# Patient Record
Sex: MALE | Race: BLACK OR AFRICAN AMERICAN | NOT HISPANIC OR LATINO | Employment: FULL TIME | URBAN - METROPOLITAN AREA
[De-identification: names, ages, dates, MRNs, and addresses within clinical notes are randomized per-mention and may not be internally consistent; named-entity substitution may affect disease eponyms.]

---

## 2018-01-10 DIAGNOSIS — M79.671 BILATERAL FOOT PAIN: Primary | ICD-10-CM

## 2018-01-10 DIAGNOSIS — M79.672 BILATERAL FOOT PAIN: Primary | ICD-10-CM

## 2018-02-07 ENCOUNTER — HOSPITAL ENCOUNTER (OUTPATIENT)
Dept: RADIOLOGY | Facility: HOSPITAL | Age: 43
Discharge: HOME | End: 2018-02-07
Attending: PODIATRIST
Payer: COMMERCIAL

## 2018-02-07 ENCOUNTER — OFFICE VISIT (OUTPATIENT)
Dept: PODIATRY | Facility: CLINIC | Age: 43
End: 2018-02-07
Payer: COMMERCIAL

## 2018-02-07 VITALS
SYSTOLIC BLOOD PRESSURE: 130 MMHG | DIASTOLIC BLOOD PRESSURE: 89 MMHG | HEART RATE: 78 BPM | WEIGHT: 239.5 LBS | RESPIRATION RATE: 16 BRPM | HEIGHT: 72 IN | BODY MASS INDEX: 32.44 KG/M2

## 2018-02-07 DIAGNOSIS — M79.671 BILATERAL FOOT PAIN: ICD-10-CM

## 2018-02-07 DIAGNOSIS — M21.6X9 MIDFOOT COLLAPSE, UNSPECIFIED LATERALITY: ICD-10-CM

## 2018-02-07 DIAGNOSIS — M79.672 BILATERAL FOOT PAIN: ICD-10-CM

## 2018-02-07 DIAGNOSIS — B35.3 TINEA PEDIS OF BOTH FEET: ICD-10-CM

## 2018-02-07 DIAGNOSIS — B35.1 ONYCHOMYCOSIS: ICD-10-CM

## 2018-02-07 DIAGNOSIS — M76.821 TIBIALIS TENDINITIS OF BOTH LOWER EXTREMITIES: ICD-10-CM

## 2018-02-07 DIAGNOSIS — M62.469 GASTROCNEMIUS EQUINUS, UNSPECIFIED LATERALITY: ICD-10-CM

## 2018-02-07 DIAGNOSIS — M76.822 TIBIALIS TENDINITIS OF BOTH LOWER EXTREMITIES: ICD-10-CM

## 2018-02-07 DIAGNOSIS — M72.2 PLANTAR FASCIITIS: Primary | ICD-10-CM

## 2018-02-07 PROCEDURE — 73610 X-RAY EXAM OF ANKLE: CPT | Mod: 26,50,, | Performed by: RADIOLOGY

## 2018-02-07 PROCEDURE — 73630 X-RAY EXAM OF FOOT: CPT | Mod: 26,50,, | Performed by: RADIOLOGY

## 2018-02-07 PROCEDURE — 73610 X-RAY EXAM OF ANKLE: CPT | Mod: 50,TC

## 2018-02-07 PROCEDURE — 73630 X-RAY EXAM OF FOOT: CPT | Mod: 50,TC

## 2018-02-07 PROCEDURE — 99213 OFFICE O/P EST LOW 20 MIN: CPT | Mod: 25 | Performed by: PODIATRIST

## 2018-02-07 PROCEDURE — 3008F BODY MASS INDEX DOCD: CPT | Mod: S$GLB,,, | Performed by: PODIATRIST

## 2018-02-07 PROCEDURE — 99999 PR PBB SHADOW E&M-EST. PATIENT-LVL III: CPT | Mod: PBBFAC,,, | Performed by: PODIATRIST

## 2018-02-07 PROCEDURE — 99203 OFFICE O/P NEW LOW 30 MIN: CPT | Mod: 25,S$GLB,, | Performed by: PODIATRIST

## 2018-02-07 RX ORDER — NABUMETONE 500 MG/1
750 TABLET, FILM COATED ORAL 3 TIMES DAILY
COMMUNITY
End: 2018-03-22 | Stop reason: ALTCHOICE

## 2018-02-07 RX ORDER — NAFTIFINE HYDROCHLORIDE 20 MG/G
1 GEL TOPICAL DAILY
Qty: 45 G | Refills: 3 | Status: SHIPPED | OUTPATIENT
Start: 2018-02-07 | End: 2020-01-15

## 2018-02-07 RX ORDER — METOPROLOL SUCCINATE 100 MG/1
100 TABLET, EXTENDED RELEASE ORAL DAILY
COMMUNITY
End: 2018-05-03

## 2018-02-07 RX ORDER — AMOXICILLIN 500 MG
1 CAPSULE ORAL DAILY
COMMUNITY
End: 2018-05-03

## 2018-02-07 RX ORDER — ASPIRIN 81 MG/1
81 TABLET ORAL DAILY
Status: ON HOLD | COMMUNITY
End: 2019-04-16 | Stop reason: HOSPADM

## 2018-02-07 RX ORDER — GABAPENTIN 600 MG/1
600 TABLET ORAL 2 TIMES DAILY
COMMUNITY
End: 2020-01-15

## 2018-02-07 RX ORDER — LISINOPRIL 20 MG/1
20 TABLET ORAL DAILY
COMMUNITY
End: 2018-08-15 | Stop reason: SDUPTHER

## 2018-02-07 NOTE — PROGRESS NOTES
Office Visit 2/7/2018  Last encounter in this department: Visit date not found    Subjective:      Patient ID: Monica Johnson is a 42 y.o. male.    Chief Complaint: Flat Foot and Foot Pain (Bilateral foot pain with possible Planter Fasciitis, Pt rates pain 10/10 when first waking and ambulating, and a 7/10 for the rest of the day, Pt states that he has had this problem since childhood, Pt also states that he is Hypoglycemic, Lst visit with PCP at [facility] on 1/15/18)    Monica Johnson is a 42 y.o. year-old male presenting to podiatry clinic with complaint of bilateral medial and plantar  rearfoot and midfoot pain. Patient describes pain as 6/10 shooting sharp, aching, throbbing, burning, stabbing and numb tingling type pain that has been ongoing for the past 1 years and has worsened. The pain is worse with pressure, increased ambulation, prolonged standing and some shoe gear. The patient also relates he notices the pain on his heel worse on the first steps in the morning. Monica Johnson had not sought any medical attention until now. The only thing that gives true relief is staying non-weightbearing with no pressure on the foot. He does not recall any injuries or inciting events leading to this problem. He does note that he has always had flatfeet all his life and was told that he had chronic sprains on the left ankle as a child growing up.  He also suffers from osteoarthritis in multiple joints and does take Relafen on a regular basis.  He also notices some itching and burning on his feet in between his toe from time to time and has known some scaling peeling to the bottom of his feet which has been ongoing for several years.          Review of Systems   Constitution: Negative for chills and fever.   Cardiovascular: Negative for chest pain.   Respiratory: Negative for shortness of breath.    Gastrointestinal: Negative for nausea and vomiting.           Objective:      Physical Exam    Constitutional: He is oriented to person, place, and time. He appears well-developed and well-nourished. No distress.   Cardiovascular:   Pulses:       Dorsalis pedis pulses are 2+ on the right side, and 2+ on the left side.        Posterior tibial pulses are 2+ on the right side, and 2+ on the left side.   Capillary fill time 3 seconds to digits bilateral feet.   Musculoskeletal:   Lower extremities:    Deformities: Supple pes planovalgus posture bilaterally.     4/5 plantarflexion inversion bilaterally.   5/5 muscle strength and tone in remaining quadrants bilaterally.     Some pulling discomfort on maximal ankle joint dorsiflexion with some limitation bilateral feet.    No pain on side to side compression of the calcaneal body bilateral feet.    Primary area of pain to palpation is the plantar medial calcaneal tubercle bilateral feet and mild pain on palpation along the plantar rim of the calcaneus.     Pain on palpation: medial navicular bilaterally.   Neurological: He is alert and oriented to person, place, and time. He displays no Babinski's sign on the right side. He displays no Babinski's sign on the left side.   Plantar protective threshold sensation by touch via 5.07 SWMF present bilaterally.    Skin:   Lower extremities:    Normal turgor, texture, temperature bilaterally. Digital hair present bilaterally. Warm equally bilaterally.   Mild fullness at the plantarmedial heel bilateral feet.     No bilateral varicosities, pigmentary changes.    No wounds, drainage were noted.    Malodor: None  Erythema: None  Interdigital maceration: webspaces 3,4 bilaterally.     Scaling in moccasin distribution bilaterally. Xerosis bilaterally.    Nails are thick dystrophic and discolored bilaterally x10.     Psychiatric: He has a normal mood and affect.   Nursing note and vitals reviewed.            X-rays: no fracture or dislocation noted, low arch height.    Assessment:       Encounter Diagnoses   Name Primary?     Plantar fasciitis Yes    Tibialis tendinitis of both lower extremities     Gastrocnemius equinus, unspecified laterality     Midfoot collapse, unspecified laterality     Tinea pedis of both feet     Onychomycosis          Plan:       Monica was seen today for flat foot and foot pain.    Diagnoses and all orders for this visit:    Plantar fasciitis    Tibialis tendinitis of both lower extremities    Gastrocnemius equinus, unspecified laterality    Midfoot collapse, unspecified laterality    Tinea pedis of both feet    Onychomycosis    Other orders  -     naftifine 2 % Gel; Apply 1 application topically once daily.  -     efinaconazole (JUBLIA) 10 % Fredis; Apply 1 application topically once daily.      I counseled the patient on his conditions, their implications and medical management.    Patient was given written instructions on maintenance care for athlete's foot and mycotic nails. The need for proper skin care in order to prevent infection and colonization in the nails was explained to the patient. Discuss treatment options for nail fungus.  I explained that fungus lives in a warm dark moist environment and therefore patient should make every attempt to keep feet clean and dry.  We discussed drying feet thoroughly after shower particularly between the toes and then applying powder between the toes and in the shoes.  For the nails, patient was prescribed  Jublia to be applied daily with filing in between applications. We discussed oral Lamisil but I did not recommend it as a first line of treatment since it is an internal medicine that may potentially have side effects, including liver problems. Patient elects for topical treatment.     For the athletes foot, patient was prescribed Naftin gel to apply between the toes and to the bottoms of feet daily. In addition, recommendations were made to spray and disinfect shoes with Lysol and to alternate shoes. Also, for excessive sweating patient was instructed to use  Arid Extra Dry spray on the bottom of the feet with Tinactin powder in the digital interspaces.     Patient was educated and counseled regarding tibial tendinitis. At this time, the patient agreed to proceed with conservative treatment for relative support and immobilization. Until symptoms improve, the patient was instructed to limit ambulation or activities on uneven surfaces that may overwork and stress the affected tendon. Patient was also given recommendations for motion controlling inserts with medial heel wedge or ankle brace and supportive shoes for long term maintenance. If pain persists we may also consider MRI imaging to evaluate the quality of the tendon. We also discussed more aggressive bracing with high top shoe and possible surgical tendon decompression and debridement.    Patient was educated regarding current condition and x-rays were reviewed. Maintenance care consisting of the use of supportive shoe gear at all times, orthotic inserts, stretching, and icing was emphasized to the patient. Patient wished to defer injection today.     Recommendations were given to the patient on powerstep over-the-counter orthotic inserts and motion controlling supportive shoe gear along with written instructions on stretching exercises. The patient was instructed to avoid going barefoot and to return with recommended shoes and insoles to be adjusted and modified as needed.  The patient was also guided on the use of anti-inflammatories for pain and inflammation to be discontinued if any stomach irritation occurs. He may continue his relafen.        .

## 2018-02-07 NOTE — PATIENT INSTRUCTIONS
Wear recommended shoes and insoles and perform stretches and exercises as directed. Discontinue medication if any stomach irritation occurs. Return with shoes and insoles on follow up for adjustments as needed.        ATHLETES FOOT (Tinea Pedis) or FUNGAL (Mycotic) NAILS    Athlete's foot is a fungal infection that develops in the moist areas between your toes and sometimes on other parts of your foot. Athlete's foot usually causes itching, stinging, burning and may also present with scaling and maceration.  When the feet or other areas of the body stay moist, warm, and irritated, this fungus can thrive and infect the upper layer of the skin.  The fungus sometimes also infects the nails as well and usually presents as a discolored, thickened or distorted toenail which the patient has noticed for a period of weeks, months, or years, and may involve one or all of the nails. Because the fungus lives under the nail, it is very difficult to eradicate germ completely.  Fungus toenails are very recalcitrant to treatment and, at best, the patients can hope to control the problem from spreading or diminish the effect of the germ on the nail. Because the germ is present in the environment and something patients are exposed to every day, the chances of completely eradication the fungus nail problem are not great.    Both oral and topical treatments of the fungus are available.  The oral treatments may have negative effects on the liver, so blood work is needed to begin oral therapy.  Even once the fungus has been eradicated, maintenance is required to prevent re-infection.  Topical treatment is also available, but requires persistence and diligence to see effective results.      My recommendations for severe athletes foot are as follows:    - Twice daily application of topical antifungal cream or gel after thorough cleansing and drying.  - If odor and excessive sweating of the feet are a problem an anti-perspirant spray  should be applied to the bottoms of the feet followed by an antifungal powder or lambswool between the toes every day in the morning.  - Also, shoes should be sprayed with a disinfectant after use and allowed to dry for at least 24 hours, therefore shoes should be alternated and not worn on consecutive days.    - In addition, if fungal nails are present, a topical nail lacquer may be applied twice daily with filing of the nail prior to each new coat of medication for better penetration.    Not all medications are effective on every type of fungus, so if problems persist you may need alterations or adjustments to your medication or maintenance regiment.               PAMELA [email or web address].      Apply nail medication daily as directed with filing of nail prior to application.

## 2018-03-22 ENCOUNTER — OFFICE VISIT (OUTPATIENT)
Dept: PODIATRY | Facility: CLINIC | Age: 43
End: 2018-03-22
Payer: COMMERCIAL

## 2018-03-22 VITALS
BODY MASS INDEX: 31.75 KG/M2 | SYSTOLIC BLOOD PRESSURE: 124 MMHG | HEIGHT: 72 IN | WEIGHT: 234.44 LBS | DIASTOLIC BLOOD PRESSURE: 75 MMHG | HEART RATE: 93 BPM

## 2018-03-22 DIAGNOSIS — M76.821 TIBIALIS TENDINITIS OF BOTH LOWER EXTREMITIES: ICD-10-CM

## 2018-03-22 DIAGNOSIS — B35.3 TINEA PEDIS OF BOTH FEET: ICD-10-CM

## 2018-03-22 DIAGNOSIS — B35.1 ONYCHOMYCOSIS: ICD-10-CM

## 2018-03-22 DIAGNOSIS — M21.6X9 MIDFOOT COLLAPSE, UNSPECIFIED LATERALITY: ICD-10-CM

## 2018-03-22 DIAGNOSIS — F17.200 SMOKER: Primary | ICD-10-CM

## 2018-03-22 DIAGNOSIS — M62.469 GASTROCNEMIUS EQUINUS, UNSPECIFIED LATERALITY: ICD-10-CM

## 2018-03-22 DIAGNOSIS — M76.822 TIBIALIS TENDINITIS OF BOTH LOWER EXTREMITIES: ICD-10-CM

## 2018-03-22 DIAGNOSIS — M72.2 PLANTAR FASCIITIS: ICD-10-CM

## 2018-03-22 PROCEDURE — 99999 PR PBB SHADOW E&M-EST. PATIENT-LVL III: CPT | Mod: PBBFAC,,, | Performed by: PODIATRIST

## 2018-03-22 PROCEDURE — 20550 NJX 1 TENDON SHEATH/LIGAMENT: CPT | Mod: 50,S$GLB,, | Performed by: PODIATRIST

## 2018-03-22 PROCEDURE — 99214 OFFICE O/P EST MOD 30 MIN: CPT | Mod: 25,S$GLB,, | Performed by: PODIATRIST

## 2018-03-22 RX ORDER — ESCITALOPRAM OXALATE 20 MG/1
TABLET ORAL
COMMUNITY
Start: 2016-12-27 | End: 2018-05-03

## 2018-03-22 RX ORDER — NAPROXEN 500 MG/1
500 TABLET ORAL 2 TIMES DAILY WITH MEALS
Qty: 60 TABLET | Refills: 1 | Status: SHIPPED | OUTPATIENT
Start: 2018-03-22 | End: 2018-04-21

## 2018-03-22 RX ORDER — DEXAMETHASONE SODIUM PHOSPHATE 4 MG/ML
1 INJECTION, SOLUTION INTRA-ARTICULAR; INTRALESIONAL; INTRAMUSCULAR; INTRAVENOUS; SOFT TISSUE ONCE
Status: COMPLETED | OUTPATIENT
Start: 2018-03-22 | End: 2018-03-22

## 2018-03-22 RX ORDER — TRIAMCINOLONE ACETONIDE 40 MG/ML
20 INJECTION, SUSPENSION INTRA-ARTICULAR; INTRAMUSCULAR ONCE
Status: COMPLETED | OUTPATIENT
Start: 2018-03-22 | End: 2018-03-22

## 2018-03-22 RX ADMIN — DEXAMETHASONE SODIUM PHOSPHATE 1 MG: 4 INJECTION, SOLUTION INTRA-ARTICULAR; INTRALESIONAL; INTRAMUSCULAR; INTRAVENOUS; SOFT TISSUE at 07:03

## 2018-03-22 RX ADMIN — TRIAMCINOLONE ACETONIDE 20 MG: 40 INJECTION, SUSPENSION INTRA-ARTICULAR; INTRAMUSCULAR at 07:03

## 2018-03-22 NOTE — PROGRESS NOTES
Office Visit 3/22/2018  Last encounter in this department: 2/7/2018    Subjective:      Patient ID: Monica Johnson is a 42 y.o. male.    Chief Complaint: Follow-up (PCP N/A, 1 month F/U possible injection Pt states that the pain has worsened and is now in both feet and now has pain that radiates from the feet up to his knees. Rates pain 8/10 and describes it as a sharp, shooting stabbing pain and states that it is constant. He states that he is not a diabetic and is wearing slippers.)    Monica Johnson is a 42 y.o. year-old male following up for bilateral heel and medial midfoot pain. Patient now rates pain as 8/10 shooting  sharp and stabbing that has worsened. The pain is worse with pressure, increased ambulation, prolonged standing and some shoe gear. The patient also relates he did purchase the recommended insoles and shoes but the shoes are on order and have not come in yet.  He has also been continuing with maintenance care and following directions for his athlete's foot and nail fungus.  He states he is in need of refills for his nail fungus medication.  Because of persistent pain he inquires about injection for his heel pain today.  He has been continuing stretching and has been using his pressure relief insoles but continues to have pain and discomfort.  He has also been taking his Relafen prescribed to him by his nurse practitioner for lower osteoarthritis which he has had for years now.  He does not feel that it is helping him very much and is open to trying an alternative.        Review of Systems   Constitution: Negative for chills and fever.   Cardiovascular: Negative for chest pain.   Respiratory: Negative for shortness of breath.    Gastrointestinal: Negative for nausea and vomiting.           Objective:      Physical Exam   Constitutional: He is oriented to person, place, and time. He appears well-developed and well-nourished. No distress.   Cardiovascular:   Pulses:       Dorsalis pedis pulses are  2+ on the right side, and 2+ on the left side.        Posterior tibial pulses are 2+ on the right side, and 2+ on the left side.   Capillary fill time 3 seconds to digits bilateral feet.   Musculoskeletal:   Lower extremities:    Deformities: Supple pes planovalgus posture bilaterally.     4/5 plantarflexion inversion bilaterally.   5/5 muscle strength and tone in remaining quadrants bilaterally.     Some pulling discomfort on maximal ankle joint dorsiflexion with some limitation bilateral feet.    No pain on side to side compression of the calcaneal body bilateral feet.    Primary area of pain to palpation is the plantar medial calcaneal tubercle bilateral feet and mild pain on palpation along the plantar rim of the calcaneus.     Pain on palpation: medial navicular bilaterally.   Neurological: He is alert and oriented to person, place, and time. He displays no Babinski's sign on the right side. He displays no Babinski's sign on the left side.   Plantar protective threshold sensation by touch via 5.07 SWMF present bilaterally.    Skin:   Lower extremities:    Normal turgor, texture, temperature bilaterally. Digital hair present bilaterally. Warm equally bilaterally.   Mild fullness at the plantarmedial heel bilateral feet.     No bilateral varicosities, pigmentary changes.    No wounds, drainage were noted.    Malodor: None  Erythema: None  Interdigital maceration: webspaces 3,4 bilaterally.     Scaling in moccasin distribution bilaterally. Xerosis bilaterally.    Nails are thick dystrophic and discolored bilaterally x10.     Psychiatric: He has a normal mood and affect.   Nursing note and vitals reviewed.                Assessment:       Encounter Diagnoses   Name Primary?    Smoker Yes    Plantar fasciitis     Tibialis tendinitis of both lower extremities     Gastrocnemius equinus, unspecified laterality     Midfoot collapse, unspecified laterality     Tinea pedis of both feet     Onychomycosis           Plan:       Moinca was seen today for follow-up.    Diagnoses and all orders for this visit:    Smoker  -     Ambulatory referral to Smoking Cessation Program    Plantar fasciitis  -     Ambulatory Referral to Physical/Occupational Therapy    Tibialis tendinitis of both lower extremities  -     Ambulatory Referral to Physical/Occupational Therapy    Gastrocnemius equinus, unspecified laterality  -     Ambulatory Referral to Physical/Occupational Therapy    Midfoot collapse, unspecified laterality  -     Ambulatory Referral to Physical/Occupational Therapy    Tinea pedis of both feet    Onychomycosis    Other orders  -     naproxen (NAPROSYN) 500 MG tablet; Take 1 tablet (500 mg total) by mouth 2 (two) times daily with meals.  -     dexamethasone injection 1 mg; Inject 0.25 mLs (1 mg total) into the muscle once.  -     triamcinolone acetonide injection 20 mg; Inject 0.5 mLs (20 mg total) into the muscle once.      I counseled the patient on his conditions, their implications and medical management.    For the athletes foot, patient will continue to apply naftin gel between the toes and to the bottoms of feet daily and keep webspace dry. In addition, recommendations were made to spray and disinfect shoes with Lysol and to alternate shoes. Also, for excessive sweating patient was instructed to use Arid Extra Dry spray on the bottom of the feet with Tinactin powder in the digital interspaces    For mycotic nails patient will continue, Jublia nail lacquer to be applied daily with filing in between applications. We also discussed drying feet thoroughly after shower particularly between the toes and then applying powder between the toes and in the shoes.      Plantar fasciitis with bursitis both heels. Once again emphasized the importance of the use of supportive shoe gear at all times, powersteps orthotic inserts, stretching, and icing.     Because of the persistent pain, the patient did wish to proceed with injection  today.     Procedure: 1st injection was administered to the both heels utilizing a mixture of one mL 1% lidocaine plain, quarter mL of dexamethasone phosphate, and half mL of Kenalog 40.    The patient was also guided on the use of anti-inflammatories for pain and inflammation to be discontinued if any stomach irritation occurs. He will discontinue relafen and try naproxen prescribed today.    Because of continued pain and limitation in range of motion patient was referred to physical therapy.

## 2018-03-22 NOTE — PATIENT INSTRUCTIONS
Mild compression wrap to aspiration/injection site and limit activities over the next 3 days.    Physical therapy as prescribed.

## 2018-04-11 ENCOUNTER — CLINICAL SUPPORT (OUTPATIENT)
Dept: SMOKING CESSATION | Facility: CLINIC | Age: 43
End: 2018-04-11
Payer: COMMERCIAL

## 2018-04-11 VITALS — SYSTOLIC BLOOD PRESSURE: 143 MMHG | DIASTOLIC BLOOD PRESSURE: 90 MMHG

## 2018-04-11 DIAGNOSIS — F17.210 NICOTINE DEPENDENCE, CIGARETTES, UNCOMPLICATED: Primary | ICD-10-CM

## 2018-04-11 PROCEDURE — 99404 PREV MED CNSL INDIV APPRX 60: CPT | Mod: S$GLB,,, | Performed by: INTERNAL MEDICINE

## 2018-04-11 PROCEDURE — 99999 PR PBB SHADOW E&M-EST. PATIENT-LVL II: CPT | Mod: PBBFAC,,,

## 2018-04-11 RX ORDER — IBUPROFEN 200 MG
1 TABLET ORAL DAILY
Qty: 28 PATCH | Refills: 0 | Status: SHIPPED | OUTPATIENT
Start: 2018-04-11 | End: 2019-04-22

## 2018-04-13 DIAGNOSIS — M25.561 PAIN IN BOTH KNEES, UNSPECIFIED CHRONICITY: Primary | ICD-10-CM

## 2018-04-13 DIAGNOSIS — M25.562 PAIN IN BOTH KNEES, UNSPECIFIED CHRONICITY: Primary | ICD-10-CM

## 2018-04-24 ENCOUNTER — TELEPHONE (OUTPATIENT)
Dept: SMOKING CESSATION | Facility: CLINIC | Age: 43
End: 2018-04-24

## 2018-04-24 NOTE — TELEPHONE ENCOUNTER
Attempted to contact patient regarding missed sessions, Left message with name Lew Saxena and return phone number [phone].

## 2018-05-03 ENCOUNTER — OFFICE VISIT (OUTPATIENT)
Dept: PODIATRY | Facility: CLINIC | Age: 43
End: 2018-05-03
Payer: COMMERCIAL

## 2018-05-03 ENCOUNTER — OFFICE VISIT (OUTPATIENT)
Dept: INTERNAL MEDICINE | Facility: CLINIC | Age: 43
End: 2018-05-03
Payer: COMMERCIAL

## 2018-05-03 VITALS
SYSTOLIC BLOOD PRESSURE: 137 MMHG | WEIGHT: 236.44 LBS | BODY MASS INDEX: 32.03 KG/M2 | HEART RATE: 91 BPM | HEIGHT: 72 IN | DIASTOLIC BLOOD PRESSURE: 86 MMHG

## 2018-05-03 VITALS
OXYGEN SATURATION: 97 % | SYSTOLIC BLOOD PRESSURE: 136 MMHG | HEIGHT: 72 IN | BODY MASS INDEX: 32.01 KG/M2 | WEIGHT: 236.31 LBS | HEART RATE: 80 BPM | DIASTOLIC BLOOD PRESSURE: 80 MMHG | TEMPERATURE: 97 F

## 2018-05-03 DIAGNOSIS — M72.2 PLANTAR FASCIITIS: ICD-10-CM

## 2018-05-03 DIAGNOSIS — M76.822 TIBIALIS TENDINITIS OF BOTH LOWER EXTREMITIES: ICD-10-CM

## 2018-05-03 DIAGNOSIS — M19.90 OSTEOARTHRITIS, UNSPECIFIED OSTEOARTHRITIS TYPE, UNSPECIFIED SITE: ICD-10-CM

## 2018-05-03 DIAGNOSIS — M62.469 GASTROCNEMIUS EQUINUS, UNSPECIFIED LATERALITY: ICD-10-CM

## 2018-05-03 DIAGNOSIS — M76.821 TIBIALIS TENDINITIS OF BOTH LOWER EXTREMITIES: ICD-10-CM

## 2018-05-03 DIAGNOSIS — Z00.00 ANNUAL PHYSICAL EXAM: Primary | ICD-10-CM

## 2018-05-03 DIAGNOSIS — M72.2 PLANTAR FASCIITIS: Primary | ICD-10-CM

## 2018-05-03 DIAGNOSIS — F43.0 STRESS REACTION: ICD-10-CM

## 2018-05-03 DIAGNOSIS — I10 ESSENTIAL HYPERTENSION: ICD-10-CM

## 2018-05-03 PROBLEM — M54.32 SCIATICA OF LEFT SIDE: Status: ACTIVE | Noted: 2017-01-31

## 2018-05-03 PROCEDURE — 99386 PREV VISIT NEW AGE 40-64: CPT | Mod: S$GLB,,, | Performed by: FAMILY MEDICINE

## 2018-05-03 PROCEDURE — 3079F DIAST BP 80-89 MM HG: CPT | Mod: CPTII,S$GLB,, | Performed by: PODIATRIST

## 2018-05-03 PROCEDURE — 3079F DIAST BP 80-89 MM HG: CPT | Mod: CPTII,S$GLB,, | Performed by: FAMILY MEDICINE

## 2018-05-03 PROCEDURE — 99213 OFFICE O/P EST LOW 20 MIN: CPT | Mod: 25,S$GLB,, | Performed by: PODIATRIST

## 2018-05-03 PROCEDURE — 3075F SYST BP GE 130 - 139MM HG: CPT | Mod: CPTII,S$GLB,, | Performed by: PODIATRIST

## 2018-05-03 PROCEDURE — 20550 NJX 1 TENDON SHEATH/LIGAMENT: CPT | Mod: LT,S$GLB,, | Performed by: PODIATRIST

## 2018-05-03 PROCEDURE — 3008F BODY MASS INDEX DOCD: CPT | Mod: CPTII,S$GLB,, | Performed by: PODIATRIST

## 2018-05-03 PROCEDURE — 3075F SYST BP GE 130 - 139MM HG: CPT | Mod: CPTII,S$GLB,, | Performed by: FAMILY MEDICINE

## 2018-05-03 PROCEDURE — 3008F BODY MASS INDEX DOCD: CPT | Mod: CPTII,S$GLB,, | Performed by: FAMILY MEDICINE

## 2018-05-03 PROCEDURE — 99999 PR PBB SHADOW E&M-EST. PATIENT-LVL III: CPT | Mod: PBBFAC,,, | Performed by: FAMILY MEDICINE

## 2018-05-03 PROCEDURE — 99999 PR PBB SHADOW E&M-EST. PATIENT-LVL III: CPT | Mod: PBBFAC,,, | Performed by: PODIATRIST

## 2018-05-03 RX ORDER — DILTIAZEM HYDROCHLORIDE 120 MG/1
120 TABLET, FILM COATED ORAL 4 TIMES DAILY
COMMUNITY
End: 2018-08-15 | Stop reason: SDUPTHER

## 2018-05-03 RX ORDER — NAPROXEN 500 MG/1
500 TABLET ORAL 2 TIMES DAILY WITH MEALS
Qty: 60 TABLET | Refills: 1 | Status: SHIPPED | OUTPATIENT
Start: 2018-05-03 | End: 2018-05-03

## 2018-05-03 RX ORDER — TRIAMCINOLONE ACETONIDE 40 MG/ML
20 INJECTION, SUSPENSION INTRA-ARTICULAR; INTRAMUSCULAR ONCE
Status: COMPLETED | OUTPATIENT
Start: 2018-05-03 | End: 2018-05-03

## 2018-05-03 RX ORDER — DEXAMETHASONE SODIUM PHOSPHATE 4 MG/ML
1 INJECTION, SOLUTION INTRA-ARTICULAR; INTRALESIONAL; INTRAMUSCULAR; INTRAVENOUS; SOFT TISSUE ONCE
Status: COMPLETED | OUTPATIENT
Start: 2018-05-03 | End: 2018-05-03

## 2018-05-03 RX ADMIN — DEXAMETHASONE SODIUM PHOSPHATE 1 MG: 4 INJECTION, SOLUTION INTRA-ARTICULAR; INTRALESIONAL; INTRAMUSCULAR; INTRAVENOUS; SOFT TISSUE at 09:05

## 2018-05-03 RX ADMIN — TRIAMCINOLONE ACETONIDE 20 MG: 40 INJECTION, SUSPENSION INTRA-ARTICULAR; INTRAMUSCULAR at 09:05
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
## 2018-05-05 NOTE — PROGRESS NOTES
Office Visit Last encounter in this department: 3/22/2018    Subjective:      Patient ID: Monica Johnson is a 42 y.o. male.    Chief Complaint: follow up (plantar fasciitis (bilateral), tendonitis (left foot),patient rated curent pain at a 4, patient is accomapanied by his wife )    Monica Johnson returns to clinic today for followup of pain, both heels.  Patient reports continued 4/10 sharp pain on first steps in the morning and late in the day despite physical therapy, injection, motion control shoes, supportive insoles, stretching and oral anti inflammatories as directed.  He relates that the injection did work remarkably well for quite some time but about 2 weeks ago he had flareup of pain in both his heels.  Patient returns today with new recommended shoes and insoles.       Review of Systems   Constitution: Negative for chills and fever.   Cardiovascular: Negative for chest pain.   Respiratory: Negative for shortness of breath.    Gastrointestinal: Negative for nausea and vomiting.           Objective:      Physical Exam   Constitutional: He is oriented to person, place, and time. He appears well-developed and well-nourished. No distress.   Cardiovascular:   Pulses:       Dorsalis pedis pulses are 2+ on the right side, and 2+ on the left side.        Posterior tibial pulses are 2+ on the right side, and 2+ on the left side.   Capillary fill time 3 seconds to digits bilateral feet.   Musculoskeletal:   Lower extremities:    Deformities: Supple pes planovalgus posture bilaterally.     4/5 plantarflexion inversion bilaterally.   5/5 muscle strength and tone in remaining quadrants bilaterally.     Some pulling discomfort on maximal ankle joint dorsiflexion with some limitation bilateral feet.    pain on side to side compression of the calcaneal body right.    Primary area of pain to palpation is the plantar medial calcaneal tubercle bilateral feet and mild pain on palpation along the plantar rim of the calcaneus.      Pain on palpation: medial navicular bilaterally.   Neurological: He is alert and oriented to person, place, and time. He displays no Babinski's sign on the right side. He displays no Babinski's sign on the left side.   Plantar protective threshold sensation by touch via 5.07 SWMF present bilaterally.    Skin:   Lower extremities:    Normal turgor, texture, temperature bilaterally. Digital hair present bilaterally. Warm equally bilaterally.   Mild fullness at the plantarmedial heel bilateral feet.     No bilateral varicosities, pigmentary changes.    No wounds, drainage were noted.    Malodor: None  Erythema: None  Interdigital maceration: webspaces 3,4 bilaterally.     Scaling in moccasin distribution bilaterally. Xerosis bilaterally.    Nails are thick dystrophic and discolored bilaterally x10.     Psychiatric: He has a normal mood and affect.   Nursing note and vitals reviewed.                Assessment:       Encounter Diagnoses   Name Primary?    Plantar fasciitis Yes    Tibialis tendinitis of both lower extremities     Gastrocnemius equinus, unspecified laterality     Stress reaction          Plan:       Monica was seen today for follow up.    Diagnoses and all orders for this visit:    Plantar fasciitis    Tibialis tendinitis of both lower extremities    Gastrocnemius equinus, unspecified laterality    Stress reaction    Other orders  -     Discontinue: naproxen (NAPROSYN) 500 MG tablet; Take 1 tablet (500 mg total) by mouth 2 (two) times daily with meals.  -     dexamethasone injection 1 mg; Inject 0.25 mLs (1 mg total) into the muscle once.  -     triamcinolone acetonide injection 20 mg; Inject 0.5 mLs (20 mg total) into the muscle once.      I counseled the patient on his conditions, their implications and medical management.    Findings were reviewed and explained condition to the patient with visual reference to the foot and x-rays. I explained to the patient that there were signs indicating early  stages of developing a stress fracture. I emphasized the importance of remaining nonweightbearing to prevent a through and through fracture. Camwalker was dispensed to offload pressure over the area of the stress reaction right foot.     Plantar fasciitis with acquired ankle joint equinus left foot. Once again emphasized the importance of the use of supportive shoe gear at all times, NB orthotic inserts, stretching, and icing.     Because of the persistent pain, the patient did wish to proceed with injection today.     Procedure: 2nd injection was administered to the left foot utilizing a mixture of one mL 1% lidocaine plain, quarter mL of dexamethasone phosphate, and half mL of Kenalog 40.    The patient was also guided on the use of anti-inflammatories for pain and inflammation to be discontinued if any stomach irritation occurs.    .

## 2018-05-07 ENCOUNTER — LAB VISIT (OUTPATIENT)
Dept: LAB | Facility: HOSPITAL | Age: 43
End: 2018-05-07
Attending: FAMILY MEDICINE
Payer: COMMERCIAL

## 2018-05-07 DIAGNOSIS — Z00.00 ANNUAL PHYSICAL EXAM: ICD-10-CM

## 2018-05-07 LAB
ALBUMIN SERPL BCP-MCNC: 4 G/DL
ALP SERPL-CCNC: 55 U/L
ALT SERPL W/O P-5'-P-CCNC: 58 U/L
ANION GAP SERPL CALC-SCNC: 9 MMOL/L
AST SERPL-CCNC: 45 U/L
BASOPHILS # BLD AUTO: 0.13 K/UL
BASOPHILS NFR BLD: 1.2 %
BILIRUB SERPL-MCNC: 0.3 MG/DL
BUN SERPL-MCNC: 16 MG/DL
CALCIUM SERPL-MCNC: 10 MG/DL
CHLORIDE SERPL-SCNC: 104 MMOL/L
CHOLEST SERPL-MCNC: 177 MG/DL
CHOLEST/HDLC SERPL: 4.7 {RATIO}
CO2 SERPL-SCNC: 28 MMOL/L
CREAT SERPL-MCNC: 1.2 MG/DL
DIFFERENTIAL METHOD: ABNORMAL
EOSINOPHIL # BLD AUTO: 0.4 K/UL
EOSINOPHIL NFR BLD: 4 %
ERYTHROCYTE [DISTWIDTH] IN BLOOD BY AUTOMATED COUNT: 14.8 %
EST. GFR  (AFRICAN AMERICAN): >60 ML/MIN/1.73 M^2
EST. GFR  (NON AFRICAN AMERICAN): >60 ML/MIN/1.73 M^2
GLUCOSE SERPL-MCNC: 84 MG/DL
HCT VFR BLD AUTO: 43.8 %
HDLC SERPL-MCNC: 38 MG/DL
HDLC SERPL: 21.5 %
HGB BLD-MCNC: 13.9 G/DL
IMM GRANULOCYTES # BLD AUTO: 0.05 K/UL
IMM GRANULOCYTES NFR BLD AUTO: 0.5 %
LDLC SERPL CALC-MCNC: 109.2 MG/DL
LYMPHOCYTES # BLD AUTO: 5 K/UL
LYMPHOCYTES NFR BLD: 47.7 %
MCH RBC QN AUTO: 28.1 PG
MCHC RBC AUTO-ENTMCNC: 31.7 G/DL
MCV RBC AUTO: 89 FL
MONOCYTES # BLD AUTO: 0.9 K/UL
MONOCYTES NFR BLD: 8.1 %
NEUTROPHILS # BLD AUTO: 4.1 K/UL
NEUTROPHILS NFR BLD: 38.5 %
NONHDLC SERPL-MCNC: 139 MG/DL
NRBC BLD-RTO: 0 /100 WBC
PLATELET # BLD AUTO: 307 K/UL
PMV BLD AUTO: 10.5 FL
POTASSIUM SERPL-SCNC: 3.9 MMOL/L
PROT SERPL-MCNC: 7.2 G/DL
RBC # BLD AUTO: 4.94 M/UL
SODIUM SERPL-SCNC: 141 MMOL/L
TRIGL SERPL-MCNC: 149 MG/DL
WBC # BLD AUTO: 10.54 K/UL

## 2018-05-07 PROCEDURE — 36415 COLL VENOUS BLD VENIPUNCTURE: CPT

## 2018-05-07 PROCEDURE — 80053 COMPREHEN METABOLIC PANEL: CPT

## 2018-05-07 PROCEDURE — 80061 LIPID PANEL: CPT

## 2018-05-07 PROCEDURE — 85025 COMPLETE CBC W/AUTO DIFF WBC: CPT

## 2018-05-08 ENCOUNTER — TELEPHONE (OUTPATIENT)
Dept: INTERNAL MEDICINE | Facility: CLINIC | Age: 43
End: 2018-05-08

## 2018-05-08 DIAGNOSIS — R79.89 ELEVATED LIVER FUNCTION TESTS: Primary | ICD-10-CM

## 2018-05-08 NOTE — PROGRESS NOTES
Call patient with labs  Normal glucose  Liver function is elevated.  He will need to have follow up Hepatitis Panel and liver US.   Cholesterol is fine  CBC is unremarkable.     I will order the follow up tests.  Ask if he drank ETOH prior to the testing.     FOllow up labs ordered, schedule him for this and the liver US>

## 2018-05-08 NOTE — TELEPHONE ENCOUNTER
----- Message from Kenya Escoto MD sent at 5/8/2018  8:04 AM CDT -----  Call patient with labs  Normal glucose  Liver function is elevated.  He will need to have follow up Hepatitis Panel and liver US.   Cholesterol is fine  CBC is unremarkable.     I will order the follow up tests.  Ask if he drank ETOH prior to the testing.     FOllow up labs ordered, schedule him for this and the liver US>

## 2018-05-10 ENCOUNTER — TELEPHONE (OUTPATIENT)
Dept: RADIOLOGY | Facility: HOSPITAL | Age: 43
End: 2018-05-10

## 2018-05-10 ENCOUNTER — LAB VISIT (OUTPATIENT)
Dept: LAB | Facility: HOSPITAL | Age: 43
End: 2018-05-10
Payer: COMMERCIAL

## 2018-05-10 DIAGNOSIS — R79.89 ELEVATED LIVER FUNCTION TESTS: ICD-10-CM

## 2018-05-10 PROCEDURE — 80074 ACUTE HEPATITIS PANEL: CPT

## 2018-05-10 PROCEDURE — 36415 COLL VENOUS BLD VENIPUNCTURE: CPT

## 2018-05-11 ENCOUNTER — HOSPITAL ENCOUNTER (OUTPATIENT)
Dept: RADIOLOGY | Facility: HOSPITAL | Age: 43
Discharge: HOME | End: 2018-05-11
Attending: FAMILY MEDICINE
Payer: COMMERCIAL

## 2018-05-11 DIAGNOSIS — R79.89 ELEVATED LIVER FUNCTION TESTS: ICD-10-CM

## 2018-05-11 LAB
HAV IGM SERPL QL IA: NEGATIVE
HBV CORE IGM SERPL QL IA: NEGATIVE
HBV SURFACE AG SERPL QL IA: NEGATIVE
HCV AB SERPL QL IA: NEGATIVE

## 2018-05-11 PROCEDURE — 76705 ECHO EXAM OF ABDOMEN: CPT | Mod: TC

## 2018-05-11 PROCEDURE — 76705 ECHO EXAM OF ABDOMEN: CPT | Mod: 26,,, | Performed by: RADIOLOGY

## 2018-05-15 PROBLEM — R79.89 ELEVATED LIVER FUNCTION TESTS: Status: ACTIVE | Noted: 2018-05-15
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
## 2018-05-16 ENCOUNTER — OFFICE VISIT (OUTPATIENT)
Dept: INTERNAL MEDICINE | Facility: CLINIC | Age: 43
End: 2018-05-16
Payer: MEDICAID

## 2018-05-16 VITALS
HEIGHT: 72 IN | BODY MASS INDEX: 31.95 KG/M2 | DIASTOLIC BLOOD PRESSURE: 88 MMHG | HEART RATE: 91 BPM | SYSTOLIC BLOOD PRESSURE: 122 MMHG | OXYGEN SATURATION: 97 % | WEIGHT: 235.88 LBS | TEMPERATURE: 97 F

## 2018-05-16 DIAGNOSIS — I10 ESSENTIAL HYPERTENSION: Primary | ICD-10-CM

## 2018-05-16 DIAGNOSIS — R79.89 ELEVATED LIVER FUNCTION TESTS: ICD-10-CM

## 2018-05-16 DIAGNOSIS — M19.90 OSTEOARTHRITIS, UNSPECIFIED OSTEOARTHRITIS TYPE, UNSPECIFIED SITE: ICD-10-CM

## 2018-05-16 PROCEDURE — 99999 PR PBB SHADOW E&M-EST. PATIENT-LVL III: CPT | Mod: PBBFAC,,, | Performed by: FAMILY MEDICINE

## 2018-05-16 PROCEDURE — 99213 OFFICE O/P EST LOW 20 MIN: CPT | Mod: PBBFAC | Performed by: FAMILY MEDICINE

## 2018-05-16 PROCEDURE — 99214 OFFICE O/P EST MOD 30 MIN: CPT | Mod: S$PBB,,, | Performed by: FAMILY MEDICINE

## 2018-05-16 NOTE — PATIENT INSTRUCTIONS
Treating Arthritis in the Foot  If your symptoms are mild, medications may be enough to reduce pain and swelling. For more severe arthritis, surgery may be needed to improve the condition of the joint.    Medicine  Your doctor may prescribe medicine--pills or injections--to limit pain and swelling. Ice, aspirin, acetaminophen, or ibuprofen may help relieve mild symptoms that occur after activity.  Surgery and bone trimming  To ease movement and reduce pain, your doctor may trim damaged bone. If arthritis is severe, the joint may be fused or removed. If the bone is not damaged too badly, your doctor may simply shave away bone spurs. Any excess bone growth related to a bunion may also be trimmed.  Fusing joints  If damage is more severe, your doctor may fuse the joint to prevent the bones from rubbing. Afterward, staples, plates, or screws may hold the bones in place so they heal properly. In some cases, the joint may be removed and replaced with an implant.  After surgery  During the early stages of recovery, your foot is likely to be bandaged and immobilized for a while. For best results, follow up with your doctor as scheduled. These visits help ensure that your foot heals properly.  As you heal  After surgery, youll be told how to care for your incision and how soon to begin walking on the foot. Until the foot can bear weight, you may need to walk with crutches or a cane.  For surgery on the big toe, your foot may be splinted to limit movement for several weeks. Despite this, you should be able to walk soon after surgery.  For surgery on rear or midfoot joints, you may need to wear a cast or surgical shoe. These joints are fairly large, so full recovery may take a few months. Once the bone has healed, any staples, plates, or screws may be removed.  Date Last Reviewed: 7/1/2016  © 6904-3091 [email or web address]. [address] [place] [address], [place] [address]. All rights reserved. This information is not intended  as a substitute for professional medical care. Always follow your healthcare professional's instructions.

## 2018-05-22 ENCOUNTER — HOSPITAL ENCOUNTER (OUTPATIENT)
Dept: RADIOLOGY | Facility: HOSPITAL | Age: 43
Discharge: HOME | End: 2018-05-22
Attending: ORTHOPAEDIC SURGERY
Payer: MEDICAID

## 2018-05-22 ENCOUNTER — OFFICE VISIT (OUTPATIENT)
Dept: ORTHOPEDICS | Facility: CLINIC | Age: 43
End: 2018-05-22
Payer: MEDICAID

## 2018-05-22 VITALS
WEIGHT: 235.88 LBS | HEART RATE: 91 BPM | SYSTOLIC BLOOD PRESSURE: 131 MMHG | HEIGHT: 72 IN | DIASTOLIC BLOOD PRESSURE: 82 MMHG | BODY MASS INDEX: 31.95 KG/M2

## 2018-05-22 DIAGNOSIS — S83.241A ACUTE MEDIAL MENISCUS TEAR OF RIGHT KNEE, INITIAL ENCOUNTER: Primary | ICD-10-CM

## 2018-05-22 DIAGNOSIS — M25.561 PAIN IN BOTH KNEES, UNSPECIFIED CHRONICITY: ICD-10-CM

## 2018-05-22 DIAGNOSIS — R93.89 ABNORMAL X-RAY: ICD-10-CM

## 2018-05-22 DIAGNOSIS — M25.562 PAIN IN BOTH KNEES, UNSPECIFIED CHRONICITY: ICD-10-CM

## 2018-05-22 DIAGNOSIS — M94.261 CHONDROMALACIA OF RIGHT KNEE: ICD-10-CM

## 2018-05-22 PROCEDURE — 73562 X-RAY EXAM OF KNEE 3: CPT | Mod: TC,50,FY,PO

## 2018-05-22 PROCEDURE — 99999 PR PBB SHADOW E&M-EST. PATIENT-LVL III: CPT | Mod: PBBFAC,,, | Performed by: ORTHOPAEDIC SURGERY

## 2018-05-22 PROCEDURE — 99203 OFFICE O/P NEW LOW 30 MIN: CPT | Mod: S$PBB,,, | Performed by: ORTHOPAEDIC SURGERY

## 2018-05-22 PROCEDURE — 99213 OFFICE O/P EST LOW 20 MIN: CPT | Mod: PBBFAC,25,PO | Performed by: ORTHOPAEDIC SURGERY

## 2018-05-22 PROCEDURE — 73562 X-RAY EXAM OF KNEE 3: CPT | Mod: 26,50,, | Performed by: RADIOLOGY

## 2018-05-22 NOTE — PROGRESS NOTES
This consultation has been requested my Dr. Kenya Escoto .  Please make certain that  she gets a copy of this consultation report.        CC: This is a 42 year old male that complains of right knee pain.  Chief Complaint   Patient presents with    Right Knee - Pain    Left Knee - Pain       HPI: The patient was at home when he fell onto the right knee, Sunday.  He has been having pain in the right knee pain for 10-20 years.  The patient states that he goes to pain management for chronic pain.  He states the he is aware that pain medication is addictive.     PMH:    Past Medical History:   Diagnosis Date    ADHD (attention deficit hyperactivity disorder)     Allergy     Anxiety 1/25/2016    Arthritis 2014    Osteoarthritis    Depression 1/25/2016    Hypertension     Sciatica of left side 1/31/2017       PSH:  History reviewed. No pertinent surgical history.    Family Hx:  History reviewed. No pertinent family history.    Allergy:  Review of patient's allergies indicates:  No Known Allergies    Medication:    Current Outpatient Prescriptions:     aspirin (ECOTRIN) 81 MG EC tablet, Take 81 mg by mouth once daily., Disp: , Rfl:     diltiaZEM (CARDIZEM) 120 MG tablet, Take 120 mg by mouth 4 (four) times daily., Disp: , Rfl:     efinaconazole (JUBLIA) 10 % Fredis, Apply 1 application topically once daily., Disp: 8 mL, Rfl: 11    gabapentin (NEURONTIN) 600 MG tablet, Take 600 mg by mouth 2 (two) times daily., Disp: , Rfl:     lisinopril (PRINIVIL,ZESTRIL) 20 MG tablet, Take 20 mg by mouth once daily., Disp: , Rfl:     naftifine 2 % Gel, Apply 1 application topically once daily., Disp: 45 g, Rfl: 3    nicotine (NICODERM CQ) 21 mg/24 hr, Place 1 patch onto the skin once daily., Disp: 28 patch, Rfl: 0    Social History:    Social History     Social History    Marital status:      Spouse name: N/A    Number of children: N/A    Years of education: N/A     Occupational History    Not on file.     Social  History Main Topics    Smoking status: Current Every Day Smoker     Packs/day: 0.50     Types: Cigarettes    Smokeless tobacco: Never Used    Alcohol use Yes      Comment: Socially    Drug use: Yes     Types: Marijuana    Sexual activity: Yes     Other Topics Concern    Not on file     Social History Narrative    No narrative on file       Vitals:   /82   Pulse 91   Ht 6' (1.829 m)   Wt 107 kg (235 lb 14.3 oz)   BMI 31.99 kg/m²      ROS:  GENERAL: No fever, chills, fatigability or weight loss.  SKIN: No rashes, itching or changes in color or texture of skin.  HEAD: No headaches or recent head trauma.  EYES: Visual acuity fine. No photophobia, ocular pain or diplopia.  EARS: Denies ear pain, discharge or vertigo.  NOSE: No loss of smell, no epistaxis or postnasal drip.  MOUTH & THROAT: No hoarseness or change in voice. No excessive gum bleeding.  NODES: Denies swollen glands.  CHEST: Denies LOOMIS, cyanosis, wheezing, cough and sputum production.  CARDIOVASCULAR: Denies chest pain, PND, orthopnea or reduced exercise tolerance.  ABDOMEN: Appetite fine. No weight loss. Denies diarrhea, abdominal pain, hematemesis or blood in stool.  URINARY: No flank pain, dysuria or hematuria.  PERIPHERAL VASCULAR: No claudication or cyanosis.  NEUROLOGIC: No history of seizures, paralysis, alteration of gait or coordination.  MUSCULOSKELETAL: See HPI    PE:  APPEARANCE: Well nourished, well developed, in no acute distress.   HEAD: Normocephalic, atraumatic.  EYES: PERRL. EOMI.   EARS: TM's intact. Light reflex normal. No retraction or perforation.   NOSE: Mucosa pink. Airway clear.  MOUTH & THROAT: No tonsillar enlargement. No pharyngeal erythema or exudate. No stridor.  NECK: Supple.   NODES: No cervical, axillary or inguinal lymph node enlargement.  CHEST: Lungs clear to auscultation.  CARDIOVASCULAR: Normal S1, S2. No rubs, murmurs or gallops.  ABDOMEN: Bowel sounds normal. Not distended. Soft. No tenderness or  masses.  NEUROLOGIC: Cranial Nerves: II-XII grossly intact, also see MUSCULOSKELETAL  MUSCULOSKELETAL:      Right  Knee Exam-abnormal    Gait-abnormal  Muscle Appearance:abnormal  Grooming:normal  Spine Alignment-normal  Muscle Atrophy-Positive  Deformities-Negative  Tenderness-Positive  Paresthesias-Negative  Range of Motion         Ext-normal, 0 degrees         Flex-abnormal  Muscle Strength-abnormal  Sensation-normal  Reflexes-normal  Crepitus-Positive                                Swelling-Negative  Effusion- Positive                                Edema-Negative  Lachman-Negative                                Erythema-Negative  Bob's-Positive                              Apley Grind-Positive  Patellar Comp-Positive                         Alignment-normal/symmetric  Patellar Apprehension-Negative              Synovial fullness-Positive  Passive Patellar Tilt-normal  Patellar Tracking-normal   Patellar Glide-normal  Q-Angle at 90 degrees-normal  Patellar Grind-abnormal  [id number]  Fatigue-Negative                                     HS Tightness-Negative  Tests on Exam, No ligamentous laxity  Neurovascular Status-normal+2 DP and PT artery pulses  Skin-normal    Assessment:  X-ray Knee Ortho Bilateral  Narrative: EXAMINATION:  XR KNEE ORTHO BILAT    CLINICAL HISTORY:  Pain in right knee    TECHNIQUE:  AP standing, lateral and Merchant views of both knees were performed.    COMPARISON:  None    FINDINGS:  Mild bilateral patellar tilt is seen.  No significant joint effusion on either side.  The joint spaces are maintained.  No marginal spurring.  No acute osseous abnormality.  Impression: As above    Electronically signed by: Kike Suresh MD  Date:    05/22/2018  Time:    08:34             Diagnosis:              1. Right patellar chondromalacia               2. Right knee medial meniscus tear    Diagnostic Studies  MRI-Yes, right knee  X-Ray-No  EMG/NCV-No  Arthrogram-No  Bone Scan-No  CT  Scan-No  Doppler-No  ESR-No  CRP-No  CBC with Diff-No   Rheumatoid/Arthritis Panel-No      Plan:                                                 1. PT-yes                                                 2.OT-no                                          3.NSAID-yes                                        4. Narcotics-no                                     5. Wound care-No                                 6. Rest-yes                                           7. Surgery-no                                         8. ILEANA Hose-no                                    9. Anticoagulation therapy-no               10. Elevation-no                                     11. Crutches-no                                    12. Walker-no             13. Cane no                        14. Referral-no                                     15.Injection-no                            16. Splint   /    Cast   /   Cast Shoe-No              17. RICE-none            18. Follow up-  3 weeks

## 2018-05-22 NOTE — LETTER
May 23, 2018      Yuki Funez, DPM  [address] [place] [address]           [facility]  [address]  [place] [address]  Phone: [phone]  Fax: [phone]          Patient: Monica Johnson   MR Number: [id number]   YOB: 1975   Date of Visit: 5/22/2018       Dear Dr. Yuki Funez:    Thank you for referring Monica Johnson to me for evaluation. Attached you will find relevant portions of my assessment and plan of care.    If you have questions, please do not hesitate to call me. I look forward to following Monica Johnson along with you.    Sincerely,    Juan Manuel Walker Sr., MD    Enclosure  CC:  No Recipients    If you would like to receive this communication electronically, please contact [email or web address] or [phone] to request more information on [facility] access.    For providers and/or their staff who would like to refer a patient to [facility], please contact us through our one-stop-shop provider referral line, [facility], at [phone].    If you feel you have received this communication in error or would no longer like to receive these types of communications, please e-mail [email or web address]

## 2018-05-23 ENCOUNTER — OFFICE VISIT (OUTPATIENT)
Dept: PODIATRY | Facility: CLINIC | Age: 43
End: 2018-05-23
Payer: MEDICAID

## 2018-05-23 VITALS
DIASTOLIC BLOOD PRESSURE: 84 MMHG | BODY MASS INDEX: 31.99 KG/M2 | HEIGHT: 72 IN | SYSTOLIC BLOOD PRESSURE: 142 MMHG | RESPIRATION RATE: 16 BRPM | HEART RATE: 92 BPM

## 2018-05-23 DIAGNOSIS — M76.821 TIBIALIS TENDINITIS OF BOTH LOWER EXTREMITIES: ICD-10-CM

## 2018-05-23 DIAGNOSIS — M76.822 TIBIALIS TENDINITIS OF BOTH LOWER EXTREMITIES: ICD-10-CM

## 2018-05-23 DIAGNOSIS — M72.2 PLANTAR FASCIITIS: ICD-10-CM

## 2018-05-23 DIAGNOSIS — M62.469 GASTROCNEMIUS EQUINUS, UNSPECIFIED LATERALITY: ICD-10-CM

## 2018-05-23 DIAGNOSIS — F43.0 STRESS REACTION: Primary | ICD-10-CM

## 2018-05-23 PROBLEM — S83.241A ACUTE MEDIAL MENISCUS TEAR OF RIGHT KNEE: Status: ACTIVE | Noted: 2018-05-23

## 2018-05-23 PROBLEM — M94.261 CHONDROMALACIA OF RIGHT KNEE: Status: ACTIVE | Noted: 2018-05-23

## 2018-05-23 PROCEDURE — 99999 PR PBB SHADOW E&M-EST. PATIENT-LVL III: CPT | Mod: PBBFAC,,, | Performed by: PODIATRIST

## 2018-05-23 PROCEDURE — 99213 OFFICE O/P EST LOW 20 MIN: CPT | Mod: PBBFAC | Performed by: PODIATRIST

## 2018-05-23 PROCEDURE — 99213 OFFICE O/P EST LOW 20 MIN: CPT | Mod: 25,S$PBB,, | Performed by: PODIATRIST

## 2018-05-23 NOTE — PATIENT INSTRUCTIONS
How Your Knee Works  A healthy knee bends easily and rotates slightly. The joint absorbs stress and moves smoothly. This allows you to walk, squat, and turn without pain.    A healthy knee  The knee is a hinge joint, formed where the thighbone (femur) and the shinbone (tibia) meet. It is the largest joint in the body. The joint is covered with smooth tissue and powered by large muscles. When all the parts listed below are healthy, a knee should move easily:  · Cartilage is a layer of smooth tissue. It covers the ends of the thighbone and shinbone. It also lines the back side of the kneecap. Healthy cartilage absorbs stress and allows the knee to bend easily.  · Muscles power the knee and leg for movement.  · Tendons attach the muscles to the bones.  · Ligaments are bands of tissue that connect bones and brace the joint.  · Bones that make up your knee joint include your thighbone (femur), shinbone (tibia), and kneecap (patella).  · Menisci are 2 wedge shaped pieces of cartilage that absorb shock between the thighbone and shinbone.  Date Last Reviewed: 9/20/2015  © 2690-1677 [employer]. [address] [place] [address], [place] [address]. All rights reserved. This information is not intended as a substitute for professional medical care. Always follow your healthcare professional's instructions.

## 2018-05-23 NOTE — PROGRESS NOTES
Office Visit 5/23/2018  Last encounter in this department: 5/3/2018    Subjective:      Patient ID: Monica Johnson is a 42 y.o. male.    Chief Complaint: Foot Pain (Right foot, rates pain 5/10, wears walking boot, states that he stands for 8-9 hours a day at work, non-diabetic, last visit with Dr. Escoto on 5/16/18)    Monica Johnson is a 42 y.o. year-old male following up for bilateral heel pain with recent injection on the left. Patient now rates pain as 2/10 dull on the left but he has been remaining in the short Cam Walker on the right with continued 5 out of 10 deep aching pain that has slightly worsened.  He explains that he just started a new job only about 90 days ago and must be able to walk and stand.  He describes the pain as being more extensive from the posterior ankle to the inferior heel.  He has continued physical therapy range of motion and massage type treatment on the right and discontinued on the left because it was doing so well.        Review of Systems   Constitution: Negative for chills and fever.   Cardiovascular: Negative for chest pain.   Respiratory: Negative for shortness of breath.    Gastrointestinal: Negative for nausea and vomiting.           Objective:      Physical Exam   Constitutional: He is oriented to person, place, and time. He appears well-developed and well-nourished. No distress.   Cardiovascular:   Pulses:       Dorsalis pedis pulses are 2+ on the right side, and 2+ on the left side.        Posterior tibial pulses are 2+ on the right side, and 2+ on the left side.   Capillary fill time 3 seconds to digits bilateral feet.   Musculoskeletal:   Lower extremities:    Deformities: Supple pes planovalgus posture bilaterally.     4/5 plantarflexion inversion bilaterally.   5/5 muscle strength and tone in remaining quadrants bilaterally.     Some pulling discomfort on maximal ankle joint dorsiflexion with some limitation bilateral feet.    Continued pain on side to side compression  of the calcaneal body right.    Primary area of pain to palpation is the plantar medial and central calcaneal tubercle right foot and along the plantar rim of the calcaneus to kager's triangle.     Pain on palpation: medial navicular bilaterally.   Neurological: He is alert and oriented to person, place, and time. He displays no Babinski's sign on the right side. He displays no Babinski's sign on the left side.   Plantar protective threshold sensation by touch via 5.07 SWMF present bilaterally.    Skin:   Lower extremities:    Normal turgor, texture, temperature bilaterally. Digital hair present bilaterally. Warm equally bilaterally.   Mild fullness at the plantarmedial heel bilateral feet.     No bilateral varicosities, pigmentary changes.    No wounds, drainage were noted.    Malodor: None  Erythema: None  Interdigital maceration: webspaces 3,4 bilaterally.     Scaling in moccasin distribution bilaterally. Xerosis bilaterally.    Nails are thick dystrophic and discolored bilaterally x10.     Psychiatric: He has a normal mood and affect.   Nursing note and vitals reviewed.              Assessment:       Encounter Diagnoses   Name Primary?    Stress reaction - Left Foot Yes    Plantar fasciitis     Gastrocnemius equinus, unspecified laterality     Tibialis tendinitis of both lower extremities          Plan:       Monica was seen today for foot pain.    Diagnoses and all orders for this visit:    Stress reaction - Left Foot    Plantar fasciitis    Gastrocnemius equinus, unspecified laterality    Tibialis tendinitis of both lower extremities      I counseled the patient on his conditions, their implications and medical management.    At this time his plantar fasciitis is improving but recommended to continue with physical therapy on the left.  Regarding his right foot we discussed discontinuing physical therapy and placement into a nonweightbearing cast.  At this time he states that because of his personal situation  with work he cannot afford to be nonweightbearing.  Explained to him that if he does develop a through and through fracture on the heel it would need more time off his feet.  With the cast I am recommending 3 weeks off his foot but if he does develop a through and through fracture he may need to be off of work much longer until complete healing.  Patient states that he understands but wished to try a walking boot and if he does have any worsening of pain he will report back to clinic immediately for casting.  He will follow-up in 3 weeks to monitor progress or call if any problems arise.

## 2018-05-24 ENCOUNTER — TELEPHONE (OUTPATIENT)
Dept: RADIOLOGY | Facility: HOSPITAL | Age: 43
End: 2018-05-24

## 2018-05-25 ENCOUNTER — HOSPITAL ENCOUNTER (OUTPATIENT)
Dept: RADIOLOGY | Facility: HOSPITAL | Age: 43
Discharge: HOME | End: 2018-05-25
Attending: ORTHOPAEDIC SURGERY
Payer: MEDICAID

## 2018-05-25 DIAGNOSIS — R93.89 ABNORMAL X-RAY: ICD-10-CM

## 2018-05-25 PROCEDURE — 73721 MRI JNT OF LWR EXTRE W/O DYE: CPT | Mod: 26,RT,, | Performed by: RADIOLOGY

## 2018-05-25 PROCEDURE — 73721 MRI JNT OF LWR EXTRE W/O DYE: CPT | Mod: TC,PO,RT

## 2018-05-31 ENCOUNTER — OFFICE VISIT (OUTPATIENT)
Dept: ORTHOPEDICS | Facility: CLINIC | Age: 43
End: 2018-05-31
Payer: MEDICAID

## 2018-05-31 ENCOUNTER — OFFICE VISIT (OUTPATIENT)
Dept: PODIATRY | Facility: CLINIC | Age: 43
End: 2018-05-31
Payer: MEDICAID

## 2018-05-31 VITALS
WEIGHT: 235.88 LBS | HEART RATE: 93 BPM | DIASTOLIC BLOOD PRESSURE: 85 MMHG | HEIGHT: 72 IN | SYSTOLIC BLOOD PRESSURE: 137 MMHG | BODY MASS INDEX: 31.95 KG/M2

## 2018-05-31 VITALS
WEIGHT: 235.88 LBS | BODY MASS INDEX: 31.95 KG/M2 | DIASTOLIC BLOOD PRESSURE: 90 MMHG | HEART RATE: 98 BPM | SYSTOLIC BLOOD PRESSURE: 133 MMHG | HEIGHT: 72 IN

## 2018-05-31 DIAGNOSIS — M62.469 GASTROCNEMIUS EQUINUS, UNSPECIFIED LATERALITY: ICD-10-CM

## 2018-05-31 DIAGNOSIS — M21.6X9 MIDFOOT COLLAPSE, UNSPECIFIED LATERALITY: ICD-10-CM

## 2018-05-31 DIAGNOSIS — M72.2 PLANTAR FASCIITIS: ICD-10-CM

## 2018-05-31 DIAGNOSIS — M76.822 TIBIALIS TENDINITIS OF BOTH LOWER EXTREMITIES: ICD-10-CM

## 2018-05-31 DIAGNOSIS — M76.821 TIBIALIS TENDINITIS OF BOTH LOWER EXTREMITIES: ICD-10-CM

## 2018-05-31 DIAGNOSIS — F43.0 STRESS REACTION: Primary | ICD-10-CM

## 2018-05-31 DIAGNOSIS — M94.261 CHONDROMALACIA OF RIGHT KNEE: Primary | ICD-10-CM

## 2018-05-31 DIAGNOSIS — F17.200 SMOKER: ICD-10-CM

## 2018-05-31 PROCEDURE — 99999 PR PBB SHADOW E&M-EST. PATIENT-LVL III: CPT | Mod: PBBFAC,,, | Performed by: ORTHOPAEDIC SURGERY

## 2018-05-31 PROCEDURE — 99213 OFFICE O/P EST LOW 20 MIN: CPT | Mod: PBBFAC,27,PO | Performed by: ORTHOPAEDIC SURGERY

## 2018-05-31 PROCEDURE — 99999 PR PBB SHADOW E&M-EST. PATIENT-LVL III: CPT | Mod: PBBFAC,,, | Performed by: PODIATRIST

## 2018-05-31 PROCEDURE — 99499 UNLISTED E&M SERVICE: CPT | Mod: S$PBB,,, | Performed by: PODIATRIST

## 2018-05-31 PROCEDURE — 99214 OFFICE O/P EST MOD 30 MIN: CPT | Mod: S$PBB,,, | Performed by: ORTHOPAEDIC SURGERY

## 2018-05-31 PROCEDURE — 99213 OFFICE O/P EST LOW 20 MIN: CPT | Mod: PBBFAC | Performed by: PODIATRIST

## 2018-05-31 NOTE — PROGRESS NOTES
CC: This is a 42 year old male that complains of right knee pain.  Chief Complaint   Patient presents with    Right Knee - Pain, Follow-up       HPI: The patient was at home when he fell onto the right knee, Sunday.  He has been having pain in the right knee pain for 10-20 years.  The patient states that he goes to pain management for chronic pain.  He states the he is aware that pain medication is addictive.     PMH:    Past Medical History:   Diagnosis Date    ADHD (attention deficit hyperactivity disorder)     Allergy     Anxiety 1/25/2016    Arthritis 2014    Osteoarthritis    Depression 1/25/2016    Hypertension     Sciatica of left side 1/31/2017       PSH:  History reviewed. No pertinent surgical history.    Family Hx:  History reviewed. No pertinent family history.    Allergy:  Review of patient's allergies indicates:  No Known Allergies    Medication:    Current Outpatient Prescriptions:     aspirin (ECOTRIN) 81 MG EC tablet, Take 81 mg by mouth once daily., Disp: , Rfl:     diltiaZEM (CARDIZEM) 120 MG tablet, Take 120 mg by mouth 4 (four) times daily., Disp: , Rfl:     efinaconazole (JUBLIA) 10 % Fredis, Apply 1 application topically once daily., Disp: 8 mL, Rfl: 11    gabapentin (NEURONTIN) 600 MG tablet, Take 600 mg by mouth 2 (two) times daily., Disp: , Rfl:     lisinopril (PRINIVIL,ZESTRIL) 20 MG tablet, Take 20 mg by mouth once daily., Disp: , Rfl:     naftifine 2 % Gel, Apply 1 application topically once daily., Disp: 45 g, Rfl: 3    nicotine (NICODERM CQ) 21 mg/24 hr, Place 1 patch onto the skin once daily., Disp: 28 patch, Rfl: 0    Social History:    Social History     Social History    Marital status:      Spouse name: N/A    Number of children: N/A    Years of education: N/A     Occupational History    Not on file.     Social History Main Topics    Smoking status: Current Some Day Smoker     Packs/day: 0.25     Types: Cigarettes, Cigars    Smokeless tobacco:  Never Used    Alcohol use Yes      Comment: Socially    Drug use: Yes     Types: Marijuana    Sexual activity: Yes     Other Topics Concern    Not on file     Social History Narrative    No narrative on file       Vitals:   /85   Pulse 93   Ht 6' (1.829 m)   Wt 107 kg (235 lb 14.3 oz)   BMI 31.99 kg/m²      ROS:  GENERAL: No fever, chills, fatigability or weight loss.  SKIN: No rashes, itching or changes in color or texture of skin.  HEAD: No headaches or recent head trauma.  EYES: Visual acuity fine. No photophobia, ocular pain or diplopia.  EARS: Denies ear pain, discharge or vertigo.  NOSE: No loss of smell, no epistaxis or postnasal drip.  MOUTH & THROAT: No hoarseness or change in voice. No excessive gum bleeding.  NODES: Denies swollen glands.  CHEST: Denies LOOMIS, cyanosis, wheezing, cough and sputum production.  CARDIOVASCULAR: Denies chest pain, PND, orthopnea or reduced exercise tolerance.  ABDOMEN: Appetite fine. No weight loss. Denies diarrhea, abdominal pain, hematemesis or blood in stool.  URINARY: No flank pain, dysuria or hematuria.  PERIPHERAL VASCULAR: No claudication or cyanosis.  NEUROLOGIC: No history of seizures, paralysis, alteration of gait or coordination.  MUSCULOSKELETAL: See HPI    PE:  APPEARANCE: Well nourished, well developed, in no acute distress.   HEAD: Normocephalic, atraumatic.  EYES: PERRL. EOMI.   EARS: TM's intact. Light reflex normal. No retraction or perforation.   NOSE: Mucosa pink. Airway clear.  MOUTH & THROAT: No tonsillar enlargement. No pharyngeal erythema or exudate. No stridor.  NECK: Supple.   NODES: No cervical, axillary or inguinal lymph node enlargement.  CHEST: Lungs clear to auscultation.  CARDIOVASCULAR: Normal S1, S2. No rubs, murmurs or gallops.  ABDOMEN: Bowel sounds normal. Not distended. Soft. No tenderness or masses.  NEUROLOGIC: Cranial Nerves: II-XII grossly intact, also see MUSCULOSKELETAL  MUSCULOSKELETAL:      Right  Knee  Exam-abnormal    Gait-abnormal  Muscle Appearance:abnormal  Grooming:normal  Spine Alignment-normal  Muscle Atrophy-Positive  Deformities-Negative  Tenderness-Positive  Paresthesias-Negative  Range of Motion         Ext-normal, 0 degrees         Flex-abnormal  Muscle Strength-abnormal  Sensation-normal  Reflexes-normal  Crepitus-Positive                                Swelling-Negative  Effusion- Positive                                Edema-Negative  Lachman-Negative                                Erythema-Negative  Bob's-Positive                              Apley Grind-Positive  Patellar Comp-Positive                         Alignment-normal/symmetric  Patellar Apprehension-Negative              Synovial fullness-Positive  Passive Patellar Tilt-normal  Patellar Tracking-normal   Patellar Glide-normal  Q-Angle at 90 degrees-normal  Patellar Grind-abnormal  E-Thqx-Mensqils  Fatigue-Negative                                     HS Tightness-Negative  Tests on Exam, No ligamentous laxity  Neurovascular Status-normal+2 DP and PT artery pulses  Skin-normal    Assessment:  MRI Knee Without Contrast Right  Narrative: EXAMINATION:  MRI KNEE WITHOUT CONTRAST RIGHT    CLINICAL HISTORY:  Abnormal xray, knee;Abnormal findings on diagnostic imaging of other specified body structures    TECHNIQUE:  Multiplanar, multisequence images were preformed of the right knee.    COMPARISON:  Radiographs dated 05/22/2018    FINDINGS:  Menisci:  There is no tear of the medial or lateral meniscus.    Ligaments:  ACL, PCL, MCL, and LCL complex are intact.    Tendons:  Extensor mechanism is maintained.    Cartilage:    Patellofemoral: Full-thickness chondral fissuring noted in the central trochlea with associated subcortical cystic change.    Medial tibiofemoral: Articular cartilage is maintained.    Lateral tibiofemoral: Articular cartilage is maintained.    Bone: No fracture or marrow replacing process.    Miscellaneous: There is no  joint effusion.  Impression: Patellofemoral cartilage loss as above.  Otherwise no evidence of internal derangement of the right knee.    Electronically signed by: Mark Moser MD  Date:    05/25/2018  Time:    09:38   I reviewed the patient's MRI findings with him.           Diagnosis:              1. Right patellar chondromalacia               2. Right knee medial meniscus tear    Diagnostic Studies  MRI-no  X-Ray-No  EMG/NCV-No  Arthrogram-No  Bone Scan-No  CT Scan-No  Doppler-No  ESR-No  CRP-No  CBC with Diff-No   Rheumatoid/Arthritis Panel-No      Plan:                                                 1. PT-yes                                                 2.OT-no                                          3.NSAID-yes                                        4. Narcotics-no                                     5. Wound care-No                                 6. Rest-yes                                           7. Surgery-yes, right knee arthroscoped                                        8. ILEANA Hose-no                                    9. Anticoagulation therapy-no               10. Elevation-no                                     11. Crutches-no                                    12. Walker-no             13. Cane no                        14. Referral-no                                     15.Injection-no                            16. Splint   /    Cast   /   Cast Shoe-No              17. RICE-none            18. Follow up-  3 weeks

## 2018-06-01 NOTE — PATIENT INSTRUCTIONS
How Your Knee Works  A healthy knee bends easily and rotates slightly. The joint absorbs stress and moves smoothly. This allows you to walk, squat, and turn without pain.    A healthy knee  The knee is a hinge joint, formed where the thighbone (femur) and the shinbone (tibia) meet. It is the largest joint in the body. The joint is covered with smooth tissue and powered by large muscles. When all the parts listed below are healthy, a knee should move easily:  · Cartilage is a layer of smooth tissue. It covers the ends of the thighbone and shinbone. It also lines the back side of the kneecap. Healthy cartilage absorbs stress and allows the knee to bend easily.  · Muscles power the knee and leg for movement.  · Tendons attach the muscles to the bones.  · Ligaments are bands of tissue that connect bones and brace the joint.  · Bones that make up your knee joint include your thighbone (femur), shinbone (tibia), and kneecap (patella).  · Menisci are 2 wedge shaped pieces of cartilage that absorb shock between the thighbone and shinbone.  Date Last Reviewed: 9/20/2015  © 5395-0731 [employer]. [address] [place] [address], [place] [address]. All rights reserved. This information is not intended as a substitute for professional medical care. Always follow your healthcare professional's instructions.

## 2018-06-02 NOTE — PROGRESS NOTES
Last encounter in this department: 5/23/2018    Subjective:      Patient ID: Monica Johnson is a 43 y.o. male.    Chief Complaint: Follow-up (left foot stress reaction)    Monica Johnson is a 43 y.o. year-old male following up for right heel pain.  Patient has now been in a tall cam walker for a week now but continues to complain of deep aching heel pain that continues to slightly worsened.  Because of his new job he has not been able to slow down but has remained in the Cam Walker boot.  For his left plantar fasciitis he does continue to receive physical therapy.  He is concerned that the effects of the injection are wearing off on the left side.  He does admit that he has been putting more pressure on the left side because of the problem on the right.        Review of Systems   Constitution: Negative for chills and fever.   Cardiovascular: Negative for chest pain.   Respiratory: Negative for shortness of breath.    Gastrointestinal: Negative for nausea and vomiting.           Objective:      Physical Exam   Constitutional: He is oriented to person, place, and time. He appears well-developed and well-nourished. No distress.   Cardiovascular:   Pulses:       Dorsalis pedis pulses are 2+ on the right side, and 2+ on the left side.        Posterior tibial pulses are 2+ on the right side, and 2+ on the left side.   Capillary fill time 3 seconds to digits bilateral feet.   Musculoskeletal:   Lower extremities:    Deformities: Supple pes planovalgus posture bilaterally.     4/5 plantarflexion inversion bilaterally.   5/5 muscle strength and tone in remaining quadrants bilaterally.     Some pulling discomfort on maximal ankle joint dorsiflexion with some limitation bilateral feet.    Continued pain on side to side compression of the calcaneal body right.    Primary area of pain to palpation is the plantar medial and central calcaneal tubercle right foot and along the plantar rim of the calcaneus to kager's triangle.      Pain on palpation: medial navicular bilaterally.   Neurological: He is alert and oriented to person, place, and time. He displays no Babinski's sign on the right side. He displays no Babinski's sign on the left side.   Plantar protective threshold sensation by touch via 5.07 SWMF present bilaterally.    Skin:   Lower extremities:    Normal turgor, texture, temperature bilaterally. Digital hair present bilaterally. Warm equally bilaterally.   Mild fullness at the plantarmedial heel bilateral feet.     No bilateral varicosities, pigmentary changes.    No wounds, drainage were noted.    Malodor: None  Erythema: None  Interdigital maceration: webspaces 3,4 bilaterally.     Scaling in moccasin distribution bilaterally. Xerosis bilaterally.    Nails are thick dystrophic and discolored bilaterally x10.     Psychiatric: He has a normal mood and affect.   Nursing note and vitals reviewed.                Assessment:       Encounter Diagnoses   Name Primary?    Stress reaction - Left Foot Yes    Plantar fasciitis     Gastrocnemius equinus, unspecified laterality     Tibialis tendinitis of both lower extremities     Smoker     Midfoot collapse, unspecified laterality          Plan:       Monica was seen today for follow-up.    Diagnoses and all orders for this visit:    Stress reaction - Left Foot    Plantar fasciitis    Gastrocnemius equinus, unspecified laterality    Tibialis tendinitis of both lower extremities    Smoker    Midfoot collapse, unspecified laterality      I counseled the patient on his conditions, their implications and medical management.    Because of persistent right heel calcaneal body pain that has been worsening despite use of the Cam Walker he agreed to transition into a cast today.  A short leg fiberglass cast was applied on the right side with a cast shoe applied so that he can continue to ambulate.  Once again explained to him this is not the most optimal situation but given his current  situation with the new job he insists that this is his only alternative for the time being.  He was cautioned on through and through fracture on the right heel.  He agrees that if the pain does worsen he will return to clinic immediately.  Because of my departure from [facility] he will continue follow-up with Dr. Dorsey who he will see in 3 weeks for reevaluation and/or cast change with possible repeat x-rays.

## 2018-06-02 NOTE — PATIENT INSTRUCTIONS
1. YOU ARE IN A CAST. PLEASE REST AND TAKE IT EASY.     2. USE A WALKER OR CRUTCHES IF YOU ARE UNSTEADY.    3. ELEVATE YOUR FOOT WHENEVER YOU ARE RESTING.    4. PLEASE CALL THE CLINIC IMMEDIATELY IF THE CAST GETS WET OR IF IT BREAKS IN ANY WAY. [phone]    5. PLEASE DO NOT ATTEMPT TO REMOVE THE CAST ON YOUR OWN.    6. CALL [facility] IF THERE IS A PROBLEM AFTER HOURS TO HAVE DR. KILGORE PAGED. [phone].

## 2018-06-14 ENCOUNTER — CLINICAL SUPPORT (OUTPATIENT)
Dept: SMOKING CESSATION | Facility: CLINIC | Age: 43
End: 2018-06-14
Payer: COMMERCIAL

## 2018-06-14 DIAGNOSIS — F17.200 NICOTINE DEPENDENCE: Primary | ICD-10-CM

## 2018-06-14 PROCEDURE — 99407 BEHAV CHNG SMOKING > 10 MIN: CPT | Mod: S$GLB,,,

## 2018-06-14 NOTE — PROGRESS NOTES
Successful contact with patient regarding tobacco cessation quit #1. Pt states, he currently smoke 2 cigarettes/day; down from 10 cigarettes/day and he's unable to return to the program at this time. Pt commended for the accomplishment, thus far. Pt provided with his current and next available benefit status and contact information to schedule an appointment when he's ready. Will follow up with him in 3-4 months.

## 2018-06-21 ENCOUNTER — OFFICE VISIT (OUTPATIENT)
Dept: PODIATRY | Facility: CLINIC | Age: 43
End: 2018-06-21
Payer: COMMERCIAL

## 2018-06-21 ENCOUNTER — HOSPITAL ENCOUNTER (OUTPATIENT)
Dept: RADIOLOGY | Facility: HOSPITAL | Age: 43
Discharge: HOME | End: 2018-06-21
Attending: PODIATRIST
Payer: COMMERCIAL

## 2018-06-21 VITALS
SYSTOLIC BLOOD PRESSURE: 124 MMHG | HEIGHT: 72 IN | WEIGHT: 235.88 LBS | BODY MASS INDEX: 31.95 KG/M2 | HEART RATE: 81 BPM | DIASTOLIC BLOOD PRESSURE: 84 MMHG

## 2018-06-21 DIAGNOSIS — S99.921A RIGHT FOOT INJURY, INITIAL ENCOUNTER: Primary | ICD-10-CM

## 2018-06-21 DIAGNOSIS — M62.469 GASTROCNEMIUS EQUINUS, UNSPECIFIED LATERALITY: ICD-10-CM

## 2018-06-21 DIAGNOSIS — M72.2 PLANTAR FASCIITIS: Primary | ICD-10-CM

## 2018-06-21 DIAGNOSIS — S99.921A RIGHT FOOT INJURY, INITIAL ENCOUNTER: ICD-10-CM

## 2018-06-21 PROCEDURE — 73630 X-RAY EXAM OF FOOT: CPT | Mod: 26,RT,, | Performed by: RADIOLOGY

## 2018-06-21 PROCEDURE — 99214 OFFICE O/P EST MOD 30 MIN: CPT | Mod: S$GLB,,, | Performed by: PODIATRIST

## 2018-06-21 PROCEDURE — 73630 X-RAY EXAM OF FOOT: CPT | Mod: TC,FY,PO,RT

## 2018-06-21 PROCEDURE — 99999 PR PBB SHADOW E&M-EST. PATIENT-LVL IV: CPT | Mod: PBBFAC,,, | Performed by: PODIATRIST

## 2018-06-21 PROCEDURE — 99214 OFFICE O/P EST MOD 30 MIN: CPT | Mod: PBBFAC,25,PO | Performed by: PODIATRIST

## 2018-06-21 RX ORDER — MELOXICAM 15 MG/1
TABLET ORAL
Qty: 30 TABLET | Refills: 0 | Status: SHIPPED | OUTPATIENT
Start: 2018-06-21 | End: 2019-03-18

## 2018-06-21 NOTE — PROGRESS NOTES
[facility] -   PODIATRIC MEDICINE AND SURGERY  PROGRESS NOTE  6/21/2018    PODIATRY NOTE  PCP: Dr. Kenya Escoto MD    CHIEF COMPLAINT   Chief Complaint   Patient presents with    Follow-up     F/U right foot stress fracture. NWB in cast for 3 weeks. Current pain 4/10. NWB x-rays prior       HPI  Monica Johnson is a 43 y.o. male who has a past medical history of ADHD (attention deficit hyperactivity disorder); Allergy; Anxiety (1/25/2016); Arthritis (2014); Depression (1/25/2016); Hypertension; and Sciatica of left side (1/31/2017).     Monica presents to clinic today complaining of right foot pain. Pt was a patient of Dr. Funez. He relates he has had right heel pain for the past two months. Of note, per chart review, clinical concern for calcaneus stress fracture. He was placed in boot, but did not improve and therefore He recently was placed in a cast due to severity of pain. He has bene in cast for past three weeks and is here today for follow up. Last xray was 1/10/18.  Pt has diagnosed plantar fasciitis on left and has initiated physical therapy which has helped with symptoms.     Pt states he works as  at [employer]. He relates he was not able to comply with NWB instructions due to work demand therefore he has been ambulating in cast. He states pain has improved on right.     Patient denies other pedal complaints at this time.     PMH  Past Medical History:   Diagnosis Date    ADHD (attention deficit hyperactivity disorder)     Allergy     Anxiety 1/25/2016    Arthritis 2014    Osteoarthritis    Depression 1/25/2016    Hypertension     Sciatica of left side 1/31/2017       PROBLEM LIST  Patient Active Problem List    Diagnosis Date Noted    Acute medial meniscus tear of right knee 05/23/2018    Chondromalacia of right knee 05/23/2018    Elevated liver function tests 05/15/2018    Plantar fasciitis 05/03/2018    Osteoarthritis 05/03/2018    Essential hypertension  05/03/2018    Sciatica of left side 01/31/2017    Anxiety 01/25/2016    Depression 01/25/2016    Intermittent explosive disorder 01/19/2016       MEDS  Current Outpatient Prescriptions on File Prior to Visit   Medication Sig Dispense Refill    aspirin (ECOTRIN) 81 MG EC tablet Take 81 mg by mouth once daily.      diltiaZEM (CARDIZEM) 120 MG tablet Take 120 mg by mouth 4 (four) times daily.      efinaconazole (JUBLIA) 10 % Fredis Apply 1 application topically once daily. 8 mL 11    gabapentin (NEURONTIN) 600 MG tablet Take 600 mg by mouth 2 (two) times daily.      lisinopril (PRINIVIL,ZESTRIL) 20 MG tablet Take 20 mg by mouth once daily.      naftifine 2 % Gel Apply 1 application topically once daily. 45 g 3    nicotine (NICODERM CQ) 21 mg/24 hr Place 1 patch onto the skin once daily. 28 patch 0     No current facility-administered medications on file prior to visit.        Medication List with Changes/Refills   New Medications    MELOXICAM (MOBIC) 15 MG TABLET    Take 1 tablet daily as needed for pain   Current Medications    ASPIRIN (ECOTRIN) 81 MG EC TABLET    Take 81 mg by mouth once daily.    DILTIAZEM (CARDIZEM) 120 MG TABLET    Take 120 mg by mouth 4 (four) times daily.    EFINACONAZOLE (JUBLIA) 10 % FREDIS    Apply 1 application topically once daily.    GABAPENTIN (NEURONTIN) 600 MG TABLET    Take 600 mg by mouth 2 (two) times daily.    LISINOPRIL (PRINIVIL,ZESTRIL) 20 MG TABLET    Take 20 mg by mouth once daily.    NAFTIFINE 2 % GEL    Apply 1 application topically once daily.    NICOTINE (NICODERM CQ) 21 MG/24 HR    Place 1 patch onto the skin once daily.       PSH   No past surgical history on file.     ALL  Review of patient's allergies indicates:  No Known Allergies    SOC     Social History   Substance Use Topics    Smoking status: Current Some Day Smoker     Packs/day: 0.25     Types: Cigarettes, Cigars    Smokeless tobacco: Never Used    Alcohol use Yes      Comment: Socially         FAMILY  HX  No family history on file.         REVIEW OF SYSTEMS   General: This patient is well-developed, well-nourished and appears stated age, well-oriented to person, place and time, and cooperative and pleasant on today's visit  Constitutional: Negative for chills and fever.   Respiratory: Negative for shortness of breath.    Cardiovascular: Negative for chest pain, palpitations, orthopnea  Gastrointestinal: Negative for diarrhea, nausea and vomiting.   Musculoskeletal: Positive for above noted in HPI  Skin: positive for skin changes  Neurological: negative  for tingling and sensory changes  Peripheral Vascular: no claudication or cyanosis  Psychiatric/Behavioral: Negative for altered mental status     PHYSICAL EXAM  Vitals:    06/21/18 1029   BP: 124/84   Pulse: 81   Weight: 107 kg (235 lb 14.3 oz)   Height: 6' (1.829 m)   PainSc:   4   PainLoc: Foot       General: This patient is well-developed, well-nourished and appears stated age, well-oriented to person, place and time, and cooperative and pleasant on today's visit    LOWER EXTREMITY  Vascular exam:   · Dorsalis pedis and posterior tibial pulses palpable 2/4 bilaterally.   · Capillary refill time immediate to the toes.   · Feet are warm to the touch. Skin temperature warm to warm from proximally to distally   · There are no varicosities, telangiectasias noted to bilateral foot and ankle regions.   · There are no ecchymoses noted to bilateral foot and ankle regions.   · There is no gross lower extremity edema.    Dermatologic exam:   · Skin moist with healthy texture and turgor.  · There are no open ulcerations, lacerations, or fissures to bilateral foot and ankle regions. There are no signs of infection as there is no erythema, no proximal-extending lymphangiitis, no fluctuance, or crepitus noted on palpation to bilateral foot and ankle regions.   · There is no interdigital maceration.   · There are hyperkeratotic lesions noted to feet. Nails are  well-trimmed.    Neurologic exam:  · Epicritic sensation is intact as the patient is able to sense light touch to bilateral foot and ankle regions.   · Achilles and patellar deep tendon reflexes intact  · Babinski reflex absent    Musculoskeletal/Orthopedic exam:   · Mild TTP along medial aspect of calcaneus tubercle RIGHT  · Decreased ankle joint DF with knee extended and flexed, bilateral   · Muscle strength AT/EHL/EDL/PT: 5/5; Achilles/Gastroc/Soleus: 5/5; PB/PL: 5/5 Muscle tone is normal.  · STJ ROM supple inv/ev, non crepitus   · MTPJ b/l supple DF/PF, non crepitus  · Foot type: Pronated     IMAGING   Reviewed by me and I agree with radiologist findings, 3 views of foot/ankle, reveal:  Results for orders placed during the hospital encounter of 02/07/18   X-Ray Foot Complete Bilateral    Narrative Technique: AP, lateral, and oblique views were obtained of the bilateral feet    Comparison: 5/3/2015    Findings: No acute fractures or dislocations visualized. There are degenerative changes at the bottom right toe MTP joints with slight hallux valgus deformity on the left.  There is left worse on right pes planus deformity.  No erosive osseous changes demonstrated.    Impression As above.             Electronically signed by: LEIDA CORTEZ MD  Date:     02/07/18  Time:    08:22            Results for orders placed during the hospital encounter of 02/07/18   X-Ray Foot Complete Bilateral    Narrative Technique: AP, lateral, and oblique views were obtained of the bilateral feet    Comparison: 5/3/2015    Findings: No acute fractures or dislocations visualized. There are degenerative changes at the bottom right toe MTP joints with slight hallux valgus deformity on the left.  There is left worse on right pes planus deformity.  No erosive osseous changes demonstrated.    Impression As above.             Electronically signed by: LEIDA CORTEZ MD  Date:     02/07/18  Time:    08:22                      ASSESSMENT  Encounter Diagnoses   Name Primary?    Plantar fasciitis - Right Foot Yes    Gastrocnemius equinus, unspecified laterality - Right Foot          PLAN  1. Patient was educated about clinical and imaging findings, and verbalizes understanding of above.     Diagnoses and all orders for this visit:  Plantar fasciitis - Right Foot  -     Ambulatory Referral to Physical/Occupational Therapy    Gastrocnemius equinus, unspecified laterality - Right Foot  -     Ambulatory Referral to Physical/Occupational Therapy    Other orders  -     meloxicam (MOBIC) 15 MG tablet; Take 1 tablet daily as needed for pain  Dispense: 30 tablet; Refill: 0      2. Treatment plan: -I Reviewed films with patient. No evidence of stress fracture. Discussed different treatment options for heel pain.   - transition back into CAM boot and then into tennis shoes and inserts x 1 week  -Night splint, Physical therapy referral placed  -Mobic Rx     -I gave written and verbal instructions on heel cord stretching and this was demonstrated for the patient. A theraband was also provided to the patient for stretching exercises. Recommendations were given for plantar fasciitis treatment including icing, stretching, arch supports, avoidance of barefoot walking, appropriate shoe wear, and strict compliance. Discussed importance of patient to perform stretching exercises.     Information was given regarding the patient's condition and prognosis. All the patient's questions were answered.     If symptoms still persists at follow up will proceed with steroid injection .    3. RTC  for follow up/evaluation as scheduled       Future Appointments  Date Time Provider Department Center   7/26/2018 10:40 AM Vilma Dorsey DPM [facility] POD [facility]   8/7/2018 8:20 AM Ivan Davey MD [facility] INT [facility]   8/16/2018 8:00 AM Kenya Escoto MD [facility]       Report Electronically Signed By:  Vilma Dorsey DPM   Podiatric Medicine &  Surgery  [facility]  6/21/2018

## 2018-06-21 NOTE — PATIENT INSTRUCTIONS
"You can purchase a night splint on the internet or at a medical supply store. These are not as effective as a "dynamic" night splint, however they have shown to help greatly in the treatment of plantar fasciitis.    Go to:  http://[employer]/   Lilliana Plantar Fasciitis Night splint-$23.99.  · Alleviates pain in the arch and heel area associated with plantar fasciitis  · Lightweight, low profile shell is sturdy and breathable  · Provides gentle stretching of the plantar fascia and achilles tendon  · User-friendly center-release nichole simplify application      You can also search online at different websites for NIGHT SPLINT. http://www.plantarfasciitisresource.com/best-plantar-fasciitis-night-splint/     BEST PLANTAR FASCIITIS NIGHT SPLINT  (click image to enlarge)  Image Brand / Model Price Rating    [employer] ~$20 5/5    Alpha Medical ~$20 5/5    Futuro Sleep Support(Dorsal) and Futuro Adjustable ~$25 4/5    Active Ankle DNS (Dorsal) ~$25 4/5    Swede-O Deluxe (Dorsal) $50+ 3.5/5    Thermoskin Night Time Relief FXT ~$30 3.5/5           "

## 2018-07-26 ENCOUNTER — OFFICE VISIT (OUTPATIENT)
Dept: PODIATRY | Facility: CLINIC | Age: 43
End: 2018-07-26
Payer: MEDICAID

## 2018-07-26 VITALS
WEIGHT: 231.25 LBS | BODY MASS INDEX: 31.32 KG/M2 | HEART RATE: 75 BPM | DIASTOLIC BLOOD PRESSURE: 88 MMHG | SYSTOLIC BLOOD PRESSURE: 145 MMHG | HEIGHT: 72 IN

## 2018-07-26 DIAGNOSIS — M72.2 PLANTAR FASCIITIS: Primary | ICD-10-CM

## 2018-07-26 DIAGNOSIS — M79.671 CHRONIC HEEL PAIN, RIGHT: ICD-10-CM

## 2018-07-26 DIAGNOSIS — G89.29 CHRONIC HEEL PAIN, RIGHT: ICD-10-CM

## 2018-07-26 PROCEDURE — 3079F DIAST BP 80-89 MM HG: CPT | Mod: CPTII,S$GLB,, | Performed by: PODIATRIST

## 2018-07-26 PROCEDURE — 99999 PR PBB SHADOW E&M-EST. PATIENT-LVL III: CPT | Mod: PBBFAC,,, | Performed by: PODIATRIST

## 2018-07-26 PROCEDURE — 99214 OFFICE O/P EST MOD 30 MIN: CPT | Mod: S$GLB,,, | Performed by: PODIATRIST

## 2018-07-26 PROCEDURE — 3077F SYST BP >= 140 MM HG: CPT | Mod: CPTII,S$GLB,, | Performed by: PODIATRIST

## 2018-07-26 PROCEDURE — 3008F BODY MASS INDEX DOCD: CPT | Mod: CPTII,S$GLB,, | Performed by: PODIATRIST

## 2018-07-26 NOTE — PROGRESS NOTES
[facility]  PODIATRIC MEDICINE AND SURGERY  PROGRESS NOTE  7/26/2018    PODIATRY NOTE  PCP: Dr. Kenya Escoto MD    CHIEF COMPLAINT   Chief Complaint   Patient presents with    Follow-up     PCP Dr. Escoto 05/16/18, F/U on b/l P.F. and stress fracture right, possible D.O.I 5/23/18. Pt states that he is still in pain mainly the right foot in the arch and the heel. He rates the pain 6/10       HPI  Monica Johnson is a 43 y.o. male who has a past medical history of ADHD (attention deficit hyperactivity disorder); Allergy; Anxiety (1/25/2016); Arthritis (2014); Depression (1/25/2016); Hypertension; and Sciatica of left side (1/31/2017).     Monica presents to clinic today complaining of right foot pain. Pt was a patient of Dr. Funez. He relates he has had right heel pain for the past two months. Of note, per chart review, clinical concern for calcaneus stress fracture. He was placed in boot, but did not improve and therefore He recently was placed in a cast due to severity of pain. He has been in cast for past three weeks and is here today for follow up. Last xray was 1/10/18.  Pt has diagnosed plantar fasciitis on left and has initiated physical therapy which has helped with symptoms. Pt states he works as  at [employer]. He relates he was not able to comply with NWB instructions due to work demand therefore he has been ambulating in cast.  He has had PT on both feet. RIGHT became progressively worse than LEFT      7/26/2018  Patient presents to clinic today for 4 week follow-up of right heel pain.  Patient states he were CAM boot for an additional week after his last visit with me and he has been in tennis shoes with the past 2 weeks.  Patient states the symptoms are still present and he has been experiencing the these pains for the past 3-4 months.  Symptoms are aggravated primarily with 1st steps in the morning and at the end of the day.  Patient is a  and stands on his  feet for long duration of time.  He has underwent physical therapist therapy in the past which helped his symptoms but he is unable to afford physical therapy at this time.  He also has knee pain which is being managed by Orthopedics.  In describing his pain patient points to plantar aspect of left heel medially and states the pain radiates to the lateral plantar aspect he also admits to pain distally at medial band of plantar fascia.  He denies any injury however there was concern of a stress fracture on his right foot recent x-rays negative for stress fracture which were taken 4 weeks ago. Otherwise there are no other new changes he is presently wearing new balance tennis shoes with orthotic inserts.  He has no further pedal complaints at this time.     [facility]  Past Medical History:   Diagnosis Date    ADHD (attention deficit hyperactivity disorder)     Allergy     Anxiety 1/25/2016    Arthritis 2014    Osteoarthritis    Depression 1/25/2016    Hypertension     Sciatica of left side 1/31/2017       PROBLEM LIST  Patient Active Problem List    Diagnosis Date Noted    Acute medial meniscus tear of right knee 05/23/2018    Chondromalacia of right knee 05/23/2018    Elevated liver function tests 05/15/2018    Plantar fasciitis 05/03/2018    Osteoarthritis 05/03/2018    Essential hypertension 05/03/2018    Sciatica of left side 01/31/2017    Anxiety 01/25/2016    Depression 01/25/2016    Intermittent explosive disorder 01/19/2016     REVIEW OF SYSTEMS   General: This patient is well-developed, well-nourished and appears stated age, well-oriented to person, place and time, and cooperative and pleasant on today's visit  Constitutional: Negative for chills and fever.   Respiratory: Negative for shortness of breath.    Cardiovascular: Negative for chest pain, palpitations, orthopnea  Gastrointestinal: Negative for diarrhea, nausea and vomiting.   Musculoskeletal: Positive for above noted in HPI  Skin:  positive for skin changes  Neurological: negative  for tingling and sensory changes  Peripheral Vascular: no claudication or cyanosis  Psychiatric/Behavioral: Negative for altered mental status     PHYSICAL EXAM  Vitals:    07/26/18 1059   BP: (!) 145/88   Pulse: 75   Weight: 104.9 kg (231 lb 4.2 oz)   Height: 6' (1.829 m)       General: This patient is well-developed, well-nourished and appears stated age, well-oriented to person, place and time, and cooperative and pleasant on today's visit    LOWER EXTREMITY  Vascular exam:   · Dorsalis pedis and posterior tibial pulses palpable 2/4 bilaterally.   · Capillary refill time immediate to the toes.   · Feet are warm to the touch. Skin temperature warm to warm from proximally to distally   · There are no varicosities, telangiectasias noted to bilateral foot and ankle regions.   · There are no ecchymoses noted to bilateral foot and ankle regions.   · There is no gross lower extremity edema.    Dermatologic exam:   · Skin moist with healthy texture and turgor.  · There are no open ulcerations, lacerations, or fissures to bilateral foot and ankle regions. There are no signs of infection as there is no erythema, no proximal-extending lymphangiitis, no fluctuance, or crepitus noted on palpation to bilateral foot and ankle regions.   · There is no interdigital maceration.   · There are hyperkeratotic lesions noted to feet. Nails are well-trimmed.    Neurologic exam:  · Epicritic sensation is intact as the patient is able to sense light touch to bilateral foot and ankle regions.   · Achilles and patellar deep tendon reflexes intact  · Babinski reflex absent    Musculoskeletal/Orthopedic exam:   · Mild TTP along medial aspect of calcaneus tubercle RIGHT  · There is pain with medial to lateral compression of right calcaneus  · There is mild TTP along medial band of plantar fascia, right   · Decreased ankle joint DF with knee extended and flexed, bilateral   · Muscle strength  AT/EHL/EDL/PT: 5/5; Achilles/Gastroc/Soleus: 5/5; PB/PL: 5/5 Muscle tone is normal.  · STJ ROM supple inv/ev, non crepitus   · MTPJ b/l supple DF/PF, non crepitus  · Foot type: Pronated     IMAGING   Reviewed by me and I agree with radiologist findings, 3 views of foot/ankle, reveal:  Results for orders placed during the hospital encounter of 02/07/18   X-Ray Foot Complete Bilateral    Narrative Technique: AP, lateral, and oblique views were obtained of the bilateral feet    Comparison: 5/3/2015    Findings: No acute fractures or dislocations visualized. There are degenerative changes at the bottom right toe MTP joints with slight hallux valgus deformity on the left.  There is left worse on right pes planus deformity.  No erosive osseous changes demonstrated.    Impression As above.             Electronically signed by: LEIDA CORTEZ MD  Date:     02/07/18  Time:    08:22            Results for orders placed during the hospital encounter of 02/07/18   X-Ray Foot Complete Bilateral    Narrative Technique: AP, lateral, and oblique views were obtained of the bilateral feet    Comparison: 5/3/2015    Findings: No acute fractures or dislocations visualized. There are degenerative changes at the bottom right toe MTP joints with slight hallux valgus deformity on the left.  There is left worse on right pes planus deformity.  No erosive osseous changes demonstrated.    Impression As above.             Electronically signed by: LEIDA CORTEZ MD  Date:     02/07/18  Time:    08:22                     ASSESSMENT  Encounter Diagnoses   Name Primary?    Plantar fasciitis - Right Foot Yes    Chronic heel pain, right          PLAN  1. Patient was educated about clinical and imaging findings, and verbalizes understanding of above.     Diagnoses and all orders for this visit:  Plantar fasciitis - Right Foot  -     MRI Foot ([facility]) Right Without Contrast; Future; Expected date: 07/26/2018    Chronic heel pain, right  -      MRI Foot ([place]) Right Without Contrast; Future; Expected date: 07/26/2018         2. Treatment plan:  At this point this is been a longstanding chronic issue of right heel pain and patient warrants further diagnostic workup with an MRI to evaluate for stress fracture of right calcaneus and/or plantar fasciitis and/or plan rupture.  Patient is ambulating in tennis shoes and what appears to be minimal to moderate pain however his clinical expression of the pain is present at 8 out a 6/10 with daily activities.  I did recommend wearing Cam boot to offload the painful area until MRI results or in.  I will call patient with results once this is received.  Will delay any further invasive treatment such as a corticosteroid injection.  Patient does inquire about pain medication and was informed to continue with anti-inflammatories as needed for pain icing and performing stretching exercises in at home rehab as provided written and verbal format.       Future Appointments  Date Time Provider Department Center   7/31/2018 8:00 AM [facility] MRI2 [facility] MRI [facility]   8/7/2018 8:20 AM Ivan Davey MD [facility] INT [facility]   [facility]16/2018 8:00 [facility]       Report Electronically Signed By:  Vilma Dorsey DPM   Podiatric Medicine & Surgery  [facility]  7/26/2018

## 2018-07-30 ENCOUNTER — TELEPHONE (OUTPATIENT)
Dept: RADIOLOGY | Facility: HOSPITAL | Age: 43
End: 2018-07-30

## 2018-07-31 ENCOUNTER — TELEPHONE (OUTPATIENT)
Dept: PODIATRY | Facility: CLINIC | Age: 43
End: 2018-07-31

## 2018-07-31 ENCOUNTER — HOSPITAL ENCOUNTER (OUTPATIENT)
Dept: RADIOLOGY | Facility: HOSPITAL | Age: 43
Discharge: HOME | End: 2018-07-31
Attending: PODIATRIST
Payer: COMMERCIAL

## 2018-07-31 DIAGNOSIS — M79.671 CHRONIC HEEL PAIN, RIGHT: ICD-10-CM

## 2018-07-31 DIAGNOSIS — G89.29 CHRONIC HEEL PAIN, RIGHT: ICD-10-CM

## 2018-07-31 DIAGNOSIS — M72.2 PLANTAR FASCIITIS: ICD-10-CM

## 2018-07-31 PROCEDURE — 73718 MRI LOWER EXTREMITY W/O DYE: CPT | Mod: TC,PO,RT

## 2018-07-31 PROCEDURE — 73718 MRI LOWER EXTREMITY W/O DYE: CPT | Mod: 26,RT,, | Performed by: RADIOLOGY

## 2018-07-31 NOTE — TELEPHONE ENCOUNTER
Calling to provide MRI results. There is no stress fracture or plantar fasciia rupture on MRI. Can receive steroid injection in heel. Schedule at his convenience.

## 2018-07-31 NOTE — TELEPHONE ENCOUNTER
Contacted pt and educated him on MRI results. Verbalized understanding. Appt scheduled 8/15/18 for steroid injection and confirmed by pt. Reminder letter to be mailed.

## 2018-08-07 ENCOUNTER — HOSPITAL ENCOUNTER (OUTPATIENT)
Dept: RADIOLOGY | Facility: HOSPITAL | Age: 43
Discharge: HOME | End: 2018-08-07
Attending: ANESTHESIOLOGY
Payer: COMMERCIAL

## 2018-08-07 ENCOUNTER — OFFICE VISIT (OUTPATIENT)
Dept: PAIN MEDICINE | Facility: CLINIC | Age: 43
End: 2018-08-07
Payer: COMMERCIAL

## 2018-08-07 VITALS
WEIGHT: 231.25 LBS | SYSTOLIC BLOOD PRESSURE: 136 MMHG | TEMPERATURE: 98 F | OXYGEN SATURATION: 98 % | BODY MASS INDEX: 31.32 KG/M2 | DIASTOLIC BLOOD PRESSURE: 93 MMHG | HEIGHT: 72 IN | HEART RATE: 80 BPM

## 2018-08-07 DIAGNOSIS — M47.816 LUMBAR SPONDYLOSIS: Primary | ICD-10-CM

## 2018-08-07 DIAGNOSIS — M47.816 LUMBAR SPONDYLOSIS: ICD-10-CM

## 2018-08-07 DIAGNOSIS — M47.816 LUMBAR FACET ARTHROPATHY: ICD-10-CM

## 2018-08-07 DIAGNOSIS — M79.18 MYOFASCIAL MUSCLE PAIN: ICD-10-CM

## 2018-08-07 DIAGNOSIS — S83.241A ACUTE MEDIAL MENISCUS TEAR OF RIGHT KNEE, INITIAL ENCOUNTER: ICD-10-CM

## 2018-08-07 PROCEDURE — 3075F SYST BP GE 130 - 139MM HG: CPT | Mod: CPTII,S$GLB,, | Performed by: ANESTHESIOLOGY

## 2018-08-07 PROCEDURE — 3080F DIAST BP >= 90 MM HG: CPT | Mod: CPTII,S$GLB,, | Performed by: ANESTHESIOLOGY

## 2018-08-07 PROCEDURE — 99204 OFFICE O/P NEW MOD 45 MIN: CPT | Mod: S$GLB,,, | Performed by: ANESTHESIOLOGY

## 2018-08-07 PROCEDURE — 3008F BODY MASS INDEX DOCD: CPT | Mod: CPTII,S$GLB,, | Performed by: ANESTHESIOLOGY

## 2018-08-07 PROCEDURE — 72114 X-RAY EXAM L-S SPINE BENDING: CPT | Mod: 26,,, | Performed by: RADIOLOGY

## 2018-08-07 PROCEDURE — 72114 X-RAY EXAM L-S SPINE BENDING: CPT | Mod: TC,FY,PO

## 2018-08-07 PROCEDURE — 99999 PR PBB SHADOW E&M-EST. PATIENT-LVL III: CPT | Mod: PBBFAC,,, | Performed by: ANESTHESIOLOGY

## 2018-08-07 RX ORDER — DICLOFENAC SODIUM 10 MG/G
4 GEL TOPICAL 3 TIMES DAILY PRN
Qty: 2 TUBE | Refills: 4 | Status: SHIPPED | OUTPATIENT
Start: 2018-08-07 | End: 2019-04-22

## 2018-08-07 RX ORDER — CYCLOBENZAPRINE HCL 10 MG
10 TABLET ORAL 3 TIMES DAILY PRN
Qty: 90 TABLET | Refills: 0 | Status: SHIPPED | OUTPATIENT
Start: 2018-08-07 | End: 2018-09-06

## 2018-08-07 NOTE — PROGRESS NOTES
Chief Pain Complaint:  Lumbar Back Pain  Right Knee Pain      History of Present Illness:   Monica Johnson is a 43 y.o. male  who is presenting with a chief complaint of Lumbar Back Pain. The patient began experiencing this problem insidiously, and the pain has been gradually worsening over the past 3 year(s). The pain is described as throbbing, cramping, aching and heavy and is located in the bilateral lumbar spine. Pain is intermittent and lasts hours. The  pain is nonradiating. The patient rates his pain a 7 out of ten and interferes with activities of daily living a 5 out of ten. Pain is exacerbated by extension of the lumbar spine, and is improved by rest. Patient reports no prior trauma, no prior spinal surgery     - pertinent negatives: No fever, No chills, No weight loss, No bladder dysfunction, No bowel dysfunction, No saddle anesthesia  - pertinent positives: none    - medications, other therapies tried (physical therapy, injections):     >> NSAIDs, Tylenol, gabapentin and flexeril    >> Has previously undergone Physical Therapy    >> Has NOT previously undergone spinal injection/s      Imaging / Labs / Studies (reviewed on 8/7/2018):    Review of Systems:  CONSTITUTIONAL: patient denies any fever, chills, or weight loss  SKIN: patient denies any rash or itching  RESPIRATORY: patient denies having any shortness of breath  GASTROINTESTINAL: patient denies having any diarrhea, constipation, or bowel incontinence  GENITOURINARY: patient denies having any abnormal bladder function    MUSCULOSKELETAL:  - patient complains of the above noted pain/s (see chief pain complaint)    NEUROLOGICAL:   - pain as above  - strength in Lower extremities is intact, BILATERALLY  - sensation in Lower extremities is intact, BILATERALLY  - patient denies any loss of bowel or bladder control      PSYCHIATRIC: patient denies any change in mood    Other:  All other systems reviewed and are negative      Physical Exam:  BP (!)  136/93 (BP Location: Left arm, Patient Position: Sitting)   Pulse 80   Temp 98 °F (36.7 °C)   Ht 6' (1.829 m)   Wt 104.9 kg (231 lb 4.2 oz)   SpO2 98%   BMI 31.36 kg/m²  (reviewed on 8/7/2018)  General: Alert and oriented, in no apparent distress.  Gait: normal gait.  Skin: No rashes, No discoloration, No obvious lesions  HEENT: Normocephalic, atraumatic. Pupils equal and round.  Cardiovascular: Regular rate and rhythm , no significant peripheral edema present  Respiratory: Without audible wheezing, without use of accessory muscles of respiration.    Musculoskeletal:    Cervical Spine    - Pain on flexion of cervical spine Absent  - Spurling's Test:  Absent    - Pain on extension of cervical spine Absent  - TTP over the cervical facet joints Absent  - Cervical facet loading Absent  -TTP over bilateral Rhomboids    Lumbar Spine    - Pain on flexion of lumbar spine Absent  - Straight Leg Raise:  Absent    - Pain on extension of lumbar spine Present  - TTP over the lumbar facet joints Present Bilateral L5-S1  - Lumbar facet loading Present    -Pain on palpation over the SI joint  Absent  - VENICE: Absent      Neuro:    Strength:  LE R/L: HF: 5/5, HE: 5/5, KF: 5/5; KE: 5/5; FE: 5/5; FF: 5/5    Extremity Reflexes: Brisk and symmetric throughout.      Extremity Sensory: Sensation to pinprick and temperature symmetric. Proprioception intact.      Psych:  Mood and affect is appropriate      Assessment:    Monica Johnson is a 43 y.o. year old male who is presenting with   Encounter Diagnoses   Name Primary?    Lumbar spondylosis Yes    Lumbar facet arthropathy     Acute medial meniscus tear of right knee, initial encounter     Myofascial muscle pain        Plan:    1. Interventional: Consider bilateral L3, L4, L5 lumbar MBB  and Right Genicular Nerve Block, patients wants to hold off for now.     2. Pharmacologic: Tylenol, NSAID's PRN.     3. Rehabilitative: Encouraged PT but patient not interested at this  time.    4. Diagnostic: Lumbar Xray.    5. Follow up: Follow-up if symptoms worsen or fail to improve.      20 minutes were spent in this encounter with more than 50% of the time used for counseling and review of the plan.  Imaging / studies reviewed, detailed above.  I discussed in detail the risks, benefits, and alternatives to any and all potential treatment options.  All questions and concerns were fully addressed today in clinic. Medical decision making moderate.    Thank you for the opportunity to assist in the care of this patient.    Best wishes,    Signed:    Ivan Davey MD          Disclaimer:  This note may have been prepared using voice recognition software, it may have not been extensively proofed, as such there could be errors within the text such as sound alike errors.

## 2018-08-07 NOTE — LETTER
August 7, 2018      Kenya Escoto MD  [id number]  [place] [address]           [facility]  [address] [place] [address]  Phone: [phone]  Fax: [phone]          Patient: Monica Johnson   MR Number: [id number]   YOB: 1975   Date of Visit: 8/7/2018       Dear Dr. Kenya Escoto:    Thank you for referring Monica Johnson to me for evaluation. Attached you will find relevant portions of my assessment and plan of care.    If you have questions, please do not hesitate to call me. I look forward to following Monica Johnson along with you.    Sincerely,    Ivan Davey MD    Enclosure  CC:  No Recipients    If you would like to receive this communication electronically, please contact [email or web address] or [phone] to request more information on [employer] Link access.    For providers and/or their staff who would like to refer a patient to [facility], please contact us through our one-stop-shop provider referral line, [facility] , at [phone].    If you feel you have received this communication in error or would no longer like to receive these types of communications, please e-mail [email or web address]

## 2018-08-15 ENCOUNTER — LAB VISIT (OUTPATIENT)
Dept: LAB | Facility: HOSPITAL | Age: 43
End: 2018-08-15
Payer: COMMERCIAL

## 2018-08-15 ENCOUNTER — OFFICE VISIT (OUTPATIENT)
Dept: INTERNAL MEDICINE | Facility: CLINIC | Age: 43
End: 2018-08-15
Payer: COMMERCIAL

## 2018-08-15 VITALS
TEMPERATURE: 97 F | DIASTOLIC BLOOD PRESSURE: 88 MMHG | SYSTOLIC BLOOD PRESSURE: 126 MMHG | HEART RATE: 84 BPM | BODY MASS INDEX: 31.36 KG/M2 | HEIGHT: 72 IN | OXYGEN SATURATION: 98 % | WEIGHT: 231.5 LBS

## 2018-08-15 DIAGNOSIS — I10 ESSENTIAL HYPERTENSION: ICD-10-CM

## 2018-08-15 DIAGNOSIS — R79.89 ELEVATED LIVER FUNCTION TESTS: Primary | ICD-10-CM

## 2018-08-15 DIAGNOSIS — R79.89 ELEVATED LIVER FUNCTION TESTS: ICD-10-CM

## 2018-08-15 LAB
ALBUMIN SERPL BCP-MCNC: 4 G/DL
ALP SERPL-CCNC: 69 U/L
ALT SERPL W/O P-5'-P-CCNC: 83 U/L
ANION GAP SERPL CALC-SCNC: 9 MMOL/L
AST SERPL-CCNC: 48 U/L
BILIRUB SERPL-MCNC: 0.4 MG/DL
BUN SERPL-MCNC: 8 MG/DL
CALCIUM SERPL-MCNC: 9.9 MG/DL
CHLORIDE SERPL-SCNC: 104 MMOL/L
CO2 SERPL-SCNC: 26 MMOL/L
CREAT SERPL-MCNC: 1.2 MG/DL
EST. GFR  (AFRICAN AMERICAN): >60 ML/MIN/1.73 M^2
EST. GFR  (NON AFRICAN AMERICAN): >60 ML/MIN/1.73 M^2
GLUCOSE SERPL-MCNC: 102 MG/DL
POTASSIUM SERPL-SCNC: 3.9 MMOL/L
PROT SERPL-MCNC: 7.3 G/DL
SODIUM SERPL-SCNC: 139 MMOL/L

## 2018-08-15 PROCEDURE — 99999 PR PBB SHADOW E&M-EST. PATIENT-LVL III: CPT | Mod: PBBFAC,,, | Performed by: FAMILY MEDICINE

## 2018-08-15 PROCEDURE — 99214 OFFICE O/P EST MOD 30 MIN: CPT | Mod: S$GLB,,, | Performed by: FAMILY MEDICINE

## 2018-08-15 PROCEDURE — 3074F SYST BP LT 130 MM HG: CPT | Mod: CPTII,S$GLB,, | Performed by: FAMILY MEDICINE

## 2018-08-15 PROCEDURE — 3079F DIAST BP 80-89 MM HG: CPT | Mod: CPTII,S$GLB,, | Performed by: FAMILY MEDICINE

## 2018-08-15 PROCEDURE — 36415 COLL VENOUS BLD VENIPUNCTURE: CPT

## 2018-08-15 PROCEDURE — 80053 COMPREHEN METABOLIC PANEL: CPT

## 2018-08-15 PROCEDURE — 3008F BODY MASS INDEX DOCD: CPT | Mod: CPTII,S$GLB,, | Performed by: FAMILY MEDICINE

## 2018-08-15 RX ORDER — LISINOPRIL 20 MG/1
20 TABLET ORAL DAILY
Qty: 30 TABLET | Refills: 6 | Status: SHIPPED | OUTPATIENT
Start: 2018-08-15 | End: 2019-03-11

## 2018-08-15 RX ORDER — DILTIAZEM HYDROCHLORIDE 120 MG/1
120 TABLET, FILM COATED ORAL DAILY
Qty: 30 TABLET | Refills: 6 | Status: SHIPPED | OUTPATIENT
Start: 2018-08-15 | End: 2020-01-09 | Stop reason: SDUPTHER

## 2018-08-15 NOTE — PROGRESS NOTES
Monica Johnson  08/15/2018  [id number]    Kenya Escoto MD  Patient Care Team:  Kenya Escoto MD as PCP - General (Family Medicine)  Has the patient seen any provider outside of the [facility] network since the last visit? (yes). If yes, HIPPA forms completed and records requested.        Visit Type:a scheduled routine follow-up visit    Chief Complaint:  Chief Complaint   Patient presents with    Follow-up     3 month follow up    Low-back Pain    Leg Pain       History of Present Illness:  He had check up that showed elevated LFTs.  Hepatitis panel negative  Liver US shows hepatomegaly.     Chart review and record review shows that this is not new.     [facility].   Cardiology did an angiogram. He had a stress test in 2003, and he reports was abnl. He reports he did an angiogram to see if there CAD, and he was told he had small vessel disease. He has slow flow through the vessels, but no intervention needed. He is medically managed.    He has seen Ortho for his knee pain, and he has seen pain management for his chronic pain.  He was offered injections, PT and NSAID. He plans on seeing them back to discuss often.  He declines PT, as he reports it makes his pain worse.      He has also been seeing Podiatry.  I will not Rx narcotics.  He will be getting steroid inection with Podiatry.     Patient is back today for recheck on BP and to recheck his labs.           History:  Past Medical History:   Diagnosis Date    ADHD (attention deficit hyperactivity disorder)     Allergy     Anxiety 1/25/2016    Arthritis 2014    Osteoarthritis    Depression 1/25/2016    Hypertension     Sciatica of left side 1/31/2017     History reviewed. No pertinent surgical history.  Family History   Problem Relation Age of Onset    Cancer Mother     Hypertension Mother     Hypertension Maternal Grandmother      Social History     Socioeconomic History    Marital status:       Spouse name: Not on file    Number of children: Not on file    Years of education: Not on file    Highest education level: Not on file   Social Needs    Financial resource strain: Not on file    Food insecurity - worry: Not on file    Food insecurity - inability: Not on file    Transportation needs - medical: Not on file    Transportation needs - non-medical: Not on file   Occupational History    Not on file   Tobacco Use    Smoking status: Current Some Day Smoker     Packs/day: 0.25     Types: Cigarettes, Cigars    Smokeless tobacco: Never Used   Substance and Sexual Activity    Alcohol use: Yes     Comment: Socially    Drug use: Yes     Types: Marijuana    Sexual activity: Yes   Other Topics Concern    Not on file   Social History Narrative    Not on file     Patient Active Problem List   Diagnosis    Anxiety    Depression    Intermittent explosive disorder    Sciatica of left side    Plantar fasciitis    Osteoarthritis    Essential hypertension    Elevated liver function tests    Acute medial meniscus tear of right knee    Chondromalacia of right knee     Review of patient's allergies indicates:  No Known Allergies    The following were reviewed at this visit: active problem list, medication list, allergies, family history, social history, and health maintenance.    Medications:  Current Outpatient Medications on File Prior to Visit   Medication Sig Dispense Refill    aspirin (ECOTRIN) 81 MG EC tablet Take 81 mg by mouth once daily.      cyclobenzaprine (FLEXERIL) 10 MG tablet Take 1 tablet (10 mg total) by mouth 3 (three) times daily as needed for Muscle spasms. 90 tablet 0    diclofenac sodium 1 % Gel Apply 4 g topically 3 (three) times daily as needed. 2 Tube 4    diltiaZEM (CARDIZEM) 120 MG tablet Take 120 mg by mouth 4 (four) times daily.      efinaconazole (JUBLIA) 10 % Fredis Apply 1 application topically once daily. 8 mL 11    gabapentin (NEURONTIN) 600 MG tablet Take 600 mg  by mouth 2 (two) times daily.      lisinopril (PRINIVIL,ZESTRIL) 20 MG tablet Take 20 mg by mouth once daily.      meloxicam (MOBIC) 15 MG tablet Take 1 tablet daily as needed for pain 30 tablet 0    naftifine 2 % Gel Apply 1 application topically once daily. 45 g 3    nicotine (NICODERM CQ) 21 mg/24 hr Place 1 patch onto the skin once daily. 28 patch 0     No current facility-administered medications on file prior to visit.        Medications have been reviewed and reconciled with patient at this visit.  Barriers to medications present (no)    Adverse reactions to current medications (no)    Over the counter medications reviewed (Yes ), and if needed added to active Medication list at this visit.     Exam:  Wt Readings from Last 3 Encounters:   08/15/18 105 kg (231 lb 7.7 oz)   08/07/18 104.9 kg (231 lb 4.2 oz)   07/26/18 104.9 kg (231 lb 4.2 oz)     Temp Readings from Last 3 Encounters:   08/15/18 96.5 °F (35.8 °C) (Tympanic)   08/07/18 98 °F (36.7 °C)   05/16/18 97.2 °F (36.2 °C) (Tympanic)     BP Readings from Last 3 Encounters:   08/15/18 126/88   08/07/18 (!) 136/93   07/26/18 (!) 145/88     Pulse Readings from Last 3 Encounters:   08/15/18 84   08/07/18 80   07/26/18 75     Body mass index is 31.39 kg/m².    Review of Systems   Constitutional: Negative.  Negative for chills and fever.   HENT: Negative.  Negative for congestion, sinus pain and sore throat.    Eyes: Negative for blurred vision and double vision.   Respiratory: Negative for cough, sputum production, shortness of breath and wheezing.    Cardiovascular: Negative for chest pain, palpitations and leg swelling.   Gastrointestinal: Negative for abdominal pain, constipation, diarrhea, heartburn, nausea and vomiting.   Genitourinary: Negative.    Musculoskeletal: Positive for back pain and joint pain.   Skin: Negative.  Negative for rash.   Neurological: Negative.    Endo/Heme/Allergies: Negative.  Negative for polydipsia. Does not bruise/bleed  easily.   Psychiatric/Behavioral: Negative for depression and substance abuse.         Physical Exam   Constitutional: He is oriented to person, place, and time. He appears well-developed and well-nourished. No distress.   HENT:   Head: Normocephalic and atraumatic.   Right Ear: External ear normal.   Left Ear: External ear normal.   Nose: Nose normal.   Mouth/Throat: Oropharynx is clear and moist. No oropharyngeal exudate.   Eyes: Conjunctivae and EOM are normal. Pupils are equal, round, and reactive to light. Right eye exhibits no discharge. Left eye exhibits no discharge.   Neck: Normal range of motion. Neck supple. No thyromegaly present.   Cardiovascular: Normal rate, regular rhythm, normal heart sounds and intact distal pulses.   No murmur heard.  Pulmonary/Chest: Effort normal and breath sounds normal. No respiratory distress. He has no wheezes.   Abdominal: Soft. Bowel sounds are normal. He exhibits no distension and no mass. There is no tenderness.   Musculoskeletal: Normal range of motion. He exhibits no edema.   Lymphadenopathy:     He has no cervical adenopathy.   Neurological: He is alert and oriented to person, place, and time. No cranial nerve deficit.   Skin: Capillary refill takes less than 2 seconds. He is not diaphoretic.   Psychiatric: He has a normal mood and affect. His behavior is normal. Judgment and thought content normal.   Nursing note and vitals reviewed.      Laboratory Reviewed ({Yes)  Lab Results   Component Value Date    WBC 10.54 05/07/2018    HGB 13.9 (L) 05/07/2018    HCT 43.8 05/07/2018     05/07/2018    CHOL 177 05/07/2018    TRIG 149 05/07/2018    HDL 38 (L) 05/07/2018    ALT 58 (H) 05/07/2018    AST 45 (H) 05/07/2018     05/07/2018    K 3.9 05/07/2018     05/07/2018    CREATININE 1.2 05/07/2018    BUN 16 05/07/2018    CO2 28 05/07/2018    TSH 0.41 05/03/2005       Assessment:  The primary encounter diagnosis was Elevated liver function tests. A diagnosis of  Essential hypertension was also pertinent to this visit.     Plan     Elevated liver function tests  -     Comprehensive metabolic panel; Future; Expected date: 08/15/2018   Recheck Liver   Discussed medication that would affect   No ETOH    Essential hypertension  -     Comprehensive metabolic panel; Future; Expected date: 08/15/2018     Bp at goal range   Continue Cardizem, Zestril   Monitor Bun Cre while on Mobic      Care Plan/Goals: Reviewed  (N/A)  Goals     None          Follow up: Follow-up in about 3 months (around 11/15/2018).    After visit summary was printed and given to patient upon discharge today.  Patient goals and care plan are included in After Visit Summary.

## 2018-08-16 ENCOUNTER — PATIENT MESSAGE (OUTPATIENT)
Dept: INTERNAL MEDICINE | Facility: CLINIC | Age: 43
End: 2018-08-16

## 2018-08-16 DIAGNOSIS — R79.89 ELEVATED LIVER FUNCTION TESTS: Primary | ICD-10-CM

## 2018-08-16 NOTE — PROGRESS NOTES
Liver function still elevated  I would like him to consult with GI.  I will place referral. Please schedule

## 2018-08-28 ENCOUNTER — INITIAL CONSULT (OUTPATIENT)
Dept: GASTROENTEROLOGY | Facility: CLINIC | Age: 43
End: 2018-08-28
Payer: COMMERCIAL

## 2018-08-28 ENCOUNTER — LAB VISIT (OUTPATIENT)
Dept: LAB | Facility: HOSPITAL | Age: 43
End: 2018-08-28
Attending: PHYSICIAN ASSISTANT
Payer: COMMERCIAL

## 2018-08-28 ENCOUNTER — OFFICE VISIT (OUTPATIENT)
Dept: PODIATRY | Facility: CLINIC | Age: 43
End: 2018-08-28
Payer: COMMERCIAL

## 2018-08-28 VITALS
BODY MASS INDEX: 30.34 KG/M2 | HEIGHT: 72 IN | SYSTOLIC BLOOD PRESSURE: 121 MMHG | WEIGHT: 224 LBS | DIASTOLIC BLOOD PRESSURE: 82 MMHG | HEART RATE: 76 BPM

## 2018-08-28 VITALS
HEIGHT: 72 IN | HEART RATE: 79 BPM | WEIGHT: 227.5 LBS | SYSTOLIC BLOOD PRESSURE: 124 MMHG | DIASTOLIC BLOOD PRESSURE: 74 MMHG | BODY MASS INDEX: 30.81 KG/M2

## 2018-08-28 DIAGNOSIS — R79.89 ELEVATED LFTS: Primary | ICD-10-CM

## 2018-08-28 DIAGNOSIS — M62.469 GASTROCNEMIUS EQUINUS, UNSPECIFIED LATERALITY: ICD-10-CM

## 2018-08-28 DIAGNOSIS — R79.89 ELEVATED LFTS: ICD-10-CM

## 2018-08-28 DIAGNOSIS — M72.2 PLANTAR FASCIITIS: Primary | ICD-10-CM

## 2018-08-28 LAB
ALBUMIN SERPL BCP-MCNC: 4.3 G/DL
ALP SERPL-CCNC: 71 U/L
ALT SERPL W/O P-5'-P-CCNC: 51 U/L
AST SERPL-CCNC: 49 U/L
BILIRUB DIRECT SERPL-MCNC: 0.2 MG/DL
BILIRUB SERPL-MCNC: 0.4 MG/DL
CERULOPLASMIN SERPL-MCNC: 25 MG/DL
FERRITIN SERPL-MCNC: 103 NG/ML
IGA SERPL-MCNC: 183 MG/DL
IGG SERPL-MCNC: 1304 MG/DL
IGM SERPL-MCNC: 153 MG/DL
INR PPP: 0.9
IRON SERPL-MCNC: 63 UG/DL
PROT SERPL-MCNC: 7.8 G/DL
PROTHROMBIN TIME: 10.1 SEC
SATURATED IRON: 17 %
TOTAL IRON BINDING CAPACITY: 369 UG/DL
TRANSFERRIN SERPL-MCNC: 249 MG/DL
TSH SERPL DL<=0.005 MIU/L-ACNC: 0.56 UIU/ML

## 2018-08-28 PROCEDURE — 99203 OFFICE O/P NEW LOW 30 MIN: CPT | Mod: S$GLB,,, | Performed by: PHYSICIAN ASSISTANT

## 2018-08-28 PROCEDURE — 86706 HEP B SURFACE ANTIBODY: CPT

## 2018-08-28 PROCEDURE — 82728 ASSAY OF FERRITIN: CPT

## 2018-08-28 PROCEDURE — 83540 ASSAY OF IRON: CPT

## 2018-08-28 PROCEDURE — 3078F DIAST BP <80 MM HG: CPT | Mod: CPTII,S$GLB,, | Performed by: PHYSICIAN ASSISTANT

## 2018-08-28 PROCEDURE — 82784 ASSAY IGA/IGD/IGG/IGM EACH: CPT | Mod: 59

## 2018-08-28 PROCEDURE — 99999 PR PBB SHADOW E&M-EST. PATIENT-LVL III: CPT | Mod: PBBFAC,,, | Performed by: PODIATRIST

## 2018-08-28 PROCEDURE — 3008F BODY MASS INDEX DOCD: CPT | Mod: CPTII,S$GLB,, | Performed by: PODIATRIST

## 2018-08-28 PROCEDURE — 3008F BODY MASS INDEX DOCD: CPT | Mod: CPTII,S$GLB,, | Performed by: PHYSICIAN ASSISTANT

## 2018-08-28 PROCEDURE — 86704 HEP B CORE ANTIBODY TOTAL: CPT

## 2018-08-28 PROCEDURE — 3079F DIAST BP 80-89 MM HG: CPT | Mod: CPTII,S$GLB,, | Performed by: PODIATRIST

## 2018-08-28 PROCEDURE — 99214 OFFICE O/P EST MOD 30 MIN: CPT | Mod: 25,S$GLB,, | Performed by: PODIATRIST

## 2018-08-28 PROCEDURE — 84443 ASSAY THYROID STIM HORMONE: CPT

## 2018-08-28 PROCEDURE — 3074F SYST BP LT 130 MM HG: CPT | Mod: CPTII,S$GLB,, | Performed by: PHYSICIAN ASSISTANT

## 2018-08-28 PROCEDURE — 82390 ASSAY OF CERULOPLASMIN: CPT

## 2018-08-28 PROCEDURE — 20550 NJX 1 TENDON SHEATH/LIGAMENT: CPT | Mod: RT,S$GLB,, | Performed by: PODIATRIST

## 2018-08-28 PROCEDURE — 86235 NUCLEAR ANTIGEN ANTIBODY: CPT

## 2018-08-28 PROCEDURE — 80076 HEPATIC FUNCTION PANEL: CPT

## 2018-08-28 PROCEDURE — 86038 ANTINUCLEAR ANTIBODIES: CPT

## 2018-08-28 PROCEDURE — 85610 PROTHROMBIN TIME: CPT

## 2018-08-28 PROCEDURE — 99999 PR PBB SHADOW E&M-EST. PATIENT-LVL IV: CPT | Mod: PBBFAC,,, | Performed by: PHYSICIAN ASSISTANT

## 2018-08-28 PROCEDURE — 3074F SYST BP LT 130 MM HG: CPT | Mod: CPTII,S$GLB,, | Performed by: PODIATRIST

## 2018-08-28 PROCEDURE — 83516 IMMUNOASSAY NONANTIBODY: CPT

## 2018-08-28 PROCEDURE — 86256 FLUORESCENT ANTIBODY TITER: CPT | Mod: 91

## 2018-08-28 RX ORDER — TRIAMCINOLONE ACETONIDE 40 MG/ML
40 INJECTION, SUSPENSION INTRA-ARTICULAR; INTRAMUSCULAR ONCE
Status: COMPLETED | OUTPATIENT
Start: 2018-08-28 | End: 2018-08-28

## 2018-08-28 RX ADMIN — TRIAMCINOLONE ACETONIDE 40 MG: 40 INJECTION, SUSPENSION INTRA-ARTICULAR; INTRAMUSCULAR at 12:08

## 2018-08-28 NOTE — LETTER
August 28, 2018      Kenya Escoto MD  [address] [place] [id number]           [facility]  [address] [place] [address]  Phone: [phone]  Fax: [phone]          Patient: Monica Johnson   MR Number: [id number]   YOB: 1975   Date of Visit: 8/28/2018       Dear Dr. Kenya Escoto:    Thank you for referring Monica Johnson to me for evaluation. Attached you will find relevant portions of my assessment and plan of care.    If you have questions, please do not hesitate to call me. I look forward to following Monica Johnson along with you.    Sincerely,    Sixto Ac PA-C    Enclosure  CC:  No Recipients    If you would like to receive this communication electronically, please contact [email or web address] or [phone] to request more information on [employer] Link access.    For providers and/or their staff who would like to refer a patient to [facility], please contact us through our one-stop-shop provider referral line, [facility], at [phone].    If you feel you have received this communication in error or would no longer like to receive these types of communications, please e-mail [email or web address]

## 2018-08-28 NOTE — PROGRESS NOTES
[facility] -   PODIATRIC MEDICINE AND SURGERY  PROGRESS NOTE  8/28/2018    PODIATRY NOTE  PCP: Dr. Kenya Escoto MD    CHIEF COMPLAINT   Chief Complaint   Patient presents with    Foot Pain     right foot pain rated at a 4, Patients states pain begain about 5 months ago PCP: Jevon last seen on 8/15/18       HPI  Monica Johnson is a 43 y.o. male who has a past medical history of ADHD (attention deficit hyperactivity disorder), Allergy, Anxiety (1/25/2016), Arthritis (2014), Depression (1/25/2016), Hypertension, and Sciatica of left side (1/31/2017).     Monica presents to clinic today complaining of right foot pain. Pt was a patient of Dr. Funez. He relates he has had right heel pain for the past two months. Of note, per chart review, clinical concern for calcaneus stress fracture. He was placed in boot, but did not improve and therefore He recently was placed in a cast due to severity of pain. He has been in cast for past three weeks and is here today for follow up. Last xray was 1/10/18.  Pt has diagnosed plantar fasciitis on left and has initiated physical therapy which has helped with symptoms. Pt states he works as  at [employer]. He relates he was not able to comply with [facility] instructions due to work demand therefore he has been ambulating in cast.  He has had PT on both feet. RIGHT became progressively worse than LEFT    7/26/18  Patient presents to clinic today for 4 week follow-up of right heel pain.  Patient states he were CAM boot for an additional week after his last visit with me and he has been in tennis shoes with the past 2 weeks.  Patient states the symptoms are still present and he has been experiencing the these pains for the past 3-4 months.  Symptoms are aggravated primarily with 1st steps in the morning and at the end of the day.  Patient is a  and stands on his feet for long duration of time.  He has underwent physical therapist therapy in the past  which helped his symptoms but he is unable to afford physical therapy at this time.  He also has knee pain which is being managed by Orthopedics.  In describing his pain patient points to plantar aspect of left heel medially and states the pain radiates to the lateral plantar aspect he also admits to pain distally at medial band of plantar fascia.  He denies any injury however there was concern of a stress fracture on his right foot recent x-rays negative for stress fracture which were taken 4 weeks ago. Otherwise there are no other new changes he is presently wearing new balance tennis shoes with orthotic inserts.  He has no further pedal complaints at this time.       8/28/2018  Pt is here today follow up right foot pain. MRI performed negative for stress fracture on right heel. He would like to have steroid injection. Symptoms are intermittent to right heel, rates pain 4/10. No further pedal complaints.    PMH  Past Medical History:   Diagnosis Date    ADHD (attention deficit hyperactivity disorder)     Allergy     Anxiety 1/25/2016    Arthritis 2014    Osteoarthritis    Depression 1/25/2016    Hypertension     Sciatica of left side 1/31/2017       PROBLEM LIST  Patient Active Problem List    Diagnosis Date Noted    Acute medial meniscus tear of right knee 05/23/2018    Chondromalacia of right knee 05/23/2018    Elevated liver function tests 05/15/2018    Plantar fasciitis 05/03/2018    Osteoarthritis 05/03/2018    Essential hypertension 05/03/2018    Sciatica of left side 01/31/2017    Anxiety 01/25/2016    Depression 01/25/2016    Intermittent explosive disorder 01/19/2016     REVIEW OF SYSTEMS   General: This patient is well-developed, well-nourished and appears stated age, well-oriented to person, place and time, and cooperative and pleasant on today's visit  Constitutional: Negative for chills and fever.   Respiratory: Negative for shortness of breath.    Cardiovascular: Negative for chest  pain, palpitations, orthopnea  Gastrointestinal: Negative for diarrhea, nausea and vomiting.   Musculoskeletal: Positive for above noted in HPI  Skin: positive for skin changes  Neurological: negative  for tingling and sensory changes  Peripheral Vascular: no claudication or cyanosis  Psychiatric/Behavioral: Negative for altered mental status     PHYSICAL EXAM  Vitals:    08/28/18 1128   BP: 121/82   Pulse: 76   Weight: 101.6 kg (223 lb 15.8 oz)   Height: 6' (1.829 m)   PainSc:   4   PainLoc: Foot       General: This patient is well-developed, well-nourished and appears stated age, well-oriented to person, place and time, and cooperative and pleasant on today's visit    LOWER EXTREMITY  Vascular exam:   · Dorsalis pedis and posterior tibial pulses palpable 2/4 bilaterally.   · Capillary refill time immediate to the toes.   · Feet are warm to the touch. Skin temperature warm to warm from proximally to distally   · There are no varicosities, telangiectasias noted to bilateral foot and ankle regions.   · There are no ecchymoses noted to bilateral foot and ankle regions.   · There is no gross lower extremity edema.    Dermatologic exam:   · Skin moist with healthy texture and turgor.  · There are no open ulcerations, lacerations, or fissures to bilateral foot and ankle regions. There are no signs of infection as there is no erythema, no proximal-extending lymphangiitis, no fluctuance, or crepitus noted on palpation to bilateral foot and ankle regions.   · There is no interdigital maceration.   · There are hyperkeratotic lesions noted to feet. Nails are well-trimmed.    Neurologic exam:  · Epicritic sensation is intact as the patient is able to sense light touch to bilateral foot and ankle regions.   · Achilles and patellar deep tendon reflexes intact  · Babinski reflex absent    Musculoskeletal/Orthopedic exam:   · Mild TTP along medial aspect of calcaneus tubercle RIGHT  · There is mild TTP along medial band of plantar  fascia, right   · Decreased ankle joint DF with knee extended and flexed, bilateral   · Muscle strength AT/EHL/EDL/PT: 5/5; Achilles/Gastroc/Soleus: 5/5; PB/PL: 5/5 Muscle tone is normal.  · STJ ROM supple inv/ev, non crepitus   · MTPJ b/l supple DF/PF, non crepitus  · Foot type: Pronated     IMAGING   Reviewed by me and I agree with radiologist findings, 3 views of foot/ankle, reveal:  Results for orders placed during the hospital encounter of 02/07/18   X-Ray Foot Complete Bilateral    Narrative Technique: AP, lateral, and oblique views were obtained of the bilateral feet    Comparison: 5/3/2015    Findings: No acute fractures or dislocations visualized. There are degenerative changes at the bottom right toe MTP joints with slight hallux valgus deformity on the left.  There is left worse on right pes planus deformity.  No erosive osseous changes demonstrated.    Impression As above.             Electronically signed by: LEIDA CORTEZ MD  Date:     02/07/18  Time:    08:22            Results for orders placed during the hospital encounter of 02/07/18   X-Ray Foot Complete Bilateral    Narrative Technique: AP, lateral, and oblique views were obtained of the bilateral feet    Comparison: 5/3/2015    Findings: No acute fractures or dislocations visualized. There are degenerative changes at the bottom right toe MTP joints with slight hallux valgus deformity on the left.  There is left worse on right pes planus deformity.  No erosive osseous changes demonstrated.    Impression As above.             Electronically signed by: LEIDA CORTEZ MD  Date:     02/07/18  Time:    08:22           MRI:    FINDINGS:  Mild degenerative subcortical cystic change no involve other than the 1st metatarsal head at the great toe MTP joint.  Marrow signal throughout the remaining visualized osseous structures is within normal limits with no evidence of fracture or marrow replacement process.    Plantar fascia, sinus tarsi, and tarsal  tunnel structures appear within normal limits.    Visualized ligamentous and tendinous structures appear grossly intact noting that examination is not tailored for evaluation of internal derangement of the hindfoot.    No large tibiotalar or subtalar chondral defects appreciated.    Mild nonspecific edema noted in the superior aspect of Kager's fat pad.      Impression       1. Mild degenerative changes at the great toe MTP joint.  2. Mild nonspecific edema noted in the superior aspect of Kager's fat pad, of uncertain                ASSESSMENT  Encounter Diagnoses   Name Primary?    Plantar fasciitis - Right Foot Yes    Gastrocnemius equinus, unspecified laterality - Right Foot          PLAN  1. Patient was educated about clinical and imaging findings, and verbalizes understanding of above.     Diagnoses and all orders for this visit:  Plantar fasciitis - Right Foot    Gastrocnemius equinus, unspecified laterality - Right Foot    Other orders  -     triamcinolone acetonide injection 40 mg; 1 mL (40 mg total) by Tendon Sheath Injection route once.         2. Treatment plan:      Pt would like to proceed with steroid injection today.  After verbal consent, the RIGHT heel was injected from a medial approach with 1 mL of 0.5% marcaine plain and 1 mL of Kenalog 40. Topical ethyl chloride was used for the patient's comfort. The patient tolerated well. A band-aid was applied over the injection site. Discussed steroid flare reaction. Pt advised to ice afterwards.          Future Appointments   Date Time Provider Department Center   9/25/2018  9:00 AM Sixto Ac PA-C [facility]   10/3/2018 10:40 AM Vilma Dorsey DPM [facility] POD [place]       Report Electronically Signed By:  Vilma Dorsey DPM   Podiatric Medicine & Surgery  [facility]  8/28/2018

## 2018-08-28 NOTE — PROGRESS NOTES
GI OUTPATIENT NOTE    PCP: Kenya Escoto [id number] / [facility] [id number]    Chief Complaint   Patient presents with    Fatty Liver       HISTORY OF PRESENT ILLNESS:  43 y.o. male presents to the GI clinic today for initial evaluation. The patient has been referred for elevated liver enzymes. This was first noted in May when he established care here. His PCP repeated them and they remained elevated. The patient denies any known family history of liver disease. Hepatitis serologies were negative for acute A, B or C. He has professionally done tattoos. He denies other risk factors. He doesn't have diabetes or elevated triglycerides. An Ultrasound in May showed mild hepatomegaly. He drinks 3-4 beers a week with an occasional mixed drink. He was taking Tylenol on a regular basis up until 3-4 months ago. At one time he was taking four 500 mg Tylenol a day. He denies hematochezia, melena, change in bowel habits, change in appetite, abdominal pain, nausea or vomiting.      Past Medical History:   Diagnosis Date    ADHD (attention deficit hyperactivity disorder)     Allergy     Anxiety 1/25/2016    Arthritis 2014    Osteoarthritis    Depression 1/25/2016    Hypertension     Sciatica of left side 1/31/2017       History reviewed. No pertinent surgical history.    Social History     Socioeconomic History    Marital status:      Spouse name: Not on file    Number of children: Not on file    Years of education: Not on file    Highest education level: Not on file   Social Needs    Financial resource strain: Not on file    Food insecurity - worry: Not on file    Food insecurity - inability: Not on file    Transportation needs - medical: Not on file    Transportation needs - non-medical: Not on file   Occupational History    Not on file   Tobacco Use    Smoking status: Current Some Day Smoker     Packs/day: 0.25     Types: Cigarettes, Cigars    Smokeless tobacco: Never Used   Substance  and Sexual Activity    Alcohol use: Yes     Comment: Socially    Drug use: Yes     Types: Marijuana    Sexual activity: Yes   Other Topics Concern    Not on file   Social History Narrative    Not on file       Family History   Problem Relation Age of Onset    Cancer Mother     Hypertension Mother     Hypertension Maternal Grandmother        MEDS/ALLERGY:  The patient's medications and allergies were reviewed and updated in the EPIC chart.     Review of Systems   Constitutional: Negative for fatigue and fever.   HENT: Negative for hearing loss.    Eyes: Negative for visual disturbance.   Respiratory: Negative for cough and shortness of breath.    Cardiovascular: Negative for chest pain and palpitations.   Gastrointestinal:        As per HPI.   Genitourinary: Negative for difficulty urinating, dysuria, frequency and hematuria.   Musculoskeletal: Positive for arthralgias and back pain.   Skin: Negative for color change and rash.   Neurological: Negative for dizziness, seizures, syncope, weakness, numbness and headaches.   Hematological: Does not bruise/bleed easily.   Psychiatric/Behavioral: The patient is not nervous/anxious.        Physical Exam   Constitutional: He is oriented to person, place, and time. He appears well-developed and well-nourished.   HENT:   Head: Normocephalic and atraumatic.   Eyes: EOM are normal.   Neck: Normal range of motion. Neck supple. No thyromegaly present.   Cardiovascular: Normal rate, regular rhythm and normal heart sounds.   No murmur heard.  Pulmonary/Chest: Effort normal and breath sounds normal. No respiratory distress. He has no wheezes.   Abdominal: Soft. Bowel sounds are normal. He exhibits no distension and no mass. There is no hepatomegaly. There is no tenderness.   Musculoskeletal: He exhibits no edema.   Neurological: He is alert and oriented to person, place, and time. No cranial nerve deficit. Gait normal.   Skin: Skin is warm and dry. No rash noted.    Psychiatric: He has a normal mood and affect. Thought content normal.       LABS/IMAGING:   Pertinent results were reviewed.     ASSESSMENT:  1. Elevated LFTs        PLAN:  Nothing in his HPI sets off any red flags. Will get labs to rule out possible underlying conditions. Further recommendations after results available.     ORDER SUMMARY:  Orders Placed This Encounter   Procedures    Hepatitis B core antibody, total    Hepatitis B surface antibody    Anti-smooth muscle antibody    Antimitochondrial antibody    SUBHASH    Protime-INR    Iron and TIBC    Immunoglobulins (IgG, IgA, IgM) Quantitative    Tissue transglutaminase, IgA    Hepatic function panel    Ferritin    Ceruloplasmin    TSH        Follow-up in about 4 weeks (around 9/25/2018).     Thank you for the opportunity to participate in the care of this patient. This consult was designated to me by my supervising physician. He fully participated in the development of the assessment and recommendations.    Sixto Ac PA-C.

## 2018-08-29 LAB
ANA SER QL IF: NORMAL
HBV CORE AB SERPL QL IA: POSITIVE
HBV SURFACE AB SER-ACNC: POSITIVE M[IU]/ML
MITOCHONDRIA AB TITR SER IF: NORMAL {TITER}

## 2018-08-30 LAB
SMOOTH MUSCLE AB TITR SER IF: NORMAL {TITER}
TTG IGA SER IA-ACNC: 5 UNITS

## 2018-09-25 ENCOUNTER — OFFICE VISIT (OUTPATIENT)
Dept: GASTROENTEROLOGY | Facility: CLINIC | Age: 43
End: 2018-09-25
Payer: COMMERCIAL

## 2018-09-25 VITALS
HEART RATE: 78 BPM | BODY MASS INDEX: 30.73 KG/M2 | DIASTOLIC BLOOD PRESSURE: 72 MMHG | SYSTOLIC BLOOD PRESSURE: 124 MMHG | WEIGHT: 226.88 LBS | HEIGHT: 72 IN

## 2018-09-25 DIAGNOSIS — Z78.9 HEPATITIS B IMMUNE: ICD-10-CM

## 2018-09-25 DIAGNOSIS — R79.89 ELEVATED LFTS: Primary | ICD-10-CM

## 2018-09-25 PROCEDURE — 3078F DIAST BP <80 MM HG: CPT | Mod: CPTII,S$GLB,, | Performed by: PHYSICIAN ASSISTANT

## 2018-09-25 PROCEDURE — 3008F BODY MASS INDEX DOCD: CPT | Mod: CPTII,S$GLB,, | Performed by: PHYSICIAN ASSISTANT

## 2018-09-25 PROCEDURE — 99999 PR PBB SHADOW E&M-EST. PATIENT-LVL III: CPT | Mod: PBBFAC,,, | Performed by: PHYSICIAN ASSISTANT

## 2018-09-25 PROCEDURE — 3074F SYST BP LT 130 MM HG: CPT | Mod: CPTII,S$GLB,, | Performed by: PHYSICIAN ASSISTANT

## 2018-09-25 PROCEDURE — 99213 OFFICE O/P EST LOW 20 MIN: CPT | Mod: S$GLB,,, | Performed by: PHYSICIAN ASSISTANT

## 2018-09-25 NOTE — PROGRESS NOTES
Subjective:      Patient ID: Monica Johnson is a 43 y.o. male.    Chief Complaint: Fatty Liver    HPI  The patient has a history of fatty liver enzymes. He is here for a follow up visit. He has no complaints. Results were discussed.     Review of Systems  As per HPI.     Objective:     Physical Exam   Constitutional: He is oriented to person, place, and time. He appears well-developed and well-nourished. No distress.   Pulmonary/Chest: Effort normal. No respiratory distress.   Neurological: He is alert and oriented to person, place, and time.   Psychiatric: His behavior is normal.       Assessment:     1. Elevated LFTs    2. Hepatitis B immune        Plan:     Results were reviewed. Suspect fatty liver. We discussed low fat diet, exercise and weight loss. I want to repeat his labs in three months. His questions were answered and he reported understanding.     Orders Placed This Encounter   Procedures    Hepatic function panel       Follow-up if symptoms worsen or fail to improve.    Thank you for the opportunity to participate in the care of this patient.   Sixto Ac PA-C.

## 2018-10-03 ENCOUNTER — OFFICE VISIT (OUTPATIENT)
Dept: PODIATRY | Facility: CLINIC | Age: 43
End: 2018-10-03
Payer: COMMERCIAL

## 2018-10-03 VITALS
DIASTOLIC BLOOD PRESSURE: 90 MMHG | BODY MASS INDEX: 30.61 KG/M2 | SYSTOLIC BLOOD PRESSURE: 133 MMHG | HEART RATE: 84 BPM | HEIGHT: 72 IN | WEIGHT: 226 LBS

## 2018-10-03 DIAGNOSIS — M62.469 GASTROCNEMIUS EQUINUS, UNSPECIFIED LATERALITY: ICD-10-CM

## 2018-10-03 DIAGNOSIS — M72.2 PLANTAR FASCIITIS: Primary | ICD-10-CM

## 2018-10-03 PROCEDURE — 99213 OFFICE O/P EST LOW 20 MIN: CPT | Mod: S$GLB,,, | Performed by: PODIATRIST

## 2018-10-03 PROCEDURE — 3075F SYST BP GE 130 - 139MM HG: CPT | Mod: CPTII,S$GLB,, | Performed by: PODIATRIST

## 2018-10-03 PROCEDURE — 99999 PR PBB SHADOW E&M-EST. PATIENT-LVL III: CPT | Mod: PBBFAC,,, | Performed by: PODIATRIST

## 2018-10-03 PROCEDURE — 3008F BODY MASS INDEX DOCD: CPT | Mod: CPTII,S$GLB,, | Performed by: PODIATRIST

## 2018-10-03 PROCEDURE — 3080F DIAST BP >= 90 MM HG: CPT | Mod: CPTII,S$GLB,, | Performed by: PODIATRIST

## 2018-10-03 RX ORDER — ACETAMINOPHEN AND CODEINE PHOSPHATE 300; 30 MG/1; MG/1
1 TABLET ORAL
COMMUNITY
End: 2019-03-11

## 2018-10-03 NOTE — PROGRESS NOTES
[facility] -   PODIATRIC MEDICINE AND SURGERY  PROGRESS NOTE  10/3/2018    PODIATRY NOTE  PCP: Dr. Kenya Escoto MD    CHIEF COMPLAINT   Chief Complaint   Patient presents with    Follow-up     steroid injection       HPI  Monica Johnson is a 43 y.o. male who has a past medical history of ADHD (attention deficit hyperactivity disorder), Allergy, Anxiety (1/25/2016), Arthritis (2014), Depression (1/25/2016), Hypertension, and Sciatica of left side (1/31/2017).     Monica presents to clinic today complaining of right foot pain. Pt was a patient of Dr. Funez. He relates he has had right heel pain for the past two months. Of note, per chart review, clinical concern for calcaneus stress fracture. He was placed in boot, but did not improve and therefore He recently was placed in a cast due to severity of pain. He has been in cast for past three weeks and is here today for follow up. Last xray was 1/10/18.  Pt has diagnosed plantar fasciitis on left and has initiated physical therapy which has helped with symptoms. Pt states he works as  at [employer]. He relates he was not able to comply with NWB instructions due to work demand therefore he has been ambulating in cast.  He has had PT on both feet. RIGHT became progressively worse than LEFT    7/26/18  Patient presents to clinic today for 4 week follow-up of right heel pain.  Patient states he were CAM boot for an additional week after his last visit with me and he has been in tennis shoes with the past 2 weeks.  Patient states the symptoms are still present and he has been experiencing the these pains for the past 3-4 months.  Symptoms are aggravated primarily with 1st steps in the morning and at the end of the day.  Patient is a  and stands on his feet for long duration of time.  He has underwent physical therapist therapy in the past which helped his symptoms but he is unable to afford physical therapy at this time.  He also  has knee pain which is being managed by Orthopedics.  In describing his pain patient points to plantar aspect of left heel medially and states the pain radiates to the lateral plantar aspect he also admits to pain distally at medial band of plantar fascia.  He denies any injury however there was concern of a stress fracture on his right foot recent x-rays negative for stress fracture which were taken 4 weeks ago. Otherwise there are no other new changes he is presently wearing new balance tennis shoes with orthotic inserts.  He has no further pedal complaints at this time.       10/3/2018  Pt is here today follow up plantar fasciitis right foot injection. He relates great improvement in symptoms after receiving sterod injection. He he complains of ball of upper extremity and knee pain on right.  He is seen orthopedist for lower extremity symptoms.  He relates that knee pain has significantly limited his ability to work full time.  He does have follow-up arranged with his orthopedist.  He has no further pedal complaints.    REVIEW OF SYSTEMS   General: This patient is well-developed, well-nourished and appears stated age, well-oriented to person, place and time, and cooperative and pleasant on today's visit  Constitutional: Negative for chills and fever.   Respiratory: Negative for shortness of breath.    Cardiovascular: Negative for chest pain, palpitations, orthopnea  Gastrointestinal: Negative for diarrhea, nausea and vomiting.   Musculoskeletal: Positive for above noted in HPI  Skin: positive for skin changes  Neurological: negative  for tingling and sensory changes  Peripheral Vascular: no claudication or cyanosis  Psychiatric/Behavioral: Negative for altered mental status     PHYSICAL EXAM  Vitals:    10/03/18 1102   BP: (!) 133/90   Pulse: 84   Weight: 102.5 kg (226 lb)   Height: 6' (1.829 m)   PainSc:   4   PainLoc: Foot       General: This patient is well-developed, well-nourished and appears stated age,  well-oriented to person, place and time, and cooperative and pleasant on today's visit    LOWER EXTREMITY  Vascular exam:   · Dorsalis pedis and posterior tibial pulses palpable 2/4 bilaterally.   · Capillary refill time immediate to the toes.   · Feet are warm to the touch. Skin temperature warm to warm from proximally to distally   · There are no varicosities, telangiectasias noted to bilateral foot and ankle regions.   · There are no ecchymoses noted to bilateral foot and ankle regions.   · There is no gross lower extremity edema.    Dermatologic exam:   · Skin moist with healthy texture and turgor.  · There are no open ulcerations, lacerations, or fissures to bilateral foot and ankle regions. There are no signs of infection as there is no erythema, no proximal-extending lymphangiitis, no fluctuance, or crepitus noted on palpation to bilateral foot and ankle regions.   · There is no interdigital maceration.   · There are hyperkeratotic lesions noted to feet. Nails are well-trimmed.    Neurologic exam:  · Epicritic sensation is intact as the patient is able to sense light touch to bilateral foot and ankle regions.   · Achilles and patellar deep tendon reflexes intact  · Babinski reflex absent    Musculoskeletal/Orthopedic exam:   · NEG TTP along medial aspect of calcaneus tubercle RIGHT  · There is NEG TTP along medial band of plantar fascia, right   · Decreased ankle joint DF with knee extended and flexed, bilateral   · Ligamentous laxity noted  · Muscle strength AT/EHL/EDL/PT: 5/5; Achilles/Gastroc/Soleus: 5/5; PB/PL: 5/5 Muscle tone is normal.  · STJ ROM supple inv/ev, non crepitus   · MTPJ b/l supple DF/PF, non crepitus  · Foot type: Pronated     IMAGING   Reviewed by me and I agree with radiologist findings, 3 views of foot/ankle, reveal:  Results for orders placed during the hospital encounter of 02/07/18   X-Ray Foot Complete Bilateral    Narrative Technique: AP, lateral, and oblique views were obtained of  the bilateral feet    Comparison: 5/3/2015    Findings: No acute fractures or dislocations visualized. There are degenerative changes at the bottom right toe MTP joints with slight hallux valgus deformity on the left.  There is left worse on right pes planus deformity.  No erosive osseous changes demonstrated.    Impression As above.             Electronically signed by: LEIDA CORTEZ MD  Date:     02/07/18  Time:    08:22            Results for orders placed during the hospital encounter of 02/07/18   X-Ray Foot Complete Bilateral    Narrative Technique: AP, lateral, and oblique views were obtained of the bilateral feet    Comparison: 5/3/2015    Findings: No acute fractures or dislocations visualized. There are degenerative changes at the bottom right toe MTP joints with slight hallux valgus deformity on the left.  There is left worse on right pes planus deformity.  No erosive osseous changes demonstrated.    Impression As above.             Electronically signed by: LEIDA CORTEZ MD  Date:     02/07/18  Time:    08:22           MRI:    FINDINGS:  Mild degenerative subcortical cystic change no involve other than the 1st metatarsal head at the great toe MTP joint.  Marrow signal throughout the remaining visualized osseous structures is within normal limits with no evidence of fracture or marrow replacement process.    Plantar fascia, sinus tarsi, and tarsal tunnel structures appear within normal limits.    Visualized ligamentous and tendinous structures appear grossly intact noting that examination is not tailored for evaluation of internal derangement of the hindfoot.    No large tibiotalar or subtalar chondral defects appreciated.    Mild nonspecific edema noted in the superior aspect of Kager's fat pad.      Impression       1. Mild degenerative changes at the great toe MTP joint.  2. Mild nonspecific edema noted in the superior aspect of Kager's fat pad, of uncertain                ASSESSMENT  Encounter  Diagnoses   Name Primary?    Plantar fasciitis - Right Foot Yes    Gastrocnemius equinus, unspecified laterality - Right Foot          PLAN  1. Patient was educated about clinical and imaging findings, and verbalizes understanding of above.     Diagnoses and all orders for this visit:  Plantar fasciitis - Right Foot    Gastrocnemius equinus, unspecified laterality - Right Foot         2. Treatment plan:  Continue with at home rehab and corrective orthoses. RTC If symptoms return        Future Appointments   Date Time Provider Department Center   12/26/2018  9:30 AM LABORATORY, [facility] [place]       Report Electronically Signed By:  Vilma Dorsey DPM   Podiatric Medicine & Surgery  [facility]  10/3/2018

## 2018-10-22 ENCOUNTER — CLINICAL SUPPORT (OUTPATIENT)
Dept: SMOKING CESSATION | Facility: CLINIC | Age: 43
End: 2018-10-22
Payer: COMMERCIAL

## 2018-10-22 DIAGNOSIS — F17.200 NICOTINE DEPENDENCE: Primary | ICD-10-CM

## 2018-10-22 PROCEDURE — 99407 BEHAV CHNG SMOKING > 10 MIN: CPT | Mod: S$GLB,,,

## 2018-10-22 NOTE — PROGRESS NOTES
Successful contact with patient regarding tobacco cessation quit #1. Pt states, he continue to smoke 4-5 cigarettes per day and he is not ready to return to the program at this time. Pt informed of his benefit status, future telephone follow ups, and contact information to schedule an appointment when he is ready. Will update the tobacco cessation smart form for 6 months on quit #1.

## 2019-01-02 RX ORDER — CYCLOBENZAPRINE HCL 10 MG
TABLET ORAL
Qty: 90 TABLET | Refills: 0 | Status: SHIPPED | OUTPATIENT
Start: 2019-01-02 | End: 2020-01-15

## 2019-02-06 ENCOUNTER — OFFICE VISIT (OUTPATIENT)
Dept: INTERNAL MEDICINE | Facility: CLINIC | Age: 44
End: 2019-02-06
Payer: COMMERCIAL

## 2019-02-06 VITALS
DIASTOLIC BLOOD PRESSURE: 82 MMHG | TEMPERATURE: 98 F | SYSTOLIC BLOOD PRESSURE: 134 MMHG | HEART RATE: 99 BPM | WEIGHT: 231.5 LBS | OXYGEN SATURATION: 97 % | HEIGHT: 72 IN | BODY MASS INDEX: 31.36 KG/M2

## 2019-02-06 DIAGNOSIS — L73.9 FOLLICULITIS: Primary | ICD-10-CM

## 2019-02-06 PROCEDURE — 99213 OFFICE O/P EST LOW 20 MIN: CPT | Mod: S$GLB,,, | Performed by: FAMILY MEDICINE

## 2019-02-06 PROCEDURE — 99213 PR OFFICE/OUTPT VISIT, EST, LEVL III, 20-29 MIN: ICD-10-PCS | Mod: S$GLB,,, | Performed by: FAMILY MEDICINE

## 2019-02-06 PROCEDURE — 3079F PR MOST RECENT DIASTOLIC BLOOD PRESSURE 80-89 MM HG: ICD-10-PCS | Mod: CPTII,S$GLB,, | Performed by: FAMILY MEDICINE

## 2019-02-06 PROCEDURE — 3075F SYST BP GE 130 - 139MM HG: CPT | Mod: CPTII,S$GLB,, | Performed by: FAMILY MEDICINE

## 2019-02-06 PROCEDURE — 3008F PR BODY MASS INDEX (BMI) DOCUMENTED: ICD-10-PCS | Mod: CPTII,S$GLB,, | Performed by: FAMILY MEDICINE

## 2019-02-06 PROCEDURE — 3079F DIAST BP 80-89 MM HG: CPT | Mod: CPTII,S$GLB,, | Performed by: FAMILY MEDICINE

## 2019-02-06 PROCEDURE — 3075F PR MOST RECENT SYSTOLIC BLOOD PRESS GE 130-139MM HG: ICD-10-PCS | Mod: CPTII,S$GLB,, | Performed by: FAMILY MEDICINE

## 2019-02-06 PROCEDURE — 99999 PR PBB SHADOW E&M-EST. PATIENT-LVL IV: CPT | Mod: PBBFAC,,, | Performed by: FAMILY MEDICINE

## 2019-02-06 PROCEDURE — 99999 PR PBB SHADOW E&M-EST. PATIENT-LVL IV: ICD-10-PCS | Mod: PBBFAC,,, | Performed by: FAMILY MEDICINE

## 2019-02-06 PROCEDURE — 3008F BODY MASS INDEX DOCD: CPT | Mod: CPTII,S$GLB,, | Performed by: FAMILY MEDICINE

## 2019-02-06 RX ORDER — CHLORZOXAZONE 250 MG/1
TABLET ORAL
COMMUNITY
Start: 2018-08-30 | End: 2019-04-22

## 2019-02-06 RX ORDER — CETIRIZINE HYDROCHLORIDE 10 MG/1
TABLET ORAL
COMMUNITY
Start: 2015-06-03 | End: 2020-01-15

## 2019-02-06 RX ORDER — MUPIROCIN 20 MG/G
OINTMENT TOPICAL 3 TIMES DAILY
Qty: 30 G | Refills: 2 | Status: SHIPPED | OUTPATIENT
Start: 2019-02-06 | End: 2019-04-08

## 2019-02-06 RX ORDER — SULFAMETHOXAZOLE AND TRIMETHOPRIM 800; 160 MG/1; MG/1
1 TABLET ORAL 2 TIMES DAILY
Qty: 20 TABLET | Refills: 0 | Status: SHIPPED | OUTPATIENT
Start: 2019-02-06 | End: 2019-03-11

## 2019-02-06 RX ORDER — NAPROXEN 500 MG/1
TABLET ORAL
COMMUNITY
Start: 2018-11-01 | End: 2019-03-11

## 2019-02-06 NOTE — PROGRESS NOTES
Monica Johnson Alex  02/06/2019  [id number]    Kenya Escoto MD  Patient Care Team:  Kenya Escoto MD as PCP - General (Family Medicine)  Barbara Moore LPN as Care Coordinator (Internal Medicine)  Has the patient seen any provider outside of the [facility] network since the last visit? (no). If yes, HIPPA forms completed and records requested.        Visit Type:a scheduled routine follow-up visit    Chief Complaint:  Chief Complaint   Patient presents with    Cyst     on right side of chin. he said it has been about the same size he just wanted to get it checked       History of Present Illness:    Skin bump, noted after shaving months ago.   Didn't go away  Squeezing, but didn't drain. Can be painful.       History:  Past Medical History:   Diagnosis Date    ADHD (attention deficit hyperactivity disorder)     Allergy     Anxiety 1/25/2016    Arthritis 2014    Osteoarthritis    Depression 1/25/2016    Hypertension     Sciatica of left side 1/31/2017     History reviewed. No pertinent surgical history.  Family History   Problem Relation Age of Onset    Cancer Mother     Hypertension Mother     Hypertension Maternal Grandmother      Social History     Socioeconomic History    Marital status:      Spouse name: Not on file    Number of children: Not on file    Years of education: Not on file    Highest education level: Not on file   Social Needs    Financial resource strain: Not on file    Food insecurity - worry: Not on file    Food insecurity - inability: Not on file    Transportation needs - medical: Not on file    Transportation needs - non-medical: Not on file   Occupational History    Not on file   Tobacco Use    Smoking status: Current Some Day Smoker     Packs/day: 0.25     Types: Cigarettes, Cigars    Smokeless tobacco: Never Used   Substance and Sexual Activity    Alcohol use: Yes     Comment: Socially    Drug use: Yes     Types: Marijuana    Sexual activity: Yes   Other  Topics Concern    Not on file   Social History Narrative    Not on file     Patient Active Problem List   Diagnosis    Anxiety    Depression    Intermittent explosive disorder    Sciatica of left side    Plantar fasciitis    Osteoarthritis    Essential hypertension    Elevated liver function tests    Acute medial meniscus tear of right knee    Chondromalacia of right knee     Review of patient's allergies indicates:  No Known Allergies    The following were reviewed at this visit: active problem list, medication list, allergies, family history, social history, and health maintenance.    Medications:  Current Outpatient Medications on File Prior to Visit   Medication Sig Dispense Refill    aspirin (ECOTRIN) 81 MG EC tablet Take 81 mg by mouth once daily.      cetirizine (ZYRTEC) 10 MG tablet       chlorzoxazone (PARAFON FORTE) 250 MG tablet       cyclobenzaprine (FLEXERIL) 10 MG tablet TAKE 1 TABLET 3 TIMES A DAY AS NEEDED FOR MUSCLE SPASMS 90 tablet 0    diltiaZEM (CARDIZEM) 120 MG tablet Take 1 tablet (120 mg total) by mouth once daily. 30 tablet 6    efinaconazole (JUBLIA) 10 % Fredis Apply 1 application topically once daily. 8 mL 11    gabapentin (NEURONTIN) 600 MG tablet Take 600 mg by mouth 2 (two) times daily.      meloxicam (MOBIC) 15 MG tablet Take 1 tablet daily as needed for pain 30 tablet 0    naftifine 2 % Gel Apply 1 application topically once daily. 45 g 3    naproxen (NAPROSYN) 500 MG tablet       acetaminophen-codeine 300-30mg (TYLENOL #3) 300-30 mg Tab Take 1 tablet by mouth.      diclofenac sodium 1 % Gel Apply 4 g topically 3 (three) times daily as needed. 2 Tube 4    lisinopril (PRINIVIL,ZESTRIL) 20 MG tablet Take 1 tablet (20 mg total) by mouth once daily. 30 tablet 6    nicotine (NICODERM CQ) 21 mg/24 hr Place 1 patch onto the skin once daily. 28 patch 0     No current facility-administered medications on file prior to visit.        Medications have been reviewed and  reconciled with patient at this visit.  Barriers to medications present (no)    Adverse reactions to current medications (no)    Over the counter medications reviewed (Yes ), and if needed added to active Medication list at this visit.     Exam:  Wt Readings from Last 3 Encounters:   02/06/19 105 kg (231 lb 7.7 oz)   10/03/18 102.5 kg (226 lb)   09/25/18 102.9 kg (226 lb 13.7 oz)     Temp Readings from Last 3 Encounters:   02/06/19 97.6 °F (36.4 °C) (Tympanic)   08/15/18 96.5 °F (35.8 °C) (Tympanic)   08/07/18 98 °F (36.7 °C)     BP Readings from Last 3 Encounters:   02/06/19 134/82   10/03/18 (!) 133/90   09/25/18 124/72     Pulse Readings from Last 3 Encounters:   02/06/19 99   10/03/18 84   09/25/18 78     Body mass index is 31.39 kg/m².      Review of Systems   HENT: Negative for hearing loss.    Eyes: Negative for discharge.   Respiratory: Negative for wheezing.    Cardiovascular: Negative for chest pain and palpitations.   Gastrointestinal: Negative for blood in stool, constipation, diarrhea and vomiting.   Genitourinary: Negative for hematuria and urgency.   Musculoskeletal: Negative for neck pain.   Skin: Positive for rash.   Neurological: Positive for headaches. Negative for weakness.   Endo/Heme/Allergies: Negative for polydipsia.     Physical Exam   Skin: There is erythema.        Folliculitis, right sub maxillary region.  Beard       Laboratory Reviewed ({Yes)  Lab Results   Component Value Date    WBC 10.54 05/07/2018    HGB 13.9 (L) 05/07/2018    HCT 43.8 05/07/2018     05/07/2018    CHOL 177 05/07/2018    TRIG 149 05/07/2018    HDL 38 (L) 05/07/2018    ALT 51 (H) 08/28/2018    AST 49 (H) 08/28/2018     08/15/2018    K 3.9 08/15/2018     08/15/2018    CREATININE 1.2 08/15/2018    BUN 8 08/15/2018    CO2 26 08/15/2018    TSH 0.563 08/28/2018    INR 0.9 08/28/2018       Tyyuliana was seen today for cyst.    Diagnoses and all orders for this visit:    Folliculitis  -      sulfamethoxazole-trimethoprim 800-160mg (BACTRIM DS) 800-160 mg Tab; Take 1 tablet by mouth 2 (two) times daily.  -     mupirocin (BACTROBAN) 2 % ointment; Apply topically 3 (three) times daily.                Care Plan/Goals: Reviewed  (N/A)  Goals     None          Follow up: No Follow-up on file.    After visit summary was printed and given to patient upon discharge today.  Patient goals and care plan are included in After Visit Summary.    Answers for HPI/ROS submitted by the patient on 2/1/2019   activity change: No  unexpected weight change: No  rhinorrhea: Yes  trouble swallowing: No  chest tightness: No  polyuria: No  difficulty urinating: No  joint swelling: Yes  arthralgias: Yes  confusion: No  dysphoric mood: No

## 2019-03-11 ENCOUNTER — OFFICE VISIT (OUTPATIENT)
Dept: INTERNAL MEDICINE | Facility: CLINIC | Age: 44
End: 2019-03-11
Payer: COMMERCIAL

## 2019-03-11 VITALS
RESPIRATION RATE: 18 BRPM | DIASTOLIC BLOOD PRESSURE: 84 MMHG | HEIGHT: 72 IN | OXYGEN SATURATION: 98 % | HEART RATE: 97 BPM | BODY MASS INDEX: 30.91 KG/M2 | SYSTOLIC BLOOD PRESSURE: 118 MMHG | WEIGHT: 228.19 LBS | TEMPERATURE: 97 F

## 2019-03-11 DIAGNOSIS — L02.91 ABSCESS: Primary | ICD-10-CM

## 2019-03-11 DIAGNOSIS — L73.9 FOLLICULITIS: ICD-10-CM

## 2019-03-11 PROCEDURE — 3079F DIAST BP 80-89 MM HG: CPT | Mod: CPTII,S$GLB,, | Performed by: FAMILY MEDICINE

## 2019-03-11 PROCEDURE — 3008F BODY MASS INDEX DOCD: CPT | Mod: CPTII,S$GLB,, | Performed by: FAMILY MEDICINE

## 2019-03-11 PROCEDURE — 96372 PR INJECTION,THERAP/PROPH/DIAG2ST, IM OR SUBCUT: ICD-10-PCS | Mod: S$GLB,,, | Performed by: FAMILY MEDICINE

## 2019-03-11 PROCEDURE — 96372 THER/PROPH/DIAG INJ SC/IM: CPT | Mod: S$GLB,,, | Performed by: FAMILY MEDICINE

## 2019-03-11 PROCEDURE — 99213 OFFICE O/P EST LOW 20 MIN: CPT | Mod: 25,S$GLB,, | Performed by: FAMILY MEDICINE

## 2019-03-11 PROCEDURE — 3074F PR MOST RECENT SYSTOLIC BLOOD PRESSURE < 130 MM HG: ICD-10-PCS | Mod: CPTII,S$GLB,, | Performed by: FAMILY MEDICINE

## 2019-03-11 PROCEDURE — 3008F PR BODY MASS INDEX (BMI) DOCUMENTED: ICD-10-PCS | Mod: CPTII,S$GLB,, | Performed by: FAMILY MEDICINE

## 2019-03-11 PROCEDURE — 99999 PR PBB SHADOW E&M-EST. PATIENT-LVL IV: CPT | Mod: PBBFAC,,, | Performed by: FAMILY MEDICINE

## 2019-03-11 PROCEDURE — 3074F SYST BP LT 130 MM HG: CPT | Mod: CPTII,S$GLB,, | Performed by: FAMILY MEDICINE

## 2019-03-11 PROCEDURE — 87081 CULTURE SCREEN ONLY: CPT

## 2019-03-11 PROCEDURE — 3079F PR MOST RECENT DIASTOLIC BLOOD PRESSURE 80-89 MM HG: ICD-10-PCS | Mod: CPTII,S$GLB,, | Performed by: FAMILY MEDICINE

## 2019-03-11 PROCEDURE — 99999 PR PBB SHADOW E&M-EST. PATIENT-LVL IV: ICD-10-PCS | Mod: PBBFAC,,, | Performed by: FAMILY MEDICINE

## 2019-03-11 PROCEDURE — 99213 PR OFFICE/OUTPT VISIT, EST, LEVL III, 20-29 MIN: ICD-10-PCS | Mod: 25,S$GLB,, | Performed by: FAMILY MEDICINE

## 2019-03-11 RX ORDER — CEFTRIAXONE 1 G/1
1 INJECTION, POWDER, FOR SOLUTION INTRAMUSCULAR; INTRAVENOUS
Status: COMPLETED | OUTPATIENT
Start: 2019-03-11 | End: 2019-03-11

## 2019-03-11 RX ORDER — SULFAMETHOXAZOLE AND TRIMETHOPRIM 800; 160 MG/1; MG/1
2 TABLET ORAL 2 TIMES DAILY
Qty: 28 TABLET | Refills: 0 | Status: SHIPPED | OUTPATIENT
Start: 2019-03-11 | End: 2019-04-08

## 2019-03-11 RX ADMIN — CEFTRIAXONE 1 G: 1 INJECTION, POWDER, FOR SOLUTION INTRAMUSCULAR; INTRAVENOUS at 05:03

## 2019-03-12 NOTE — PROGRESS NOTES
Monica Johnson Alex  03/12/2019  [id number]    Kenya Escoto MD  Patient Care Team:  Kenya Escoto MD as PCP - General (Family Medicine)  Barbara Moore LPN as Care Coordinator (Internal Medicine)        Chief Complaint:  Chief Complaint   Patient presents with    Abscess       History of Present Illness:  This patient apparently arrived right at 5:00 p.m. today and had a history of being seen at the urgent care of few days ago for an abscess in the lower abdominal/groin region which was painful and draining purulent material.  Patient and his wife state that a small incision was made at urgent care and he had some packing placed and was told to follow up with his primary care and so I am filling in for his primary care today to recheck his wound infection.  He has no known fever in last 24 hr and no vomiting but states that the lower abdominal area is still very sensitive and hurts alot especially since he has to be on his feet working during the day.  He does not know how he may have contracted the infection.        History:  Past Medical History:   Diagnosis Date    ADHD (attention deficit hyperactivity disorder)     Allergy     Anxiety 1/25/2016    Arthritis 2014    Osteoarthritis    Depression 1/25/2016    Hypertension     Sciatica of left side 1/31/2017     No past surgical history on file.  Family History   Problem Relation Age of Onset    Cancer Mother     Hypertension Mother     Hypertension Maternal Grandmother      Social History     Socioeconomic History    Marital status:      Spouse name: Not on file    Number of children: Not on file    Years of education: Not on file    Highest education level: Not on file   Social Needs    Financial resource strain: Not on file    Food insecurity - worry: Not on file    Food insecurity - inability: Not on file    Transportation needs - medical: Not on file    Transportation needs - non-medical: Not on file   Occupational History     Not on file   Tobacco Use    Smoking status: Current Some Day Smoker     Packs/day: 0.25     Types: Cigarettes, Cigars    Smokeless tobacco: Never Used   Substance and Sexual Activity    Alcohol use: Yes     Comment: Socially    Drug use: Yes     Types: Marijuana    Sexual activity: Yes   Other Topics Concern    Not on file   Social History Narrative    Not on file     Patient Active Problem List   Diagnosis    Anxiety    Depression    Intermittent explosive disorder    Sciatica of left side    Plantar fasciitis    Osteoarthritis    Essential hypertension    Elevated liver function tests    Acute medial meniscus tear of right knee    Chondromalacia of right knee     Review of patient's allergies indicates:  No Known Allergies    The following were reviewed at this visit: active problem list, medication list, allergies, family history, social history, and health maintenance.    Medications:  Current Outpatient Medications on File Prior to Visit   Medication Sig Dispense Refill    aspirin (ECOTRIN) 81 MG EC tablet Take 81 mg by mouth once daily.      cetirizine (ZYRTEC) 10 MG tablet       chlorzoxazone (PARAFON FORTE) 250 MG tablet       cyclobenzaprine (FLEXERIL) 10 MG tablet TAKE 1 TABLET 3 TIMES A DAY AS NEEDED FOR MUSCLE SPASMS 90 tablet 0    diltiaZEM (CARDIZEM) 120 MG tablet Take 1 tablet (120 mg total) by mouth once daily. 30 tablet 6    efinaconazole (JUBLIA) 10 % Fredis Apply 1 application topically once daily. 8 mL 11    gabapentin (NEURONTIN) 600 MG tablet Take 600 mg by mouth 2 (two) times daily.      meloxicam (MOBIC) 15 MG tablet Take 1 tablet daily as needed for pain 30 tablet 0    mupirocin (BACTROBAN) 2 % ointment Apply topically 3 (three) times daily. 30 g 2    naftifine 2 % Gel Apply 1 application topically once daily. 45 g 3    nicotine (NICODERM CQ) 21 mg/24 hr Place 1 patch onto the skin once daily. 28 patch 0    diclofenac sodium 1 % Gel Apply 4 g topically 3  (three) times daily as needed. 2 Tube 4     No current facility-administered medications on file prior to visit.        Medications have been reviewed and reconciled with patient at this visit.      Exam:  Wt Readings from Last 3 Encounters:   03/11/19 103.5 kg (228 lb 2.8 oz)   02/06/19 105 kg (231 lb 7.7 oz)   10/03/18 102.5 kg (226 lb)     Temp Readings from Last 3 Encounters:   03/11/19 97.4 °F (36.3 °C) (Tympanic)   02/06/19 97.6 °F (36.4 °C) (Tympanic)   08/15/18 96.5 °F (35.8 °C) (Tympanic)     BP Readings from Last 3 Encounters:   03/11/19 118/84   02/06/19 134/82   10/03/18 (!) 133/90     Pulse Readings from Last 3 Encounters:   03/11/19 97   02/06/19 99   10/03/18 84     Body mass index is 30.95 kg/m².      Review of Systems   Constitutional: Negative for chills, fever and weight loss.   Eyes: Negative for blurred vision.   Respiratory: Negative for cough and shortness of breath.    Cardiovascular: Negative for chest pain.   Musculoskeletal: Negative for back pain and neck pain.   Neurological: Negative for dizziness.     Physical Exam alert and oriented, ambulatory in no acute distress, a well-nourished well-developed  Heart regular rate and rhythm without murmur  Lungs clear  Abdomen-large dressing is removed from the lower abdominal region a 1 cm incision is just to the left of midline and is draining a small amount of yellow purulent material and has packing in place.  Packing is removed and the wound appears to be healing in a normal manner.  Mild tenderness to palpation of the wound and surrounding area no expanding cellulitis is noted however.    Procedure-packing is removed from the wound the wound is cleaned with peroxide and 3 in of iodoform gauze are use to repack the wound and the and mupirocin ointment is placed on around the wound and then repeat and is to.    Recommended the patient his wife they continue cleaning the wound 2 to 3 times a day that they wash their hands very frequently.  I  recommended he double the bACTRIM DOSE for now until he rechecks with his regular physician a few days.  This is commonly known treatment regimen for MRSA which I presume this is.  Sometimes im injection of Rocephin found empirically to be helpful so we did that for the patient today as well.  He is also to apply warm compresses to the WOUND 3 times a day to help with resolution.        Monica was seen today for abscess.    Diagnoses and all orders for this visit:    Abscess  -     Culture, MRSA    Folliculitis  -     sulfamethoxazole-trimethoprim 800-160mg (BACTRIM DS) 800-160 mg Tab; Take 2 tablets by mouth 2 (two) times daily.    Other orders  -     cefTRIAXone injection 1 g                  Follow up: Follow-up in about 3 years (around 3/11/2022) for Jevon.    After visit summary was printed and given to patient upon discharge today.  Patient goals and care plan are included in After Visit Summary.

## 2019-03-14 LAB — MRSA SPEC QL CULT: NORMAL

## 2019-03-18 ENCOUNTER — OFFICE VISIT (OUTPATIENT)
Dept: PODIATRY | Facility: CLINIC | Age: 44
End: 2019-03-18
Payer: COMMERCIAL

## 2019-03-18 ENCOUNTER — OFFICE VISIT (OUTPATIENT)
Dept: INTERNAL MEDICINE | Facility: CLINIC | Age: 44
End: 2019-03-18
Payer: COMMERCIAL

## 2019-03-18 VITALS
HEART RATE: 113 BPM | RESPIRATION RATE: 17 BRPM | SYSTOLIC BLOOD PRESSURE: 125 MMHG | DIASTOLIC BLOOD PRESSURE: 74 MMHG | HEIGHT: 72 IN | WEIGHT: 223.69 LBS | BODY MASS INDEX: 30.3 KG/M2

## 2019-03-18 VITALS
HEART RATE: 115 BPM | SYSTOLIC BLOOD PRESSURE: 122 MMHG | TEMPERATURE: 99 F | OXYGEN SATURATION: 97 % | BODY MASS INDEX: 30.31 KG/M2 | HEIGHT: 72 IN | WEIGHT: 223.75 LBS | DIASTOLIC BLOOD PRESSURE: 80 MMHG

## 2019-03-18 DIAGNOSIS — M25.571 PAIN IN RIGHT ANKLE AND JOINTS OF RIGHT FOOT: ICD-10-CM

## 2019-03-18 DIAGNOSIS — M25.572 PAIN IN LEFT ANKLE AND JOINTS OF LEFT FOOT: ICD-10-CM

## 2019-03-18 DIAGNOSIS — M21.41 BILATERAL PES PLANUS: ICD-10-CM

## 2019-03-18 DIAGNOSIS — M21.42 BILATERAL PES PLANUS: ICD-10-CM

## 2019-03-18 DIAGNOSIS — M76.821 POSTERIOR TIBIAL TENDON DYSFUNCTION (PTTD) OF RIGHT LOWER EXTREMITY: ICD-10-CM

## 2019-03-18 DIAGNOSIS — K76.0 FATTY LIVER: ICD-10-CM

## 2019-03-18 DIAGNOSIS — M72.2 PLANTAR FASCIITIS: ICD-10-CM

## 2019-03-18 DIAGNOSIS — L02.91 ABSCESS: Primary | ICD-10-CM

## 2019-03-18 DIAGNOSIS — M76.822 POSTERIOR TIBIAL TENDON DYSFUNCTION (PTTD) OF LEFT LOWER EXTREMITY: Primary | ICD-10-CM

## 2019-03-18 PROCEDURE — 3008F PR BODY MASS INDEX (BMI) DOCUMENTED: ICD-10-PCS | Mod: CPTII,S$GLB,, | Performed by: FAMILY MEDICINE

## 2019-03-18 PROCEDURE — 3078F PR MOST RECENT DIASTOLIC BLOOD PRESSURE < 80 MM HG: ICD-10-PCS | Mod: CPTII,S$GLB,, | Performed by: PODIATRIST

## 2019-03-18 PROCEDURE — 3074F PR MOST RECENT SYSTOLIC BLOOD PRESSURE < 130 MM HG: ICD-10-PCS | Mod: CPTII,S$GLB,, | Performed by: FAMILY MEDICINE

## 2019-03-18 PROCEDURE — 3008F BODY MASS INDEX DOCD: CPT | Mod: CPTII,S$GLB,, | Performed by: PODIATRIST

## 2019-03-18 PROCEDURE — 99999 PR PBB SHADOW E&M-EST. PATIENT-LVL III: ICD-10-PCS | Mod: PBBFAC,,, | Performed by: PODIATRIST

## 2019-03-18 PROCEDURE — 3008F PR BODY MASS INDEX (BMI) DOCUMENTED: ICD-10-PCS | Mod: CPTII,S$GLB,, | Performed by: PODIATRIST

## 2019-03-18 PROCEDURE — 3074F SYST BP LT 130 MM HG: CPT | Mod: CPTII,S$GLB,, | Performed by: PODIATRIST

## 2019-03-18 PROCEDURE — 99214 PR OFFICE/OUTPT VISIT, EST, LEVL IV, 30-39 MIN: ICD-10-PCS | Mod: S$GLB,,, | Performed by: PODIATRIST

## 2019-03-18 PROCEDURE — 99999 PR PBB SHADOW E&M-EST. PATIENT-LVL III: CPT | Mod: PBBFAC,,, | Performed by: PODIATRIST

## 2019-03-18 PROCEDURE — 99999 PR PBB SHADOW E&M-EST. PATIENT-LVL III: CPT | Mod: PBBFAC,,, | Performed by: FAMILY MEDICINE

## 2019-03-18 PROCEDURE — 3079F DIAST BP 80-89 MM HG: CPT | Mod: CPTII,S$GLB,, | Performed by: FAMILY MEDICINE

## 2019-03-18 PROCEDURE — 99213 OFFICE O/P EST LOW 20 MIN: CPT | Mod: S$GLB,,, | Performed by: FAMILY MEDICINE

## 2019-03-18 PROCEDURE — 3008F BODY MASS INDEX DOCD: CPT | Mod: CPTII,S$GLB,, | Performed by: FAMILY MEDICINE

## 2019-03-18 PROCEDURE — 3078F DIAST BP <80 MM HG: CPT | Mod: CPTII,S$GLB,, | Performed by: PODIATRIST

## 2019-03-18 PROCEDURE — 3074F PR MOST RECENT SYSTOLIC BLOOD PRESSURE < 130 MM HG: ICD-10-PCS | Mod: CPTII,S$GLB,, | Performed by: PODIATRIST

## 2019-03-18 PROCEDURE — 99999 PR PBB SHADOW E&M-EST. PATIENT-LVL III: ICD-10-PCS | Mod: PBBFAC,,, | Performed by: FAMILY MEDICINE

## 2019-03-18 PROCEDURE — 99214 OFFICE O/P EST MOD 30 MIN: CPT | Mod: S$GLB,,, | Performed by: PODIATRIST

## 2019-03-18 PROCEDURE — 3074F SYST BP LT 130 MM HG: CPT | Mod: CPTII,S$GLB,, | Performed by: FAMILY MEDICINE

## 2019-03-18 PROCEDURE — 3079F PR MOST RECENT DIASTOLIC BLOOD PRESSURE 80-89 MM HG: ICD-10-PCS | Mod: CPTII,S$GLB,, | Performed by: FAMILY MEDICINE

## 2019-03-18 PROCEDURE — 99213 PR OFFICE/OUTPT VISIT, EST, LEVL III, 20-29 MIN: ICD-10-PCS | Mod: S$GLB,,, | Performed by: FAMILY MEDICINE

## 2019-03-18 RX ORDER — IBUPROFEN 800 MG/1
800 TABLET ORAL 2 TIMES DAILY
Qty: 60 TABLET | Refills: 1 | Status: ON HOLD | OUTPATIENT
Start: 2019-03-18 | End: 2019-04-16 | Stop reason: HOSPADM

## 2019-03-18 NOTE — PROGRESS NOTES
Monica Johnson Answers for HPI/ROS submitted by the patient on 3/18/2019   activity change: No  unexpected weight change: No  rhinorrhea: No  trouble swallowing: No  visual disturbance: No  chest tightness: No  polyuria: No  difficulty urinating: No  joint swelling: No  arthralgias: No  confusion: No  dysphoric mood: No    03/18/2019  [id number]    Kenya Escoto MD  Patient Care Team:  Kenya Escoto MD as PCP - General (Family Medicine)  Barbara Moore LPN as Care Coordinator (Internal Medicine)  Has the patient seen any provider outside of the [facility] network since the last visit? (no). If yes, HIPPA forms completed and records requested.        Visit Type:a scheduled routine follow-up visit    Chief Complaint:  Chief Complaint   Patient presents with    Follow-up     abcess f/u       History of Present Illness:  43 year old here for recheck on abscess.  Seen orginally on 3.7 at urgent care. Had I & D of abscess and packing lacked, lower abdominal region.  On follow up visit, Dr. Galeano repacked. Rocephin IM given in office. Bactrim dose doubled.    He is here for recheck.  Culture Negative MRSA.      History:  Past Medical History:   Diagnosis Date    ADHD (attention deficit hyperactivity disorder)     Allergy     Anxiety 1/25/2016    Arthritis 2014    Osteoarthritis    Depression 1/25/2016    Hypertension     Sciatica of left side 1/31/2017     No past surgical history on file.  Family History   Problem Relation Age of Onset    Cancer Mother     Hypertension Mother     Hypertension Maternal Grandmother      Social History     Socioeconomic History    Marital status:      Spouse name: Not on file    Number of children: Not on file    Years of education: Not on file    Highest education level: Not on file   Social Needs    Financial resource strain: Not on file    Food insecurity - worry: Not on file    Food insecurity - inability: Not on file    Transportation needs -  medical: Not on file    Transportation needs - non-medical: Not on file   Occupational History    Not on file   Tobacco Use    Smoking status: Current Some Day Smoker     Packs/day: 0.25     Types: Cigarettes, Cigars    Smokeless tobacco: Never Used   Substance and Sexual Activity    Alcohol use: Yes     Comment: Socially    Drug use: Yes     Types: Marijuana    Sexual activity: Yes   Other Topics Concern    Not on file   Social History Narrative    Not on file     Patient Active Problem List   Diagnosis    Anxiety    Depression    Intermittent explosive disorder    Sciatica of left side    Plantar fasciitis    Osteoarthritis    Essential hypertension    Elevated liver function tests    Acute medial meniscus tear of right knee    Chondromalacia of right knee    Fatty liver     Review of patient's allergies indicates:  No Known Allergies    The following were reviewed at this visit: active problem list, medication list, allergies, family history, social history, and health maintenance.    Medications:  Current Outpatient Medications on File Prior to Visit   Medication Sig Dispense Refill    aspirin (ECOTRIN) 81 MG EC tablet Take 81 mg by mouth once daily.      cetirizine (ZYRTEC) 10 MG tablet       chlorzoxazone (PARAFON FORTE) 250 MG tablet       cyclobenzaprine (FLEXERIL) 10 MG tablet TAKE 1 TABLET 3 TIMES A DAY AS NEEDED FOR MUSCLE SPASMS 90 tablet 0    diclofenac sodium 1 % Gel Apply 4 g topically 3 (three) times daily as needed. 2 Tube 4    diltiaZEM (CARDIZEM) 120 MG tablet Take 1 tablet (120 mg total) by mouth once daily. 30 tablet 6    efinaconazole (JUBLIA) 10 % Fredis Apply 1 application topically once daily. 8 mL 11    gabapentin (NEURONTIN) 600 MG tablet Take 600 mg by mouth 2 (two) times daily.      mupirocin (BACTROBAN) 2 % ointment Apply topically 3 (three) times daily. 30 g 2    naftifine 2 % Gel Apply 1 application topically once daily. 45 g 3    nicotine (NICODERM CQ) 21  mg/24 hr Place 1 patch onto the skin once daily. 28 patch 0    sulfamethoxazole-trimethoprim 800-160mg (BACTRIM DS) 800-160 mg Tab Take 2 tablets by mouth 2 (two) times daily. 28 tablet 0    [DISCONTINUED] meloxicam (MOBIC) 15 MG tablet Take 1 tablet daily as needed for pain 30 tablet 0     No current facility-administered medications on file prior to visit.        Medications have been reviewed and reconciled with patient at this visit.  Barriers to medications present (no)    Adverse reactions to current medications (no)    Over the counter medications reviewed (Yes ), and if needed added to active Medication list at this visit.     Exam:  Wt Readings from Last 3 Encounters:   03/18/19 101.5 kg (223 lb 10.5 oz)   03/11/19 103.5 kg (228 lb 2.8 oz)   02/06/19 105 kg (231 lb 7.7 oz)     Temp Readings from Last 3 Encounters:   03/11/19 97.4 °F (36.3 °C) (Tympanic)   02/06/19 97.6 °F (36.4 °C) (Tympanic)   08/15/18 96.5 °F (35.8 °C) (Tympanic)     BP Readings from Last 3 Encounters:   03/18/19 125/74   03/11/19 118/84   02/06/19 134/82     Pulse Readings from Last 3 Encounters:   03/18/19 (!) 113   03/11/19 97   02/06/19 99     There is no height or weight on file to calculate BMI.      Review of Systems   Constitutional: Negative.  Negative for chills and fever.   HENT: Negative.  Negative for congestion, sinus pain and sore throat.    Eyes: Negative for blurred vision and double vision.   Respiratory: Negative for cough, sputum production, shortness of breath and wheezing.    Cardiovascular: Negative for chest pain, palpitations and leg swelling.   Gastrointestinal: Negative for abdominal pain, constipation, diarrhea, heartburn, nausea and vomiting.   Genitourinary: Negative.    Musculoskeletal: Negative.    Skin: Negative.  Negative for rash.        abscess   Neurological: Negative.    Endo/Heme/Allergies: Negative.  Negative for polydipsia. Does not bruise/bleed easily.   Psychiatric/Behavioral: Negative for  depression and substance abuse.     Physical Exam   Constitutional: He is oriented to person, place, and time. He appears well-developed and well-nourished. No distress.   HENT:   Head: Normocephalic and atraumatic.   Right Ear: External ear normal.   Left Ear: External ear normal.   Nose: Nose normal.   Mouth/Throat: Oropharynx is clear and moist. No oropharyngeal exudate.   Eyes: Conjunctivae and EOM are normal. Pupils are equal, round, and reactive to light. Right eye exhibits no discharge. Left eye exhibits no discharge.   Neck: Normal range of motion. Neck supple. No thyromegaly present.   Cardiovascular: Normal rate, regular rhythm, normal heart sounds and intact distal pulses.   No murmur heard.  Pulmonary/Chest: Effort normal and breath sounds normal. No respiratory distress. He has no wheezes.   Abdominal: Soft. Bowel sounds are normal. He exhibits no distension and no mass. There is no tenderness.   Musculoskeletal: Normal range of motion. He exhibits no edema.   Lymphadenopathy:     He has no cervical adenopathy.   Neurological: He is alert and oriented to person, place, and time. No cranial nerve deficit.   Skin: Capillary refill takes less than 2 seconds. He is not diaphoretic.        Packing out Healing abscess  Skin well healing.   Psychiatric: He has a normal mood and affect. His behavior is normal. Judgment and thought content normal.   Nursing note and vitals reviewed.      Laboratory Reviewed ({Yes)  Lab Results   Component Value Date    WBC 10.54 05/07/2018    HGB 13.9 (L) 05/07/2018    HCT 43.8 05/07/2018     05/07/2018    CHOL 177 05/07/2018    TRIG 149 05/07/2018    HDL 38 (L) 05/07/2018    ALT 51 (H) 08/28/2018    AST 49 (H) 08/28/2018     08/15/2018    K 3.9 08/15/2018     08/15/2018    CREATININE 1.2 08/15/2018    BUN 8 08/15/2018    CO2 26 08/15/2018    TSH 0.563 08/28/2018    INR 0.9 08/28/2018       Monica was seen today for follow-up.    Diagnoses and all orders for  this visit:    Abscess    Fatty liver      Healing well  No further tx needed  Finish the antibiotics            Care Plan/Goals: Reviewed  (N/A)  Goals     None          Follow up: No Follow-up on file.    After visit summary was printed and given to patient upon discharge today.  Patient goals and care plan are included in After Visit Summary.    Answers for HPI/ROS submitted by the patient on 3/18/2019   activity change: No  unexpected weight change: No  rhinorrhea: No  trouble swallowing: No  visual disturbance: No  chest tightness: No  polyuria: No  difficulty urinating: No  joint swelling: No  arthralgias: No  confusion: No  dysphoric mood: No

## 2019-03-18 NOTE — PROGRESS NOTES
Subjective:       Patient ID: Monica Johnson is a 43 y.o. male.    Chief Complaint: Foot Pain (bliat foot, pain 8/10, footwear tennis,amublation with out shereen, non-diabetic,PCP Dr. Escoto)      HPI: Monica Johnson presents to the office with the chief complaint of pains to the left foot and ankle at the medial aspect. The pains are rated as 10/10 and are described as sharp in nature. The pains have been on going now for the past several weeks to a month or so, and are worsening. The patient denies any identifiable or trauma. The patient states in frequent NSAIDs for management.  Patient has been evaluated prior my colleague Dr. Vilma Dorsey for plantar fasciitis of the bilateral lower extremity. The patient states that prolonged walking and standing exacerbates and causes the symptoms. The patient does state mild associated swelling as well. Patient's Primary Care Provider is Kenya Escoto MD.     Review of patient's allergies indicates:  No Known Allergies    Past Medical History:   Diagnosis Date    ADHD (attention deficit hyperactivity disorder)     Allergy     Anxiety 1/25/2016    Arthritis 2014    Osteoarthritis    Depression 1/25/2016    Hypertension     Sciatica of left side 1/31/2017       Family History   Problem Relation Age of Onset    Cancer Mother     Hypertension Mother     Hypertension Maternal Grandmother        Social History     Socioeconomic History    Marital status:      Spouse name: Not on file    Number of children: Not on file    Years of education: Not on file    Highest education level: Not on file   Social Needs    Financial resource strain: Not on file    Food insecurity - worry: Not on file    Food insecurity - inability: Not on file    Transportation needs - medical: Not on file    Transportation needs - non-medical: Not on file   Occupational History    Not on file   Tobacco Use    Smoking status: Current Some Day Smoker     Packs/day: 0.25      Types: Cigarettes, Cigars    Smokeless tobacco: Never Used   Substance and Sexual Activity    Alcohol use: Yes     Comment: Socially    Drug use: Yes     Types: Marijuana    Sexual activity: Yes   Other Topics Concern    Not on file   Social History Narrative    Not on file       History reviewed. No pertinent surgical history.    Review of Systems   Constitutional: Negative for chills, fatigue and fever.   HENT: Negative for hearing loss.    Eyes: Negative for photophobia and visual disturbance.   Respiratory: Negative for cough, chest tightness, shortness of breath and wheezing.    Cardiovascular: Negative for chest pain and palpitations.   Gastrointestinal: Negative for constipation, diarrhea, nausea and vomiting.   Endocrine: Negative for cold intolerance and heat intolerance.   Genitourinary: Negative for flank pain.   Musculoskeletal: Positive for gait problem. Negative for neck pain and neck stiffness.   Skin: Negative for wound.   Neurological: Negative for light-headedness, numbness and headaches.   Psychiatric/Behavioral: Negative for sleep disturbance.         Objective:   /74 (BP Location: Right arm, Patient Position: Sitting, BP Method: Medium (Automatic))   Pulse (!) 113   Resp 17   Ht 6' (1.829 m)   Wt 101.5 kg (223 lb 10.5 oz)   BMI 30.33 kg/m²       LOWER EXTREMITY PHYSICAL EXAMINATION    VASCULAR: Pulses are palpable to the B/L lower extremity. The right dorsalis pedis pulse is 2/4 and the posterior tibial pulse is 2/4. The left dorsalis pedis pulse is 2/4 and the posterior tibial pulse is 2/4. Hair growth is noted on the dorsal foot and digits. Proximal to distal, warm to warm. Capillary refill time is WNL at less than 3s.    DERMATOLOGY: Skin is supple, dry and intact. No ecchymosis is noted. No hypertrophic skin formation. No erythema or cellulitis is noted.     ORTHOPEDIC: There is severe collapsing pes planovalgus on the left foot. There is severe tenderness to palpation of  the navicular tuberosity on the left foot. There is mild edema noted along the course of the PT tendon on the left foot. There is mild retro-malleolar edema (medial malleolus). There is mild pain to palpation at the spring ligament and the deltoid ligaments. There is no apparent pains to palpation of the medial malleolus.  Equinus contracture is noted.  No pain with palpation and/or range of motion of the ankle joint.  There is no crepitus noted with range of motion of the ankle joint. The ankle is not in valgus.  There is mild limitation to ROM of the STJ and the MTJ. The hindfoot is in valgus. Upon standing, there is severe marion-talar subluxation noted on the left foot. There is severe forefoot/midfoot abduction on the hindfoot.  RCSP is valgus. The deformity is supple. The patient is able to double heel rise, but has difficulties with single heel rise on the left foot. Gait pattern is antalgic at this time.     NEUROLOGY: Protective sensation is intact via 5.07 [place] monofilament. Proprioception is intact. Sensation to light touch is intact. Upon palpation of the interspaces, there are no neurological sensations stated that radiate proximal or distal. Upon compression of the metatarsal heads from medial to lateral, no neurological sensations or symptoms are stated.    Assessment:     1. Posterior tibial tendon dysfunction (PTTD) of left lower extremity    2. Posterior tibial tendon dysfunction (PTTD) of right lower extremity    3. Plantar fasciitis    4. Bilateral pes planus    5. Pain in right ankle and joints of right foot    6. Pain in left ankle and joints of left foot        Plan:     Posterior tibial tendon dysfunction (PTTD) of left lower extremity  Posterior tibial tendon dysfunction (PTTD) of right lower extremity  Plantar fasciitis  Bilateral pes planus  Pain in right ankle and joints of right foot  Pain in left ankle and joints of left foot  -     HME - OTHER  -     ibuprofen (ADVIL,MOTRIN)  800 MG tablet; Take 1 tablet (800 mg total) by mouth 2 (two) times daily.  Dispense: 60 tablet; Refill: 1    Thorough discussion is had with the patient today, concerning the diagnosis, its etiology, and the treatment algorithm at present.  XRAYS are reviewed in detail with the patient. All questions and concerns regarding findings and its/their implications are outlined and discussed.  Most recent labs reviewed in detail with the patient. All questions and concerns regarding findings and its/their implications are outlined and discussed.  Patient's past medical history is thoroughly reviewed today, in light of the fact that surgical intervention is discussed/planned/initiated.     Patient needs prolonged immobilization with walking boot to the LLE.  CAM Walker (Walking Boot), short/tall is dispensed to the patient. The CAM Walker is appropriately fitted and customized to the patient's lower extremity physique by the LPN/MA. Patient to ambulate with the CAM Walker at all times. The patient should not sleep with the device or shower with the device, or drive with the device (if dispensed for right ankle/foot pathology).     Prescriptions written for Motrin 800mg BID as needed.        Future Appointments   Date Time Provider Department Center   4/2/2019  3:30 PM Rober Colon DPM [facility] POD  Medical C   4/8/2019  8:00 AM Jamie Pacheco IV, MD [facility] UROLOGY  Medical C

## 2019-04-02 ENCOUNTER — OFFICE VISIT (OUTPATIENT)
Dept: PODIATRY | Facility: CLINIC | Age: 44
End: 2019-04-02
Attending: PODIATRIST
Payer: COMMERCIAL

## 2019-04-02 VITALS
BODY MASS INDEX: 30.35 KG/M2 | DIASTOLIC BLOOD PRESSURE: 84 MMHG | SYSTOLIC BLOOD PRESSURE: 142 MMHG | HEART RATE: 78 BPM | HEIGHT: 72 IN | RESPIRATION RATE: 16 BRPM

## 2019-04-02 DIAGNOSIS — M76.822 POSTERIOR TIBIAL TENDON DYSFUNCTION (PTTD) OF LEFT LOWER EXTREMITY: Primary | ICD-10-CM

## 2019-04-02 DIAGNOSIS — M25.572 PAIN IN LEFT ANKLE AND JOINTS OF LEFT FOOT: ICD-10-CM

## 2019-04-02 DIAGNOSIS — M19.072 OSTEOARTHRITIS OF LEFT ANKLE OR FOOT: ICD-10-CM

## 2019-04-02 DIAGNOSIS — M21.42 ACQUIRED PES PLANUS, LEFT: ICD-10-CM

## 2019-04-02 PROCEDURE — 99999 PR PBB SHADOW E&M-EST. PATIENT-LVL III: CPT | Mod: PBBFAC,,, | Performed by: PODIATRIST

## 2019-04-02 PROCEDURE — 99214 PR OFFICE/OUTPT VISIT, EST, LEVL IV, 30-39 MIN: ICD-10-PCS | Mod: S$GLB,,, | Performed by: PODIATRIST

## 2019-04-02 PROCEDURE — 3008F BODY MASS INDEX DOCD: CPT | Mod: CPTII,S$GLB,, | Performed by: PODIATRIST

## 2019-04-02 PROCEDURE — 99214 OFFICE O/P EST MOD 30 MIN: CPT | Mod: S$GLB,,, | Performed by: PODIATRIST

## 2019-04-02 PROCEDURE — 3077F PR MOST RECENT SYSTOLIC BLOOD PRESSURE >= 140 MM HG: ICD-10-PCS | Mod: CPTII,S$GLB,, | Performed by: PODIATRIST

## 2019-04-02 PROCEDURE — 3079F PR MOST RECENT DIASTOLIC BLOOD PRESSURE 80-89 MM HG: ICD-10-PCS | Mod: CPTII,S$GLB,, | Performed by: PODIATRIST

## 2019-04-02 PROCEDURE — 3079F DIAST BP 80-89 MM HG: CPT | Mod: CPTII,S$GLB,, | Performed by: PODIATRIST

## 2019-04-02 PROCEDURE — 3077F SYST BP >= 140 MM HG: CPT | Mod: CPTII,S$GLB,, | Performed by: PODIATRIST

## 2019-04-02 PROCEDURE — 99999 PR PBB SHADOW E&M-EST. PATIENT-LVL III: ICD-10-PCS | Mod: PBBFAC,,, | Performed by: PODIATRIST

## 2019-04-02 PROCEDURE — 3008F PR BODY MASS INDEX (BMI) DOCUMENTED: ICD-10-PCS | Mod: CPTII,S$GLB,, | Performed by: PODIATRIST

## 2019-04-02 NOTE — PROGRESS NOTES
Subjective:       Patient ID: Monica Johnson is a 43 y.o. male.    Chief Complaint: Foot Pain (Left foot, rates pain 9/10, wears walking boot, ambulates without assistance, Non-Diabetic Pt, PCP Dr. Escoto)    HPI: Monica Johnson presents to the office today, for follow-up concerning left posterior tibial tendon dysfunction.  Patient states recalcitrant pains approximately 9/10.  He has been immobilized with a walking boot since his last evaluation, approximately 2 weeks ago. Patient states his pains are recalcitrant to the immobilization therapy and NSAIDs. Kenya Ecsoto MD is his primary care provider.  Patient does state swelling as well. States pains are worse with prolonged walking standing. The patient states the pains are aching and sharp.  Cessation from standing and walking does alleviate his symptoms.  Patient presents this afternoon for surgical consultation.    Review of patient's allergies indicates:  No Known Allergies    Past Medical History:   Diagnosis Date    ADHD (attention deficit hyperactivity disorder)     Allergy     Anxiety 1/25/2016    Arthritis 2014    Osteoarthritis    Depression 1/25/2016    Hypertension     Sciatica of left side 1/31/2017       Family History   Problem Relation Age of Onset    Cancer Mother     Hypertension Mother     Hypertension Maternal Grandmother        Social History     Socioeconomic History    Marital status:      Spouse name: Not on file    Number of children: Not on file    Years of education: Not on file    Highest education level: Not on file   Occupational History    Not on file   Social Needs    Financial resource strain: Not on file    Food insecurity:     Worry: Not on file     Inability: Not on file    Transportation needs:     Medical: Not on file     Non-medical: Not on file   Tobacco Use    Smoking status: Current Some Day Smoker     Packs/day: 0.25     Types: Cigarettes, Cigars    Smokeless tobacco: Never Used    Substance and Sexual Activity    Alcohol use: Yes     Comment: Socially    Drug use: Yes     Types: Marijuana    Sexual activity: Yes   Lifestyle    Physical activity:     Days per week: Not on file     Minutes per session: Not on file    Stress: Not on file   Relationships    Social connections:     Talks on phone: Not on file     Gets together: Not on file     Attends Congregation service: Not on file     Active member of club or organization: Not on file     Attends meetings of clubs or organizations: Not on file     Relationship status: Not on file   Other Topics Concern    Not on file   Social History Narrative    Not on file       History reviewed. No pertinent surgical history.    Review of Systems   Constitutional: Negative for chills, fatigue and fever.   HENT: Negative for hearing loss.    Eyes: Negative for photophobia and visual disturbance.   Respiratory: Negative for cough, chest tightness, shortness of breath and wheezing.    Cardiovascular: Negative for chest pain and palpitations.   Gastrointestinal: Negative for constipation, diarrhea, nausea and vomiting.   Endocrine: Negative for cold intolerance and heat intolerance.   Genitourinary: Negative for flank pain.   Musculoskeletal: Positive for gait problem. Negative for neck pain and neck stiffness.   Skin: Negative for wound.   Neurological: Negative for light-headedness and headaches.   Psychiatric/Behavioral: Negative for sleep disturbance.          Objective:   BP (!) 142/84 (BP Location: Left arm, Patient Position: Sitting, BP Method: Medium (Automatic))   Pulse 78   Resp 16   Ht 6' (1.829 m)   BMI 30.35 kg/m²       LOWER EXTREMITY PHYSICAL EXAMINATION  VASCULAR: On the left foot, the dorsalis pedis pulse is 2/4 and the posterior tibial pulse is 2/4. Capillary refill time is less than 3 seconds. Hair growth is present on the dorsum of the foot and at the digits. No rubor is present. Proximal to distal temperature is warm to  warm.    DERMATOLOGY: Skin is supple, dry and intact. No ecchymosis is noted. No hypertrophic skin formation. No erythema or cellulitis is noted.      ORTHOPEDIC: There is severe collapsing pes planovalgus on the left foot. There is severe tenderness to palpation of the navicular tuberosity on the left foot. There is mild edema noted along the course of the PT tendon on the left foot. There is mild retro-malleolar edema (medial malleolus). There is mild pain to palpation at the spring ligament and the deltoid ligaments. There is no apparent pains to palpation of the medial malleolus.  Equinus contracture is noted.  No pain with palpation and/or range of motion of the ankle joint.  There is no crepitus noted with range of motion of the ankle joint. The ankle is not in valgus.  There is mild limitation to ROM of the STJ and the MTJ. The hindfoot is in valgus. Upon standing, there is severe marion-talar subluxation noted on the left foot. There is severe forefoot/midfoot abduction on the hindfoot.  RCSP is valgus. The deformity is supple. The patient is able to double heel rise, but has difficulties with single heel rise on the left foot. Gait pattern is antalgic at this time.      NEUROLOGY: Protective sensation is intact via 5.07 [place] monofilament. Proprioception is intact. Sensation to light touch is intact. Vibratory sensation is WNL.    Assessment:     1. Posterior tibial tendon dysfunction (PTTD) of left lower extremity    2. Pain in left ankle and joints of left foot    3. Osteoarthritis of left ankle or foot    4. Acquired pes planus, left        Plan:     Posterior tibial tendon dysfunction (PTTD) of left lower extremity  -     CT Foot Without Contrast Left; Future; Expected date: 04/02/2019    Pain in left ankle and joints of left foot  -     CT Foot Without Contrast Left; Future; Expected date: 04/02/2019    Osteoarthritis of left ankle or foot  -     CT Foot Without Contrast Left; Future; Expected  date: 04/02/2019    Acquired pes planus, left      Thorough discussion is had with the patient today, concerning the diagnosis, its etiology, and the treatment algorithm at present.  Patient's pains are nonresponsive to immobilization therapy with CAM Walker boot or NSAIDs.  As such, patient is interested in surgical intervention.  CT scan evaluation of the left foot to evaluate for DJD at the subtalar joint due to substantial sinus tarsi discomfort, as well as rigidity.  Also rule out coalition.  For now, continue CAM Walker and NSAIDs.  Follow-up for surgical consultation after CT scan.          Future Appointments   Date Time Provider Department Center   4/3/2019  4:00 PM [facility] CT1 LIMIT 500 LBS [facility] CT SCAN [place]   4/8/2019  8:00 AM Jamie Pacheco IV, MD [facility] UROLOGY [facility]   4/10/2019  2:45 PM Rober Colon DPM ON POD [facility]

## 2019-04-03 ENCOUNTER — HOSPITAL ENCOUNTER (OUTPATIENT)
Dept: RADIOLOGY | Facility: HOSPITAL | Age: 44
Discharge: HOME | End: 2019-04-03
Attending: PODIATRIST
Payer: COMMERCIAL

## 2019-04-03 DIAGNOSIS — M76.822 POSTERIOR TIBIAL TENDON DYSFUNCTION (PTTD) OF LEFT LOWER EXTREMITY: ICD-10-CM

## 2019-04-03 DIAGNOSIS — M19.072 OSTEOARTHRITIS OF LEFT ANKLE OR FOOT: ICD-10-CM

## 2019-04-03 DIAGNOSIS — M25.572 PAIN IN LEFT ANKLE AND JOINTS OF LEFT FOOT: ICD-10-CM

## 2019-04-03 PROCEDURE — 73700 CT LOWER EXTREMITY W/O DYE: CPT | Mod: TC,LT

## 2019-04-08 ENCOUNTER — OFFICE VISIT (OUTPATIENT)
Dept: PODIATRY | Facility: CLINIC | Age: 44
End: 2019-04-08
Payer: COMMERCIAL

## 2019-04-08 ENCOUNTER — OFFICE VISIT (OUTPATIENT)
Dept: UROLOGY | Facility: CLINIC | Age: 44
End: 2019-04-08
Payer: COMMERCIAL

## 2019-04-08 ENCOUNTER — CLINICAL SUPPORT (OUTPATIENT)
Dept: CARDIOLOGY | Facility: CLINIC | Age: 44
End: 2019-04-08
Payer: COMMERCIAL

## 2019-04-08 ENCOUNTER — HOSPITAL ENCOUNTER (OUTPATIENT)
Dept: RADIOLOGY | Facility: HOSPITAL | Age: 44
Discharge: HOME | End: 2019-04-08
Attending: PODIATRIST
Payer: COMMERCIAL

## 2019-04-08 ENCOUNTER — TELEPHONE (OUTPATIENT)
Dept: UROLOGY | Facility: CLINIC | Age: 44
End: 2019-04-08

## 2019-04-08 VITALS
BODY MASS INDEX: 31.46 KG/M2 | SYSTOLIC BLOOD PRESSURE: 149 MMHG | HEART RATE: 83 BPM | RESPIRATION RATE: 16 BRPM | DIASTOLIC BLOOD PRESSURE: 100 MMHG | HEIGHT: 72 IN

## 2019-04-08 VITALS — WEIGHT: 232 LBS | BODY MASS INDEX: 31.46 KG/M2

## 2019-04-08 DIAGNOSIS — M76.822 POSTERIOR TIBIAL TENDON DYSFUNCTION (PTTD) OF LEFT LOWER EXTREMITY: Primary | ICD-10-CM

## 2019-04-08 DIAGNOSIS — M76.822 POSTERIOR TIBIAL TENDON DYSFUNCTION (PTTD) OF LEFT LOWER EXTREMITY: ICD-10-CM

## 2019-04-08 DIAGNOSIS — M21.42 ACQUIRED PES PLANUS, LEFT: ICD-10-CM

## 2019-04-08 DIAGNOSIS — Z12.5 PROSTATE CANCER SCREENING: Primary | ICD-10-CM

## 2019-04-08 DIAGNOSIS — M25.572 PAIN IN LEFT ANKLE AND JOINTS OF LEFT FOOT: ICD-10-CM

## 2019-04-08 DIAGNOSIS — M19.072 OSTEOARTHRITIS OF LEFT ANKLE OR FOOT: ICD-10-CM

## 2019-04-08 DIAGNOSIS — M24.572 CONTRACTURE, LEFT ANKLE: ICD-10-CM

## 2019-04-08 DIAGNOSIS — M25.375 FOOT JOINT INSTABILITY, LEFT: ICD-10-CM

## 2019-04-08 LAB
BILIRUB SERPL-MCNC: NORMAL MG/DL
BLOOD URINE, POC: NORMAL
COLOR, POC UA: YELLOW
GLUCOSE UR QL STRIP: NORMAL
KETONES UR QL STRIP: NORMAL
LEUKOCYTE ESTERASE URINE, POC: NORMAL
NITRITE, POC UA: NORMAL
PH, POC UA: 7
PROTEIN, POC: NORMAL
SPECIFIC GRAVITY, POC UA: 1.03
UROBILINOGEN, POC UA: NORMAL

## 2019-04-08 PROCEDURE — 3008F BODY MASS INDEX DOCD: CPT | Mod: CPTII,S$GLB,, | Performed by: PODIATRIST

## 2019-04-08 PROCEDURE — 99204 PR OFFICE/OUTPT VISIT, NEW, LEVL IV, 45-59 MIN: ICD-10-PCS | Mod: 25,S$GLB,, | Performed by: UROLOGY

## 2019-04-08 PROCEDURE — 3077F SYST BP >= 140 MM HG: CPT | Mod: CPTII,S$GLB,, | Performed by: PODIATRIST

## 2019-04-08 PROCEDURE — 93000 EKG 12-LEAD: ICD-10-PCS | Mod: S$GLB,,, | Performed by: INTERNAL MEDICINE

## 2019-04-08 PROCEDURE — 99999 PR PBB SHADOW E&M-EST. PATIENT-LVL II: ICD-10-PCS | Mod: PBBFAC,,, | Performed by: UROLOGY

## 2019-04-08 PROCEDURE — 99999 PR PBB SHADOW E&M-EST. PATIENT-LVL II: CPT | Mod: PBBFAC,,, | Performed by: UROLOGY

## 2019-04-08 PROCEDURE — 3008F BODY MASS INDEX DOCD: CPT | Mod: CPTII,S$GLB,, | Performed by: UROLOGY

## 2019-04-08 PROCEDURE — 99999 PR PBB SHADOW E&M-EST. PATIENT-LVL IV: ICD-10-PCS | Mod: PBBFAC,,, | Performed by: PODIATRIST

## 2019-04-08 PROCEDURE — 81002 URINALYSIS NONAUTO W/O SCOPE: CPT | Mod: S$GLB,,, | Performed by: UROLOGY

## 2019-04-08 PROCEDURE — 71046 X-RAY EXAM CHEST 2 VIEWS: CPT | Mod: 26,,, | Performed by: RADIOLOGY

## 2019-04-08 PROCEDURE — 99214 OFFICE O/P EST MOD 30 MIN: CPT | Mod: 57,S$GLB,, | Performed by: PODIATRIST

## 2019-04-08 PROCEDURE — 3080F PR MOST RECENT DIASTOLIC BLOOD PRESSURE >= 90 MM HG: ICD-10-PCS | Mod: CPTII,S$GLB,, | Performed by: PODIATRIST

## 2019-04-08 PROCEDURE — 3080F DIAST BP >= 90 MM HG: CPT | Mod: CPTII,S$GLB,, | Performed by: PODIATRIST

## 2019-04-08 PROCEDURE — 99214 PR OFFICE/OUTPT VISIT, EST, LEVL IV, 30-39 MIN: ICD-10-PCS | Mod: 57,S$GLB,, | Performed by: PODIATRIST

## 2019-04-08 PROCEDURE — 93000 ELECTROCARDIOGRAM COMPLETE: CPT | Mod: S$GLB,,, | Performed by: INTERNAL MEDICINE

## 2019-04-08 PROCEDURE — 71046 XR CHEST PA AND LATERAL PRE-OP: ICD-10-PCS | Mod: 26,,, | Performed by: RADIOLOGY

## 2019-04-08 PROCEDURE — 71046 X-RAY EXAM CHEST 2 VIEWS: CPT | Mod: TC

## 2019-04-08 PROCEDURE — 3077F PR MOST RECENT SYSTOLIC BLOOD PRESSURE >= 140 MM HG: ICD-10-PCS | Mod: CPTII,S$GLB,, | Performed by: PODIATRIST

## 2019-04-08 PROCEDURE — 3008F PR BODY MASS INDEX (BMI) DOCUMENTED: ICD-10-PCS | Mod: CPTII,S$GLB,, | Performed by: PODIATRIST

## 2019-04-08 PROCEDURE — 99204 OFFICE O/P NEW MOD 45 MIN: CPT | Mod: 25,S$GLB,, | Performed by: UROLOGY

## 2019-04-08 PROCEDURE — 81002 POCT URINE DIPSTICK WITHOUT MICROSCOPE: ICD-10-PCS | Mod: S$GLB,,, | Performed by: UROLOGY

## 2019-04-08 PROCEDURE — 3008F PR BODY MASS INDEX (BMI) DOCUMENTED: ICD-10-PCS | Mod: CPTII,S$GLB,, | Performed by: UROLOGY

## 2019-04-08 PROCEDURE — 99999 PR PBB SHADOW E&M-EST. PATIENT-LVL IV: CPT | Mod: PBBFAC,,, | Performed by: PODIATRIST

## 2019-04-08 NOTE — H&P (VIEW-ONLY)
Subjective:       Patient ID: Monica Johnson is a 43 y.o. male.    Chief Complaint: Follow-up (CT Review, left foot, rates pain 9/10, wears walking boot, ambulates wtihout assistance, Diabetic Pt, PCP Dr. Escoto)    HPI: Monica Johnson presents to the office today, for follow-up concerning pes planus foot type, left lower extremity.  Patient does have persistent and recalcitrant severe discomfort to the left foot due to pes planus and PTTD.  The patient presents this afternoon with his significant other.  Patient states on average, symptoms/pains approximately 10/10 with prolonged walking standing. Patient does states slight edema as well. Does state his pains are not responsive to NSAIDs and/or Tylenol.  Patient presents this afternoon for surgical consultation.  At his last evaluation on last week, in anticipation of surgical intervention, we did start Vitamin D, [id number] QWeekly for 8 weeks.  Patient states some tobacco use.    Review of patient's allergies indicates:  No Known Allergies    Past Medical History:   Diagnosis Date    ADHD (attention deficit hyperactivity disorder)     Allergy     Anxiety 1/25/2016    Arthritis 2014    Osteoarthritis    Depression 1/25/2016    Hypertension     Sciatica of left side 1/31/2017       Family History   Problem Relation Age of Onset    Cancer Mother     Hypertension Mother     Hypertension Maternal Grandmother        Social History     Socioeconomic History    Marital status:      Spouse name: Not on file    Number of children: Not on file    Years of education: Not on file    Highest education level: Not on file   Occupational History    Not on file   Social Needs    Financial resource strain: Not on file    Food insecurity:     Worry: Not on file     Inability: Not on file    Transportation needs:     Medical: Not on file     Non-medical: Not on file   Tobacco Use    Smoking status: Current Some Day Smoker     Packs/day: 0.25      Types: Cigarettes, Cigars    Smokeless tobacco: Never Used   Substance and Sexual Activity    Alcohol use: Yes     Comment: Socially    Drug use: Yes     Types: Marijuana    Sexual activity: Yes   Lifestyle    Physical activity:     Days per week: Not on file     Minutes per session: Not on file    Stress: Not on file   Relationships    Social connections:     Talks on phone: Not on file     Gets together: Not on file     Attends Confucianism service: Not on file     Active member of club or organization: Not on file     Attends meetings of clubs or organizations: Not on file     Relationship status: Not on file   Other Topics Concern    Not on file   Social History Narrative    Not on file       Past Surgical History:   Procedure Laterality Date    CIRCUMCISION         Review of Systems   Constitutional: Negative for chills, fatigue and fever.   HENT: Negative for hearing loss.    Eyes: Negative for photophobia and visual disturbance.   Respiratory: Negative for cough, chest tightness, shortness of breath and wheezing.    Cardiovascular: Negative for chest pain and palpitations.   Gastrointestinal: Negative for constipation, diarrhea, nausea and vomiting.   Endocrine: Negative for cold intolerance and heat intolerance.   Genitourinary: Negative for flank pain.   Musculoskeletal: Positive for gait problem. Negative for neck pain and neck stiffness.   Skin: Negative for wound.   Neurological: Negative for light-headedness and headaches.   Psychiatric/Behavioral: Negative for sleep disturbance.          Objective:   BP (!) 149/100 (BP Location: Left arm, Patient Position: Sitting, BP Method: Medium (Automatic))   Pulse 83   Resp 16   Ht 6' (1.829 m)   BMI 31.46 kg/m²     CT Foot Without Contrast Left  Narrative: EXAMINATION:  CT FOOT WITHOUT CONTRAST LEFT    CLINICAL HISTORY:  Posterior tibial tendinitis, left leg, <Reason For Exam>    TECHNIQUE:  Standard CT technique.  All CT scans at this facility are  performed  using dose modulation techniques as appropriate to performed exam including the following:  automated exposure control; adjustment of mA and/or kV according to the patients size (this includes techniques or standardized protocols for targeted exams where dose is matched to indication/reason for exam: i.e. extremities or head);  iterative reconstruction technique.    COMPARISON:  None    FINDINGS:  No evidence of fracture or dislocation.  The Achilles tendon is intact.  The peroneal tendons are intact.  There is a prominent medial process of the navicular bone.  And there appears to be thickening of the posterior tibialis tendon there is insertion on the navicular bone suggesting tendinopathy or tendinitis.  There are degenerative changes once again seen of the 1st metatarsophalangeal joint.  Impression: Prominent medial process of the navicular bone.  There appears to be thickening of the posterior tibialis tendon near its insertion on the navicular bone suggesting tendinopathy or tendinitis.    Electronically signed by: Ottoniel Burns MD  Date:    04/03/2019  Time:    16:30         LOWER EXTREMITY PHYSICAL EXAMINATION  DERMATOLOGY: Skin is supple, dry and intact. No ecchymosis is noted. No hypertrophic skin formation. No erythema or cellulitis is noted.     VASCULAR: On the left foot, the dorsalis pedis pulse is 2/4 and the posterior tibial pulse is 2/4. Capillary refill time is less than 3 seconds. Hair growth is present on the dorsum of the foot and at the digits. No rubor is present. Proximal to distal temperature is warm to warm.      ORTHOPEDIC: There is severe collapsing pes planovalgus on the left foot. There is severe tenderness to palpation of the navicular tuberosity on the left foot. There is mild edema noted along the course of the PT tendon on the left foot. There is mild retro-malleolar edema (medial malleolus). There is mild pain to palpation at the spring ligament and the deltoid  ligaments. There is no apparent pains to palpation of the medial malleolus.  Equinus contracture is noted.  No pain with palpation and/or range of motion of the ankle joint.  There is no crepitus noted with range of motion of the ankle joint. The ankle is not in valgus.  There is mild limitation to ROM of the STJ and the MTJ. The hindfoot is in valgus. Upon standing, there is severe marion-talar subluxation noted on the left foot. There is severe forefoot/midfoot abduction on the hindfoot.  RCSP is valgus. The deformity is supple. The patient is able to double heel rise, but has difficulties with single heel rise on the left foot. Gait pattern is antalgic at this time.      NEUROLOGY: Protective sensation is intact via 5.07 [place] Mayco monofilament. Proprioception is intact. Sensation to light touch is intact. Vibratory sensation is WNL.    Assessment:     1. Posterior tibial tendon dysfunction (PTTD) of left lower extremity    2. Pain in left ankle and joints of left foot    3. Osteoarthritis of left ankle or foot    4. Acquired pes planus, left    5. Contracture, left ankle    6. Foot joint instability, left        Plan:     Posterior tibial tendon dysfunction (PTTD) of left lower extremity  Pain in left ankle and joints of left foot  Osteoarthritis of left ankle or foot  Acquired pes planus, left  Contracture, left ankle  Foot joint instability, left  -     EKG 12-lead  -     X-Ray Chest PA And Lateral Pre-OP; Future; Expected date: 04/08/2019  -     Vitamin D; Future; Expected date: 04/08/2019  -     Comprehensive metabolic panel; Future; Expected date: 04/08/2019  -     CBC auto differential; Future; Expected date: 04/08/2019    Thorough discussion is had with the patient today, concerning the diagnosis, its etiology, and the treatment algorithm at present.  Patient's past medical history is thoroughly reviewed today, in light of the fact that surgical intervention is discussed/planned/initiated.  Most  recent labs reviewed in detail with the patient. All questions and concerns regarding findings and its/their implications are outlined and discussed.  XRAYS are reviewed in detail with the patient. All questions and concerns regarding findings and its/their implications are outlined and discussed.  CT Scan is reviewed in detail with the patient. All questions and concerns regarding findings and its/their implications are outlined and discussed.      The procedure of (left flatfoot reconstruction via DUSTIN + MENDOZA + COTTON + PT Repair + FDL Transfer) was thoroughly explained to the patient. Its necessity was outlined, including its necessity, implications, advantages and/or disadvantages, and possible complications, if any. Possible complications include recurrence of pathology and/or deformity, infection (cellulitis, drainage, purulence, malodor, etc...), pain, numbness, neuritis, edema, burning, loss of function, need for further surgery, possible need for removal of any implanted hardware, soft tissue contracture and/or scarring, etc... No guarantees were given and/or implied. Post-operative expectations and weightbearing protocol is thoroughly explained the patient, who acknowledges understanding. The patient acknowledges understanding of all the aforementioned, and does sign a consent form, that is witnessed. Preoperative labs and/or EKG and/or XRay Chest or other pertinent imaging, as well as medical clearances are to be reviewed and read over by myself.     Surgical Consent Information  Procedure Date:  Tuesday, April 16, 2019.    Treatment/Procedure:  Left lower extremity flatfoot reconstruction.    Sedation: Deep: General.    Patient Condition/Indication for Procedure:  To alleviate pain and deformity.    Surgical Coding  Diagnosis: Posterior tibial tendon dysfunction (PTTD) of left lower extremity  ICD10:   CPT: 66461    Diagnosis: Foot joint instability, left  ICD10:   CPT: 48010    Diagnosis: Contracture,  left ankle  ICD10:  CPT: 65016    Diagnosis: Acquired pes planus, left  ICD10:  CPT: 04079     Diagnosis: Posterior tibial tendon dysfunction (PTTD) of left lower extremity  ICD10:   CPT: 67080          Future Appointments   Date Time Provider Department Center   4/8/2019 11:15 AM E[facility], [facility] E[facility]  [facility]   4/8/2019 11:30 AM [facility]   4/8/2019 11:45 AM [facility] XR1-DR ONL XRAY [facility]   4/9/2019 11:00 AM [facility], [facility]   4/11/2019  2:20 PM Kenya Escoto MD ON IM  [facility]   4/25/2019  9:00 AM Rober Colon DPM ON POD  [facility]   4/8/2020 10:40 AM [facility], [facility]   4/22/2020  9:20 AM Jamie Pacheco IV, MD ON UROLOGY  [facility]
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
## 2019-04-08 NOTE — PROGRESS NOTES
Chief Complaint: Prostate Cancer Screening    HPI:   4/8/19: 42 yo man has a father dx with prostate cancer in 60s and he is here to get checked.  No abd/pelvic pain and no exac/rel factors.  No hematuria.  No urolithiasis.  No urinary bother.  No  history.  Normal sexual function.    Allergies:  Patient has no known allergies.    Medications:  has a current medication list which includes the following prescription(s): aspirin, cetirizine, chlorzoxazone, cyclobenzaprine, diltiazem, efinaconazole, gabapentin, ibuprofen, naftifine, nicotine, and diclofenac sodium.    Review of Systems:  General: No fever, chills, fatigability, or weight loss.  Skin: No rashes, itching, or changes in color or texture of skin.  Chest: Denies LOOMIS, cyanosis, wheezing, cough, and sputum production.  Abdomen: Appetite fine. No weight loss. Denies diarrhea, abdominal pain, hematemesis, or blood in stool.  Musculoskeletal: No joint stiffness or swelling. Denies back pain.  : As above.  All other review of systems negative.    PMH:   has a past medical history of ADHD (attention deficit hyperactivity disorder), Allergy, Anxiety (1/25/2016), Arthritis (2014), Depression (1/25/2016), Hypertension, and Sciatica of left side (1/31/2017).    PSH:   has a past surgical history that includes Circumcision.    FamHx: family history includes Cancer in his mother; Hypertension in his maternal grandmother and mother.    SocHx:  reports that he has been smoking cigarettes and cigars.  He has been smoking about 0.25 packs per day. He has never used smokeless tobacco. He reports that he drinks alcohol. He reports that he has current or past drug history. Drug: Marijuana.      Physical Exam:  There were no vitals filed for this visit.  General: A&Ox3, no apparent distress, no deformities  Neck: No masses, normal thyroid  Lungs: normal inspiration, no use of accessory muscles  Heart: normal pulse, no arrhythmias  Abdomen: Soft, NT, ND, no masses, no  hernias, no hepatosplenomegaly  Lymphatic: Neck and groin nodes negative  Skin: The skin is warm and dry. No jaundice.  Ext: No c/c/e.  : Test desc funmi, no abnormalities of epididymus. Penis normal, with normal penile and scrotal skin. Meatus normal. Normal rectal tone, no hemorrhoids. Prost 40 gm no nodules or masses appreciated. SV not palpable. Perineum and anus normal.    Labs/Studies: Urinalysis performed in clinic, summary: UA normal    Impression/Plan:   1. Low risk of prostate cancer.   PSA today and in a year.

## 2019-04-08 NOTE — PROGRESS NOTES
Subjective:       Patient ID: Monica Johnson is a 43 y.o. male.    Chief Complaint: Follow-up (CT Review, left foot, rates pain 9/10, wears walking boot, ambulates wtihout assistance, Diabetic Pt, PCP Dr. Escoto)    HPI: Monica Johnson presents to the office today, for follow-up concerning pes planus foot type, left lower extremity.  Patient does have persistent and recalcitrant severe discomfort to the left foot due to pes planus and PTTD.  The patient presents this afternoon with his significant other.  Patient states on average, symptoms/pains approximately 10/10 with prolonged walking standing. Patient does states slight edema as well. Does state his pains are not responsive to NSAIDs and/or Tylenol.  Patient presents this afternoon for surgical consultation.  At his last evaluation on last week, in anticipation of surgical intervention, we did start Vitamin D, [id number] QWeekly for 8 weeks.  Patient states some tobacco use.    Review of patient's allergies indicates:  No Known Allergies    Past Medical History:   Diagnosis Date    ADHD (attention deficit hyperactivity disorder)     Allergy     Anxiety 1/25/2016    Arthritis 2014    Osteoarthritis    Depression 1/25/2016    Hypertension     Sciatica of left side 1/31/2017       Family History   Problem Relation Age of Onset    Cancer Mother     Hypertension Mother     Hypertension Maternal Grandmother        Social History     Socioeconomic History    Marital status:      Spouse name: Not on file    Number of children: Not on file    Years of education: Not on file    Highest education level: Not on file   Occupational History    Not on file   Social Needs    Financial resource strain: Not on file    Food insecurity:     Worry: Not on file     Inability: Not on file    Transportation needs:     Medical: Not on file     Non-medical: Not on file   Tobacco Use    Smoking status: Current Some Day Smoker     Packs/day: 0.25      Types: Cigarettes, Cigars    Smokeless tobacco: Never Used   Substance and Sexual Activity    Alcohol use: Yes     Comment: Socially    Drug use: Yes     Types: Marijuana    Sexual activity: Yes   Lifestyle    Physical activity:     Days per week: Not on file     Minutes per session: Not on file    Stress: Not on file   Relationships    Social connections:     Talks on phone: Not on file     Gets together: Not on file     Attends Jew service: Not on file     Active member of club or organization: Not on file     Attends meetings of clubs or organizations: Not on file     Relationship status: Not on file   Other Topics Concern    Not on file   Social History Narrative    Not on file       Past Surgical History:   Procedure Laterality Date    CIRCUMCISION         Review of Systems   Constitutional: Negative for chills, fatigue and fever.   HENT: Negative for hearing loss.    Eyes: Negative for photophobia and visual disturbance.   Respiratory: Negative for cough, chest tightness, shortness of breath and wheezing.    Cardiovascular: Negative for chest pain and palpitations.   Gastrointestinal: Negative for constipation, diarrhea, nausea and vomiting.   Endocrine: Negative for cold intolerance and heat intolerance.   Genitourinary: Negative for flank pain.   Musculoskeletal: Positive for gait problem. Negative for neck pain and neck stiffness.   Skin: Negative for wound.   Neurological: Negative for light-headedness and headaches.   Psychiatric/Behavioral: Negative for sleep disturbance.          Objective:   BP (!) 149/100 (BP Location: Left arm, Patient Position: Sitting, BP Method: Medium (Automatic))   Pulse 83   Resp 16   Ht 6' (1.829 m)   BMI 31.46 kg/m²     CT Foot Without Contrast Left  Narrative: EXAMINATION:  CT FOOT WITHOUT CONTRAST LEFT    CLINICAL HISTORY:  Posterior tibial tendinitis, left leg, <Reason For Exam>    TECHNIQUE:  Standard CT technique.  All CT scans at this facility are  performed  using dose modulation techniques as appropriate to performed exam including the following:  automated exposure control; adjustment of mA and/or kV according to the patients size (this includes techniques or standardized protocols for targeted exams where dose is matched to indication/reason for exam: i.e. extremities or head);  iterative reconstruction technique.    COMPARISON:  None    FINDINGS:  No evidence of fracture or dislocation.  The Achilles tendon is intact.  The peroneal tendons are intact.  There is a prominent medial process of the navicular bone.  And there appears to be thickening of the posterior tibialis tendon there is insertion on the navicular bone suggesting tendinopathy or tendinitis.  There are degenerative changes once again seen of the 1st metatarsophalangeal joint.  Impression: Prominent medial process of the navicular bone.  There appears to be thickening of the posterior tibialis tendon near its insertion on the navicular bone suggesting tendinopathy or tendinitis.    Electronically signed by: Ottoniel Burns MD  Date:    04/03/2019  Time:    16:30         LOWER EXTREMITY PHYSICAL EXAMINATION  DERMATOLOGY: Skin is supple, dry and intact. No ecchymosis is noted. No hypertrophic skin formation. No erythema or cellulitis is noted.     VASCULAR: On the left foot, the dorsalis pedis pulse is 2/4 and the posterior tibial pulse is 2/4. Capillary refill time is less than 3 seconds. Hair growth is present on the dorsum of the foot and at the digits. No rubor is present. Proximal to distal temperature is warm to warm.      ORTHOPEDIC: There is severe collapsing pes planovalgus on the left foot. There is severe tenderness to palpation of the navicular tuberosity on the left foot. There is mild edema noted along the course of the PT tendon on the left foot. There is mild retro-malleolar edema (medial malleolus). There is mild pain to palpation at the spring ligament and the deltoid  ligaments. There is no apparent pains to palpation of the medial malleolus.  Equinus contracture is noted.  No pain with palpation and/or range of motion of the ankle joint.  There is no crepitus noted with range of motion of the ankle joint. The ankle is not in valgus.  There is mild limitation to ROM of the STJ and the MTJ. The hindfoot is in valgus. Upon standing, there is severe marion-talar subluxation noted on the left foot. There is severe forefoot/midfoot abduction on the hindfoot.  RCSP is valgus. The deformity is supple. The patient is able to double heel rise, but has difficulties with single heel rise on the left foot. Gait pattern is antalgic at this time.      NEUROLOGY: Protective sensation is intact via 5.07 [place] Mayco monofilament. Proprioception is intact. Sensation to light touch is intact. Vibratory sensation is WNL.    Assessment:     1. Posterior tibial tendon dysfunction (PTTD) of left lower extremity    2. Pain in left ankle and joints of left foot    3. Osteoarthritis of left ankle or foot    4. Acquired pes planus, left    5. Contracture, left ankle    6. Foot joint instability, left        Plan:     Posterior tibial tendon dysfunction (PTTD) of left lower extremity  Pain in left ankle and joints of left foot  Osteoarthritis of left ankle or foot  Acquired pes planus, left  Contracture, left ankle  Foot joint instability, left  -     EKG 12-lead  -     X-Ray Chest PA And Lateral Pre-OP; Future; Expected date: 04/08/2019  -     Vitamin D; Future; Expected date: 04/08/2019  -     Comprehensive metabolic panel; Future; Expected date: 04/08/2019  -     CBC auto differential; Future; Expected date: 04/08/2019    Thorough discussion is had with the patient today, concerning the diagnosis, its etiology, and the treatment algorithm at present.  Patient's past medical history is thoroughly reviewed today, in light of the fact that surgical intervention is discussed/planned/initiated.  Most  recent labs reviewed in detail with the patient. All questions and concerns regarding findings and its/their implications are outlined and discussed.  XRAYS are reviewed in detail with the patient. All questions and concerns regarding findings and its/their implications are outlined and discussed.  CT Scan is reviewed in detail with the patient. All questions and concerns regarding findings and its/their implications are outlined and discussed.      The procedure of (left flatfoot reconstruction via DUSITN + MENDOZA + COTTON + PT Repair + FDL Transfer) was thoroughly explained to the patient. Its necessity was outlined, including its necessity, implications, advantages and/or disadvantages, and possible complications, if any. Possible complications include recurrence of pathology and/or deformity, infection (cellulitis, drainage, purulence, malodor, etc...), pain, numbness, neuritis, edema, burning, loss of function, need for further surgery, possible need for removal of any implanted hardware, soft tissue contracture and/or scarring, etc... No guarantees were given and/or implied. Post-operative expectations and weightbearing protocol is thoroughly explained the patient, who acknowledges understanding. The patient acknowledges understanding of all the aforementioned, and does sign a consent form, that is witnessed. Preoperative labs and/or EKG and/or XRay Chest or other pertinent imaging, as well as medical clearances are to be reviewed and read over by myself.     Surgical Consent Information  Procedure Date:  Tuesday, April 16, 2019.    Treatment/Procedure:  Left lower extremity flatfoot reconstruction.    Sedation: Deep: General.    Patient Condition/Indication for Procedure:  To alleviate pain and deformity.    Surgical Coding  Diagnosis: Posterior tibial tendon dysfunction (PTTD) of left lower extremity  ICD10:   CPT: 18488    Diagnosis: Foot joint instability, left  ICD10:   CPT: 33348    Diagnosis: Contracture,  left ankle  ICD10:  CPT: 30307    Diagnosis: Acquired pes planus, left  ICD10:  CPT: 32598     Diagnosis: Posterior tibial tendon dysfunction (PTTD) of left lower extremity  ICD10:   CPT: 47306          Future Appointments   Date Time Provider Department Center   4/8/2019 11:15 AM E[facility], [facility] EKG  [facility]   4/8/2019 11:30 AM [facility]   4/8/2019 11:45 AM [facility] XR1-DR [facility] XRAY [facility]   4/9/2019 11:00 AM [facility]   4/11/2019  2:20 PM Kenya Escoto MD ON IM  [facility]   4/25/2019  9:00 AM Rober Colon DPM ON POD  [facility]   4/8/2020 10:40 AM [facility], [facility]   4/22/2020  9:20 AM Jamie Pacheco IV, MD ON UROLOGY  [facility]

## 2019-04-09 NOTE — PRE-PROCEDURE INSTRUCTIONS
Pre op instructions reviewed with patient per phone:    To confirm, Your surgeon has instructed you:  Surgery is scheduled 4/16/19 at 1030.      Please report to [facility] [place] 1st floor main lobby by 0900.   Pre admit office to call afternoon prior to surgery with final arrival time      INSTRUCTIONS IMPORTANT!!!  ¨ Do not eat, drink, or smoke after 12 midnight-including water. OK to brush teeth, no gum, candy or mints!    ¨ Take only these medicines with a small swallow of water-morning of surgery.  Diltiazem, Gabapentin    ____  Do not wear makeup, including mascara.  ____  No powder, lotions or creams to surgical area.  ____  Please remove all jewelry, including piercings and leave at home.  ____  No money or valuables needed. Please leave at home.  ____  Please bring identification and insurance information to hospital.  ____  If going home the same day, arrange for a ride home. You will not be able to   drive if Anesthesia was used.  ____  Children, under 12 years old, must remain in the waiting room with an adult.  They are not allowed in patient areas.  ____  Wear loose fitting clothing. Allow for dressings, bandages.  ____  Stop Aspirin, Ibuprofen, Motrin and Aleve at least 5-7 days before surgery, unless otherwise instructed by your doctor, or the nurse.   You MAY use Tylenol/acetaminophen until day of surgery.  ____  If you take diabetic medication, do not take am of surgery unless instructed by   Doctor.  ____ Stop taking any Fish Oil supplement or any Vitamins that contain Vitamin E at least 5 days prior to surgery.          Bathing Instructions-- The night before surgery and the morning prior to coming to the hospital:   -Do not shave the surgical area.   -Shower and wash your hair and body as usual with anti-bacterial  soap and shampoo.   -Rinse your hair and body completely.   -Use one packet of hibiclens to wash the surgical site (using your hand) gently for 5 minutes.  Do not  scrub you skin too hard.   -Do not use hibiclens on your head, face, or genitals.   -Do not wash with anti-bacterial soap after you use the hibiclens.   -Rinse your body thoroughly.   -Dry with clean, soft towel.  Do not use lotion, cream, deodorant, or powders on   the surgical site.    Use antibacterial soap in place of hibiclens if your surgery is on the head, face or genitals.         Surgical Site Infection    Prevention of surgical site infections:     -Keep incisions clean and dry.   -Do not soak/submerge incisions in water until completely healed.   -Do not apply lotions, powders, creams, or deodorants to site.   -Always make sure hands are cleaned with antibacterial soap/ alcohol-based   prior to touching the surgical site.  (This includes doctors, nurses, staff, and yourself.)    Signs and symptoms:   -Redness and pain around the area where you had surgery   -Drainage of cloudy fluid from your surgical wound   -Fever over 100.4  I have read or had read and explained to me, and understand the above information.

## 2019-04-11 ENCOUNTER — OFFICE VISIT (OUTPATIENT)
Dept: INTERNAL MEDICINE | Facility: CLINIC | Age: 44
End: 2019-04-11
Payer: COMMERCIAL

## 2019-04-11 VITALS
OXYGEN SATURATION: 99 % | HEIGHT: 72 IN | WEIGHT: 232 LBS | DIASTOLIC BLOOD PRESSURE: 90 MMHG | HEART RATE: 73 BPM | TEMPERATURE: 97 F | SYSTOLIC BLOOD PRESSURE: 146 MMHG | BODY MASS INDEX: 31.42 KG/M2

## 2019-04-11 DIAGNOSIS — F17.200 TOBACCO USE DISORDER: ICD-10-CM

## 2019-04-11 DIAGNOSIS — R79.89 ELEVATED LIVER FUNCTION TESTS: ICD-10-CM

## 2019-04-11 DIAGNOSIS — Z01.818 PREOP EXAMINATION: Primary | ICD-10-CM

## 2019-04-11 DIAGNOSIS — F12.10 TETRAHYDROCANNABINOL (THC) USE DISORDER, MILD, ABUSE: ICD-10-CM

## 2019-04-11 DIAGNOSIS — I10 ESSENTIAL HYPERTENSION: ICD-10-CM

## 2019-04-11 PROCEDURE — 3080F DIAST BP >= 90 MM HG: CPT | Mod: CPTII,S$GLB,, | Performed by: PHYSICIAN ASSISTANT

## 2019-04-11 PROCEDURE — 3008F BODY MASS INDEX DOCD: CPT | Mod: CPTII,S$GLB,, | Performed by: PHYSICIAN ASSISTANT

## 2019-04-11 PROCEDURE — 99214 OFFICE O/P EST MOD 30 MIN: CPT | Mod: S$GLB,,, | Performed by: PHYSICIAN ASSISTANT

## 2019-04-11 PROCEDURE — 3077F SYST BP >= 140 MM HG: CPT | Mod: CPTII,S$GLB,, | Performed by: PHYSICIAN ASSISTANT

## 2019-04-11 PROCEDURE — 99999 PR PBB SHADOW E&M-EST. PATIENT-LVL V: CPT | Mod: PBBFAC,,, | Performed by: PHYSICIAN ASSISTANT

## 2019-04-11 PROCEDURE — 3080F PR MOST RECENT DIASTOLIC BLOOD PRESSURE >= 90 MM HG: ICD-10-PCS | Mod: CPTII,S$GLB,, | Performed by: PHYSICIAN ASSISTANT

## 2019-04-11 PROCEDURE — 99999 PR PBB SHADOW E&M-EST. PATIENT-LVL V: ICD-10-PCS | Mod: PBBFAC,,, | Performed by: PHYSICIAN ASSISTANT

## 2019-04-11 PROCEDURE — 3008F PR BODY MASS INDEX (BMI) DOCUMENTED: ICD-10-PCS | Mod: CPTII,S$GLB,, | Performed by: PHYSICIAN ASSISTANT

## 2019-04-11 PROCEDURE — 3077F PR MOST RECENT SYSTOLIC BLOOD PRESSURE >= 140 MM HG: ICD-10-PCS | Mod: CPTII,S$GLB,, | Performed by: PHYSICIAN ASSISTANT

## 2019-04-11 PROCEDURE — 99214 PR OFFICE/OUTPT VISIT, EST, LEVL IV, 30-39 MIN: ICD-10-PCS | Mod: S$GLB,,, | Performed by: PHYSICIAN ASSISTANT

## 2019-04-11 RX ORDER — TRAMADOL HYDROCHLORIDE 50 MG/1
TABLET ORAL
Refills: 0 | Status: ON HOLD | COMMUNITY
Start: 2019-03-08 | End: 2019-04-16 | Stop reason: HOSPADM

## 2019-04-11 RX ORDER — GABAPENTIN 600 MG/1
600 TABLET ORAL
COMMUNITY
End: 2019-04-11

## 2019-04-11 RX ORDER — DILTIAZEM HYDROCHLORIDE 120 MG/1
120 TABLET, FILM COATED ORAL
Status: ON HOLD | COMMUNITY
End: 2019-04-16

## 2019-04-11 NOTE — PROGRESS NOTES
Subjective:       Patient ID: Monica Johnson is a 43 y.o. male.    Chief Complaint: Pre-op Exam (KURTIS Escoto)    Patient is a 43 to male coming in today for a pre-op clearance. He is about to under go left foot surgery. He voices no acute complaints.     Past Medical History:  No date: ADHD (attention deficit hyperactivity disorder)  No date: Allergy  1/25/2016: Anxiety  2014: Arthritis      Comment:  Osteoarthritis  1/25/2016: Depression  No date: Hypertension  1/31/2017: Sciatica of left side    Past Surgical History:  No date: CIRCUMCISION    Review of patient's family history indicates:  Problem: Cancer      Relation: Mother          Age of Onset: (Not Specified)  Problem: Hypertension      Relation: Mother          Age of Onset: (Not Specified)  Problem: Hypertension      Relation: Maternal Grandmother          Age of Onset: (Not Specified)      Social History    Socioeconomic History      Marital status:       Spouse name: Not on file      Number of children: Not on file      Years of education: Not on file      Highest education level: Not on file    Occupational History      Not on file    Social Needs      Financial resource strain: Not hard at all      Food insecurity:        Worry: Never true        Inability: Never true      Transportation needs:        Medical: No        Non-medical: No    Tobacco Use      Smoking status: Current Some Day Smoker        Packs/day: 0.25        Types: Cigarettes, Cigars      Smokeless tobacco: Never Used    Substance and Sexual Activity      Alcohol use: Yes        Frequency: 2-4 times a month        Drinks per session: 1 or 2        Binge frequency: Never        Comment: Socially  Hold 3 days prior to surgery      Drug use: Yes        Types: Marijuana      Sexual activity: Yes        Partners: Female    Lifestyle      Physical activity:        Days per week: 7 days        Minutes per session: 20 min      Stress: Not at all    Relationships      Social  connections:        Talks on phone: More than three times a week        Gets together: More than three times a week        Attends Jew service: Not on file        Active member of club or organization: No        Attends meetings of clubs or organizations: Never        Relationship status:     Other Topics      Concerns:        Not on file    Social History Narrative      Not on file      Review of patient's allergies indicates:  No Known Allergies    Current Outpatient Medications:   cyclobenzaprine (FLEXERIL) 10 MG tablet, TAKE 1 TABLET 3 TIMES A DAY AS NEEDED FOR MUSCLE SPASMS, Disp: 90 tablet, Rfl: 0  diltiaZEM (CARDIZEM) 120 MG tablet, Take 120 mg by mouth., Disp: , Rfl:   gabapentin (NEURONTIN) 600 MG tablet, Take 600 mg by mouth 2 (two) times daily., Disp: , Rfl:   traMADol (ULTRAM) 50 mg tablet, TK 1 T PO EVERY 6 HOURS PRN P FOR UP TO 10 DAYS, Disp: , Rfl: 0  aspirin (ECOTRIN) 81 MG EC tablet, Take 81 mg by mouth once daily., Disp: , Rfl:   cetirizine (ZYRTEC) 10 MG tablet, , Disp: , Rfl:   chlorzoxazone (PARAFON FORTE) 250 MG tablet, , Disp: , Rfl:   diclofenac sodium 1 % Gel, Apply 4 g topically 3 (three) times daily as needed., Disp: 2 Tube, Rfl: 4  diltiaZEM (CARDIZEM) 120 MG tablet, Take 1 tablet (120 mg total) by mouth once daily., Disp: 30 tablet, Rfl: 6  efinaconazole (JUBLIA) 10 % Fredis, Apply 1 application topically once daily., Disp: 8 mL, Rfl: 11  ibuprofen (ADVIL,MOTRIN) 800 MG tablet, Take 1 tablet (800 mg total) by mouth 2 (two) times daily., Disp: 60 tablet, Rfl: 1  multivitamin capsule, Take 1 capsule by mouth once daily., Disp: , Rfl:   naftifine 2 % Gel, Apply 1 application topically once daily., Disp: 45 g, Rfl: 3  nicotine (NICODERM CQ) 21 mg/24 hr, Place 1 patch onto the skin once daily., Disp: 28 patch, Rfl: 0    BP (!) 146/90   Pulse 73   Temp 97.1 °F (36.2 °C) (Tympanic)   Ht 6' (1.829 m)   Wt 105.2 kg (232 lb)   SpO2 99%   BMI 31.46 kg/m²     Review of  Systems   Constitutional: Negative for chills, fatigue and fever.   HENT: Negative.    Eyes: Negative.    Respiratory: Negative for cough, chest tightness, shortness of breath and wheezing.    Cardiovascular: Negative for chest pain, palpitations and leg swelling.   Gastrointestinal: Negative for abdominal pain, diarrhea and nausea.   Skin: Negative.    Neurological: Negative for dizziness, seizures, syncope, weakness, light-headedness, numbness and headaches.   Hematological: Negative.    Psychiatric/Behavioral: Negative.        Objective:      Physical Exam   Constitutional: He is oriented to person, place, and time. He appears well-developed and well-nourished. No distress.   HENT:   Head: Normocephalic and atraumatic.   Mouth/Throat: No oropharyngeal exudate.   Eyes: Pupils are equal, round, and reactive to light.   Neck: Neck supple.   Cardiovascular: Normal rate and regular rhythm. Exam reveals no gallop and no friction rub.   No murmur heard.  Pulmonary/Chest: Effort normal and breath sounds normal. No stridor. No respiratory distress. He has no wheezes. He has no rales. He exhibits no tenderness.   Abdominal: Soft. Bowel sounds are normal. He exhibits no distension and no mass. There is no tenderness. There is no rebound and no guarding. No hernia.   Musculoskeletal: Normal range of motion.   Lymphadenopathy:     He has no cervical adenopathy.   Neurological: He is alert and oriented to person, place, and time. No cranial nerve deficit. Coordination normal.   Skin: Skin is warm and dry. He is not diaphoretic.   Psychiatric: He has a normal mood and affect.   Nursing note and vitals reviewed.      Lab Results   Component Value Date    WBC 9.73 04/08/2019    HGB 14.3 04/08/2019    HCT 42.7 04/08/2019    MCV 84 04/08/2019     04/08/2019     CMP  Sodium   Date Value Ref Range Status   04/08/2019 140 136 - 145 mmol/L Final     Potassium   Date Value Ref Range Status   04/08/2019 4.1 3.5 - 5.1 mmol/L Final      Chloride   Date Value Ref Range Status   04/08/2019 104 95 - 110 mmol/L Final     CO2   Date Value Ref Range Status   04/08/2019 29 23 - 29 mmol/L Final     Glucose   Date Value Ref Range Status   04/08/2019 103 70 - 110 mg/dL Final     BUN, Bld   Date Value Ref Range Status   04/08/2019 13 6 - 20 mg/dL Final     Creatinine   Date Value Ref Range Status   04/08/2019 1.1 0.5 - 1.4 mg/dL Final     Calcium   Date Value Ref Range Status   04/08/2019 9.9 8.7 - 10.5 mg/dL Final     Total Protein   Date Value Ref Range Status   04/08/2019 7.5 6.0 - 8.4 g/dL Final     Albumin   Date Value Ref Range Status   04/08/2019 4.0 3.5 - 5.2 g/dL Final     Total Bilirubin   Date Value Ref Range Status   04/08/2019 0.3 0.1 - 1.0 mg/dL Final     Comment:     For infants and newborns, interpretation of results should be based  on gestational age, weight and in agreement with clinical  observations.  Premature Infant recommended reference ranges:  Up to 24 hours.............<8.0 mg/dL  Up to 48 hours............<12.0 mg/dL  3-5 days..................<15.0 mg/dL  6-29 days.................<15.0 mg/dL       Alkaline Phosphatase   Date Value Ref Range Status   04/08/2019 64 55 - 135 U/L Final     AST   Date Value Ref Range Status   04/08/2019 43 (H) 10 - 40 U/L Final     ALT   Date Value Ref Range Status   04/08/2019 69 (H) 10 - 44 U/L Final     Anion Gap   Date Value Ref Range Status   04/08/2019 7 (L) 8 - 16 mmol/L Final     eGFR if    Date Value Ref Range Status   04/08/2019 >60.0 >60 mL/min/1.73 m^2 Final     eGFR if non    Date Value Ref Range Status   04/08/2019 >60.0 >60 mL/min/1.73 m^2 Final     Comment:     Calculation used to obtain the estimated glomerular filtration  rate (eGFR) is the CKD-EPI equation.        Lab Results   Component Value Date    CHOL 177 05/07/2018     Lab Results   Component Value Date    HDL 38 (L) 05/07/2018     Lab Results   Component Value Date    LDLCALC 109.2  05/07/2018     Lab Results   Component Value Date    TRIG 149 05/07/2018     Lab Results   Component Value Date    CHOLHDL 21.5 05/07/2018     No results found for: LABA1C, HGBA1C    X-Ray Chest PA And Lateral Pre-OP   Order: [id number]   Status:  Final result   Visible to patient:  Yes (Patient Portal)   Next appt:  04/15/2019 at 10:30 AM in Internal Medicine (INTERNAL MEDICINE NURSE, [facility])   Dx:  Osteoarthritis of left ankle or foot;...   Details     Reading Physician Reading Date Result Priority   Jose Andrade III, MD 4/8/2019       Narrative     EXAMINATION:  XR CHEST PA AND LATERAL PRE-OP    CLINICAL HISTORY:  Posterior tibial tendinitis, left leg    TECHNIQUE:  PA and lateral views of the chest were performed.    COMPARISON:  None    FINDINGS:  Heart and pulmonary vasculature within normal limits.  Lungs symmetrically aerated and clear.  No consolidation or effusion.  Mild osteopenia and spondylosis with accentuated kyphosis.  A few scattered areas of the minimal scarring and/or subsegmental atelectasis within the mid lower lung fields, slightly greater on the right.  Probable granuloma right lower lobe.      Impression       No acute infiltrate or area of consolidation.    Chronic findings as above.      Electronically signed by: Jose Andrade MD  Date: 04/08/2019  Time: 12:35            Last Resulted: 04/08/19 12:35   Order Details View Encounter Lab and Collection Details Routing Result History           EKG 12-lead   Order: [id number]   Status:  Final result   Visible to patient:  Yes (Patient Portal) Next appt:  04/15/2019 at 10:30 AM in Internal Medicine (INTERNAL MEDICINE NURSE, [facility]) Dx:  Posterior tibial tendon dysfunction (...      Narrative   Performed by: GEMUSE   Test Reason : M76.822,M25.572,M19.072,M21.42,    Vent. Rate : 067 BPM     Atrial Rate : 067 BPM     P-R Int : 170 ms          QRS Dur : 092 ms      QT Int : 394 ms       P-R-T Axes : 050 -10 008 degrees     QTc Int : 416 ms    Normal  sinus rhythm  Normal ECG  No previous ECGs available  Confirmed by DOMONIQUE ARNOLD MD ([id number]) on 4/9/2019 6:34:41 PM    Referred By: HERI WEBBER           Confirmed By:DOMONIQUE ARNOLD MD             Assessment:       1. Preop examination    2. Essential hypertension    3. Elevated liver function tests        Plan:       Preop examination  Patient is cleared at a low surgical risk. He may proceed as scheduled.     Essential hypertension  This problem is currently not controlled. Please follow up with your PCP as planned to discuss adjustments to your treatment plan.    Elevated liver function tests  Stable    Tobacco use disorder  Suggest to refrain from use    Tetrahydrocannabinol (THC) use disorder, mild, abuse  Suggest to refrain from use

## 2019-04-15 ENCOUNTER — ANESTHESIA EVENT (OUTPATIENT)
Dept: SURGERY | Facility: HOSPITAL | Age: 44
End: 2019-04-15
Payer: COMMERCIAL

## 2019-04-15 ENCOUNTER — TELEPHONE (OUTPATIENT)
Dept: INTERNAL MEDICINE | Facility: CLINIC | Age: 44
End: 2019-04-15

## 2019-04-15 ENCOUNTER — CLINICAL SUPPORT (OUTPATIENT)
Dept: INTERNAL MEDICINE | Facility: CLINIC | Age: 44
End: 2019-04-15
Payer: COMMERCIAL

## 2019-04-15 VITALS — HEART RATE: 87 BPM | DIASTOLIC BLOOD PRESSURE: 88 MMHG | SYSTOLIC BLOOD PRESSURE: 136 MMHG | OXYGEN SATURATION: 98 %

## 2019-04-15 DIAGNOSIS — I10 HYPERTENSION, UNSPECIFIED TYPE: Primary | ICD-10-CM

## 2019-04-15 PROCEDURE — 99999 PR PBB SHADOW E&M-EST. PATIENT-LVL IV: CPT | Mod: PBBFAC,,,

## 2019-04-15 PROCEDURE — 99999 PR PBB SHADOW E&M-EST. PATIENT-LVL IV: ICD-10-PCS | Mod: PBBFAC,,,

## 2019-04-15 NOTE — Clinical Note
Pt came in today for a b/p check 1st one 140/90, pulse 88, ox 98 pt is complaining of pain in the foot, pain level is a 7 let pt sit for 10-15 min recheck the b/p 136/88, pulse 87, ox 98 let him know I spoke to nurse will give him a call to see what his PCP would like to do before his surgery.

## 2019-04-15 NOTE — TELEPHONE ENCOUNTER
Patient came in today for a nurse visit for his bp. First reading 140/90 pulse 88 ox 98 pain scale 7. Second reading 136/88 pulse 87 ox 98. He is having surgery tomorrow. When would he need to come back to be seen?

## 2019-04-16 ENCOUNTER — ANESTHESIA (OUTPATIENT)
Dept: SURGERY | Facility: HOSPITAL | Age: 44
End: 2019-04-16
Payer: COMMERCIAL

## 2019-04-16 ENCOUNTER — HOSPITAL ENCOUNTER (OUTPATIENT)
Facility: HOSPITAL | Age: 44
Discharge: HOME | End: 2019-04-16
Attending: PODIATRIST | Admitting: INTERNAL MEDICINE
Payer: COMMERCIAL

## 2019-04-16 DIAGNOSIS — M19.90 OSTEOARTHRITIS, UNSPECIFIED OSTEOARTHRITIS TYPE, UNSPECIFIED SITE: Primary | ICD-10-CM

## 2019-04-16 DIAGNOSIS — M76.822 POSTERIOR TIBIAL TENDON DYSFUNCTION (PTTD) OF LEFT LOWER EXTREMITY: ICD-10-CM

## 2019-04-16 PROBLEM — M21.40 FLAT FOOT: Status: ACTIVE | Noted: 2019-04-16

## 2019-04-16 LAB
POCT GLUCOSE: 118 MG/DL (ref 70–110)
POCT GLUCOSE: 126 MG/DL (ref 70–110)

## 2019-04-16 PROCEDURE — 63600175 PHARM REV CODE 636 W HCPCS: Performed by: ANESTHESIOLOGY

## 2019-04-16 PROCEDURE — 25000003 PHARM REV CODE 250: Performed by: NURSE ANESTHETIST, CERTIFIED REGISTERED

## 2019-04-16 PROCEDURE — 63600175 PHARM REV CODE 636 W HCPCS: Performed by: PODIATRIST

## 2019-04-16 PROCEDURE — 36415 COLL VENOUS BLD VENIPUNCTURE: CPT

## 2019-04-16 PROCEDURE — 28300 INCISION OF HEEL BONE: CPT | Mod: 51,LT,, | Performed by: PODIATRIST

## 2019-04-16 PROCEDURE — 27658 REPAIR OF LEG TENDON EACH: CPT | Mod: 59,LT,, | Performed by: PODIATRIST

## 2019-04-16 PROCEDURE — 27658 PR REPAIR FLEX LEG TENDON,PRIM,EA: ICD-10-PCS | Mod: 59,LT,, | Performed by: PODIATRIST

## 2019-04-16 PROCEDURE — C1713 ANCHOR/SCREW BN/BN,TIS/BN: HCPCS | Performed by: PODIATRIST

## 2019-04-16 PROCEDURE — 25000003 PHARM REV CODE 250: Performed by: PHYSICIAN ASSISTANT

## 2019-04-16 PROCEDURE — 37000009 HC ANESTHESIA EA ADD 15 MINS: Performed by: PODIATRIST

## 2019-04-16 PROCEDURE — 27685 REVISION OF LOWER LEG TENDON: CPT | Mod: 59,LT,, | Performed by: PODIATRIST

## 2019-04-16 PROCEDURE — 27800903 OPTIME MED/SURG SUP & DEVICES OTHER IMPLANTS: Performed by: PODIATRIST

## 2019-04-16 PROCEDURE — 28300 PR OSTEOTOMY HEEL BONE: ICD-10-PCS | Mod: 51,LT,, | Performed by: PODIATRIST

## 2019-04-16 PROCEDURE — C9290 INJ, BUPIVACAINE LIPOSOME: HCPCS | Performed by: PODIATRIST

## 2019-04-16 PROCEDURE — 80048 BASIC METABOLIC PNL TOTAL CA: CPT

## 2019-04-16 PROCEDURE — 63600175 PHARM REV CODE 636 W HCPCS: Performed by: PHYSICIAN ASSISTANT

## 2019-04-16 PROCEDURE — 27691 PR XFER SINGLE DEEP LOW LEG TENDON: ICD-10-PCS | Mod: LT,,, | Performed by: PODIATRIST

## 2019-04-16 PROCEDURE — 36000709 HC OR TIME LEV III EA ADD 15 MIN: Performed by: PODIATRIST

## 2019-04-16 PROCEDURE — 71000039 HC RECOVERY, EACH ADD'L HOUR: Performed by: PODIATRIST

## 2019-04-16 PROCEDURE — 63600175 PHARM REV CODE 636 W HCPCS: Performed by: NURSE ANESTHETIST, CERTIFIED REGISTERED

## 2019-04-16 PROCEDURE — 27691 REVISE LOWER LEG TENDON: CPT | Mod: LT,,, | Performed by: PODIATRIST

## 2019-04-16 PROCEDURE — 85025 COMPLETE CBC W/AUTO DIFF WBC: CPT

## 2019-04-16 PROCEDURE — 36000708 HC OR TIME LEV III 1ST 15 MIN: Performed by: PODIATRIST

## 2019-04-16 PROCEDURE — 25000003 PHARM REV CODE 250: Performed by: PODIATRIST

## 2019-04-16 PROCEDURE — 71000033 HC RECOVERY, INTIAL HOUR: Performed by: PODIATRIST

## 2019-04-16 PROCEDURE — 37000008 HC ANESTHESIA 1ST 15 MINUTES: Performed by: PODIATRIST

## 2019-04-16 PROCEDURE — 83735 ASSAY OF MAGNESIUM: CPT

## 2019-04-16 PROCEDURE — 84100 ASSAY OF PHOSPHORUS: CPT

## 2019-04-16 PROCEDURE — 27685 PR LENGTH/SHORT LEG/ANKL TENDON,SINGLE: ICD-10-PCS | Mod: 59,LT,, | Performed by: PODIATRIST

## 2019-04-16 PROCEDURE — 27201423 OPTIME MED/SURG SUP & DEVICES STERILE SUPPLY: Performed by: PODIATRIST

## 2019-04-16 PROCEDURE — C1769 GUIDE WIRE: HCPCS | Performed by: PODIATRIST

## 2019-04-16 PROCEDURE — 96372 THER/PROPH/DIAG INJ SC/IM: CPT

## 2019-04-16 DEVICE — IMPLANTABLE DEVICE: Type: IMPLANTABLE DEVICE | Site: FOOT | Status: FUNCTIONAL

## 2019-04-16 RX ORDER — SODIUM CHLORIDE 0.9 % (FLUSH) 0.9 %
3 SYRINGE (ML) INJECTION EVERY 8 HOURS
Status: DISCONTINUED | OUTPATIENT
Start: 2019-04-16 | End: 2019-04-16 | Stop reason: HOSPADM

## 2019-04-16 RX ORDER — OXYCODONE AND ACETAMINOPHEN 10; 325 MG/1; MG/1
1 TABLET ORAL EVERY 6 HOURS PRN
Qty: 28 TABLET | Refills: 0 | Status: SHIPPED | OUTPATIENT
Start: 2019-04-16 | End: 2019-04-23

## 2019-04-16 RX ORDER — CEFADROXIL 500 MG/1
500 CAPSULE ORAL EVERY 12 HOURS
Qty: 10 CAPSULE | Refills: 0 | Status: SHIPPED | OUTPATIENT
Start: 2019-04-16 | End: 2019-04-21

## 2019-04-16 RX ORDER — KETOROLAC TROMETHAMINE 30 MG/ML
INJECTION, SOLUTION INTRAMUSCULAR; INTRAVENOUS
Status: DISCONTINUED | OUTPATIENT
Start: 2019-04-16 | End: 2019-04-16

## 2019-04-16 RX ORDER — LIDOCAINE HYDROCHLORIDE 10 MG/ML
INJECTION INFILTRATION; PERINEURAL
Status: DISCONTINUED | OUTPATIENT
Start: 2019-04-16 | End: 2019-04-16

## 2019-04-16 RX ORDER — OXYCODONE AND ACETAMINOPHEN 10; 325 MG/1; MG/1
1 TABLET ORAL EVERY 6 HOURS PRN
Status: DISCONTINUED | OUTPATIENT
Start: 2019-04-16 | End: 2019-04-17

## 2019-04-16 RX ORDER — MEPERIDINE HYDROCHLORIDE 50 MG/ML
12.5 INJECTION INTRAMUSCULAR; INTRAVENOUS; SUBCUTANEOUS ONCE AS NEEDED
Status: DISCONTINUED | OUTPATIENT
Start: 2019-04-16 | End: 2019-04-16 | Stop reason: HOSPADM

## 2019-04-16 RX ORDER — SUCCINYLCHOLINE CHLORIDE 20 MG/ML
INJECTION INTRAMUSCULAR; INTRAVENOUS
Status: DISCONTINUED | OUTPATIENT
Start: 2019-04-16 | End: 2019-04-16

## 2019-04-16 RX ORDER — CEFAZOLIN SODIUM 2 G/50ML
2 SOLUTION INTRAVENOUS
Status: COMPLETED | OUTPATIENT
Start: 2019-04-16 | End: 2019-04-17

## 2019-04-16 RX ORDER — MORPHINE SULFATE 4 MG/ML
4 INJECTION, SOLUTION INTRAMUSCULAR; INTRAVENOUS EVERY 4 HOURS PRN
Status: DISCONTINUED | OUTPATIENT
Start: 2019-04-16 | End: 2019-04-17

## 2019-04-16 RX ORDER — LIDOCAINE HYDROCHLORIDE 10 MG/ML
1 INJECTION, SOLUTION EPIDURAL; INFILTRATION; INTRACAUDAL; PERINEURAL ONCE
Status: DISCONTINUED | OUTPATIENT
Start: 2019-04-16 | End: 2019-04-16 | Stop reason: HOSPADM

## 2019-04-16 RX ORDER — DEXAMETHASONE SODIUM PHOSPHATE 4 MG/ML
INJECTION, SOLUTION INTRA-ARTICULAR; INTRALESIONAL; INTRAMUSCULAR; INTRAVENOUS; SOFT TISSUE
Status: DISCONTINUED | OUTPATIENT
Start: 2019-04-16 | End: 2019-04-16

## 2019-04-16 RX ORDER — CEFAZOLIN SODIUM 2 G/50ML
2 SOLUTION INTRAVENOUS
Status: COMPLETED | OUTPATIENT
Start: 2019-04-16 | End: 2019-04-16

## 2019-04-16 RX ORDER — ENOXAPARIN SODIUM 100 MG/ML
40 INJECTION SUBCUTANEOUS EVERY 24 HOURS
Status: DISCONTINUED | OUTPATIENT
Start: 2019-04-16 | End: 2019-04-17 | Stop reason: HOSPADM

## 2019-04-16 RX ORDER — FENTANYL CITRATE 50 UG/ML
INJECTION, SOLUTION INTRAMUSCULAR; INTRAVENOUS
Status: DISCONTINUED | OUTPATIENT
Start: 2019-04-16 | End: 2019-04-16

## 2019-04-16 RX ORDER — DILTIAZEM HYDROCHLORIDE 120 MG/1
120 CAPSULE, COATED, EXTENDED RELEASE ORAL DAILY
Status: DISCONTINUED | OUTPATIENT
Start: 2019-04-17 | End: 2019-04-17 | Stop reason: HOSPADM

## 2019-04-16 RX ORDER — SODIUM CHLORIDE, SODIUM LACTATE, POTASSIUM CHLORIDE, CALCIUM CHLORIDE 600; 310; 30; 20 MG/100ML; MG/100ML; MG/100ML; MG/100ML
INJECTION, SOLUTION INTRAVENOUS CONTINUOUS PRN
Status: DISCONTINUED | OUTPATIENT
Start: 2019-04-16 | End: 2019-04-16

## 2019-04-16 RX ORDER — GABAPENTIN 300 MG/1
600 CAPSULE ORAL 2 TIMES DAILY
Status: DISCONTINUED | OUTPATIENT
Start: 2019-04-16 | End: 2019-04-17 | Stop reason: HOSPADM

## 2019-04-16 RX ORDER — PROPOFOL 10 MG/ML
VIAL (ML) INTRAVENOUS
Status: DISCONTINUED | OUTPATIENT
Start: 2019-04-16 | End: 2019-04-16

## 2019-04-16 RX ORDER — ACETAMINOPHEN 325 MG/1
650 TABLET ORAL EVERY 6 HOURS PRN
Status: DISCONTINUED | OUTPATIENT
Start: 2019-04-16 | End: 2019-04-17 | Stop reason: HOSPADM

## 2019-04-16 RX ORDER — ACETAMINOPHEN 10 MG/ML
1000 INJECTION, SOLUTION INTRAVENOUS ONCE
Status: COMPLETED | OUTPATIENT
Start: 2019-04-16 | End: 2019-04-16

## 2019-04-16 RX ORDER — SODIUM CHLORIDE 0.9 % (FLUSH) 0.9 %
3 SYRINGE (ML) INJECTION
Status: DISCONTINUED | OUTPATIENT
Start: 2019-04-16 | End: 2019-04-16 | Stop reason: HOSPADM

## 2019-04-16 RX ORDER — MIDAZOLAM HYDROCHLORIDE 1 MG/ML
INJECTION, SOLUTION INTRAMUSCULAR; INTRAVENOUS
Status: DISCONTINUED | OUTPATIENT
Start: 2019-04-16 | End: 2019-04-16

## 2019-04-16 RX ORDER — ONDANSETRON 8 MG/1
8 TABLET, ORALLY DISINTEGRATING ORAL EVERY 8 HOURS PRN
Status: DISCONTINUED | OUTPATIENT
Start: 2019-04-16 | End: 2019-04-17 | Stop reason: HOSPADM

## 2019-04-16 RX ORDER — HYDROMORPHONE HYDROCHLORIDE 2 MG/ML
0.2 INJECTION, SOLUTION INTRAMUSCULAR; INTRAVENOUS; SUBCUTANEOUS EVERY 5 MIN PRN
Status: DISCONTINUED | OUTPATIENT
Start: 2019-04-16 | End: 2019-04-16 | Stop reason: HOSPADM

## 2019-04-16 RX ORDER — ONDANSETRON 2 MG/ML
INJECTION INTRAMUSCULAR; INTRAVENOUS
Status: DISCONTINUED | OUTPATIENT
Start: 2019-04-16 | End: 2019-04-16

## 2019-04-16 RX ORDER — ROCURONIUM BROMIDE 10 MG/ML
INJECTION, SOLUTION INTRAVENOUS
Status: DISCONTINUED | OUTPATIENT
Start: 2019-04-16 | End: 2019-04-16

## 2019-04-16 RX ADMIN — FENTANYL CITRATE 50 MCG: 50 INJECTION, SOLUTION INTRAMUSCULAR; INTRAVENOUS at 10:04

## 2019-04-16 RX ADMIN — FENTANYL CITRATE 50 MCG: 50 INJECTION, SOLUTION INTRAMUSCULAR; INTRAVENOUS at 12:04

## 2019-04-16 RX ADMIN — OXYCODONE HYDROCHLORIDE AND ACETAMINOPHEN 1 TABLET: 10; 325 TABLET ORAL at 09:04

## 2019-04-16 RX ADMIN — LIDOCAINE HYDROCHLORIDE 50 MG: 10 INJECTION, SOLUTION INFILTRATION; PERINEURAL at 10:04

## 2019-04-16 RX ADMIN — CEFAZOLIN SODIUM 2 G: 2 SOLUTION INTRAVENOUS at 10:04

## 2019-04-16 RX ADMIN — PROPOFOL 200 MG: 10 INJECTION, EMULSION INTRAVENOUS at 10:04

## 2019-04-16 RX ADMIN — MIDAZOLAM 2 MG: 1 INJECTION INTRAMUSCULAR; INTRAVENOUS at 10:04

## 2019-04-16 RX ADMIN — HYDROMORPHONE HYDROCHLORIDE 0.2 MG: 2 INJECTION INTRAMUSCULAR; INTRAVENOUS; SUBCUTANEOUS at 01:04

## 2019-04-16 RX ADMIN — CEFAZOLIN SODIUM 2 G: 2 SOLUTION INTRAVENOUS at 09:04

## 2019-04-16 RX ADMIN — GABAPENTIN 600 MG: 300 CAPSULE ORAL at 09:04

## 2019-04-16 RX ADMIN — SUCCINYLCHOLINE CHLORIDE 100 MG: 20 INJECTION, SOLUTION INTRAMUSCULAR; INTRAVENOUS at 10:04

## 2019-04-16 RX ADMIN — KETOROLAC TROMETHAMINE 30 MG: 30 INJECTION, SOLUTION INTRAMUSCULAR; INTRAVENOUS at 12:04

## 2019-04-16 RX ADMIN — ROCURONIUM BROMIDE 10 MG: 10 INJECTION, SOLUTION INTRAVENOUS at 10:04

## 2019-04-16 RX ADMIN — DEXAMETHASONE SODIUM PHOSPHATE 4 MG: 4 INJECTION, SOLUTION INTRA-ARTICULAR; INTRALESIONAL; INTRAMUSCULAR; INTRAVENOUS; SOFT TISSUE at 11:04

## 2019-04-16 RX ADMIN — ENOXAPARIN SODIUM 40 MG: 100 INJECTION SUBCUTANEOUS at 05:04

## 2019-04-16 RX ADMIN — SODIUM CHLORIDE, SODIUM LACTATE, POTASSIUM CHLORIDE, AND CALCIUM CHLORIDE: 600; 310; 30; 20 INJECTION, SOLUTION INTRAVENOUS at 10:04

## 2019-04-16 RX ADMIN — ACETAMINOPHEN 1000 MG: 10 INJECTION, SOLUTION INTRAVENOUS at 01:04

## 2019-04-16 RX ADMIN — BUPIVACAINE 266 MG: 13.3 INJECTION, SUSPENSION, LIPOSOMAL INFILTRATION at 11:04

## 2019-04-16 RX ADMIN — MORPHINE SULFATE 4 MG: 4 INJECTION INTRAVENOUS at 04:04

## 2019-04-16 RX ADMIN — ONDANSETRON 4 MG: 2 INJECTION, SOLUTION INTRAMUSCULAR; INTRAVENOUS at 11:04

## 2019-04-16 NOTE — ANESTHESIA RELEASE NOTE
Anesthesia Release from PACU Note    Patient: Monica Johnson    Procedure(s) Performed: Procedure(s) (LRB):  OSTEOTOMY, CALCANEUS (Left)  TRANSFER, TENDON (Left)  LENGTHENING, TENDON, ACHILLES (Left)  REPAIR, TENDON, TIBIALIS POSTERIOR (Left)    Anesthesia type: general    Post pain: Adequate analgesia    Post assessment: no apparent anesthetic complications, tolerated procedure well and no evidence of recall    Last Vitals:   Visit Vitals  /81 (BP Location: Left arm, Patient Position: Sitting)   Pulse 77   Temp 37 °C (98.6 °F) (Oral)   Resp 18   Ht 6' (1.829 m)   Wt 101.8 kg (224 lb 6.9 oz)   SpO2 98%   BMI 30.44 kg/m²       Post vital signs: stable    Level of consciousness: responds to stimulation    Nausea/Vomiting: no nausea/no vomiting    Complications: none    Airway Patency: patent    Respiratory: unassisted    Cardiovascular: stable and blood pressure at baseline    Hydration: euvolemic

## 2019-04-16 NOTE — ASSESSMENT & PLAN NOTE
-S/P repair by Dr. Colon.   -Continue pain control and nonweightbearing.   -PT consult.   -Duricef upon discharge per Dr. Colon.

## 2019-04-16 NOTE — ASSESSMENT & PLAN NOTE
-Patient counseled on cessation and decreased ability to heal in the setting of use.   -Declines nicotine patch.

## 2019-04-16 NOTE — OP NOTE
[facility]  Podiatric Medicine & Surgery  Operative Report    SUMMARY     Date of Procedure: 4/16/2019    Procedure: Procedure(s):  OSTEOTOMY, CALCANEUS  TRANSFER, TENDON  LENGTHENING, TENDON, ACHILLES  REPAIR, TENDON, TIBIALIS POSTERIOR    Surgeon(s) and Role: Surgeon(s) and Role:     * Rober Colon DPM - Primary    Pre-Operative Diagnosis: Pre-Op Diagnosis Codes:     * Posterior tibial tendon dysfunction (PTTD) of left lower extremity [M76.822]     * Pain in left ankle and joints of left foot [M25.572]     * Osteoarthritis of left ankle or foot [M19.072]     * Acquired pes planus, left [M21.42]     * Contracture, left ankle [M24.572]     * Foot joint instability, left [M25.375]    Post-Operative Diagnosis: Post-Op Diagnosis Codes:     * Posterior tibial tendon dysfunction (PTTD) of left lower extremity [M76.822]     * Pain in left ankle and joints of left foot [M25.572]     * Osteoarthritis of left ankle or foot [M19.072]     * Acquired pes planus, left [M21.42]     * Contracture, left ankle [M24.572]     * Foot joint instability, left [M25.375]    Anesthesia: General    Technical Procedures Used:   1. Tendo-Achilles lengthening, left.   2. Calcaneal osteotomy (MENDOZA type), left.   3. Posterior tibial tendon repair, left.   4. Flexor tendon transfer, left.    Description of the Findings of the Procedure: The patient was seen in the Holding Room. The risks, benefits, complications, treatment options, and expected outcomes were discussed with the patient. The risks and potential complications of their problem and purposed treatment include but are not limited to infection, nerve injury, vascular injury, nonunion/malunion/delayed union of the surgical site, persistent pain, potential skin necrosis, deep vein thrombosis, possible pulmonary embolus, complications of the anesthetics and failure of the implant.  The patient concurred with the proposed plan, giving informed consent. The  patient is aware that the procedure may be a part of a staged collection of procedures for definitive cure and/or alleviation of symptoms. The site of surgery properly noted/marked. Preoperative intravenous antibiotics are hanging at bedside, and are currently being administered via the heparin lock. The patient was taken to Operating Suite.    Once in the operative suite, the patient is transferred onto the operative table in the supine position. A TIME-OUT is taken as per protocol to identify the proper patient, procedure to be performed, and laterality.  The patient is properly positioned on the operating room table for ease of dissection and for any ancillary imaging.  A well-padded tourniquet was applied to the left mid-thigh.  Nursing and ancillary OR staff prepared the patient for the procedure. The patient is adequately sedated by the Attending Anesthesiologist and/or covering CRNA. Next, the operative limb was rendered sterile using chlorhexidine paint and scrub.  Sterile sheets and drapes were applied thereafter. Another TIME-OUT is taken as per protocol to identify the proper patient, procedure to be performed, and laterality.      Following this, a percutaneous triple cj section tendo-Achilles lengthening was performed.  The distal most incision is approximately 2.5 cm proximal to the insertion of the Achilles tendon on the calcaneus.  The next incision approximately 3 cm proximal to this and the proximal-most incisions approximately 3 cm proximal dislocation.  The proximal-most and distal-most incisions are severed in the same direction, central to lateral or central to medial.  The intermediate incision is opposite of the aforementioned and is either central to medial or central to lateral.  After adequate resection of these areas, the foot is dorsiflexed at the ankle, and tendon is effectively lengthened.  These incisions were not closed.    The tourniquet is elevated to 320mmHg after the limb is  exsanguinated for approximately 2 min with an Esmarch bandage.    Following this, my attention is turned to performing the lateral column lengthening.  The large fluoroscopy machine is used to triangulate the sinus tarsi and the calcaneocuboid joint. A sharp skin incision is made at approximately one finger breaths plantar to the sinus tarsi.  Meticulous blunt dissection for identification of the sural nerve.  The sural nerve was retracted plantarly.  Further sharp dissection with a #15 blade to enter the distal sheath of the peroneal tendons.  The peroneal tendons are identified and retracted plantarly and posteriorly.  Following this, a sharp #15 blade is used to remove the lateral portion of the EDB muscle belly from the anterior process of the calcaneus.  This muscle belly was then retracted with a [place].  Following this, the sinus tarsi is identified.  The calcaneocuboid joint is identified with an 18-gauge needle.  A ruler was used to measure approximately 1.75cm proximal to this juncture.  Prior to osteotomy of the bone, the calcaneocuboid joint is pinned with a 0.062 K-wire, as to prevent dorsal migration of the anterior process.  Following this, the lateral aspect of the calcaneus is osteotomized in standard fashion from lateral to medial.    The medial aspect of the osteotomy was finished with a medium straight osteotome with the assistance of a mallet.  The osteotomy was further inspected with the Neffs elevator. The peroneal tendons remain retracted plantarly and posteriorly.  The sural nerve remains protected.  Copious irrigation with sterile saline solution followed by adequate suctioning.  A Hintermann-distractor or a butterfly-distractor is used to distract the osteotomy.  Various MENDOZA wedge trail sizers are used until adequate and proper talo-navicular joint coverage and congruity is noted via fluoroscopy.  Once adequate coverage is noted, without mid-tarsal joint jamming, the appropriately  sized, Roland 1cm, MENDOZA wedge is tamped into the osteotomy site.  Copious irrigation with sterile saline solution.  Of note, the mid tarsal joint is not jamming, and the talonavicular joint coverage is ideal.  The medial column has plantarflexed.     The incision is closed deep using 2-0 Vicryl suture. The distal aspect of the peroneal tendon sheath was repaired using 2-0 Vicryl suture. Subcutaneous closed using 2-0 Vicryl suture. Subcuticular closure using 4-0 Monocryl suture. The skin is closed using 4-0 Nylon.     Following this, my attention is turned to performing an inspection and/or debridement and/or repair and/or transfer of the medial ankle tendons.  Sharp skin incision with initiation at about the distal proximal aspect the medial malleolus and running around to and towards the plantar proximal aspect of the navicular bone.  The skin is incised with a sharp #15 blade. Subcutaneous finger dissection with the assistance a large Swati clamp.  There are copious venous structures and/or vein tributaries noted in the area.  Electrocautery and/or luminal tying off as necessary.  Copious irrigation with sterile saline solution.  The sheath of the posterior tibial tendon is entered with a sharp curved Metzenbaum scissor.  The sheath  is entered from distal, to as far proximal as the proximal portion of the medial malleolus.  The tendon is removed from its groove at the posterior aspect of the medial malleolus and is inspected.    Of note, there are several minor longitudinal tears noted throughout the course of the visualized portion of the tendon. The tendon however is not overly thickened or discolored.  There is not an abundant amount of tenosynovitis noted. The tendon is repaired via tubularization after the segments of tears are resected.  The tubularization is performed with 2-0 Vicryl suture in running and locking fashion..     Following this, it is determined that a FDL transfer to the navicular bone  will be performed to augment the posterior tibial tendon. The sheath of the flexor digitorum longus is entered, just plantar to the remnants of the posterior tibial tendon.  The flexor digitorum longus tendon is traced as far proximal into the foot as possible, and is transected proximal to the Master Knot of [place].  The distal aspect of the tendon and whipstitched.  A Sonic [place] is inserted in standard fashion into the medial tuberosity of the navicular bone.  The flexor digitorum longus tendon is affixed to the bone anchor and is secured using a Krackow type stitch, with the foot being placed in a slight dorsiflexion and inverted position.  Copious irrigation with sterile saline solution.    The tourniquet is deflated and CFT is WNL. Hemostasis is achieved with ligation and cautery.    The posterior tibial tendon is anastomosed to the flexor digitorum longus tendon. The remnants of the posterior tibial tendon sheath is repaired using 2-0 Vicryl suture. Deep closure of the wound using 2-0 Vicryl suture. Subcutaneous closure using 2-0/3-0 Vicryl suture. Subcuticular closure using 4-0 Monocryl suture. The skin is closed using 4-0 Nylon.    All incisions are dressed with Xeroform/Adaptic nonadherent dressings followed by sterile 4 x 4 gauze, abdominal pad, Teresita/Kerlix and light ACE. Turner Compression is applied w/ a posterior splint.     The anesthesia is weaned. There were no complications to this procedure. Any final necessary imaging to be performed in the PACU if it was not performed here in the OR suite.  The patient is transferred to the [facility] bed/stretcher, [facility]. The patient is transferred to the PACU.    Significant Surgical Tasks Conducted by the Assistant(s), if Applicable: N/A    Complications: * No complications entered in OR log *    Estimated Blood Loss (EBL): Minimal    Drains: N/A    Implants:   Implant Name Type Inv. Item Serial No.  Lot No. LRB No. Used   [place] Geoff Guajardo    N/A  [id number] Left 1   2.5 x 10 mm / FORCE FIBER #2-0 / C-2   N/A  [id number] Left 1   GUIDEWIRE ORTHO [id number] - SN/A  GUIDEWIRE ORTHO [id number] N/A [employer] GIN. N/A Left 1   PIN STEINMANN SMOOTH [id number] - SN/A  PIN STEINMANN SMOOTH [id number] N/A [employer] GIN. N/A Left 1       Specimens: * No specimens in log *    Condition: stable    Disposition: PACU - hemodynamically stable.    Attestation: I performed the procedure.

## 2019-04-16 NOTE — BRIEF OP NOTE
[facility]  Brief Operative Note     SUMMARY     Surgery Date: 4/16/2019     Surgeon(s) and Role:     * Rober Colon DPM - Primary    Assisting Surgeon: None    Pre-op Diagnosis:  Posterior tibial tendon dysfunction (PTTD) of left lower extremity [M76.822]  Pain in left ankle and joints of left foot [M25.572]  Osteoarthritis of left ankle or foot [M19.072]  Acquired pes planus, left [M21.42]  Contracture, left ankle [M24.572]  Foot joint instability, left [M25.375]    Post-op Diagnosis:  Post-Op Diagnosis Codes:     * Posterior tibial tendon dysfunction (PTTD) of left lower extremity [M76.822]     * Pain in left ankle and joints of left foot [M25.572]     * Osteoarthritis of left ankle or foot [M19.072]     * Acquired pes planus, left [M21.42]     * Contracture, left ankle [M24.572]     * Foot joint instability, left [M25.375]    Procedure(s) (LRB):  OSTEOTOMY, CALCANEUS (Left)  TRANSFER, TENDON (Left)  LENGTHENING, TENDON, ACHILLES (Left)  REPAIR, TENDON, TIBIALIS POSTERIOR (Left)    Anesthesia: General    Description of the findings of the procedure:    1. Tendo-Achilles lengthening, left.   2. Calcaneal osteotomy (MENDOZA type), left.   3. Posterior tibial tendon repair, left.   4. Flexor tendon transfer, left.    Findings/Key Components: As per Dx.     Estimated Blood Loss: * No values recorded between 4/16/2019 10:23 AM and 4/16/2019 12:25 PM *         Specimens:   Specimen (12h ago, onward)    None          Discharge Note    SUMMARY     Admit Date: 4/16/2019    Discharge Date and Time:  04/16/2019 12:46 PM    Hospital Course (synopsis of major diagnoses, care, treatment, and services provided during the course of the hospital stay): Patient underwent successful Tendo-Achilles lengthening, Calcaneal osteotomy (MENDOZA type), Posterior tibial tendon repair, and Flexor tendon transfer, left.    Final Diagnosis: Post-Op Diagnosis Codes:     * Posterior tibial tendon dysfunction (PTTD) of left  lower extremity [M76.822]     * Pain in left ankle and joints of left foot [M25.572]     * Osteoarthritis of left ankle or foot [M19.072]     * Acquired pes planus, left [M21.42]     * Contracture, left ankle [M24.572]     * Foot joint instability, left [M25.375]    Disposition: Admitted as an Inpatient    Follow Up/Patient Instructions:     Medications:  Reconciled Home Medications:      Medication List      ASK your doctor about these medications    aspirin 81 MG EC tablet  Commonly known as:  ECOTRIN  Take 81 mg by mouth once daily.     cetirizine 10 MG tablet  Commonly known as:  ZYRTEC     chlorzoxazone 250 MG tablet  Commonly known as:  PARAFON FORTE     cyclobenzaprine 10 MG tablet  Commonly known as:  FLEXERIL  TAKE 1 TABLET 3 TIMES A DAY AS NEEDED FOR MUSCLE SPASMS     diclofenac sodium 1 % Gel  Commonly known as:  VOLTAREN  Apply 4 g topically 3 (three) times daily as needed.     * diltiaZEM 120 MG tablet  Commonly known as:  CARDIZEM  Take 120 mg by mouth.     * diltiaZEM 120 MG tablet  Commonly known as:  CARDIZEM  Take 1 tablet (120 mg total) by mouth once daily.     efinaconazole 10 % Crystal  Commonly known as:  JUBLIA  Apply 1 application topically once daily.     gabapentin 600 MG tablet  Commonly known as:  NEURONTIN  Take 600 mg by mouth 2 (two) times daily.     ibuprofen 800 MG tablet  Commonly known as:  ADVIL,MOTRIN  Take 1 tablet (800 mg total) by mouth 2 (two) times daily.     multivitamin capsule  Take 1 capsule by mouth once daily.     naftifine 2 % Gel  Apply 1 application topically once daily.     nicotine 21 mg/24 hr  Commonly known as:  NICODERM CQ  Place 1 patch onto the skin once daily.     traMADol 50 mg tablet  Commonly known as:  ULTRAM  TK 1 T PO EVERY 6 HOURS PRN P FOR UP TO 10 DAYS         * This list has 2 medication(s) that are the same as other medications prescribed for you. Read the directions carefully, and ask your doctor or other care provider to review them with you.               No discharge procedures on file.

## 2019-04-16 NOTE — H&P
[facility] Hospital Medicine  History & Physical    Patient Name: Monica Johnson  MRN: [id number]  Admission Date: 4/16/2019  Attending Physician: Jesus Manrique MD   Primary Care Provider: Kenya Escoto MD         Patient information was obtained from patient, past medical records and ER records.     Subjective:     Principal Problem:Posterior tibial tendon dysfunction (PTTD) of left lower extremity    Chief Complaint:   Chief Complaint   Patient presents with    Foot Pain     left        HPI: Monica Johnson is a 43 year old male with hypertension who presented for elective repair of posterior tibial tendon dysfunction of the left lower extremity. He underwent left calcaneus osteotomy, left tendon transfer, left achilles tendon lengthening and left tibialis posterior tendon repair. He tolerated the procedure well and complaints of expected postoperative pain at the time of exam. He denies cough and fever prior to surgery.     Past Medical History:   Diagnosis Date    ADHD (attention deficit hyperactivity disorder)     Allergy     Anxiety 1/25/2016    Arthritis 2014    Osteoarthritis    Depression 1/25/2016    Hypertension     Sciatica of left side 1/31/2017       Past Surgical History:   Procedure Laterality Date    CIRCUMCISION         Review of patient's allergies indicates:  No Known Allergies    No current facility-administered medications on file prior to encounter.      Current Outpatient Medications on File Prior to Encounter   Medication Sig    cetirizine (ZYRTEC) 10 MG tablet     chlorzoxazone (PARAFON FORTE) 250 MG tablet     cyclobenzaprine (FLEXERIL) 10 MG tablet TAKE 1 TABLET 3 TIMES A DAY AS NEEDED FOR MUSCLE SPASMS    diltiaZEM (CARDIZEM) 120 MG tablet Take 1 tablet (120 mg total) by mouth once daily.    efinaconazole (JUBLIA) 10 % Fredis Apply 1 application topically once daily.    gabapentin (NEURONTIN) 600 MG tablet Take 600 mg by mouth 2 (two) times daily.     multivitamin capsule Take 1 capsule by mouth once daily.    naftifine 2 % Gel Apply 1 application topically once daily.    nicotine (NICODERM CQ) 21 mg/24 hr Place 1 patch onto the skin once daily.    [DISCONTINUED] aspirin (ECOTRIN) 81 MG EC tablet Take 81 mg by mouth once daily.    [DISCONTINUED] ibuprofen (ADVIL,MOTRIN) 800 MG tablet Take 1 tablet (800 mg total) by mouth 2 (two) times daily.    diclofenac sodium 1 % Gel Apply 4 g topically 3 (three) times daily as needed.     Family History     Problem Relation (Age of Onset)    Cancer Mother    Hypertension Mother, Maternal Grandmother        Tobacco Use    Smoking status: Current Some Day Smoker     Packs/day: 0.25     Types: Cigarettes, Cigars    Smokeless tobacco: Never Used   Substance and Sexual Activity    Alcohol use: Yes     Frequency: 2-3 times a week     Drinks per session: 1 or 2     Binge frequency: Never     Comment: Socially  Hold 3 days prior to surgery    Drug use: Yes     Types: Marijuana    Sexual activity: Yes     Partners: Female     Review of Systems   Constitutional: Negative for appetite change, chills, diaphoresis, fatigue and fever.   HENT: Negative for congestion, ear pain, mouth sores, sore throat and trouble swallowing.    Eyes: Negative for pain and visual disturbance.   Respiratory: Negative for cough, chest tightness and shortness of breath.    Cardiovascular: Negative for chest pain, palpitations and leg swelling.   Gastrointestinal: Negative for abdominal pain, constipation, diarrhea and nausea.   Endocrine: Negative for cold intolerance, heat intolerance, polydipsia and polyuria.   Genitourinary: Negative for dysuria, frequency and hematuria.   Musculoskeletal: Positive for myalgias (left foot). Negative for arthralgias, back pain and neck pain.   Skin: Negative for pallor, rash and wound.   Allergic/Immunologic: Negative for environmental allergies and immunocompromised state.   Neurological: Negative for dizziness,  seizures, syncope, weakness, numbness and headaches.   Hematological: Negative for adenopathy. Does not bruise/bleed easily.   Psychiatric/Behavioral: Negative for agitation, confusion and sleep disturbance.     Objective:     Vital Signs (Most Recent):  Temp: 98.7 °F (37.1 °C) (04/16/19 1411)  Pulse: 78 (04/16/19 1411)  Resp: 18 (04/16/19 1411)  BP: (!) 154/89 (04/16/19 1411)  SpO2: 98 % (04/16/19 1411) Vital Signs (24h Range):  Temp:  [97.6 °F (36.4 °C)-98.7 °F (37.1 °C)] 98.7 °F (37.1 °C)  Pulse:  [77-90] 78  Resp:  [11-38] 18  SpO2:  [94 %-100 %] 98 %  BP: (132-181)/() 154/89     Weight: 101.8 kg (224 lb 6.9 oz)  Body mass index is 30.44 kg/m².    Physical Exam   Constitutional: He is oriented to person, place, and time. He appears well-developed and well-nourished. He appears lethargic. He is cooperative. He is easily aroused.   HENT:   Head: Normocephalic and atraumatic.   Eyes: Conjunctivae are normal.   Neck: Neck supple. No JVD present.   Cardiovascular: Normal rate, regular rhythm and normal heart sounds.   Pulmonary/Chest: Effort normal and breath sounds normal. He has no wheezes.   Abdominal: Soft. Bowel sounds are normal. He exhibits no distension. There is no tenderness.   Musculoskeletal:        Left foot: There is decreased range of motion.   Dressing to left lower extremity.    Neurological: He is oriented to person, place, and time and easily aroused. He appears lethargic.   Skin: Skin is warm and dry. No rash noted.   Psychiatric: He has a normal mood and affect. His behavior is normal. Thought content normal.   Nursing note and vitals reviewed.          Significant Labs: All pertinent labs within the past 24 hours have been reviewed.    Significant Imaging: I have reviewed all pertinent imaging results/findings within the past 24 hours.    Assessment/Plan:     * Posterior tibial tendon dysfunction (PTTD) of left lower extremity  -S/P repair by Dr. Colon.   -Continue pain control and  nonweightbearing.   -PT consult.   -Duricef upon discharge per Dr. Colon.       Essential hypertension  Continue Cardizem.       Tobacco use disorder  -Patient counseled on cessation and decreased ability to heal in the setting of use.   -Declines nicotine patch.       VTE Risk Mitigation (From admission, onward)        Ordered     enoxaparin injection 40 mg  Daily      04/16/19 2922             MARGO Silvestre  Department of Hospital Medicine   [facility]

## 2019-04-16 NOTE — ANESTHESIA PREPROCEDURE EVALUATION
04/16/2019  Monica Johnson is a 43 y.o., male.    Anesthesia Evaluation    I have reviewed the Patient Summary Reports.    I have reviewed the Nursing Notes.      Review of Systems  Anesthesia Hx:  No problems with previous Anesthesia    Social:  Smoker, Alcohol Use Hx THC use   Hematology/Oncology:  Hematology Normal   Oncology Normal     EENT/Dental:EENT/Dental Normal   Cardiovascular:   Hypertension ECG has been reviewed. Hx of tachycardia 4-5 years ago.  Resolved, and not taking medication for this.     Pulmonary:  Pulmonary Normal    Renal/:  Renal/ Normal     Hepatic/GI:   Liver Disease,    Musculoskeletal:   Arthritis  Plantar fascitis, OA   Neurological:   Neuromuscular Disease, Sciatica left   Endocrine:  Endocrine Normal    Psych:   Psychiatric History anxiety depression ADHD         Physical Exam  General:  Well nourished    Airway/Jaw/Neck:  Airway Findings: Mouth Opening: Normal General Airway Assessment: Adult  Mallampati: II  Improves to I with phonation.  TM Distance: Normal, at least 6 cm       Chest/Lungs:  Chest/Lungs Findings: Clear to auscultation, Normal Respiratory Rate     Heart/Vascular:  Heart Findings: Rate: Normal  Rhythm: Regular Rhythm        Mental Status:  Mental Status Findings:  Cooperative, Alert and Oriented         Anesthesia Plan  Type of Anesthesia, risks & benefits discussed:  Anesthesia Type:  general  Patient's Preference:   Intra-op Monitoring Plan: standard ASA monitors  Intra-op Monitoring Plan Comments:   Post Op Pain Control Plan: IV/PO Opioids PRN  Post Op Pain Control Plan Comments:   Induction:   IV  Beta Blocker:  Patient is not currently on a Beta-Blocker (No further documentation required).       Informed Consent: Patient understands risks and agrees with Anesthesia plan.  Questions answered. Anesthesia consent signed with patient.  ASA Score: 2      Day of Surgery Review of History & Physical: I have interviewed and examined the patient. I have reviewed the patient's H&P dated:            Ready For Surgery From Anesthesia Perspective.

## 2019-04-16 NOTE — PLAN OF CARE
Problem: Adult Inpatient Plan of Care  Goal: Plan of Care Review  Outcome: Ongoing (interventions implemented as appropriate)  Pt pain managed with prn med-effective; right leg dressing intact; fall precautions maintained; non weight bearing on left leg. Will continue to monitor

## 2019-04-16 NOTE — HPI
Monica Johnson is a 43 year old male with hypertension who presented for elective repair of posterior tibial tendon dysfunction of the left lower extremity. He underwent left calcaneus osteotomy, left tendon transfer, left achilles tendon lengthening and left tibialis posterior tendon repair. He tolerated the procedure well and complaints of expected postoperative pain at the time of exam. He denies cough and fever prior to surgery.

## 2019-04-16 NOTE — TRANSFER OF CARE
Anesthesia Transfer of Care Note    Patient: Monica Johnson    Procedure(s) Performed: Procedure(s) (LRB):  OSTEOTOMY, CALCANEUS (Left)  TRANSFER, TENDON (Left)  LENGTHENING, TENDON, ACHILLES (Left)  REPAIR, TENDON, TIBIALIS POSTERIOR (Left)    Patient location: PACU    Anesthesia Type: general    Transport from OR: Transported from OR on room air with adequate spontaneous ventilation    Post pain: adequate analgesia    Post assessment: no apparent anesthetic complications    Post vital signs: stable    Level of consciousness: responds to stimulation    Nausea/Vomiting: no nausea/vomiting    Complications: none    Transfer of care protocol was followed      Last vitals:   Visit Vitals  /81 (BP Location: Left arm, Patient Position: Sitting)   Pulse 77   Temp 37 °C (98.6 °F) (Oral)   Resp 18   Ht 6' (1.829 m)   Wt 101.8 kg (224 lb 6.9 oz)   SpO2 98%   BMI 30.44 kg/m²

## 2019-04-16 NOTE — ANESTHESIA POSTPROCEDURE EVALUATION
Anesthesia Post Evaluation    Patient: Monica Johnson    Procedure(s) Performed: Procedure(s) (LRB):  OSTEOTOMY, CALCANEUS (Left)  TRANSFER, TENDON (Left)  LENGTHENING, TENDON, ACHILLES (Left)  REPAIR, TENDON, TIBIALIS POSTERIOR (Left)    Final Anesthesia Type: general  Patient location during evaluation: PACU  Patient participation: Yes- Able to Participate  Level of consciousness: awake and alert and oriented  Post-procedure vital signs: reviewed and stable  Pain management: adequate  Airway patency: patent  PONV status at discharge: No PONV  Anesthetic complications: no      Cardiovascular status: hemodynamically stable  Respiratory status: unassisted, room air and spontaneous ventilation  Hydration status: euvolemic  Follow-up not needed.          Vitals Value Taken Time   /89 4/16/2019  4:19 PM   Temp 37 °C (98.6 °F) 4/16/2019  4:19 PM   Pulse 80 4/16/2019  4:19 PM   Resp 18 4/16/2019  4:19 PM   SpO2 95 % 4/16/2019  4:19 PM         Event Time     Out of Recovery 13:59:34          Pain/Chet Score: Pain Rating Prior to Med Admin: 10 (4/16/2019  4:22 PM)  Pain Rating Post Med Admin: 7 (4/16/2019  4:52 PM)  Chet Score: 9 (4/16/2019  1:45 PM)

## 2019-04-16 NOTE — SUBJECTIVE & OBJECTIVE
Past Medical History:   Diagnosis Date    ADHD (attention deficit hyperactivity disorder)     Allergy     Anxiety 1/25/2016    Arthritis 2014    Osteoarthritis    Depression 1/25/2016    Hypertension     Sciatica of left side 1/31/2017       Past Surgical History:   Procedure Laterality Date    CIRCUMCISION         Review of patient's allergies indicates:  No Known Allergies    No current facility-administered medications on file prior to encounter.      Current Outpatient Medications on File Prior to Encounter   Medication Sig    cetirizine (ZYRTEC) 10 MG tablet     chlorzoxazone (PARAFON FORTE) 250 MG tablet     cyclobenzaprine (FLEXERIL) 10 MG tablet TAKE 1 TABLET 3 TIMES A DAY AS NEEDED FOR MUSCLE SPASMS    diltiaZEM (CARDIZEM) 120 MG tablet Take 1 tablet (120 mg total) by mouth once daily.    efinaconazole (JUBLIA) 10 % Fredis Apply 1 application topically once daily.    gabapentin (NEURONTIN) 600 MG tablet Take 600 mg by mouth 2 (two) times daily.    multivitamin capsule Take 1 capsule by mouth once daily.    naftifine 2 % Gel Apply 1 application topically once daily.    nicotine (NICODERM CQ) 21 mg/24 hr Place 1 patch onto the skin once daily.    [DISCONTINUED] aspirin (ECOTRIN) 81 MG EC tablet Take 81 mg by mouth once daily.    [DISCONTINUED] ibuprofen (ADVIL,MOTRIN) 800 MG tablet Take 1 tablet (800 mg total) by mouth 2 (two) times daily.    diclofenac sodium 1 % Gel Apply 4 g topically 3 (three) times daily as needed.     Family History     Problem Relation (Age of Onset)    Cancer Mother    Hypertension Mother, Maternal Grandmother        Tobacco Use    Smoking status: Current Some Day Smoker     Packs/day: 0.25     Types: Cigarettes, Cigars    Smokeless tobacco: Never Used   Substance and Sexual Activity    Alcohol use: Yes     Frequency: 2-3 times a week     Drinks per session: 1 or 2     Binge frequency: Never     Comment: Socially  Hold 3 days prior to surgery    Drug use: Yes      Types: Marijuana    Sexual activity: Yes     Partners: Female     Review of Systems   Constitutional: Negative for appetite change, chills, diaphoresis, fatigue and fever.   HENT: Negative for congestion, ear pain, mouth sores, sore throat and trouble swallowing.    Eyes: Negative for pain and visual disturbance.   Respiratory: Negative for cough, chest tightness and shortness of breath.    Cardiovascular: Negative for chest pain, palpitations and leg swelling.   Gastrointestinal: Negative for abdominal pain, constipation, diarrhea and nausea.   Endocrine: Negative for cold intolerance, heat intolerance, polydipsia and polyuria.   Genitourinary: Negative for dysuria, frequency and hematuria.   Musculoskeletal: Positive for myalgias (left foot). Negative for arthralgias, back pain and neck pain.   Skin: Negative for pallor, rash and wound.   Allergic/Immunologic: Negative for environmental allergies and immunocompromised state.   Neurological: Negative for dizziness, seizures, syncope, weakness, numbness and headaches.   Hematological: Negative for adenopathy. Does not bruise/bleed easily.   Psychiatric/Behavioral: Negative for agitation, confusion and sleep disturbance.     Objective:     Vital Signs (Most Recent):  Temp: 98.7 °F (37.1 °C) (04/16/19 1411)  Pulse: 78 (04/16/19 1411)  Resp: 18 (04/16/19 1411)  BP: (!) 154/89 (04/16/19 1411)  SpO2: 98 % (04/16/19 1411) Vital Signs (24h Range):  Temp:  [97.6 °F (36.4 °C)-98.7 °F (37.1 °C)] 98.7 °F (37.1 °C)  Pulse:  [77-90] 78  Resp:  [11-38] 18  SpO2:  [94 %-100 %] 98 %  BP: (132-181)/() 154/89     Weight: 101.8 kg (224 lb 6.9 oz)  Body mass index is 30.44 kg/m².    Physical Exam   Constitutional: He is oriented to person, place, and time. He appears well-developed and well-nourished. He appears lethargic. He is cooperative. He is easily aroused.   HENT:   Head: Normocephalic and atraumatic.   Eyes: Conjunctivae are normal.   Neck: Neck supple. No JVD present.    Cardiovascular: Normal rate, regular rhythm and normal heart sounds.   Pulmonary/Chest: Effort normal and breath sounds normal. He has no wheezes.   Abdominal: Soft. Bowel sounds are normal. He exhibits no distension. There is no tenderness.   Musculoskeletal:        Left foot: There is decreased range of motion.   Dressing to left lower extremity.    Neurological: He is oriented to person, place, and time and easily aroused. He appears lethargic.   Skin: Skin is warm and dry. No rash noted.   Psychiatric: He has a normal mood and affect. His behavior is normal. Thought content normal.   Nursing note and vitals reviewed.          Significant Labs: All pertinent labs within the past 24 hours have been reviewed.    Significant Imaging: I have reviewed all pertinent imaging results/findings within the past 24 hours.

## 2019-04-17 VITALS
HEIGHT: 72 IN | SYSTOLIC BLOOD PRESSURE: 156 MMHG | TEMPERATURE: 98 F | HEART RATE: 85 BPM | RESPIRATION RATE: 18 BRPM | WEIGHT: 224.44 LBS | OXYGEN SATURATION: 100 % | BODY MASS INDEX: 30.4 KG/M2 | DIASTOLIC BLOOD PRESSURE: 99 MMHG

## 2019-04-17 LAB
ANION GAP SERPL CALC-SCNC: 9 MMOL/L (ref 8–16)
BASOPHILS # BLD AUTO: 0.01 K/UL (ref 0–0.2)
BASOPHILS NFR BLD: 0.1 % (ref 0–1.9)
BUN SERPL-MCNC: 16 MG/DL (ref 6–20)
CALCIUM SERPL-MCNC: 9.6 MG/DL (ref 8.7–10.5)
CHLORIDE SERPL-SCNC: 103 MMOL/L (ref 95–110)
CO2 SERPL-SCNC: 26 MMOL/L (ref 23–29)
CREAT SERPL-MCNC: 1.3 MG/DL (ref 0.5–1.4)
DACRYOCYTES BLD QL SMEAR: ABNORMAL
DIFFERENTIAL METHOD: ABNORMAL
EOSINOPHIL # BLD AUTO: 0 K/UL (ref 0–0.5)
EOSINOPHIL NFR BLD: 0.1 % (ref 0–8)
ERYTHROCYTE [DISTWIDTH] IN BLOOD BY AUTOMATED COUNT: 14.7 % (ref 11.5–14.5)
EST. GFR  (AFRICAN AMERICAN): >60 ML/MIN/1.73 M^2
EST. GFR  (NON AFRICAN AMERICAN): >60 ML/MIN/1.73 M^2
GLUCOSE SERPL-MCNC: 129 MG/DL (ref 70–110)
HCT VFR BLD AUTO: 42.9 % (ref 40–54)
HGB BLD-MCNC: 14.4 G/DL (ref 14–18)
LYMPHOCYTES # BLD AUTO: 2.9 K/UL (ref 1–4.8)
LYMPHOCYTES NFR BLD: 16.6 % (ref 18–48)
MAGNESIUM SERPL-MCNC: 2 MG/DL (ref 1.6–2.6)
MCH RBC QN AUTO: 28.4 PG (ref 27–31)
MCHC RBC AUTO-ENTMCNC: 33.6 G/DL (ref 32–36)
MCV RBC AUTO: 85 FL (ref 82–98)
MONOCYTES # BLD AUTO: 1.6 K/UL (ref 0.3–1)
MONOCYTES NFR BLD: 9.1 % (ref 4–15)
NEUTROPHILS # BLD AUTO: 13 K/UL (ref 1.8–7.7)
NEUTROPHILS NFR BLD: 74.3 % (ref 38–73)
PHOSPHATE SERPL-MCNC: 3.3 MG/DL (ref 2.7–4.5)
PLATELET # BLD AUTO: 297 K/UL (ref 150–350)
PMV BLD AUTO: 10.4 FL (ref 9.2–12.9)
POIKILOCYTOSIS BLD QL SMEAR: ABNORMAL
POTASSIUM SERPL-SCNC: 4.1 MMOL/L (ref 3.5–5.1)
RBC # BLD AUTO: 5.07 M/UL (ref 4.6–6.2)
SODIUM SERPL-SCNC: 138 MMOL/L (ref 136–145)
TARGETS BLD QL SMEAR: ABNORMAL
WBC # BLD AUTO: 17.56 K/UL (ref 3.9–12.7)

## 2019-04-17 PROCEDURE — 25000003 PHARM REV CODE 250: Performed by: PODIATRIST

## 2019-04-17 PROCEDURE — 97161 PT EVAL LOW COMPLEX 20 MIN: CPT

## 2019-04-17 PROCEDURE — 63600175 PHARM REV CODE 636 W HCPCS: Performed by: PODIATRIST

## 2019-04-17 PROCEDURE — 25000003 PHARM REV CODE 250: Performed by: PHYSICIAN ASSISTANT

## 2019-04-17 PROCEDURE — 97116 GAIT TRAINING THERAPY: CPT

## 2019-04-17 PROCEDURE — 94760 N-INVAS EAR/PLS OXIMETRY 1: CPT

## 2019-04-17 RX ORDER — OXYCODONE AND ACETAMINOPHEN 10; 325 MG/1; MG/1
1 TABLET ORAL EVERY 6 HOURS PRN
Status: DISCONTINUED | OUTPATIENT
Start: 2019-04-17 | End: 2019-04-17

## 2019-04-17 RX ORDER — OXYCODONE AND ACETAMINOPHEN 10; 325 MG/1; MG/1
1 TABLET ORAL EVERY 4 HOURS PRN
Status: DISCONTINUED | OUTPATIENT
Start: 2019-04-17 | End: 2019-04-17 | Stop reason: HOSPADM

## 2019-04-17 RX ORDER — OXYCODONE HYDROCHLORIDE 5 MG/1
15 TABLET ORAL EVERY 4 HOURS PRN
Status: DISCONTINUED | OUTPATIENT
Start: 2019-04-17 | End: 2019-04-17 | Stop reason: HOSPADM

## 2019-04-17 RX ORDER — OXYCODONE HYDROCHLORIDE 5 MG/1
15 TABLET ORAL EVERY 4 HOURS PRN
Status: DISCONTINUED | OUTPATIENT
Start: 2019-04-17 | End: 2019-04-17

## 2019-04-17 RX ADMIN — OXYCODONE HYDROCHLORIDE AND ACETAMINOPHEN 1 TABLET: 10; 325 TABLET ORAL at 03:04

## 2019-04-17 RX ADMIN — DILTIAZEM HYDROCHLORIDE 120 MG: 120 CAPSULE, COATED, EXTENDED RELEASE ORAL at 08:04

## 2019-04-17 RX ADMIN — CEFAZOLIN SODIUM 2 G: 2 SOLUTION INTRAVENOUS at 11:04

## 2019-04-17 RX ADMIN — CEFAZOLIN SODIUM 2 G: 2 SOLUTION INTRAVENOUS at 03:04

## 2019-04-17 RX ADMIN — OXYCODONE HYDROCHLORIDE 15 MG: 5 TABLET ORAL at 01:04

## 2019-04-17 RX ADMIN — OXYCODONE HYDROCHLORIDE AND ACETAMINOPHEN 1 TABLET: 10; 325 TABLET ORAL at 09:04

## 2019-04-17 RX ADMIN — GABAPENTIN 600 MG: 300 CAPSULE ORAL at 08:04

## 2019-04-17 NOTE — PLAN OF CARE
Problem: Adult Inpatient Plan of Care  Goal: Plan of Care Review  Outcome: Outcome(s) achieved Date Met: 04/17/19  Fall precautions maintained. Pt free from falls/injuries.  Patient complains of pain. Pain controlled with PRN meds.  Antibiotics given as prescribed.  Ambulates and repositions with assistance.  Plan of care and medications discussed with patient.  Patient verbalized understanding.  Bed locked and low, call bell within reach.  Chart check done. Will continue to monitor.

## 2019-04-17 NOTE — DISCHARGE SUMMARY
[facility] Hospital Medicine  Discharge Summary      Patient Name: Monica Johnson  MRN: [id number]  Admission Date: 4/16/2019  Hospital Length of Stay: 0 days  Discharge Date and Time:  04/17/2019 1:26 PM  Attending Physician: Jesus Manrique MD   Discharging Provider: MARGO Silvestre  Primary Care Provider: Kenya Escoto MD      HPI:   Monica Johnson is a 43 year old male with hypertension who presented for elective repair of posterior tibial tendon dysfunction of the left lower extremity. He underwent left calcaneus osteotomy, left tendon transfer, left achilles tendon lengthening and left tibialis posterior tendon repair. He tolerated the procedure well and complaints of expected postoperative pain at the time of exam. He denies cough and fever prior to surgery.     Procedure(s) (LRB):  OSTEOTOMY, CALCANEUS (Left)  TRANSFER, TENDON (Left)  LENGTHENING, TENDON, ACHILLES (Left)  REPAIR, TENDON, TIBIALIS POSTERIOR (Left)      Hospital Course:   The patient was placed in Out Patient Extended Recovery following left calcaneus osteotomy, left tendon transfer, left achilles tendon lengthening and left tibialis posterior tendon repair performed by Dr. Colon on 4/16/19. He tolerated the procedure well, but experienced expected post-operative pain. His pain medication regimen was adjusted and better control was achieved prior to discharge. He agrees to follow up with Dr. Colon and complete a course of antibiotics upon discharge.      Consults:       Final Active Diagnoses:    Diagnosis Date Noted POA    PRINCIPAL PROBLEM:  Posterior tibial tendon dysfunction (PTTD) of left lower extremity [M76.822] 04/16/2019 Yes    Essential hypertension [I10] 05/03/2018 Yes    Tobacco use disorder [F17.200] 04/11/2019 Yes      Problems Resolved During this Admission:       Discharged Condition: stable    Disposition: Home or Self Care    Follow Up:  Follow-up Information     Rober Colon DPM On 4/25/2019.     Specialty:  Podiatry  Contact information:  [address] [place] [address] [address] [place] [id number]  [phone]             Kenya Escoto MD In 3 days.    Specialty:  Family Medicine  Contact information:  [address] [place] [address] [address]  [place] [address]  [phone]                 Patient Instructions:      CRUTCHES FOR HOME USE     Order Specific Question Answer Comments   Type: Axillary    Height: 6' (1.829 m)    Weight: 101.8 kg (224 lb 6.9 oz)    Length of need (1-99 months): 2      Diet Cardiac     Weight bearing restrictions (specify):   Order Comments: Nonweightbearing left lower extremity       Significant Diagnostic Studies: Labs:   CMP   Recent Labs   Lab 04/17/19  0436      K 4.1      CO2 26   *   BUN 16   CREATININE 1.3   CALCIUM 9.6   ANIONGAP 9   ESTGFRAFRICA >60   EGFRNONAA >60   , CBC   Recent Labs   Lab 04/17/19 0436   WBC 17.56*   HGB 14.4   HCT 42.9       and All labs within the past 24 hours have been reviewed    Pending Diagnostic Studies:     Procedure Component Value Units Date/Time    X-Ray Foot 2 View Left [[id number]] Resulted:  04/16/19 1313    Order Status:  Sent Lab Status:  In process Updated:  04/16/19 1426         Medications:  Reconciled Home Medications:      Medication List      START taking these medications    cefadroxil 500 MG Cap  Commonly known as:  DURICEF  Take 1 capsule (500 mg total) by mouth every 12 (twelve) hours. for 5 days     oxyCODONE-acetaminophen  mg per tablet  Commonly known as:  PERCOCET  Take 1 tablet by mouth every 6 (six) hours as needed for Pain.        CHANGE how you take these medications    diltiaZEM 120 MG tablet  Commonly known as:  CARDIZEM  Take 1 tablet (120 mg total) by mouth once daily.  What changed:  Another medication with the same name was removed. Continue taking this medication, and follow the directions you see here.        CONTINUE taking these medications    cetirizine 10 MG tablet  Commonly known as:   ZYRTEC     chlorzoxazone 250 MG tablet  Commonly known as:  PARAFON FORTE     cyclobenzaprine 10 MG tablet  Commonly known as:  FLEXERIL  TAKE 1 TABLET 3 TIMES A DAY AS NEEDED FOR MUSCLE SPASMS     diclofenac sodium 1 % Gel  Commonly known as:  VOLTAREN  Apply 4 g topically 3 (three) times daily as needed.     efinaconazole 10 % Crystal  Commonly known as:  JUBLIA  Apply 1 application topically once daily.     gabapentin 600 MG tablet  Commonly known as:  NEURONTIN  Take 600 mg by mouth 2 (two) times daily.     multivitamin capsule  Take 1 capsule by mouth once daily.     naftifine 2 % Gel  Apply 1 application topically once daily.     nicotine 21 mg/24 hr  Commonly known as:  NICODERM CQ  Place 1 patch onto the skin once daily.        STOP taking these medications    aspirin 81 MG EC tablet  Commonly known as:  ECOTRIN     ibuprofen 800 MG tablet  Commonly known as:  ADVIL,MOTRIN     traMADol 50 mg tablet  Commonly known as:  ULTRAM            Indwelling Lines/Drains at time of discharge:   Lines/Drains/Airways          None          Time spent on the discharge of patient: Greater than 30 minutes. Patient was seen and examined on the date of discharge and determined to be suitable for discharge.         MARGO Silvestre  Department of Hospital Medicine  [facility]

## 2019-04-17 NOTE — HOSPITAL COURSE
The patient was placed in Out Patient Extended Recovery following left calcaneus osteotomy, left tendon transfer, left achilles tendon lengthening and left tibialis posterior tendon repair performed by Dr. Colon on 4/16/19. He tolerated the procedure well, but experienced expected post-operative pain. His pain medication regimen was adjusted and better control was achieved prior to discharge. He agrees to follow up with Dr. Colon and complete a course of antibiotics upon discharge.

## 2019-04-17 NOTE — PLAN OF CARE
Problem: Adult Inpatient Plan of Care  Goal: Plan of Care Review  Outcome: Ongoing (interventions implemented as appropriate)  Pt complains of pain to left foot. Pain medication is effective. Moist small amount of drainage not to dressing. Foot is elevated. Pt denies numbness/tingling to LLE. IV ABX given per order. No injuries. Will continue to monitor. 12 hour chart check is completed.

## 2019-04-17 NOTE — PT/OT/SLP EVAL
Physical Therapy Evaluation    Patient Name:  Monica Johnson   MRN:  [id number]    Recommendations:     Discharge Recommendations:  home health PT   Discharge Equipment Recommendations: none   Barriers to discharge: None    Assessment:     Monica Johnson is a 43 y.o. male admitted with a medical diagnosis of Posterior tibial tendon dysfunction (PTTD) of left lower extremity.  He presents with the following impairments/functional limitations:  weakness, gait instability, impaired balance, impaired endurance, pain, impaired functional mobilty, impaired self care skills, decreased lower extremity function, decreased ROM .    Rehab Prognosis: Good; patient would benefit from acute skilled PT services to address these deficits and reach maximum level of function.    Recent Surgery: Procedure(s) (LRB):  OSTEOTOMY, CALCANEUS (Left)  TRANSFER, TENDON (Left)  LENGTHENING, TENDON, ACHILLES (Left)  REPAIR, TENDON, TIBIALIS POSTERIOR (Left) 1 Day Post-Op    Plan:     During this hospitalization, patient to be seen   to address the identified rehab impairments via gait training, therapeutic activities, therapeutic exercises and progress toward the following goals:    · Plan of Care Expires:  04/24/19    Subjective     Chief Complaint: PAIN  Patient/Family Comments/goals: DEC PAIN  Pain/Comfort:  · Pain Rating 1: 8/10  · Location - Side 1: Left  · Location 1: foot  · Pain Rating Post-Intervention 1: 8/10    Patients cultural, spiritual, Mu-ism conflicts given the current situation:      Living Environment:  PT LIVES AT HOME WITH WIFE AND HAS 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  Equipment used at home: none.  DME owned (not currently used): none.  Upon discharge, patient will have assistance from WIFE.    Objective:     Communicated with NURSE ALONSO AND Epic CHART REVIEW prior to session.  Patient found supine with peripheral IV  upon PT entry to room.    General  Precautions: Standard, fall   Orthopedic Precautions:LLE non weight bearing   Braces: N/A     Exams:  · RLE ROM: WNL  · RLE Strength: WNL    Functional Mobility:  PT MET IN RM SUP IN BED. PT EDUCATED ON USE OF RW AND GAVE VISUAL DEMONSTRATION. PT SUP>SIT EOB WITH S. PT STOOD WITH RW AND CGA NWB L LE. PT GT TRAINED X 70' WITH RW AND NWB L LE. PT RETURNED TO RM SEATED EOB AND SUP IN BED WITH L LE ELEVATED. PT EDUCATED ON ROLE OF P.T. AND PT LEFT WITH ALL NEEDS MET AND SET UP WITH BREAKFAST.     AM-PAC 6 CLICK MOBILITY  Total Score:18     Patient left supine with call button in reach.    GOALS:   Multidisciplinary Problems     Physical Therapy Goals        Problem: Physical Therapy Goal    Goal Priority Disciplines Outcome Goal Variances Interventions   Physical Therapy Goal     PT, PT/OT      Description:  PT WILL BE SEEN FOR P.T. FOR A MIN OF 5 OUT OF 7 DAYS A WEEK  LT19  1. PT WILL COMPLETE B LE TE X 20 REPS  2. PT WILL GT TRAIN NWB L LE X 150' WITH RW MOD I                    History:     Past Medical History:   Diagnosis Date    ADHD (attention deficit hyperactivity disorder)     Allergy     Anxiety 2016    Arthritis 2014    Osteoarthritis    Depression 2016    Hypertension     Sciatica of left side 2017       Past Surgical History:   Procedure Laterality Date    CIRCUMCISION      LENGTHENING, TENDON, ACHILLES Left 2019    [facility]formed by [facility] at [facility]    OSTEOTOMY, CALCANEUS Left 2019    Per[facility] by Rober [facility]    REPAIR, TENDON, TIBIALIS POSTERIOR Left 2019    Performed by Rober [facility] at [facility]    TRANSFER, TENDON Left 2019    Performed by Rober Colon D[facility] at [facility] OR       Time Tracking:     PT Received On: 19  PT Start Time: 0826     PT Stop Time: 0850  PT Total Time (min): 24 min     Billable Minutes: Evaluation 14 and Gait Training 10      Erin Crews, PT  2019

## 2019-04-22 ENCOUNTER — OFFICE VISIT (OUTPATIENT)
Dept: INTERNAL MEDICINE | Facility: CLINIC | Age: 44
End: 2019-04-22
Payer: COMMERCIAL

## 2019-04-22 ENCOUNTER — OFFICE VISIT (OUTPATIENT)
Dept: PODIATRY | Facility: CLINIC | Age: 44
End: 2019-04-22
Payer: COMMERCIAL

## 2019-04-22 VITALS
OXYGEN SATURATION: 96 % | HEIGHT: 72 IN | TEMPERATURE: 97 F | SYSTOLIC BLOOD PRESSURE: 122 MMHG | HEART RATE: 101 BPM | WEIGHT: 224.44 LBS | BODY MASS INDEX: 30.4 KG/M2 | DIASTOLIC BLOOD PRESSURE: 88 MMHG

## 2019-04-22 DIAGNOSIS — M76.822 POSTERIOR TIBIAL TENDON DYSFUNCTION (PTTD) OF LEFT LOWER EXTREMITY: ICD-10-CM

## 2019-04-22 DIAGNOSIS — M76.822 POSTERIOR TIBIAL TENDON DYSFUNCTION (PTTD) OF LEFT LOWER EXTREMITY: Primary | ICD-10-CM

## 2019-04-22 DIAGNOSIS — M24.572 CONTRACTURE, LEFT ANKLE: ICD-10-CM

## 2019-04-22 DIAGNOSIS — M25.572 PAIN IN LEFT ANKLE AND JOINTS OF LEFT FOOT: ICD-10-CM

## 2019-04-22 DIAGNOSIS — M19.072 OSTEOARTHRITIS OF LEFT ANKLE OR FOOT: ICD-10-CM

## 2019-04-22 DIAGNOSIS — M25.375 FOOT JOINT INSTABILITY, LEFT: ICD-10-CM

## 2019-04-22 DIAGNOSIS — M76.821 POSTERIOR TIBIAL TENDON DYSFUNCTION (PTTD) OF RIGHT LOWER EXTREMITY: ICD-10-CM

## 2019-04-22 DIAGNOSIS — F17.200 TOBACCO USE DISORDER: ICD-10-CM

## 2019-04-22 DIAGNOSIS — M21.42 ACQUIRED PES PLANUS, LEFT: ICD-10-CM

## 2019-04-22 DIAGNOSIS — I10 ESSENTIAL HYPERTENSION: Primary | ICD-10-CM

## 2019-04-22 PROCEDURE — 99213 OFFICE O/P EST LOW 20 MIN: CPT | Mod: S$GLB,,, | Performed by: FAMILY MEDICINE

## 2019-04-22 PROCEDURE — 99213 PR OFFICE/OUTPT VISIT, EST, LEVL III, 20-29 MIN: ICD-10-PCS | Mod: S$GLB,,, | Performed by: FAMILY MEDICINE

## 2019-04-22 PROCEDURE — 3008F BODY MASS INDEX DOCD: CPT | Mod: CPTII,S$GLB,, | Performed by: FAMILY MEDICINE

## 2019-04-22 PROCEDURE — 99024 POSTOP FOLLOW-UP VISIT: CPT | Mod: S$GLB,,, | Performed by: PODIATRIST

## 2019-04-22 PROCEDURE — 99999 PR PBB SHADOW E&M-EST. PATIENT-LVL I: CPT | Mod: PBBFAC,,, | Performed by: PODIATRIST

## 2019-04-22 PROCEDURE — 99024 PR POST-OP FOLLOW-UP VISIT: ICD-10-PCS | Mod: S$GLB,,, | Performed by: PODIATRIST

## 2019-04-22 PROCEDURE — 99999 PR PBB SHADOW E&M-EST. PATIENT-LVL III: ICD-10-PCS | Mod: PBBFAC,,, | Performed by: FAMILY MEDICINE

## 2019-04-22 PROCEDURE — 99999 PR PBB SHADOW E&M-EST. PATIENT-LVL I: ICD-10-PCS | Mod: PBBFAC,,, | Performed by: PODIATRIST

## 2019-04-22 PROCEDURE — 3074F SYST BP LT 130 MM HG: CPT | Mod: CPTII,S$GLB,, | Performed by: FAMILY MEDICINE

## 2019-04-22 PROCEDURE — 99999 PR PBB SHADOW E&M-EST. PATIENT-LVL III: CPT | Mod: PBBFAC,,, | Performed by: FAMILY MEDICINE

## 2019-04-22 PROCEDURE — 3008F PR BODY MASS INDEX (BMI) DOCUMENTED: ICD-10-PCS | Mod: CPTII,S$GLB,, | Performed by: FAMILY MEDICINE

## 2019-04-22 PROCEDURE — 3079F DIAST BP 80-89 MM HG: CPT | Mod: CPTII,S$GLB,, | Performed by: FAMILY MEDICINE

## 2019-04-22 PROCEDURE — 3079F PR MOST RECENT DIASTOLIC BLOOD PRESSURE 80-89 MM HG: ICD-10-PCS | Mod: CPTII,S$GLB,, | Performed by: FAMILY MEDICINE

## 2019-04-22 PROCEDURE — 3074F PR MOST RECENT SYSTOLIC BLOOD PRESSURE < 130 MM HG: ICD-10-PCS | Mod: CPTII,S$GLB,, | Performed by: FAMILY MEDICINE

## 2019-04-22 NOTE — PROGRESS NOTES
Monica Johnson Alex  04/22/2019  [id number]    Kenya Escoto MD  Patient Care Team:  Kenya Escoto MD as PCP - General (Family Medicine)  Barbara Moore LPN as Care Coordinator (Internal Medicine)  Has the patient seen any provider outside of the [facility] network since the last visit? (no). If yes, HIPPA forms completed and records requested.        Visit Type:a scheduled routine follow-up visit    Chief Complaint:  Chief Complaint   Patient presents with    Follow-up     from procedure       History of Present Illness:  Patient here for recheck.  He had his foot sx with Dr. Colon on 4/16. Osteotomy, calcaneus.    Bp pre op check was okay on nurse visit.   He returns for a recheck.  He is on Cardizem for BP control.  BP is better today.      He is non weight bearing for 6-8 weeks.  He is c/o pain and not sleeping due to pain.  He was given Rx for Percocet.  He has flexeril for muscle spasm.  He did dressing change at home, but not sure of instructions.  He has no supplies for dressing changes.  It appear he needs a podiatry visit to answer his post op instruction question and pain issues.      History:  Past Medical History:   Diagnosis Date    ADHD (attention deficit hyperactivity disorder)     Allergy     Anxiety 1/25/2016    Arthritis 2014    Osteoarthritis    Depression 1/25/2016    Hypertension     Sciatica of left side 1/31/2017     Past Surgical History:   Procedure Laterality Date    CIRCUMCISION      LENGTHENING, TENDON, ACHILLES Left 4/16/2019    [facility]    OSTEOTOMY, CALCANEUS Left 4/16/2019    [facility]    REPAIR, TENDON, TIBIALIS POSTERIOR Left 4/16/2019    [facility]    TRANSFER, TENDON Left 4/16/2019    Performed by [facility] at [facility]     Family History   Problem Relation Age of Onset    Cancer Mother     Hypertension Mother     Hypertension Maternal  Grandmother      Social History     Socioeconomic History    Marital status:      Spouse name: Not on file    Number of children: Not on file    Years of education: Not on file    Highest education level: Not on file   Occupational History    Not on file   Social Needs    Financial resource strain: Not hard at all    Food insecurity:     Worry: Never true     Inability: Never true    Transportation needs:     Medical: No     Non-medical: No   Tobacco Use    Smoking status: Current Some Day Smoker     Packs/day: 0.25     Types: Cigarettes, Cigars    Smokeless tobacco: Never Used   Substance and Sexual Activity    Alcohol use: Yes     Frequency: 2-3 times a week     Drinks per session: 1 or 2     Binge frequency: Never     Comment: Socially  Hold 3 days prior to surgery    Drug use: Yes     Types: Marijuana    Sexual activity: Yes     Partners: Female   Lifestyle    Physical activity:     Days per week: 7 days     Minutes per session: 20 min    Stress: Not at all   Relationships    Social connections:     Talks on phone: More than three times a week     Gets together: More than three times a week     Attends Adventism service: Not on file     Active member of club or organization: No     Attends meetings of clubs or organizations: Never     Relationship status:    Other Topics Concern    Not on file   Social History Narrative    Not on file     Patient Active Problem List   Diagnosis    Depression    Intermittent explosive disorder    Sciatica of left side    Plantar fasciitis    Osteoarthritis    Essential hypertension    Elevated liver function tests    Acute medial meniscus tear of right knee    Chondromalacia of right knee    Fatty liver    Tetrahydrocannabinol (THC) use disorder, mild, abuse    Tobacco use disorder    Posterior tibial tendon dysfunction (PTTD) of left lower extremity     Review of patient's allergies indicates:  No Known Allergies    The following were  reviewed at this visit: active problem list, medication list, allergies, family history, social history, and health maintenance.    Medications:  Current Outpatient Medications on File Prior to Visit   Medication Sig Dispense Refill    cetirizine (ZYRTEC) 10 MG tablet       cyclobenzaprine (FLEXERIL) 10 MG tablet TAKE 1 TABLET 3 TIMES A DAY AS NEEDED FOR MUSCLE SPASMS 90 tablet 0    diltiaZEM (CARDIZEM) 120 MG tablet Take 1 tablet (120 mg total) by mouth once daily. 30 tablet 6    efinaconazole (JUBLIA) 10 % Fredis Apply 1 application topically once daily. 8 mL 11    gabapentin (NEURONTIN) 600 MG tablet Take 600 mg by mouth 2 (two) times daily.      multivitamin capsule Take 1 capsule by mouth once daily.      naftifine 2 % Gel Apply 1 application topically once daily. 45 g 3    oxyCODONE-acetaminophen (PERCOCET)  mg per tablet Take 1 tablet by mouth every 6 (six) hours as needed for Pain. 28 tablet 0    [] cefadroxil (DURICEF) 500 MG Cap Take 1 capsule (500 mg total) by mouth every 12 (twelve) hours. for 5 days 10 capsule 0    [DISCONTINUED] chlorzoxazone (PARAFON FORTE) 250 MG tablet       [DISCONTINUED] diclofenac sodium 1 % Gel Apply 4 g topically 3 (three) times daily as needed. 2 Tube 4    [DISCONTINUED] nicotine (NICODERM CQ) 21 mg/24 hr Place 1 patch onto the skin once daily. 28 patch 0     No current facility-administered medications on file prior to visit.        Medications have been reviewed and reconciled with patient at this visit.  Barriers to medications present (no)    Adverse reactions to current medications (no)    Over the counter medications reviewed (Yes ), and if needed added to active Medication list at this visit.     Exam:  Wt Readings from Last 3 Encounters:   19 101.8 kg (224 lb 6.9 oz)   19 101.8 kg (224 lb 6.9 oz)   19 105.2 kg (232 lb)     Temp Readings from Last 3 Encounters:   19 97.2 °F (36.2 °C) (Tympanic)   19 98 °F (36.7 °C)  (Oral)   04/11/19 97.1 °F (36.2 °C) (Tympanic)     BP Readings from Last 3 Encounters:   04/22/19 122/88   04/17/19 (!) 156/99   04/15/19 136/88     Pulse Readings from Last 3 Encounters:   04/22/19 101   04/17/19 85   04/15/19 87     Body mass index is 30.44 kg/m².      Review of Systems   HENT: Negative for hearing loss.    Eyes: Negative for discharge.   Respiratory: Negative for wheezing.    Cardiovascular: Negative for chest pain and palpitations.   Gastrointestinal: Negative for blood in stool, constipation, diarrhea and vomiting.   Genitourinary: Negative for hematuria and urgency.   Musculoskeletal: Positive for joint pain. Negative for neck pain.   Neurological: Negative for weakness and headaches.   Endo/Heme/Allergies: Negative for polydipsia.   Psychiatric/Behavioral: The patient has insomnia.      Physical Exam   Constitutional: He is oriented to person, place, and time. He appears well-developed and well-nourished. No distress.   HENT:   Head: Normocephalic and atraumatic.   Eyes: Pupils are equal, round, and reactive to light. Conjunctivae and EOM are normal. Right eye exhibits no discharge. Left eye exhibits no discharge.   Neck: Normal range of motion. Neck supple.   Pulmonary/Chest: Effort normal. No respiratory distress.   Musculoskeletal: He exhibits tenderness.        Legs:  Post op   Lymphadenopathy:     He has no cervical adenopathy.   Neurological: He is alert and oriented to person, place, and time. No cranial nerve deficit.   Skin: Capillary refill takes less than 2 seconds. He is not diaphoretic.   Psychiatric: He has a normal mood and affect. His behavior is normal. Judgment and thought content normal.   Nursing note and vitals reviewed.      Laboratory Reviewed ({Yes)  Lab Results   Component Value Date    WBC 17.56 (H) 04/17/2019    HGB 14.4 04/17/2019    HCT 42.9 04/17/2019     04/17/2019    CHOL 177 05/07/2018    TRIG 149 05/07/2018    HDL 38 (L) 05/07/2018    ALT 69 (H)  04/08/2019    AST 43 (H) 04/08/2019     04/17/2019    K 4.1 04/17/2019     04/17/2019    CREATININE 1.3 04/17/2019    BUN 16 04/17/2019    CO2 26 04/17/2019    TSH 0.563 08/28/2018    PSA 1.4 04/08/2019    INR 0.9 08/28/2018       Monica was seen today for follow-up.    Diagnoses and all orders for this visit:    Essential hypertension   Bp in goal range today    Posterior tibial tendon dysfunction (PTTD) of left lower extremity    He has appt with Dr. Colon on 4.25 and Pain management this week as well.  He will await in waiting room for podiatry to change dressings.  I have deferred his post op question to his surgeon.                  Care Plan/Goals: Reviewed  (Yes)  Goals     None          Follow up: No follow-ups on file.    After visit summary was printed and given to patient upon discharge today.  Patient goals and care plan are included in After Visit Summary.    Answers for HPI/ROS submitted by the patient on 4/18/2019   activity change: Yes  unexpected weight change: No  rhinorrhea: No  trouble swallowing: No  visual disturbance: No  chest tightness: No  polyuria: No  difficulty urinating: No  joint swelling: No  arthralgias: No  confusion: No  dysphoric mood: No    Answers for HPI/ROS submitted by the patient on 4/18/2019   activity change: Yes  unexpected weight change: No  rhinorrhea: No  trouble swallowing: No  visual disturbance: No  chest tightness: No  polyuria: No  difficulty urinating: No  joint swelling: No  arthralgias: No  confusion: No  dysphoric mood: No

## 2019-04-22 NOTE — PROGRESS NOTES
Subjective:       Patient ID: Monica Johnson is a 43 y.o. male.    Chief Complaint: No chief complaint on file.      HPI:  Monica Johnson presents to the office today, s/p 4/16/2019 Tendo-Achilles lengthening, Calcaneal osteotomy (MENDOZA type), Posterior tibial tendon repair, w/ Flexor tendon transfer, left.  Patient presents emergently this morning due to his dressings being partially removed by his wife to assess his left foot pain.  Patient was seen by his primary care provider, Kenya Escoto MD early this morning.  Patient states substantial pains which are persistent approximately 7/10.  Patient states icing at top of the foot is not helping alleviate his symptoms.  He does continue the oral anticoagulation, Aspirin.  Does continue the oral antibiotic as well. Denies cough or dyspnea.  Patient does continue to smoke cigarettes/cigars.    Review of patient's allergies indicates:  No Known Allergies    Past Medical History:   Diagnosis Date    ADHD (attention deficit hyperactivity disorder)     Allergy     Anxiety 1/25/2016    Arthritis 2014    Osteoarthritis    Depression 1/25/2016    Hypertension     Sciatica of left side 1/31/2017       Family History   Problem Relation Age of Onset    Cancer Mother     Hypertension Mother     Hypertension Maternal Grandmother        Social History     Socioeconomic History    Marital status:      Spouse name: Not on file    Number of children: Not on file    Years of education: Not on file    Highest education level: Not on file   Occupational History    Not on file   Social Needs    Financial resource strain: Not hard at all    Food insecurity:     Worry: Never true     Inability: Never true    Transportation needs:     Medical: No     Non-medical: No   Tobacco Use    Smoking status: Current Some Day Smoker     Packs/day: 0.25     Types: Cigarettes, Cigars    Smokeless tobacco: Never Used   Substance and Sexual Activity    Alcohol use:  Yes     Frequency: 2-3 times a week     Drinks per session: 1 or 2     Binge frequency: Never     Comment: Socially  Hold 3 days prior to surgery    Drug use: Yes     Types: Marijuana    Sexual activity: Yes     Partners: Female   Lifestyle    Physical activity:     Days per week: 7 days     Minutes per session: 20 min    Stress: Not at all   Relationships    Social connections:     Talks on phone: More than three times a week     Gets together: More than three times a week     Attends Confucianism service: Not on file     Active member of club or organization: No     Attends meetings of clubs or organizations: Never     Relationship status:    Other Topics Concern    Not on file   Social History Narrative    Not on file       Past Surgical History:   Procedure Laterality Date    CIRCUMCISION      LENGTHENING, TENDON, ACHILLES Left 4/16/2019    [facility]    OSTEOTOMY, CALCANEUS Left 4/16/2019    [facility]    REPAIR, TENDON, TIBIALIS POSTERIOR Left 4/16/2019    [facility]    TRANSFER, TENDON Left 4/16/2019    Performed by Rober [facility] at [facility]       Review of Systems   Constitutional: Negative for chills, fatigue and fever.   HENT: Negative for hearing loss.    Eyes: Negative for photophobia and visual disturbance.   Respiratory: Negative for cough, chest tightness, shortness of breath and wheezing.    Cardiovascular: Negative for chest pain and palpitations.   Gastrointestinal: Negative for constipation, diarrhea, nausea and vomiting.   Endocrine: Negative for cold intolerance and heat intolerance.   Genitourinary: Negative for flank pain.   Musculoskeletal: Positive for gait problem. Negative for neck pain and neck stiffness.   Skin: Negative for wound.   Neurological: Negative for light-headedness and headaches.   Psychiatric/Behavioral: Negative for sleep disturbance.           Objective:   There were no vitals taken for this visit.    X-Ray Foot 2 View Left  Narrative: EXAMINATION:  XR FOOT 2 VIEW LEFT    CLINICAL HISTORY:  left foot, intra operative;    COMPARISON:  None  Impression: FINDINGS/  Intraoperative fluoroscopic images were obtained.    Total fluoro time was 33.8 sec and 1 fluoroscopic images were obtained.  See operative report.    Electronically signed by: Jimmy Ellis MD  Date:    04/17/2019  Time:    14:36       LOWER EXTREMITY PHYSICAL EXAMINATION    NEUROLOGY: Proprioception is intact. Sensation to light touch is intact. Sudha-incisional sensation is decreased.    VASCULAR: On the left foot, the dorsalis pedis pulse is 2/4 and the posterior tibial pulse is 2/4. Capillary refill time is less than 3 seconds. Hair growth is present on the dorsum of the foot and at the digits. No rubor is present. Proximal to distal temperature is warm to warm. No ipsilateral contralateral calf pain or tenderness is noted with palpation and compression. No palpable cords noted.    DERMATOLOGY:  No evidence of infection.  Minimal erythema is noted to the medial incision.  No calor fluctuance is noted.    ORTHOPEDIC:  Anatomic alignment of foot and ankle.  Mild discomfort to palpation to the area of incision.  Moderate pedal edema is noted. Mild-to-moderate ankle edema is noted.      Assessment:     1. Posterior tibial tendon dysfunction (PTTD) of left lower extremity    2. Pain in left ankle and joints of left foot    3. Osteoarthritis of left ankle or foot    4. Acquired pes planus, left    5. Contracture, left ankle    6. Foot joint instability, left    7. Posterior tibial tendon dysfunction (PTTD) of right lower extremity    8. Tobacco use disorder          Plan:     Posterior tibial tendon dysfunction (PTTD) of left lower extremity  Pain in left ankle and joints of left foot  Osteoarthritis of left ankle or foot  Acquired pes planus, left  Contracture, left ankle  Foot joint instability,  left  Posterior tibial tendon dysfunction (PTTD) of right lower extremity  Thorough discussion is had with the patient today, concerning the diagnosis, its etiology, and the treatment algorithm at present.  XRAYS are reviewed in detail with the patient. All questions and concerns regarding findings and its/their implications are outlined and discussed.  No signs of localized infection or DVT.  Please continue oral anticoagulation.  Local wound care with DSD w/ compression.  Posterior splint re-application.  Please continue RICE therapy.  Temporary handicap form is completed.      Tobacco use disorder  Did discuss in detail the harmful effects of nicotine/tobacco/cigarette smoking, especially in relation to the lower extremity. I do rec. consultation with primary care provider for further discussed of smoking cessation methods. Smoking & Tobacco use cessation couseling was rendered at today's visit; intermediate, bewteen 3 and 10 minutes.          Future Appointments   Date Time Provider Department Center   4/22/2019  1:15 PM Rober Colon DPM [facility]   4/25/2019  3:30 PM Rober Colon DPM [facility]   4/26/2019  3:20 PM Ivan Davey MD [facility]

## 2019-04-29 ENCOUNTER — OFFICE VISIT (OUTPATIENT)
Dept: PODIATRY | Facility: CLINIC | Age: 44
End: 2019-04-29
Payer: COMMERCIAL

## 2019-04-29 VITALS
WEIGHT: 224 LBS | SYSTOLIC BLOOD PRESSURE: 140 MMHG | DIASTOLIC BLOOD PRESSURE: 81 MMHG | BODY MASS INDEX: 30.34 KG/M2 | HEIGHT: 72 IN | HEART RATE: 136 BPM

## 2019-04-29 DIAGNOSIS — M25.375 FOOT JOINT INSTABILITY, LEFT: ICD-10-CM

## 2019-04-29 DIAGNOSIS — M25.572 PAIN IN LEFT ANKLE AND JOINTS OF LEFT FOOT: ICD-10-CM

## 2019-04-29 DIAGNOSIS — M19.072 OSTEOARTHRITIS OF LEFT ANKLE OR FOOT: ICD-10-CM

## 2019-04-29 DIAGNOSIS — M24.572 CONTRACTURE, LEFT ANKLE: ICD-10-CM

## 2019-04-29 DIAGNOSIS — M21.42 ACQUIRED PES PLANUS, LEFT: ICD-10-CM

## 2019-04-29 DIAGNOSIS — M76.822 POSTERIOR TIBIAL TENDON DYSFUNCTION (PTTD) OF LEFT LOWER EXTREMITY: Primary | ICD-10-CM

## 2019-04-29 PROCEDURE — 99024 POSTOP FOLLOW-UP VISIT: CPT | Mod: S$GLB,,, | Performed by: PODIATRIST

## 2019-04-29 PROCEDURE — 99999 PR PBB SHADOW E&M-EST. PATIENT-LVL III: CPT | Mod: PBBFAC,,, | Performed by: PODIATRIST

## 2019-04-29 PROCEDURE — 99024 PR POST-OP FOLLOW-UP VISIT: ICD-10-PCS | Mod: S$GLB,,, | Performed by: PODIATRIST

## 2019-04-29 PROCEDURE — 99999 PR PBB SHADOW E&M-EST. PATIENT-LVL III: ICD-10-PCS | Mod: PBBFAC,,, | Performed by: PODIATRIST

## 2019-04-29 RX ORDER — OXYCODONE AND ACETAMINOPHEN 7.5; 325 MG/1; MG/1
1 TABLET ORAL EVERY 6 HOURS PRN
Qty: 28 TABLET | Refills: 0 | Status: SHIPPED | OUTPATIENT
Start: 2019-04-29 | End: 2019-05-06

## 2019-04-29 NOTE — PROGRESS NOTES
Subjective:       Patient ID: Monica Johnson is a 43 y.o. male.    Chief Complaint: Post-op Evaluation (Calcaneous osteotomy DOS:4/16/19; pt reports pain at 6/10.)      HPI:  Monica Johnson presents to the office today, s/p 4/16/2019 Tendo-Achilles lengthening, Calcaneal osteotomy (MENDOZA type), Posterior tibial tendon repair, w/ Flexor tendon transfer, left. Ppatient presents this afternoon with his mother.  Patient did complete a course of Percocet 10mg, in does request a refill.  He does continue the ASA daily with Gabapentin and Motrin. States neuralgia about the left foot. Denies local or systemic signs of infection.  Presents nonweightbearing with scooter.    Review of patient's allergies indicates:  No Known Allergies    Past Medical History:   Diagnosis Date    ADHD (attention deficit hyperactivity disorder)     Allergy     Anxiety 1/25/2016    Arthritis 2014    Osteoarthritis    Depression 1/25/2016    Hypertension     Sciatica of left side 1/31/2017       Family History   Problem Relation Age of Onset    Cancer Mother     Hypertension Mother     Hypertension Maternal Grandmother        Social History     Socioeconomic History    Marital status:      Spouse name: Not on file    Number of children: Not on file    Years of education: Not on file    Highest education level: Not on file   Occupational History    Not on file   Social Needs    Financial resource strain: Not hard at all    Food insecurity:     Worry: Never true     Inability: Never true    Transportation needs:     Medical: No     Non-medical: No   Tobacco Use    Smoking status: Current Some Day Smoker     Packs/day: 0.25     Types: Cigarettes, Cigars    Smokeless tobacco: Never Used   Substance and Sexual Activity    Alcohol use: Yes     Frequency: 2-3 times a week     Drinks per session: 1 or 2     Binge frequency: Never     Comment: Socially  Hold 3 days prior to surgery    Drug use: Yes     Types: Marijuana     Sexual activity: Yes     Partners: Female   Lifestyle    Physical activity:     Days per week: 7 days     Minutes per session: 20 min    Stress: Not at all   Relationships    Social connections:     Talks on phone: More than three times a week     Gets together: More than three times a week     Attends Advent service: Not on file     Active member of club or organization: No     Attends meetings of clubs or organizations: Never     Relationship status:    Other Topics Concern    Not on file   Social History Narrative    Not on file       Past Surgical History:   Procedure Laterality Date    CIRCUMCISION      LENGTHENING, TENDON, ACHILLES Left 4/16/2019    [facility]    OSTEOTOMY, CALCANEUS Left 4/16/2019    Performed by Rober [facility] at [facility] [place]    REPAIR, TENDON, TIBIALIS POSTERIOR Left 4/16/2019    Performed by Rober Colon [facility] at [facility]    TRANSFER, TENDON Left 4/16/2019    Performed by Rober Colon [facility]PM at [facility]       Review of Systems   Constitutional: Negative for chills, fatigue and fever.   HENT: Negative for hearing loss.    Eyes: Negative for photophobia and visual disturbance.   Respiratory: Negative for cough, chest tightness, shortness of breath and wheezing.    Cardiovascular: Negative for chest pain, palpitations and leg swelling.   Gastrointestinal: Negative for constipation, diarrhea, nausea and vomiting.   Endocrine: Negative for cold intolerance and heat intolerance.   Genitourinary: Negative for flank pain.   Musculoskeletal: Positive for gait problem. Negative for neck pain and neck stiffness.   Skin: Negative for wound.   Neurological: Negative for light-headedness and headaches.        Neuralgia, left lower extremity   Psychiatric/Behavioral: Negative for sleep disturbance.          Objective:   BP (!) 140/81 (BP Location: Left arm, Patient Position: Sitting)   Pulse (!) 136   Ht 6' (1.829 m)   Wt 101.6 kg (224 lb)    "BMI 30.38 kg/m²       LOWER EXTREMITY PHYSICAL EXAMINATION    NEUROLOGY: Proprioception is intact. Sensation to light touch is intact. Sudha-incisional sensation is intact.    VASCULAR: On the left foot, the dorsalis pedis pulse is 2/4 and the posterior tibial pulse is 2/4. Capillary refill time is less than 3 seconds. Hair growth is present on the dorsum of the foot and at the digits. No rubor is present. Proximal to distal temperature is warm to warm. No ipsilateral contralateral calf pain or tenderness is noted with palpation and compression. No palpable cords noted.    DERMATOLOGY:  Upon removal of sutures, no wound healing complications are noted.  No ecchymosis.  No local signs of infection.    ORTHOPEDIC:  Mild pedal and ankle edema is noted, improved as compared to prior.  Anatomic alignment of the foot ankle is noted.    Assessment:     1. Posterior tibial tendon dysfunction (PTTD) of left lower extremity    2. Pain in left ankle and joints of left foot    3. Osteoarthritis of left ankle or foot    4. Acquired pes planus, left    5. Contracture, left ankle    6. Foot joint instability, left          Plan:     Posterior tibial tendon dysfunction (PTTD) of left lower extremity    Pain in left ankle and joints of left foot    Osteoarthritis of left ankle or foot    Acquired pes planus, left    Contracture, left ankle    Foot joint instability, left        Thorough discussion is had with the patient today, concerning the diagnosis, its etiology, and the treatment algorithm at present.  Continue nonweightbearing.  Local wound care after removal of sutures with Betadine paint and dry sterile dressings.  Patient to leave dressings intact until follow-up.  Short leg posterior splint application in standard fashion using 5" OrthoGlass fiberglass splinting material, approx. 24" with web-roll/cast padding and ACE bandage. RICE therapy. Follow up in 3 weeks. Patient to get me the denial for short term disability so that " I may do a peer-peer review.          Future Appointments   Date Time Provider Department Center   5/21/2019 11:15 AM Rober Colon DPM [facility]

## 2019-05-06 ENCOUNTER — TELEPHONE (OUTPATIENT)
Dept: PODIATRY | Facility: CLINIC | Age: 44
End: 2019-05-06

## 2019-05-06 ENCOUNTER — HOSPITAL ENCOUNTER (OUTPATIENT)
Dept: RADIOLOGY | Facility: HOSPITAL | Age: 44
Discharge: HOME | End: 2019-05-06
Attending: PODIATRIST
Payer: COMMERCIAL

## 2019-05-06 DIAGNOSIS — Z91.81 HISTORY OF FALL: ICD-10-CM

## 2019-05-06 DIAGNOSIS — M25.572 PAIN IN LEFT ANKLE AND JOINTS OF LEFT FOOT: ICD-10-CM

## 2019-05-06 DIAGNOSIS — M25.572 PAIN IN LEFT ANKLE AND JOINTS OF LEFT FOOT: Primary | ICD-10-CM

## 2019-05-06 PROCEDURE — 73630 X-RAY EXAM OF FOOT: CPT | Mod: 26,LT,, | Performed by: RADIOLOGY

## 2019-05-06 PROCEDURE — 73630 XR FOOT COMPLETE 3 VIEW LEFT: ICD-10-PCS | Mod: 26,LT,, | Performed by: RADIOLOGY

## 2019-05-06 PROCEDURE — 73630 X-RAY EXAM OF FOOT: CPT | Mod: TC,LT

## 2019-05-06 NOTE — TELEPHONE ENCOUNTER
Spoke with pt and his wife, pt has an appt today for an X-ray, Dr. Colon put in. Please advise     ----- Message from Charla Zaragoza sent at 5/6/2019  8:29 AM CDT -----  Contact: Lydia/wife  Type:  Same Day Appointment Request    Caller is requesting a same day appointment.  Caller declined first available appointment listed below.    Name of Caller: Lydia/wife  When is the first available appointment? 5/21/19  Symptoms: Fell on Saturday and Hit Foot that he just had surgery on  Best Call Back Number: [phone]  Additional Information: she would like him to be worked in today with Dr. Colon    Thanks,  Charla

## 2019-05-16 ENCOUNTER — TELEPHONE (OUTPATIENT)
Dept: PODIATRY | Facility: CLINIC | Age: 44
End: 2019-05-16

## 2019-05-16 DIAGNOSIS — M24.572 CONTRACTURE, LEFT ANKLE: ICD-10-CM

## 2019-05-16 DIAGNOSIS — M25.572 PAIN IN LEFT ANKLE AND JOINTS OF LEFT FOOT: Primary | ICD-10-CM

## 2019-05-16 DIAGNOSIS — M19.072 OSTEOARTHRITIS OF LEFT ANKLE OR FOOT: ICD-10-CM

## 2019-05-16 DIAGNOSIS — M76.822 POSTERIOR TIBIAL TENDON DYSFUNCTION (PTTD) OF LEFT LOWER EXTREMITY: ICD-10-CM

## 2019-05-16 DIAGNOSIS — M25.375 FOOT JOINT INSTABILITY, LEFT: ICD-10-CM

## 2019-05-16 DIAGNOSIS — M21.42 ACQUIRED PES PLANUS, LEFT: ICD-10-CM

## 2019-05-16 RX ORDER — IBUPROFEN 800 MG/1
800 TABLET ORAL EVERY 8 HOURS PRN
Qty: 90 TABLET | Refills: 0 | Status: SHIPPED | OUTPATIENT
Start: 2019-05-16 | End: 2019-06-15

## 2019-05-16 NOTE — TELEPHONE ENCOUNTER
----- Message from Rober Colon DPM sent at 5/16/2019 10:14 AM CDT -----  Contact: pt  No refill for opioid is appropriate here as he is TOO TOO far s/p. Pains must be treated with high dose NSAIDs. E.g., Motrin 800mg BID. Let me know and I will send it.    ----- Message -----  From: Melissa Rao MA  Sent: 5/16/2019   8:25 AM  To: Rober Colon DPM        ----- Message -----  From: Sepideh Marti  Sent: 5/16/2019   7:22 AM  To: Isaiah [facility]ter Staff    Type:  RX Refill Request    Who Called: Patient  Refill or New Rx:Refill  RX Name and Strength:Perocet 7.5/325mg  How is the patient currently taking it? (ex. 1XDay):every 6hrs as needed  Is this a 30 day or 90 day RX:30  Preferred Pharmacy with phone number:[facility]  Local or Mail Order:Local  Ordering Provider:Dr Colon  Would the patient rather a call back or a response via [facility]? Call back  Best Call Back Number:[phone]  Additional Information: uche

## 2019-05-21 ENCOUNTER — OFFICE VISIT (OUTPATIENT)
Dept: PODIATRY | Facility: CLINIC | Age: 44
End: 2019-05-21
Payer: COMMERCIAL

## 2019-05-21 VITALS — TEMPERATURE: 99 F | HEART RATE: 95 BPM | SYSTOLIC BLOOD PRESSURE: 137 MMHG | DIASTOLIC BLOOD PRESSURE: 96 MMHG

## 2019-05-21 DIAGNOSIS — M25.375 FOOT JOINT INSTABILITY, LEFT: ICD-10-CM

## 2019-05-21 DIAGNOSIS — M24.572 CONTRACTURE, LEFT ANKLE: ICD-10-CM

## 2019-05-21 DIAGNOSIS — M76.822 POSTERIOR TIBIAL TENDON DYSFUNCTION (PTTD) OF LEFT LOWER EXTREMITY: Primary | ICD-10-CM

## 2019-05-21 DIAGNOSIS — M19.072 OSTEOARTHRITIS OF LEFT ANKLE OR FOOT: ICD-10-CM

## 2019-05-21 DIAGNOSIS — M21.42 ACQUIRED PES PLANUS, LEFT: ICD-10-CM

## 2019-05-21 DIAGNOSIS — F17.200 TOBACCO USE DISORDER: ICD-10-CM

## 2019-05-21 DIAGNOSIS — M25.572 PAIN IN LEFT ANKLE AND JOINTS OF LEFT FOOT: ICD-10-CM

## 2019-05-21 DIAGNOSIS — Z98.890 POST-OPERATIVE STATE: Primary | ICD-10-CM

## 2019-05-21 PROCEDURE — 99999 PR PBB SHADOW E&M-EST. PATIENT-LVL II: ICD-10-PCS | Mod: PBBFAC,,, | Performed by: PODIATRIST

## 2019-05-21 PROCEDURE — 99024 POSTOP FOLLOW-UP VISIT: CPT | Mod: S$GLB,,, | Performed by: PODIATRIST

## 2019-05-21 PROCEDURE — 99024 PR POST-OP FOLLOW-UP VISIT: ICD-10-PCS | Mod: S$GLB,,, | Performed by: PODIATRIST

## 2019-05-21 PROCEDURE — 99999 PR PBB SHADOW E&M-EST. PATIENT-LVL II: CPT | Mod: PBBFAC,,, | Performed by: PODIATRIST

## 2019-05-21 NOTE — PROGRESS NOTES
Subjective:       Patient ID: Monica Johnson is a 43 y.o. male.    Chief Complaint: Post-op Evaluation (DOS:4/16/19-osteotomy, calcaneus-left; pt reports pain at 4/10.)      HPI:  Monica Johnson presents to the office today, s/p 4/16/2019 Tendo-Achilles lengthening, Calcaneal osteotomy (MENDOZA type), Posterior tibial tendon repair, w/ Flexor tendon transfer, left.  Patient states continued RICE therapy.  Patient presents nonweightbearing posterior splint and knee scooter.  Patient does continue his daily ASA 325mg.  Patient states on average, his pains are 4/10.  Patient does continue to smoke cigarettes.       Review of patient's allergies indicates:  No Known Allergies    Past Medical History:   Diagnosis Date    ADHD (attention deficit hyperactivity disorder)     Allergy     Anxiety 1/25/2016    Arthritis 2014    Osteoarthritis    Depression 1/25/2016    Hypertension     Sciatica of left side 1/31/2017       Family History   Problem Relation Age of Onset    Cancer Mother     Hypertension Mother     Hypertension Maternal Grandmother        Social History     Socioeconomic History    Marital status:      Spouse name: Not on file    Number of children: Not on file    Years of education: Not on file    Highest education level: Not on file   Occupational History    Not on file   Social Needs    Financial resource strain: Not hard at all    Food insecurity:     Worry: Never true     Inability: Never true    Transportation needs:     Medical: No     Non-medical: No   Tobacco Use    Smoking status: Current Some Day Smoker     Packs/day: 0.25     Types: Cigarettes, Cigars    Smokeless tobacco: Never Used   Substance and Sexual Activity    Alcohol use: Yes     Frequency: 2-3 times a week     Drinks per session: 1 or 2     Binge frequency: Never     Comment: Socially  Hold 3 days prior to surgery    Drug use: Yes     Types: Marijuana    Sexual activity: Yes     Partners: Female    Lifestyle    Physical activity:     Days per week: 7 days     Minutes per session: 20 min    Stress: Not at all   Relationships    Social connections:     Talks on phone: More than three times a week     Gets together: More than three times a week     Attends [employer] service: Not on file     Active member of club or organization: No     Attends meetings of clubs or organizations: Never     Relationship status:    Other Topics Concern    Not on file   Social History Narrative    Not on file       Past Surgical History:   Procedure Laterality Date    CIRCUMCISION      LENGTHENING, TENDON, ACHILLES Left 4/16/2019    [facility]formed by [facility]    OSTEOTOMY, CALCANEUS Left 4/16/2019    [facility] by [facility]    REPAIR, TENDON, TIBIALIS POSTERIOR Left 4/16/2019    [facility] by [facility]    TRANSFER, TENDON Left 4/16/2019    Performed by [facility] at [facility]       Review of Systems   Constitutional: Negative for chills, fatigue and fever.   HENT: Negative for hearing loss.    Eyes: Negative for photophobia and visual disturbance.   Respiratory: Negative for cough, chest tightness, shortness of breath and wheezing.    Cardiovascular: Negative for chest pain and palpitations.   Gastrointestinal: Negative for constipation, diarrhea, nausea and vomiting.   Endocrine: Negative for cold intolerance and heat intolerance.   Genitourinary: Negative for flank pain.   Musculoskeletal: Positive for gait problem. Negative for neck pain and neck stiffness.   Skin: Negative for wound.   Neurological: Positive for numbness. Negative for light-headedness and headaches.   Psychiatric/Behavioral: Negative for sleep disturbance.          Objective:   BP (!) 137/96 (BP Location: Right arm, Patient Position: Sitting)   Pulse 95   Temp 98.7 °F (37.1 °C) (Oral)     LOWER EXTREMITY PHYSICAL EXAMINATION    NEUROLOGY: Proprioception is intact. Sensation  to light touch is intact. Sudha-incisional sensation is decreased at the lateral aspect of the hindfoot.    VASCULAR: On the left foot, the dorsalis pedis pulse is 2/4 and the posterior tibial pulse is 2/4. Capillary refill time is less than 3 seconds. Hair growth is present on the dorsum of the foot and at the digits. No rubor is present. Proximal to distal temperature is warm to warm. No ipsilateral contralateral calf pain or tenderness is noted with palpation and compression. No palpable cords noted.    DERMATOLOGY:  Substantial xerosis and hyperkeratosis is appreciated.  No overt wound healing complications are noted. No local signs of infection are noted.    ORTHOPEDIC:  Anatomic alignment is noted. Decreased plantarflexion ability is noted. Mild edema is noted, medial ankle.    Assessment:     1. Posterior tibial tendon dysfunction (PTTD) of left lower extremity    2. Pain in left ankle and joints of left foot    3. Osteoarthritis of left ankle or foot    4. Acquired pes planus, left    5. Contracture, left ankle    6. Foot joint instability, left    7. Tobacco use disorder          Plan:     Posterior tibial tendon dysfunction (PTTD) of left lower extremity    Pain in left ankle and joints of left foot    Osteoarthritis of left ankle or foot    Acquired pes planus, left    Contracture, left ankle    Foot joint instability, left    Tobacco use disorder        Thorough discussion is had with the patient today, concerning the diagnosis, its etiology, and the treatment algorithm at present.  Patient may discontinue posterior splint transition to nonweightbearing CAM Walker at this time. He may shower and/or bath the limb as per normal, remain strictly nonweightbearing.  Continue daily Aspirin for DVT prophylaxis.  Continue elevation when nonweightbearing.  Patient will follow up in approximately 10 days for repeat x-ray evaluation and to commence weight-bearing as tolerated.  Did discuss in detail the harmful  effects of nicotine/tobacco/cigarette smoking, especially in relation to the lower extremity. I do rec. consultation with primary care provider for further discussed of smoking cessation methods. Smoking & Tobacco use cessation couseling was rendered at today's visit; intermediate, bewteen 3 and 10 minutes.        Future Appointments   Date Time Provider Department Center   5/21/2019 11:15 AM Rober Cooln DPM [facility]   6/4/2019  1:30 PM DEANN GARCIA- [facility]   6/4/2019  2:00 PM Rober Colon DPM [facility]

## 2019-06-04 ENCOUNTER — HOSPITAL ENCOUNTER (OUTPATIENT)
Dept: RADIOLOGY | Facility: HOSPITAL | Age: 44
Discharge: HOME | End: 2019-06-04
Attending: PODIATRIST
Payer: COMMERCIAL

## 2019-06-04 ENCOUNTER — OFFICE VISIT (OUTPATIENT)
Dept: PODIATRY | Facility: CLINIC | Age: 44
End: 2019-06-04
Payer: COMMERCIAL

## 2019-06-04 VITALS
HEIGHT: 72 IN | DIASTOLIC BLOOD PRESSURE: 96 MMHG | SYSTOLIC BLOOD PRESSURE: 140 MMHG | BODY MASS INDEX: 30.48 KG/M2 | HEART RATE: 86 BPM | WEIGHT: 225 LBS

## 2019-06-04 DIAGNOSIS — M25.572 PAIN IN LEFT ANKLE AND JOINTS OF LEFT FOOT: ICD-10-CM

## 2019-06-04 DIAGNOSIS — M76.822 POSTERIOR TIBIAL TENDON DYSFUNCTION (PTTD) OF LEFT LOWER EXTREMITY: Primary | ICD-10-CM

## 2019-06-04 DIAGNOSIS — M19.072 OSTEOARTHRITIS OF LEFT ANKLE OR FOOT: ICD-10-CM

## 2019-06-04 DIAGNOSIS — Z98.890 POST-OPERATIVE STATE: ICD-10-CM

## 2019-06-04 DIAGNOSIS — M25.375 FOOT JOINT INSTABILITY, LEFT: ICD-10-CM

## 2019-06-04 DIAGNOSIS — M24.572 CONTRACTURE, LEFT ANKLE: ICD-10-CM

## 2019-06-04 DIAGNOSIS — F17.200 TOBACCO USE DISORDER: ICD-10-CM

## 2019-06-04 DIAGNOSIS — M21.42 ACQUIRED PES PLANUS, LEFT: ICD-10-CM

## 2019-06-04 PROCEDURE — 73630 X-RAY EXAM OF FOOT: CPT | Mod: TC,LT

## 2019-06-04 PROCEDURE — 99999 PR PBB SHADOW E&M-EST. PATIENT-LVL III: ICD-10-PCS | Mod: PBBFAC,,, | Performed by: PODIATRIST

## 2019-06-04 PROCEDURE — 73630 XR FOOT COMPLETE 3 VIEW LEFT: ICD-10-PCS | Mod: 26,LT,, | Performed by: RADIOLOGY

## 2019-06-04 PROCEDURE — 99024 POSTOP FOLLOW-UP VISIT: CPT | Mod: S$GLB,,, | Performed by: PODIATRIST

## 2019-06-04 PROCEDURE — 73630 X-RAY EXAM OF FOOT: CPT | Mod: 26,LT,, | Performed by: RADIOLOGY

## 2019-06-04 PROCEDURE — 99024 PR POST-OP FOLLOW-UP VISIT: ICD-10-PCS | Mod: S$GLB,,, | Performed by: PODIATRIST

## 2019-06-04 PROCEDURE — 99999 PR PBB SHADOW E&M-EST. PATIENT-LVL III: CPT | Mod: PBBFAC,,, | Performed by: PODIATRIST

## 2019-06-04 NOTE — PROGRESS NOTES
Subjective:       Patient ID: Monica Johnson is a 44 y.o. male.    Chief Complaint: Follow-up (pt presents with left foot in walking boot; pt reports pain at 4/10.)      HPI:  Monica Johnson presents to the office today, s/p 4/16/2019 Tendo-Achilles lengthening, Calcaneal osteotomy (MENDOZA type), Posterior tibial tendon repair, w/ Flexor tendon transfer, left. Patient presents nonweightbearing with CAM Walker and in the scooter.  Patient does continue DVT prophylaxis.  Patient presents this afternoon with his wife.  Patient states tolerable pains at approximately 4/10.  Patient states no swelling. Denies cough or dyspnea.  Patient states occasional RICE therapy.   Patient does continue to smoke cigarettes. Kenya Escoto MD is his primary care provider.    Review of patient's allergies indicates:  No Known Allergies    Past Medical History:   Diagnosis Date    ADHD (attention deficit hyperactivity disorder)     Allergy     Anxiety 1/25/2016    Arthritis 2014    Osteoarthritis    Depression 1/25/2016    Hypertension     Sciatica of left side 1/31/2017       Family History   Problem Relation Age of Onset    Cancer Mother     Hypertension Mother     Hypertension Maternal Grandmother        Social History     Socioeconomic History    Marital status:      Spouse name: Not on file    Number of children: Not on file    Years of education: Not on file    Highest education level: Not on file   Occupational History    Not on file   Social Needs    Financial resource strain: Not hard at all    Food insecurity:     Worry: Never true     Inability: Never true    Transportation needs:     Medical: No     Non-medical: No   Tobacco Use    Smoking status: Current Some Day Smoker     Packs/day: 0.25     Types: Cigarettes, Cigars    Smokeless tobacco: Never Used   Substance and Sexual Activity    Alcohol use: Yes     Frequency: 2-3 times a week     Drinks per session: 1 or 2     Binge frequency:  Never     Comment: Socially  Hold 3 days prior to surgery    Drug use: Yes     Types: Marijuana    Sexual activity: Yes     Partners: Female   Lifestyle    Physical activity:     Days per week: 7 days     Minutes per session: 20 min    Stress: Not at all   Relationships    Social connections:     Talks on phone: More than three times a week     Gets together: More than three times a week     Attends [employer] service: Not on file     Active member of club or organization: No     Attends meetings of clubs or organizations: Never     Relationship status:    Other Topics Concern    Not on file   Social History Narrative    Not on file       Past Surgical History:   Procedure Laterality Date    CIRCUMCISION      LENGTHENING, TENDON, ACHILLES Left 4/16/2019    [facility]formed [facility]    OSTEOTOMY, CALCANEUS Left 4/16/2019    [facility]formed by [facility] at [facility]    REPAIR, TENDON, TIBIALIS POSTERIOR Left 4/16/2019    [facility]formed by [facility] at [facility]    TRANSFER, TENDON Left 4/16/2019    Performed by Rober [facility] at [facility]       Review of Systems   Constitutional: Negative for chills, fatigue and fever.   HENT: Negative for hearing loss.    Eyes: Negative for photophobia and visual disturbance.   Respiratory: Negative for cough, chest tightness, shortness of breath and wheezing.    Cardiovascular: Negative for chest pain and palpitations.   Gastrointestinal: Negative for constipation, diarrhea, nausea and vomiting.   Endocrine: Negative for cold intolerance and heat intolerance.   Genitourinary: Negative for flank pain.   Musculoskeletal: Positive for gait problem. Negative for neck pain and neck stiffness.   Skin: Negative for wound.   Neurological: Negative for light-headedness and headaches.   Psychiatric/Behavioral: Negative for sleep disturbance.          Objective:   BP (!) 140/96 (BP Location: Left arm, Patient Position: Sitting)   Pulse 86   Ht  6' (1.829 m)   Wt 102.1 kg (225 lb)   BMI 30.52 kg/m²     LOWER EXTREMITY PHYSICAL EXAMINATION    NEUROLOGY: Proprioception is intact. Sensation to light touch is intact. Sudha-incisional sensation is intact.    VASCULAR: On the left foot, the dorsalis pedis pulse is 2/4 and the posterior tibial pulse is 2/4. Capillary refill time is less than 3 seconds. Hair growth is present on the dorsum of the foot and at the digits. No rubor is present. Proximal to distal temperature is warm to warm. No ipsilateral contralateral calf pain or tenderness is noted with palpation and compression. No palpable cords noted.    DERMATOLOGY:  Mild area of delayed wound closure, medial aspect of the hindfoot.  This area measures 1.60 cm x 0.40 cm x 0.30 cm.  No probe to bone or osseous exposure noted. No periwound erythema or cellulitis noted.  No purulence.  No local signs of infection.    ORTHOPEDIC:  Mild discomfort to palpation to the areas of incision.  Mild edema is noted.      Assessment:     1. Posterior tibial tendon dysfunction (PTTD) of left lower extremity    2. Pain in left ankle and joints of left foot    3. Osteoarthritis of left ankle or foot    4. Acquired pes planus, left    5. Foot joint instability, left    6. Contracture, left ankle    7. Tobacco use disorder          Plan:     Posterior tibial tendon dysfunction (PTTD) of left lower extremity    Pain in left ankle and joints of left foot    Osteoarthritis of left ankle or foot    Acquired pes planus, left    Foot joint instability, left    Contracture, left ankle    Tobacco use disorder        Thorough discussion is had with the patient today, concerning the diagnosis, its etiology, and the treatment algorithm at present.  XRAYS are reviewed in detail with the patient. All questions and concerns regarding findings and its/their implications are outlined and discussed.  Patient's transition to weight-bearing as tolerated CAM Walker with the assistance of a  walker/cane/crutches/crutches.  Patient will start outpatient physical therapy.  Please follow up in approximately 6 weeks.    The wound was surgically debrided after adequate prep with alcohol and/or betadine paint. Excisional wound debridement was performed using sharp #10/#15 blade/rounded scalpel and tissue nipper, with removal of all non-viable skin and soft tissues; necrotic skin/tissue formation. The woundbase/wound bed was also debrided to encourage bleeding as to promote/stimulate healing. Debridement was excisional and included epidermal, dermal and subcutaneous tissues. Post debridement measurements are as above. Hemostasis was achieved. Patient tolerated procedure well and without complications. Local woundcare with collagen dressings and bandage thereafter. Patient will change the collagen dressings Q3 - Q4 days in standard fashion.    Smoking cessation.  Please discontinue blood thinner.          No future appointments.

## 2019-06-18 ENCOUNTER — CLINICAL SUPPORT (OUTPATIENT)
Dept: SMOKING CESSATION | Facility: CLINIC | Age: 44
End: 2019-06-18
Payer: COMMERCIAL

## 2019-06-18 DIAGNOSIS — F17.200 NICOTINE DEPENDENCE: Primary | ICD-10-CM

## 2019-06-18 PROCEDURE — 99407 BEHAV CHNG SMOKING > 10 MIN: CPT | Mod: S$GLB,,, | Performed by: INTERNAL MEDICINE

## 2019-06-18 PROCEDURE — 99407 PR TOBACCO USE CESSATION INTENSIVE >10 MINUTES: ICD-10-PCS | Mod: S$GLB,,, | Performed by: INTERNAL MEDICINE

## 2019-06-18 NOTE — PROGRESS NOTES
Spoke with patient today in regard to smoking cessation progress for 12 month telephone follow up, he states not tobacco free. Patient states he has cut down on cigarette intake but is having some stressful situations due to not being able to work after surgery. Commended patient on the accomplishment thus far with cutting down. Informed patient of benefit period and contact information if any further help or support is needed. Will resolve episode and complete smart form for Quit attempt #1.

## 2019-07-12 DIAGNOSIS — M21.42 ACQUIRED PES PLANUS, LEFT: ICD-10-CM

## 2019-07-12 DIAGNOSIS — M19.072 OSTEOARTHRITIS OF LEFT ANKLE OR FOOT: ICD-10-CM

## 2019-07-12 DIAGNOSIS — M25.375 FOOT JOINT INSTABILITY, LEFT: ICD-10-CM

## 2019-07-12 DIAGNOSIS — M76.822 POSTERIOR TIBIAL TENDON DYSFUNCTION (PTTD) OF LEFT LOWER EXTREMITY: Primary | ICD-10-CM

## 2019-07-12 DIAGNOSIS — M25.572 PAIN IN LEFT ANKLE AND JOINTS OF LEFT FOOT: ICD-10-CM

## 2019-07-16 ENCOUNTER — HOSPITAL ENCOUNTER (OUTPATIENT)
Dept: RADIOLOGY | Facility: HOSPITAL | Age: 44
Discharge: HOME | End: 2019-07-16
Attending: PODIATRIST
Payer: COMMERCIAL

## 2019-07-16 ENCOUNTER — OFFICE VISIT (OUTPATIENT)
Dept: PODIATRY | Facility: CLINIC | Age: 44
End: 2019-07-16
Payer: COMMERCIAL

## 2019-07-16 VITALS — RESPIRATION RATE: 17 BRPM | BODY MASS INDEX: 30.46 KG/M2 | WEIGHT: 224.88 LBS | HEIGHT: 72 IN

## 2019-07-16 DIAGNOSIS — M19.072 OSTEOARTHRITIS OF LEFT ANKLE OR FOOT: ICD-10-CM

## 2019-07-16 DIAGNOSIS — M25.375 FOOT JOINT INSTABILITY, LEFT: ICD-10-CM

## 2019-07-16 DIAGNOSIS — M25.572 PAIN IN LEFT ANKLE AND JOINTS OF LEFT FOOT: ICD-10-CM

## 2019-07-16 DIAGNOSIS — M76.822 POSTERIOR TIBIAL TENDON DYSFUNCTION (PTTD) OF LEFT LOWER EXTREMITY: ICD-10-CM

## 2019-07-16 DIAGNOSIS — M24.572 CONTRACTURE, LEFT ANKLE: ICD-10-CM

## 2019-07-16 DIAGNOSIS — F17.200 TOBACCO USE DISORDER: ICD-10-CM

## 2019-07-16 DIAGNOSIS — M21.42 ACQUIRED PES PLANUS, LEFT: ICD-10-CM

## 2019-07-16 DIAGNOSIS — M76.822 POSTERIOR TIBIAL TENDON DYSFUNCTION (PTTD) OF LEFT LOWER EXTREMITY: Primary | ICD-10-CM

## 2019-07-16 PROCEDURE — 99999 PR PBB SHADOW E&M-EST. PATIENT-LVL III: CPT | Mod: PBBFAC,,, | Performed by: PODIATRIST

## 2019-07-16 PROCEDURE — 73630 XR FOOT COMPLETE 3 VIEW LEFT: ICD-10-PCS | Mod: 26,LT,, | Performed by: RADIOLOGY

## 2019-07-16 PROCEDURE — 99999 PR PBB SHADOW E&M-EST. PATIENT-LVL III: ICD-10-PCS | Mod: PBBFAC,,, | Performed by: PODIATRIST

## 2019-07-16 PROCEDURE — 99024 PR POST-OP FOLLOW-UP VISIT: ICD-10-PCS | Mod: S$GLB,,, | Performed by: PODIATRIST

## 2019-07-16 PROCEDURE — 99024 POSTOP FOLLOW-UP VISIT: CPT | Mod: S$GLB,,, | Performed by: PODIATRIST

## 2019-07-16 PROCEDURE — 73630 X-RAY EXAM OF FOOT: CPT | Mod: TC,LT

## 2019-07-16 PROCEDURE — 73630 X-RAY EXAM OF FOOT: CPT | Mod: 26,LT,, | Performed by: RADIOLOGY

## 2019-07-16 NOTE — PROGRESS NOTES
Subjective:       Patient ID: Monica Johnson is a 44 y.o. male.    Chief Complaint: Follow-up (Left foot, rates pain 3/10, wear walking boot, ambualtion without shereen, non diabetic, PCP Dr. Escoto)      HPI:  Monica Johnson presents to the office today, s/p 4/16/2019 Tendo-Achilles lengthening, Calcaneal osteotomy (MENDOZA type), Posterior tibial tendon repair, w/ Flexor tendon transfer, left. Patient presents nonweightbearing with CAM Walker. Patient states his pain are 3/10. Patient states minimal swelling. Denies cough or dyspnea. Kenya Escoto MD is his primary care provider.  Patient denies local or systemic signs of infection at this time.  Patient does continue outpatient physical therapy.    Review of patient's allergies indicates:  No Known Allergies    Past Medical History:   Diagnosis Date    ADHD (attention deficit hyperactivity disorder)     Allergy     Anxiety 1/25/2016    Arthritis 2014    Osteoarthritis    Depression 1/25/2016    Hypertension     Sciatica of left side 1/31/2017       Family History   Problem Relation Age of Onset    Cancer Mother     Hypertension Mother     Hypertension Maternal Grandmother        Social History     Socioeconomic History    Marital status:      Spouse name: Not on file    Number of children: Not on file    Years of education: Not on file    Highest education level: Not on file   Occupational History    Not on file   Social Needs    Financial resource strain: Not hard at all    Food insecurity:     Worry: Never true     Inability: Never true    Transportation needs:     Medical: No     Non-medical: No   Tobacco Use    Smoking status: Current Some Day Smoker     Packs/day: 0.25     Types: Cigarettes, Cigars    Smokeless tobacco: Never Used   Substance and Sexual Activity    Alcohol use: Yes     Frequency: 2-3 times a week     Drinks per session: 1 or 2     Binge frequency: Never     Comment: Socially  Hold 3 days prior to surgery     Drug use: Yes     Types: Marijuana    Sexual activity: Yes     Partners: Female   Lifestyle    Physical activity:     Days per week: 7 days     Minutes per session: 20 min    Stress: Not at all   Relationships    Social connections:     Talks on phone: More than three times a week     Gets together: More than three times a week     Attends [employer] service: Not on file     Active member of club or organization: No     Attends meetings of clubs or organizations: Never     Relationship status:    Other Topics Concern    Not on file   Social History Narrative    Not on file       Past Surgical History:   Procedure Laterality Date    CIRCUMCISION      LENGTHENING, TENDON, ACHILLES Left 4/16/2019    [facility]formed by [facility]    OSTEOTOMY, CALCANEUS Left 4/16/2019    Performed by Rober [facility] at [facility]    REPAIR, TENDON, TIBIALIS POSTERIOR Left 4/16/2019    Performed by [facility] at [facility]    TRANSFER, TENDON Left 4/16/2019    Performed by Rober Colon D[facility] at [facility]       Review of Systems   Constitutional: Negative for chills, fatigue and fever.   HENT: Negative for hearing loss.    Eyes: Negative for photophobia and visual disturbance.   Respiratory: Negative for cough, chest tightness, shortness of breath and wheezing.    Cardiovascular: Negative for chest pain and palpitations.   Gastrointestinal: Negative for constipation, diarrhea, nausea and vomiting.   Endocrine: Negative for cold intolerance and heat intolerance.   Genitourinary: Negative for flank pain.   Musculoskeletal: Positive for gait problem. Negative for neck pain and neck stiffness.   Skin: Negative for wound.   Neurological: Negative for light-headedness and headaches.   Psychiatric/Behavioral: Negative for sleep disturbance.          Objective:   Resp 17   Ht 6' (1.829 m)   Wt 102 kg (224 lb 13.9 oz)   BMI 30.50 kg/m²     X-Ray Foot Complete Left  Narrative: EXAMINATION:  XR FOOT  COMPLETE 3 VIEW LEFT    CLINICAL HISTORY:  .  Posterior tibial tendinitis, left leg    TECHNIQUE:  AP, lateral and oblique views of the left foot were performed.    COMPARISON:  June 4, 2019    FINDINGS:  Postsurgical changes calcaneal osteotomy again noted with stable positioning and alignment fracture fragments.  Callus formation noted.  Multi articular degenerative changes and mild pes planus again noted.  No acute fracture or dislocation.  Stable appearance mild soft tissue swelling.  Impression: Stable exam.  See above.  Follow-up and/or further evaluation as warranted.    Electronically signed by: Jose Andrade MD  Date:    07/16/2019  Time:    10:42       LOWER EXTREMITY PHYSICAL EXAMINATION    NEUROLOGY: Proprioception is intact. Sensation to light touch is intact. Sudha-incisional sensation is intact.    VASCULAR: On the left foot, the dorsalis pedis pulse is 2/4 and the posterior tibial pulse is 2/4. Capillary refill time is less than 3 seconds. Hair growth is present on the dorsum of the foot and at the digits. No rubor is present. Proximal to distal temperature is warm to warm.     DERMATOLOGY: Skin is supple, dry and intact. No ecchymosis is noted. No hypertrophic skin formation. No erythema or cellulitis is noted.     ORTHOPEDIC: MMT is 4/5 with isolation of the PT tendon on the left lower extremity.  Anatomic alignment of the foot and ankle is noted. Edema is noted. Discomfort to palpation to the areas of incision.    Assessment:     1. Posterior tibial tendon dysfunction (PTTD) of left lower extremity    2. Pain in left ankle and joints of left foot    3. Acquired pes planus, left    4. Osteoarthritis of left ankle or foot    5. Foot joint instability, left    6. Contracture, left ankle    7. Tobacco use disorder          Plan:     Posterior tibial tendon dysfunction (PTTD) of left lower extremity  Pain in left ankle and joints of left foot  Acquired pes planus, left  Osteoarthritis of left ankle or  foot  Foot joint instability, left  Contracture, left ankle  Thorough discussion is had with the patient today, concerning the diagnosis, its etiology, and the treatment algorithm at present.  XRAYS are reviewed in detail with the patient. All questions and concerns regarding findings and its/their implications are outlined and discussed.  Continue outpatient physical therapy.  Please continue CAM Walker and wean the boot to tolerance in at the direction of the physical therapist.  Patient will follow up in approximately 1 month.  At that point in time, patient should be ambulatory with a sneaker.  Patient to return to work on 08/12/2019.  NSAIDs as needed.       Tobacco use disorder  Did discuss in detail the harmful effects of nicotine/tobacco/cigarette smoking, especially in relation to the lower extremity. I do rec. consultation with primary care provider for further discussed of smoking cessation methods. Smoking & Tobacco use cessation couseling was rendered at today's visit; intermediate, bewteen 3 and 10 minutes.              Future Appointments   Date Time Provider Department Center   8/8/2019 10:30 AM Rober Colon DPM [facility]

## 2019-08-08 ENCOUNTER — OFFICE VISIT (OUTPATIENT)
Dept: PODIATRY | Facility: CLINIC | Age: 44
End: 2019-08-08
Payer: COMMERCIAL

## 2019-08-08 VITALS — DIASTOLIC BLOOD PRESSURE: 105 MMHG | SYSTOLIC BLOOD PRESSURE: 146 MMHG | HEART RATE: 107 BPM

## 2019-08-08 DIAGNOSIS — M25.572 PAIN IN LEFT ANKLE AND JOINTS OF LEFT FOOT: ICD-10-CM

## 2019-08-08 DIAGNOSIS — M19.072 OSTEOARTHRITIS OF LEFT ANKLE OR FOOT: ICD-10-CM

## 2019-08-08 DIAGNOSIS — M76.822 POSTERIOR TIBIAL TENDON DYSFUNCTION (PTTD) OF LEFT LOWER EXTREMITY: Primary | ICD-10-CM

## 2019-08-08 DIAGNOSIS — M24.572 CONTRACTURE, LEFT ANKLE: ICD-10-CM

## 2019-08-08 DIAGNOSIS — M21.42 ACQUIRED PES PLANUS, LEFT: ICD-10-CM

## 2019-08-08 DIAGNOSIS — M25.375 FOOT JOINT INSTABILITY, LEFT: ICD-10-CM

## 2019-08-08 DIAGNOSIS — F17.200 TOBACCO USE DISORDER: ICD-10-CM

## 2019-08-08 PROCEDURE — 3077F SYST BP >= 140 MM HG: CPT | Mod: CPTII,S$GLB,, | Performed by: PODIATRIST

## 2019-08-08 PROCEDURE — 3080F PR MOST RECENT DIASTOLIC BLOOD PRESSURE >= 90 MM HG: ICD-10-PCS | Mod: CPTII,S$GLB,, | Performed by: PODIATRIST

## 2019-08-08 PROCEDURE — 99999 PR PBB SHADOW E&M-EST. PATIENT-LVL III: CPT | Mod: PBBFAC,,, | Performed by: PODIATRIST

## 2019-08-08 PROCEDURE — 99213 OFFICE O/P EST LOW 20 MIN: CPT | Mod: S$GLB,,, | Performed by: PODIATRIST

## 2019-08-08 PROCEDURE — 3077F PR MOST RECENT SYSTOLIC BLOOD PRESSURE >= 140 MM HG: ICD-10-PCS | Mod: CPTII,S$GLB,, | Performed by: PODIATRIST

## 2019-08-08 PROCEDURE — 3080F DIAST BP >= 90 MM HG: CPT | Mod: CPTII,S$GLB,, | Performed by: PODIATRIST

## 2019-08-08 PROCEDURE — 99213 PR OFFICE/OUTPT VISIT, EST, LEVL III, 20-29 MIN: ICD-10-PCS | Mod: S$GLB,,, | Performed by: PODIATRIST

## 2019-08-08 PROCEDURE — 99999 PR PBB SHADOW E&M-EST. PATIENT-LVL III: ICD-10-PCS | Mod: PBBFAC,,, | Performed by: PODIATRIST

## 2019-08-08 NOTE — PROGRESS NOTES
Subjective:       Patient ID: Monica oJhnson is a 44 y.o. male.    Chief Complaint: Follow-up (Left ankle pain, pt reports pain not as severe, rates pain 4/10, not diabetic, wears CAM boot, PCP Dr Escoto.)      HPI:  Monica Johnson presents to the office today, s/p 4/16/2019 Tendo-Achilles lengthening, Calcaneal osteotomy (MENDOZA type), Posterior tibial tendon repair, w/ Flexor tendon transfer, left.  Patient presents this afternoon with his wife. Patient presents ambulatory with CAM Walker. Patient states pains are 4/10 with the walking boot. Without the CAM Walker, his pains are 7/10.  Patient does state weakness about the left ankle joint. He has been doing outpatient physical therapy for the past 4 weeks.    Review of patient's allergies indicates:  No Known Allergies    Past Medical History:   Diagnosis Date    ADHD (attention deficit hyperactivity disorder)     Allergy     Anxiety 1/25/2016    Arthritis 2014    Osteoarthritis    Depression 1/25/2016    Hypertension     Sciatica of left side 1/31/2017       Family History   Problem Relation Age of Onset    Cancer Mother     Hypertension Mother     Hypertension Maternal Grandmother        Social History     Socioeconomic History    Marital status:      Spouse name: Not on file    Number of children: Not on file    Years of education: Not on file    Highest education level: Not on file   Occupational History    Not on file   Social Needs    Financial resource strain: Not hard at all    Food insecurity:     Worry: Never true     Inability: Never true    Transportation needs:     Medical: No     Non-medical: No   Tobacco Use    Smoking status: Current Some Day Smoker     Packs/day: 0.25     Types: Cigarettes, Cigars    Smokeless tobacco: Never Used   Substance and Sexual Activity    Alcohol use: Yes     Frequency: 2-3 times a week     Drinks per session: 1 or 2     Binge frequency: Never     Comment: Socially  Hold 3 days prior to  surgery    Drug use: Yes     Types: Marijuana    Sexual activity: Yes     Partners: Female   Lifestyle    Physical activity:     Days per week: 7 days     Minutes per session: 20 min    Stress: Not at all   Relationships    Social connections:     Talks on phone: More than three times a week     Gets together: More than three times a week     Attends Yazdanism service: Not on file     Active member of club or organization: No     Attends meetings of clubs or organizations: Never     Relationship status:    Other Topics Concern    Not on file   Social History Narrative    Not on file       Past Surgical History:   Procedure Laterality Date    CIRCUMCISION      LENGTHENING, TENDON, ACHILLES Left 4/16/2019    Performed by [facility] at [facility]    OSTEOTOMY, CALCANEUS Left 4/16/2019    [facility]    REPAIR, TENDON, TIBIALIS POSTERIOR Left 4/16/2019    [facility]    TRANSFER, TENDON Left 4/16/2019    Performed by [facility] at [facility]       Review of Systems   Constitutional: Negative for chills, fatigue and fever.   HENT: Negative for hearing loss.    Eyes: Negative for photophobia and visual disturbance.   Respiratory: Negative for cough, chest tightness, shortness of breath and wheezing.    Cardiovascular: Negative for chest pain and palpitations.   Gastrointestinal: Negative for constipation, diarrhea, nausea and vomiting.   Endocrine: Negative for cold intolerance and heat intolerance.   Genitourinary: Negative for flank pain.   Musculoskeletal: Positive for gait problem. Negative for neck pain and neck stiffness.   Skin: Negative for wound.   Neurological: Negative for light-headedness, numbness and headaches.   Psychiatric/Behavioral: Negative for sleep disturbance.          Objective:   BP (!) 146/105 (BP Location: Right arm, Patient Position: Sitting, BP Method: Medium (Automatic))   Pulse 107     LOWER  EXTREMITY PHYSICAL EXAMINATION  DERMATOLOGY: Skin is supple, dry and intact. No ecchymosis is noted. No hypertrophic skin formation. No erythema or cellulitis is noted.      NEUROLOGY: Proprioception is intact. Sensation to light touch is intact. Sudha-incisional sensation is intact.     VASCULAR: On the left foot, the dorsalis pedis pulse is 2/4 and the posterior tibial pulse is 2/4. Capillary refill time is less than 3 seconds. Hair growth is present on the dorsum of the foot and at the digits. No rubor is present. Proximal to distal temperature is warm to warm.      ORTHOPEDIC:  Decreased strength with isolation of the flexor tendons about the medial ankle and the Achilles tendon. No defects are noted within either of the tendons.  Anatomic alignment of the foot ankle is noted. Mild medial ankle edema is noted. Substantial discomfort to palpation to the area of the lateral column allograft.  Gait pattern is slightly antalgic.    Assessment:     1. Posterior tibial tendon dysfunction (PTTD) of left lower extremity    2. Pain in left ankle and joints of left foot    3. Acquired pes planus, left    4. Osteoarthritis of left ankle or foot    5. Foot joint instability, left    6. Contracture, left ankle    7. Tobacco use disorder          Plan:     Posterior tibial tendon dysfunction (PTTD) of left lower extremity  Pain in left ankle and joints of left foot  Acquired pes planus, left  Osteoarthritis of left ankle or foot  Foot joint instability, left  Contracture, left ankle  -     Ambulatory Referral to Physical/Occupational Therapy    Thorough discussion is had with the patient today, concerning the diagnosis, its etiology, and the treatment algorithm at present.  Patient to wean the CAM walker boot to tolerance and transition to normal supportive shoe gear.  Patient may return to work on Monday with restriction.  Work note is completed.  Please continue outpatient physical therapy with explicit goal to strengthen  the flexor tendons about the ankle joint. Follow up in 1 month if needed.    Tobacco use disorder  Did discuss in detail the harmful effects of nicotine/tobacco/cigarette smoking, especially in relation to the lower extremity. I do rec. consultation with primary care provider for further discussed of smoking cessation methods. Smoking & Tobacco use cessation couseling was rendered at today's visit; intermediate, bewteen 3 and 10 minutes.            No future appointments.

## 2019-08-08 NOTE — LETTER
Rober Colon DPM  [facility]  Dept. of Podiatric Medicine and Surgery  [address] [address], [place] [id number] [phone] (phone)  [phone] (office)  [email or web address]      Re:  Name: Monica Johnson  Date of Birth: [birth year]    To Whom It May Concern:     Above noted patient is being treated by Dr. Colon for post surgical care.  Patient has been informed to not return to work until Monday 8/12/19 with the following medical restrictions: no prolonged standing/walking, must wear boot at all times, pantient may not lift anything item over 15 lbs., and the  patient must take seated breaks once per hour. If any further details are needed in regards to patient diagnosis and/or treatment, please feel free to contact my office for release of medical information pending clearance of my patient.     If you have any questions or concerns, please don't hesitate to call.     Sincerely,         ARMAND Vazquez, RAQUEL   Podiatry   [facility]   8/8/19

## 2020-01-07 ENCOUNTER — NURSE TRIAGE (OUTPATIENT)
Dept: ADMINISTRATIVE | Facility: CLINIC | Age: 45
End: 2020-01-07

## 2020-01-08 ENCOUNTER — HOSPITAL ENCOUNTER (EMERGENCY)
Facility: HOSPITAL | Age: 45
Discharge: HOME | End: 2020-01-08
Attending: EMERGENCY MEDICINE
Payer: COMMERCIAL

## 2020-01-08 VITALS
DIASTOLIC BLOOD PRESSURE: 89 MMHG | BODY MASS INDEX: 30.54 KG/M2 | HEART RATE: 84 BPM | OXYGEN SATURATION: 99 % | WEIGHT: 218.13 LBS | TEMPERATURE: 99 F | SYSTOLIC BLOOD PRESSURE: 157 MMHG | RESPIRATION RATE: 18 BRPM | HEIGHT: 71 IN

## 2020-01-08 DIAGNOSIS — R07.9 CHEST PAIN: ICD-10-CM

## 2020-01-08 DIAGNOSIS — E16.2 HYPOGLYCEMIA: ICD-10-CM

## 2020-01-08 DIAGNOSIS — I10 HYPERTENSION, UNSPECIFIED TYPE: Primary | ICD-10-CM

## 2020-01-08 LAB
ALBUMIN SERPL BCP-MCNC: 4.1 G/DL (ref 3.5–5.2)
ALP SERPL-CCNC: 63 U/L (ref 55–135)
ALT SERPL W/O P-5'-P-CCNC: 75 U/L (ref 10–44)
ANION GAP SERPL CALC-SCNC: 15 MMOL/L (ref 8–16)
AST SERPL-CCNC: 67 U/L (ref 10–40)
BASOPHILS # BLD AUTO: 0.08 K/UL (ref 0–0.2)
BASOPHILS NFR BLD: 0.8 % (ref 0–1.9)
BILIRUB SERPL-MCNC: 0.3 MG/DL (ref 0.1–1)
BNP SERPL-MCNC: <10 PG/ML (ref 0–99)
BUN SERPL-MCNC: 10 MG/DL (ref 6–20)
CALCIUM SERPL-MCNC: 9.9 MG/DL (ref 8.7–10.5)
CHLORIDE SERPL-SCNC: 104 MMOL/L (ref 95–110)
CO2 SERPL-SCNC: 23 MMOL/L (ref 23–29)
CREAT SERPL-MCNC: 1.1 MG/DL (ref 0.5–1.4)
DIFFERENTIAL METHOD: NORMAL
EOSINOPHIL # BLD AUTO: 0.5 K/UL (ref 0–0.5)
EOSINOPHIL NFR BLD: 4.5 % (ref 0–8)
ERYTHROCYTE [DISTWIDTH] IN BLOOD BY AUTOMATED COUNT: 14.4 % (ref 11.5–14.5)
EST. GFR  (AFRICAN AMERICAN): >60 ML/MIN/1.73 M^2
EST. GFR  (NON AFRICAN AMERICAN): >60 ML/MIN/1.73 M^2
GLUCOSE SERPL-MCNC: 118 MG/DL (ref 70–110)
HCT VFR BLD AUTO: 45.8 % (ref 40–54)
HGB BLD-MCNC: 14.8 G/DL (ref 14–18)
IMM GRANULOCYTES # BLD AUTO: 0.03 K/UL (ref 0–0.04)
IMM GRANULOCYTES NFR BLD AUTO: 0.3 % (ref 0–0.5)
LYMPHOCYTES # BLD AUTO: 4.7 K/UL (ref 1–4.8)
LYMPHOCYTES NFR BLD: 44.2 % (ref 18–48)
MCH RBC QN AUTO: 27.9 PG (ref 27–31)
MCHC RBC AUTO-ENTMCNC: 32.3 G/DL (ref 32–36)
MCV RBC AUTO: 86 FL (ref 82–98)
MONOCYTES # BLD AUTO: 0.5 K/UL (ref 0.3–1)
MONOCYTES NFR BLD: 4.9 % (ref 4–15)
NEUTROPHILS # BLD AUTO: 4.8 K/UL (ref 1.8–7.7)
NEUTROPHILS NFR BLD: 45.3 % (ref 38–73)
NRBC BLD-RTO: 0 /100 WBC
PLATELET # BLD AUTO: 316 K/UL (ref 150–350)
PMV BLD AUTO: 10.3 FL (ref 9.2–12.9)
POCT GLUCOSE: 110 MG/DL (ref 70–110)
POTASSIUM SERPL-SCNC: 3.4 MMOL/L (ref 3.5–5.1)
PROT SERPL-MCNC: 7.5 G/DL (ref 6–8.4)
RBC # BLD AUTO: 5.3 M/UL (ref 4.6–6.2)
SODIUM SERPL-SCNC: 142 MMOL/L (ref 136–145)
TROPONIN I SERPL DL<=0.01 NG/ML-MCNC: 0.01 NG/ML (ref 0–0.03)
WBC # BLD AUTO: 10.54 K/UL (ref 3.9–12.7)

## 2020-01-08 PROCEDURE — 80053 COMPREHEN METABOLIC PANEL: CPT

## 2020-01-08 PROCEDURE — 85025 COMPLETE CBC W/AUTO DIFF WBC: CPT

## 2020-01-08 PROCEDURE — 93010 ELECTROCARDIOGRAM REPORT: CPT | Mod: ,,, | Performed by: INTERNAL MEDICINE

## 2020-01-08 PROCEDURE — 93010 EKG 12-LEAD: ICD-10-PCS | Mod: ,,, | Performed by: INTERNAL MEDICINE

## 2020-01-08 PROCEDURE — 36415 COLL VENOUS BLD VENIPUNCTURE: CPT

## 2020-01-08 PROCEDURE — 84484 ASSAY OF TROPONIN QUANT: CPT

## 2020-01-08 PROCEDURE — 93005 ELECTROCARDIOGRAM TRACING: CPT

## 2020-01-08 PROCEDURE — 83880 ASSAY OF NATRIURETIC PEPTIDE: CPT

## 2020-01-08 PROCEDURE — 99285 EMERGENCY DEPT VISIT HI MDM: CPT | Mod: 25

## 2020-01-08 PROCEDURE — 36000 PLACE NEEDLE IN VEIN: CPT

## 2020-01-08 PROCEDURE — 82962 GLUCOSE BLOOD TEST: CPT

## 2020-01-08 PROCEDURE — 25000003 PHARM REV CODE 250: Performed by: NURSE PRACTITIONER

## 2020-01-08 RX ORDER — ASPIRIN 325 MG
325 TABLET ORAL
Status: COMPLETED | OUTPATIENT
Start: 2020-01-08 | End: 2020-01-08

## 2020-01-08 RX ADMIN — ASPIRIN 325 MG: 325 TABLET ORAL at 07:01

## 2020-01-09 ENCOUNTER — LAB VISIT (OUTPATIENT)
Dept: LAB | Facility: HOSPITAL | Age: 45
End: 2020-01-09
Attending: FAMILY MEDICINE
Payer: COMMERCIAL

## 2020-01-09 ENCOUNTER — OFFICE VISIT (OUTPATIENT)
Dept: INTERNAL MEDICINE | Facility: CLINIC | Age: 45
End: 2020-01-09
Payer: COMMERCIAL

## 2020-01-09 VITALS
OXYGEN SATURATION: 99 % | WEIGHT: 217.63 LBS | DIASTOLIC BLOOD PRESSURE: 90 MMHG | HEIGHT: 72 IN | BODY MASS INDEX: 29.48 KG/M2 | TEMPERATURE: 97 F | HEART RATE: 85 BPM | SYSTOLIC BLOOD PRESSURE: 158 MMHG

## 2020-01-09 DIAGNOSIS — I10 ESSENTIAL HYPERTENSION: ICD-10-CM

## 2020-01-09 DIAGNOSIS — R53.1 WEAKNESS: ICD-10-CM

## 2020-01-09 DIAGNOSIS — R79.89 ELEVATED LIVER FUNCTION TESTS: ICD-10-CM

## 2020-01-09 DIAGNOSIS — R79.89 ELEVATED LIVER FUNCTION TESTS: Primary | ICD-10-CM

## 2020-01-09 PROCEDURE — 3008F BODY MASS INDEX DOCD: CPT | Mod: CPTII,S$GLB,, | Performed by: FAMILY MEDICINE

## 2020-01-09 PROCEDURE — 3077F PR MOST RECENT SYSTOLIC BLOOD PRESSURE >= 140 MM HG: ICD-10-PCS | Mod: CPTII,S$GLB,, | Performed by: FAMILY MEDICINE

## 2020-01-09 PROCEDURE — 3080F DIAST BP >= 90 MM HG: CPT | Mod: CPTII,S$GLB,, | Performed by: FAMILY MEDICINE

## 2020-01-09 PROCEDURE — 83036 HEMOGLOBIN GLYCOSYLATED A1C: CPT

## 2020-01-09 PROCEDURE — 3077F SYST BP >= 140 MM HG: CPT | Mod: CPTII,S$GLB,, | Performed by: FAMILY MEDICINE

## 2020-01-09 PROCEDURE — 36415 COLL VENOUS BLD VENIPUNCTURE: CPT

## 2020-01-09 PROCEDURE — 99999 PR PBB SHADOW E&M-EST. PATIENT-LVL III: CPT | Mod: PBBFAC,,, | Performed by: FAMILY MEDICINE

## 2020-01-09 PROCEDURE — 99999 PR PBB SHADOW E&M-EST. PATIENT-LVL III: ICD-10-PCS | Mod: PBBFAC,,, | Performed by: FAMILY MEDICINE

## 2020-01-09 PROCEDURE — 3008F PR BODY MASS INDEX (BMI) DOCUMENTED: ICD-10-PCS | Mod: CPTII,S$GLB,, | Performed by: FAMILY MEDICINE

## 2020-01-09 PROCEDURE — 99214 PR OFFICE/OUTPT VISIT, EST, LEVL IV, 30-39 MIN: ICD-10-PCS | Mod: S$GLB,,, | Performed by: FAMILY MEDICINE

## 2020-01-09 PROCEDURE — 99214 OFFICE O/P EST MOD 30 MIN: CPT | Mod: S$GLB,,, | Performed by: FAMILY MEDICINE

## 2020-01-09 PROCEDURE — 3080F PR MOST RECENT DIASTOLIC BLOOD PRESSURE >= 90 MM HG: ICD-10-PCS | Mod: CPTII,S$GLB,, | Performed by: FAMILY MEDICINE

## 2020-01-09 RX ORDER — DILTIAZEM HYDROCHLORIDE 120 MG/1
120 TABLET, FILM COATED ORAL DAILY
Qty: 30 TABLET | Refills: 6 | Status: SHIPPED | OUTPATIENT
Start: 2020-01-09 | End: 2020-01-15

## 2020-01-09 NOTE — ED NOTES
Patient identifiers verified and correct for Monica Johnson.    Pt c/o headache and reports his sugar has been up and down over the past several days     LOC: The patient is awake, alert and aware of environment with an appropriate affect, the patient is oriented x 3 and speaking appropriately.  APPEARANCE: Patient resting comfortably and in no acute distress, patient is clean and well groomed, patient's clothing is properly fastened.  SKIN: The skin is warm and dry, color consistent with ethnicity, patient has normal skin turgor and moist mucus membranes, skin intact, no breakdown or bruising noted.  MUSCULOSKELETAL: Patient moving all extremities spontaneously.  RESPIRATORY: Airway is open and patent, respirations are spontaneous.  CARDIAC: Patient has a normal rate, no periphreal edema noted, capillary refill < 3 seconds.  ABDOMEN: Soft and non tender to palpation.

## 2020-01-09 NOTE — ED PROVIDER NOTES
"44 year old male patient presents to the ER with low blood sugar and having head and chest pain.      Pt understands that a workup will begin in the treatment lounge/results waiting areas due to there being no available beds. Pt also undertstands they will be placed in the next available bed where they will be seen and dispositioned by a physician. I am removing myself from the care of pt. Pt will be assigned to next available physician.      Sascha Concepcion FNP-C 4550    SCRIBE #1 NOTE: I, Dorothy Castro, am scribing for, and in the presence of, Annmarie Cam Do, MD. I have scribed the entire note.       History     Chief Complaint   Patient presents with    Hypoglycemia     reports sugar has been going up and down for several weeks and has been "dropping too low" lately, c/p headache     Review of patient's allergies indicates:  No Known Allergies      History of Present Illness     HPI    1/8/2020, 9:15 PM  History obtained from the patient      History of Present Illness: Monica Johnson is a 44 y.o. male patient with a PMHx of HTN who presents to the Emergency Department for evaluation of hypoglycemia which onset gradually PTA. Pt reports that he occasionally feels hypoglycemic and when he checks his blood sugar it is between 75-80. Pt then reports that he drinks/ eats sugary items and eventually feels better. This happened PTA at work. Pt had a syncopal episode in the past and was evaluated by cardiology with a cardiac cath 3 years ago and no blockages noted. Pt states that he has been off his BP medication, but has an appointment tomorrow with his PCP for evaluation. Symptoms are constant, improved, and moderate in severity. No mitigating or exacerbating factors reported. Associated sxs include sugar consumption. Patient denies any syncope, fever/ chills, SOB, cough, CP, palpations, numbness, HA, dizziness, rash, wound, abdominal pain, n/v/d, back/ neck pain, sore throat, congestion, dysuria, hematuria, localized " "weakness, easily bruising, and all other sxs at this time. Prior Tx includes sugary drinks. No further complaints or concerns at this time.     Arrival mode: Personal vehicle      PCP: Kenya Escoto MD        Past Medical History:  Past Medical History:   Diagnosis Date    Hypertension        Past Surgical History:  Past Surgical History:   Procedure Laterality Date    FOOT SURGERY           Family History:  History reviewed. No pertinent family history.    Social History:  Social History     Tobacco Use    Smoking status: Current Some Day Smoker   Substance and Sexual Activity    Alcohol use: Yes     Comment: socially    Drug use: Never    Sexual activity: Unknown        Review of Systems     Review of Systems   Constitutional: Negative for chills and fever.   HENT: Negative for congestion and sore throat.    Respiratory: Negative for cough and shortness of breath.    Cardiovascular: Negative for chest pain and palpitations.   Gastrointestinal: Negative for abdominal pain, diarrhea, nausea and vomiting.   Endocrine:        + "feeling hypoglycemic"  + Sugary drink/ food consumption   Genitourinary: Negative for dysuria and hematuria.   Musculoskeletal: Negative for back pain and neck pain.   Skin: Negative for rash and wound.   Neurological: Negative for dizziness, syncope, weakness, numbness and headaches.   Hematological: Does not bruise/bleed easily.   All other systems reviewed and are negative.     Physical Exam     Initial Vitals [01/08/20 1810]   BP Pulse Resp Temp SpO2   (!) 166/90 92 18 99.1 °F (37.3 °C) 99 %      MAP       --          Physical Exam  Nursing Notes and Vital Signs Reviewed.  Constitutional: Patient is in no acute distress. Well-developed and well-nourished.  Head: Atraumatic. Normocephalic.  Eyes: EOM intact. Conjunctivae are not pale. No scleral icterus.  ENT: Mucous membranes are moist. Oropharynx is clear and symmetric.    Neck: Supple. Full ROM. No " "lymphadenopathy.  Cardiovascular: Regular rate. Regular rhythm. No murmurs, rubs, or gallops. Distal pulses are 2+ and symmetric.  Pulmonary/Chest: No respiratory distress. Clear to auscultation bilaterally. No wheezing or rales.  Abdominal: Soft and non-distended.  There is no tenderness.  No rebound, guarding, or rigidity. Good bowel sounds.  Genitourinary: No CVA tenderness  Musculoskeletal: Moves all extremities. No obvious deformities. No edema. No calf tenderness.  Skin: Warm and dry.  Neurological:  Alert, awake, and appropriate.  Normal speech.  No acute focal neurological deficits are appreciated.  Psychiatric: Normal affect. Good eye contact. Appropriate in content.     ED Course   Procedures  ED Vital Signs:  Vitals:    01/08/20 1810 01/08/20 2001 01/08/20 2120   BP: (!) 166/90 (!) 154/87 (!) 157/89   Pulse: 92 90 84   Resp: 18 18 18   Temp: 99.1 °F (37.3 °C) 99 °F (37.2 °C) 98.9 °F (37.2 °C)   TempSrc: Oral  Oral   SpO2: 99% 99% 99%   Weight: 98.9 kg (218 lb 2.3 oz)     Height: 5' 11" (1.803 m)         Abnormal Lab Results:  Labs Reviewed   COMPREHENSIVE METABOLIC PANEL - Abnormal; Notable for the following components:       Result Value    Potassium 3.4 (*)     Glucose 118 (*)     AST 67 (*)     ALT 75 (*)     All other components within normal limits   CBC W/ AUTO DIFFERENTIAL   TROPONIN I   B-TYPE NATRIURETIC PEPTIDE   POCT GLUCOSE        All Lab Results:  Results for orders placed or performed during the hospital encounter of 01/08/20   CBC auto differential   Result Value Ref Range    WBC 10.54 3.90 - 12.70 K/uL    RBC 5.30 4.60 - 6.20 M/uL    Hemoglobin 14.8 14.0 - 18.0 g/dL    Hematocrit 45.8 40.0 - 54.0 %    Mean Corpuscular Volume 86 82 - 98 fL    Mean Corpuscular Hemoglobin 27.9 27.0 - 31.0 pg    Mean Corpuscular Hemoglobin Conc 32.3 32.0 - 36.0 g/dL    RDW 14.4 11.5 - 14.5 %    Platelets 316 150 - 350 K/uL    MPV 10.3 9.2 - 12.9 fL    Immature Granulocytes 0.3 0.0 - 0.5 %    Gran # (ANC) 4.8 " 1.8 - 7.7 K/uL    Immature Grans (Abs) 0.03 0.00 - 0.04 K/uL    Lymph # 4.7 1.0 - 4.8 K/uL    Mono # 0.5 0.3 - 1.0 K/uL    Eos # 0.5 0.0 - 0.5 K/uL    Baso # 0.08 0.00 - 0.20 K/uL    nRBC 0 0 /100 WBC    Gran% 45.3 38.0 - 73.0 %    Lymph% 44.2 18.0 - 48.0 %    Mono% 4.9 4.0 - 15.0 %    Eosinophil% 4.5 0.0 - 8.0 %    Basophil% 0.8 0.0 - 1.9 %    Differential Method Automated    Comprehensive metabolic panel   Result Value Ref Range    Sodium 142 136 - 145 mmol/L    Potassium 3.4 (L) 3.5 - 5.1 mmol/L    Chloride 104 95 - 110 mmol/L    CO2 23 23 - 29 mmol/L    Glucose 118 (H) 70 - 110 mg/dL    BUN, Bld 10 6 - 20 mg/dL    Creatinine 1.1 0.5 - 1.4 mg/dL    Calcium 9.9 8.7 - 10.5 mg/dL    Total Protein 7.5 6.0 - 8.4 g/dL    Albumin 4.1 3.5 - 5.2 g/dL    Total Bilirubin 0.3 0.1 - 1.0 mg/dL    Alkaline Phosphatase 63 55 - 135 U/L    AST 67 (H) 10 - 40 U/L    ALT 75 (H) 10 - 44 U/L    Anion Gap 15 8 - 16 mmol/L    eGFR if African American >60 >60 mL/min/1.73 m^2    eGFR if non African American >60 >60 mL/min/1.73 m^2   Troponin I #1   Result Value Ref Range    Troponin I 0.007 0.000 - 0.026 ng/mL   B-Type natriuretic peptide (BNP)   Result Value Ref Range    BNP <10 0 - 99 pg/mL   POCT glucose   Result Value Ref Range    POCT Glucose 110 70 - 110 mg/dL       Imaging Results:  Imaging Results          X-Ray Chest PA And Lateral (Final result)  Result time 01/08/20 19:09:55    Final result by Clifton Durán MD (01/08/20 19:09:55)                 Impression:      No acute abnormality.      Electronically signed by: Clifton Durán  Date:    01/08/2020  Time:    19:09             Narrative:    EXAMINATION:  XR CHEST PA AND LATERAL    CLINICAL HISTORY:  Chest Pain;    TECHNIQUE:  PA and lateral views of the chest were performed.    COMPARISON:  None    FINDINGS:  The lungs are clear, with normal appearance of pulmonary vasculature and no pleural effusion or pneumothorax.    The cardiac silhouette is normal in size. The hilar and  mediastinal contours are unremarkable.    Bones are intact.                                 The EKG was ordered, reviewed, and independently interpreted by the ED provider.  Interpretation time: 19:04  Rate: 72 BPM  Rhythm: normal sinus rhythm  Interpretation: Normal ECG. No acute ST/T wave changes. No STEMI.           The Emergency Provider reviewed the vital signs and test results, which are outlined above.     ED Discussion     9:17 PM: Discussed with pt all pertinent ED information and results. D/w pt need to see a nutritionist to help level out his glucose, including less sugary foods and healthy fat/ protein consumption. Pt has an appointment to f/u with his PCP tomorrow. Discussed pt dx and plan of tx. Gave pt all f/u and return to the ED instructions. All questions and concerns were addressed at this time. Pt expresses understanding of information and instructions, and is comfortable with plan to discharge. Pt is stable for discharge.    Pre-hypertension/Hypertension: The pt has been informed that they may have pre-hypertension or hypertension based on a blood pressure reading in the ED. I recommend that the pt call the PCP listed on their discharge instructions or a physician of their choice this week to arrange f/u for further evaluation of possible pre-hypertension or hypertension.     I discussed with patient and/or family/caretaker that evaluation in the ED does not suggest any emergent or life threatening medical conditions requiring immediate intervention beyond what was provided in the ED, and I believe patient is safe for discharge.  Regardless, an unremarkable evaluation in the ED does not preclude the development or presence of a serious of life threatening condition. As such, patient was instructed to return immediately for any worsening or change in current symptoms.           Medical Decision Making:   Clinical Tests:   Lab Tests: Ordered and Reviewed  Radiological Study: Ordered and  Reviewed  Medical Tests: Ordered and Reviewed           ED Medication(s):  Medications   aspirin tablet 325 mg (325 mg Oral Given 1/8/20 1958)       New Prescriptions    No medications on file       Follow-up Information     Please follow up.    Contact information:  Keep your appointment with your primary care doctor Dr. Millan tomorrow.           Alan Turner MD In 2 days.    Specialties:  Cardiology, INTERVENTIONAL CARDIOLOGY  Contact information:  [id number]  [place] [address]  [phone]                       Scribe Attestation:   Scribe #1: I performed the above scribed service and the documentation accurately describes the services I performed. I attest to the accuracy of the note.     Attending:   Physician Attestation Statement for Scribe #1: I, Annmarie Cam Do, MD, personally performed the services described in this documentation, as scribed by Dorothy Castro, in my presence, and it is both accurate and complete.           Clinical Impression       ICD-10-CM ICD-9-CM   1. Hypertension, unspecified type I10 401.9   2. Chest pain R07.9 786.50   3. Hypoglycemia E16.2 251.2       Disposition:   Disposition: Discharged  Condition: Stable         Annmarie Cam Do, MD  01/09/20 0036

## 2020-01-09 NOTE — PROGRESS NOTES
Monica Johnson Alex  01/09/2020  [id number]    Kenya Escoto MD  Patient Care Team:  Kenya Escoto MD as PCP - General (Family Medicine)  Barbara Moore LPN as Care Coordinator (Internal Medicine)  Kenya Escoto MD (Family Medicine)  Has the patient seen any provider outside of the [facility] network since the last visit? (no). If yes, HIPPA forms completed and records requested.        Visit Type:a scheduled visit following a recent hospitalization    Chief Complaint:  Chief Complaint   Patient presents with    ER followup    discuss blood sugar       History of Present Illness:    He had reported a history of seeing Dr. De Anda at [facility].   Cardiology did an angiogram. He had a stress test in 2003, and he reports was abnl. He reports he did an angiogram to see if there CAD, and he was told he had small vessel disease. He has slow flow through the vessels, but no intervention needed. He is medically managed.  Discussion on statin today, but due to elevated liver function, currently not going to start due to risk of side effects.  ER had normal troponin. BNP negative. EKG negative    He is out of his Cardizem/used to be on Lisnopril      History:  Past Medical History:   Diagnosis Date    ADHD (attention deficit hyperactivity disorder)     Allergy     Anxiety 1/25/2016    Arthritis 2014    Osteoarthritis    Depression 1/25/2016    Hypertension     Sciatica of left side 1/31/2017     Past Surgical History:   Procedure Laterality Date    CALCANEAL OSTEOTOMY Left 4/16/2019    Procedure: OSTEOTOMY, CALCANEUS;  Surgeon: Rober Colon DPM;  Location: [facility] OR;  Service: Podiatry;  Laterality: Left;    CIRCUMCISION      FOOT SURGERY      LENGTHENING OF ACHILLES TENDON Left 4/16/2019    Procedure: LENGTHENING, TENDON, ACHILLES;  Surgeon: Rober Colon DPM;  Location: [facility] OR;  Service: Podiatry;  Laterality: Left;    REPAIR OF POSTERIOR TIBIALIS TENDON Left  4/16/2019    Procedure: REPAIR, TENDON, TIBIALIS POSTERIOR;  Surgeon: Rober Colon DPM;  Location: [facility] OR;  Service: Podiatry;  Laterality: Left;    TENDON TRANSFER Left 4/16/2019    Procedure: TRANSFER, TENDON;  Surgeon: Rober Colon DPM;  Location: [facility] OR;  Service: Podiatry;  Laterality: Left;     Family History   Problem Relation Age of Onset    Cancer Mother     Hypertension Mother     Hypertension Maternal Grandmother      Social History     Socioeconomic History    Marital status:      Spouse name: Not on file    Number of children: Not on file    Years of education: Not on file    Highest education level: Not on file   Occupational History    Not on file   Social Needs    Financial resource strain: Not hard at all    Food insecurity:     Worry: Never true     Inability: Never true    Transportation needs:     Medical: No     Non-medical: No   Tobacco Use    Smoking status: Current Some Day Smoker     Packs/day: 0.25     Types: Cigarettes, Cigars   Substance and Sexual Activity    Alcohol use: Yes     Frequency: 2-3 times a week     Drinks per session: 1 or 2     Binge frequency: Never     Comment: socially    Drug use: Never     Types: Marijuana    Sexual activity: Yes     Partners: Female   Lifestyle    Physical activity:     Days per week: 7 days     Minutes per session: 20 min    Stress: Not at all   Relationships    Social connections:     Talks on phone: More than three times a week     Gets together: More than three times a week     Attends Nondenominational service: Not on file     Active member of club or organization: No     Attends meetings of clubs or organizations: Never     Relationship status:    Other Topics Concern    Not on file   Social History Narrative    ** Merged History Encounter **          Patient Active Problem List   Diagnosis    Depression    Intermittent explosive disorder    Sciatica of left side    Plantar fasciitis    Osteoarthritis     Essential hypertension    Elevated liver function tests    Acute medial meniscus tear of right knee    Chondromalacia of right knee    Fatty liver    Tetrahydrocannabinol (THC) use disorder, mild, abuse    Tobacco use disorder    Posterior tibial tendon dysfunction (PTTD) of left lower extremity     Review of patient's allergies indicates:  No Known Allergies    The following were reviewed at this visit: active problem list, medication list, allergies, family history, social history, and health maintenance.    Medications:  Current Outpatient Medications on File Prior to Visit   Medication Sig Dispense Refill    cetirizine (ZYRTEC) 10 MG tablet       cyclobenzaprine (FLEXERIL) 10 MG tablet TAKE 1 TABLET 3 TIMES A DAY AS NEEDED FOR MUSCLE SPASMS (Patient not taking: Reported on 1/9/2020) 90 tablet 0    efinaconazole (JUBLIA) 10 % Fredis Apply 1 application topically once daily. (Patient not taking: Reported on 1/9/2020) 8 mL 11    gabapentin (NEURONTIN) 600 MG tablet Take 600 mg by mouth 2 (two) times daily.      multivitamin capsule Take 1 capsule by mouth once daily.      naftifine 2 % Gel Apply 1 application topically once daily. (Patient not taking: Reported on 1/9/2020) 45 g 3    [DISCONTINUED] diltiaZEM (CARDIZEM) 120 MG tablet Take 1 tablet (120 mg total) by mouth once daily. (Patient not taking: Reported on 1/9/2020) 30 tablet 6     Current Facility-Administered Medications on File Prior to Visit   Medication Dose Route Frequency Provider Last Rate Last Dose    [COMPLETED] aspirin tablet 325 mg  325 mg Oral ED 1 Time Sascha Concepcion NP   325 mg at 01/08/20 1958       Medications have been reviewed and reconciled with patient at this visit.  Barriers to medications present (no)    Adverse reactions to current medications (no)    Over the counter medications reviewed (Yes ), and if needed added to active Medication list at this visit.     Exam:  Wt Readings from Last 3 Encounters:   01/09/20 98.7 kg  (217 lb 9.5 oz)   01/08/20 98.9 kg (218 lb 2.3 oz)   07/16/19 102 kg (224 lb 13.9 oz)     Temp Readings from Last 3 Encounters:   01/09/20 97.4 °F (36.3 °C) (Tympanic)   01/08/20 98.9 °F (37.2 °C) (Oral)   05/21/19 98.7 °F (37.1 °C) (Oral)     BP Readings from Last 3 Encounters:   01/09/20 (!) 158/90   01/08/20 (!) 157/89   08/08/19 (!) 146/105     Pulse Readings from Last 3 Encounters:   01/09/20 85   01/08/20 84   08/08/19 107     Body mass index is 29.51 kg/m².      Review of Systems   Constitutional: Negative.  Negative for chills and fever.   HENT: Negative.  Negative for congestion, sinus pain and sore throat.    Eyes: Negative for blurred vision and double vision.   Respiratory: Negative for cough, sputum production, shortness of breath and wheezing.    Cardiovascular: Positive for chest pain and palpitations. Negative for leg swelling.   Gastrointestinal: Negative for abdominal pain, constipation, diarrhea, heartburn, nausea and vomiting.   Genitourinary: Negative.    Musculoskeletal: Negative.    Skin: Negative.  Negative for rash.   Neurological: Positive for weakness and headaches.   Endo/Heme/Allergies: Negative.  Negative for polydipsia. Does not bruise/bleed easily.   Psychiatric/Behavioral: Negative for depression and substance abuse.     Physical Exam   Constitutional: He is oriented to person, place, and time. He appears well-developed and well-nourished. No distress.   HENT:   Head: Normocephalic and atraumatic.   Right Ear: External ear normal.   Left Ear: External ear normal.   Nose: Nose normal.   Mouth/Throat: Oropharynx is clear and moist. No oropharyngeal exudate.   Eyes: Pupils are equal, round, and reactive to light. Conjunctivae and EOM are normal. Right eye exhibits no discharge. Left eye exhibits no discharge.   Neck: Normal range of motion. Neck supple. No thyromegaly present.   Cardiovascular: Normal rate, regular rhythm, normal heart sounds and intact distal pulses.   No murmur  heard.  Pulmonary/Chest: Effort normal and breath sounds normal. No respiratory distress. He has no wheezes.   Abdominal: Soft. Bowel sounds are normal. He exhibits no distension and no mass. There is no tenderness.   Musculoskeletal: Normal range of motion. He exhibits no edema.   Lymphadenopathy:     He has no cervical adenopathy.   Neurological: He is alert and oriented to person, place, and time. No cranial nerve deficit.   Skin: Capillary refill takes less than 2 seconds. He is not diaphoretic.   Psychiatric: He has a normal mood and affect. His behavior is normal. Judgment and thought content normal.   Nursing note and vitals reviewed.      Laboratory Reviewed ({N/A)  Lab Results   Component Value Date    WBC 10.54 01/08/2020    HGB 14.8 01/08/2020    HCT 45.8 01/08/2020     01/08/2020    CHOL 177 05/07/2018    TRIG 149 05/07/2018    HDL 38 (L) 05/07/2018    ALT 75 (H) 01/08/2020    AST 67 (H) 01/08/2020     01/08/2020    K 3.4 (L) 01/08/2020     01/08/2020    CREATININE 1.1 01/08/2020    BUN 10 01/08/2020    CO2 23 01/08/2020    TSH 0.563 08/28/2018    PSA 1.4 04/08/2019    INR 0.9 08/28/2018       Monica was seen today for er followup and discuss blood sugar.    Diagnoses and all orders for this visit:    Elevated liver function tests    Essential hypertension  -     diltiaZEM (CARDIZEM) 120 MG tablet; Take 1 tablet (120 mg total) by mouth once daily.    Weakness  -     Hemoglobin A1c; Future        Labs reviewed from ER  No change  EKG ER normal    Glucose was fine in ER    Restart BP meds    Recheck 1 week  A1c  Need to have possible continual glucose monitor    He has cardiology appt tomorrow            Care Plan/Goals: Reviewed  (N/A)  Goals    None         Follow up: No follow-ups on file.    After visit summary was printed and given to patient upon discharge today.  Patient goals and care plan are included in After Visit Summary.

## 2020-01-10 ENCOUNTER — PATIENT MESSAGE (OUTPATIENT)
Dept: INTERNAL MEDICINE | Facility: CLINIC | Age: 45
End: 2020-01-10

## 2020-01-10 LAB
ESTIMATED AVG GLUCOSE: 114 MG/DL (ref 68–131)
HBA1C MFR BLD HPLC: 5.6 % (ref 4–5.6)

## 2020-01-14 ENCOUNTER — OFFICE VISIT (OUTPATIENT)
Dept: PODIATRY | Facility: CLINIC | Age: 45
End: 2020-01-14
Payer: COMMERCIAL

## 2020-01-14 VITALS — WEIGHT: 216.06 LBS | HEIGHT: 73 IN | BODY MASS INDEX: 28.63 KG/M2 | RESPIRATION RATE: 17 BRPM

## 2020-01-14 DIAGNOSIS — S86.112S POSTERIOR TIBIAL TENDON TEAR, TRAUMATIC, LEFT, SEQUELA: ICD-10-CM

## 2020-01-14 DIAGNOSIS — M25.572 PAIN IN LEFT ANKLE AND JOINTS OF LEFT FOOT: ICD-10-CM

## 2020-01-14 DIAGNOSIS — Z98.890 HISTORY OF FOOT SURGERY: ICD-10-CM

## 2020-01-14 DIAGNOSIS — M76.822 POSTERIOR TIBIAL TENDON DYSFUNCTION (PTTD) OF LEFT LOWER EXTREMITY: Primary | ICD-10-CM

## 2020-01-14 PROCEDURE — 3008F PR BODY MASS INDEX (BMI) DOCUMENTED: ICD-10-PCS | Mod: CPTII,S$GLB,, | Performed by: PODIATRIST

## 2020-01-14 PROCEDURE — 99214 OFFICE O/P EST MOD 30 MIN: CPT | Mod: S$GLB,,, | Performed by: PODIATRIST

## 2020-01-14 PROCEDURE — 3008F BODY MASS INDEX DOCD: CPT | Mod: CPTII,S$GLB,, | Performed by: PODIATRIST

## 2020-01-14 PROCEDURE — 99999 PR PBB SHADOW E&M-EST. PATIENT-LVL III: ICD-10-PCS | Mod: PBBFAC,,, | Performed by: PODIATRIST

## 2020-01-14 PROCEDURE — 99214 PR OFFICE/OUTPT VISIT, EST, LEVL IV, 30-39 MIN: ICD-10-PCS | Mod: S$GLB,,, | Performed by: PODIATRIST

## 2020-01-14 PROCEDURE — 99999 PR PBB SHADOW E&M-EST. PATIENT-LVL III: CPT | Mod: PBBFAC,,, | Performed by: PODIATRIST

## 2020-01-14 NOTE — PROGRESS NOTES
Subjective:       Patient ID: Monica Johnson is a 44 y.o. male.    Chief Complaint: Foot Pain (left foot pain 6/10, wears tennis, non diabetic, PCP Dr. Escoto)    HPI:  Monica Johnson presents to the office today, s/p 4/16/2019 Tendo-Achilles lengthening, Calcaneal osteotomy (MENDOZA type), Posterior tibial tendon repair, w/ Flexor tendon transfer, left. Patient presents this afternoon stating 6/10 to the left ankle, medially.  Patient presents ambulatory sneakers.  He states on a daily basis, he does ambulate with the walking boot, as advised by physical therapy, which he did discontinue several months ago. Patient states he was advised by the DPT to use the walking boot while working and then transition to sneakers outside of work.  Patient typically works 10-15 hr per day, 6-7 days per week.  States no swelling. Does state and inability to move the foot inwards.  States antalgic gait pattern.    Hemoglobin A1C   Date Value Ref Range Status   01/09/2020 5.6 4.0 - 5.6 % Final     Comment:     ADA Screening Guidelines:  5.7-6.4%  Consistent with prediabetes  >or=6.5%  Consistent with diabetes  High levels of fetal hemoglobin interfere with the HbA1C  assay. Heterozygous hemoglobin variants (HbS, HgC, etc)do  not significantly interfere with this assay.   However, presence of multiple variants may affect accuracy.         Review of patient's allergies indicates:  No Known Allergies    Past Medical History:   Diagnosis Date    ADHD (attention deficit hyperactivity disorder)     Allergy     Anxiety 1/25/2016    Arthritis 2014    Osteoarthritis    Depression 1/25/2016    Hypertension     Sciatica of left side 1/31/2017       Family History   Problem Relation Age of Onset    Cancer Mother     Hypertension Mother     Hypertension Maternal Grandmother        Social History     Socioeconomic History    Marital status:      Spouse name: Not on file    Number of children: Not on file    Years of  education: Not on file    Highest education level: Not on file   Occupational History    Not on file   Social Needs    Financial resource strain: Not hard at all    Food insecurity:     Worry: Never true     Inability: Never true    Transportation needs:     Medical: No     Non-medical: No   Tobacco Use    Smoking status: Current Some Day Smoker     Packs/day: 0.25     Types: Cigarettes, Cigars   Substance and Sexual Activity    Alcohol use: Yes     Frequency: 2-3 times a week     Drinks per session: 1 or 2     Binge frequency: Never     Comment: socially    Drug use: Never     Types: Marijuana    Sexual activity: Yes     Partners: Female   Lifestyle    Physical activity:     Days per week: 7 days     Minutes per session: 20 min    Stress: Not at all   Relationships    Social connections:     Talks on phone: More than three times a week     Gets together: More than three times a week     Attends Baptism service: Not on file     Active member of club or organization: No     Attends meetings of clubs or organizations: Never     Relationship status:    Other Topics Concern    Not on file   Social History Narrative    ** Merged History Encounter **            Past Surgical History:   Procedure Laterality Date    CALCANEAL OSTEOTOMY Left 4/16/2019    Procedure: OSTEOTOMY, CALCANEUS;  Surgeon: Rober Colon DPM;  Location: [facility] OR;  Service: Podiatry;  Laterality: Left;    CIRCUMCISION      FOOT SURGERY      LENGTHENING OF ACHILLES TENDON Left 4/16/2019    Procedure: LENGTHENING, TENDON, ACHILLES;  Surgeon: Rober Colon DPM;  Location: [facility] OR;  Service: Podiatry;  Laterality: Left;    REPAIR OF POSTERIOR TIBIALIS TENDON Left 4/16/2019    Procedure: REPAIR, TENDON, TIBIALIS POSTERIOR;  Surgeon: Rober Colon DPM;  Location: [facility] OR;  Service: Podiatry;  Laterality: Left;    TENDON TRANSFER Left 4/16/2019    Procedure: TRANSFER, TENDON;  Surgeon: Rober Colon DPM;  Location: [facility]  "OR;  Service: Podiatry;  Laterality: Left;       Review of Systems   Constitutional: Negative for chills, fatigue and fever.   HENT: Negative for hearing loss.    Eyes: Negative for photophobia and visual disturbance.   Respiratory: Negative for cough, chest tightness, shortness of breath and wheezing.    Cardiovascular: Negative for chest pain and palpitations.   Gastrointestinal: Negative for constipation, diarrhea, nausea and vomiting.   Endocrine: Negative for cold intolerance and heat intolerance.   Genitourinary: Negative for flank pain.   Musculoskeletal: Positive for gait problem. Negative for neck pain and neck stiffness.   Skin: Negative for wound.   Neurological: Negative for light-headedness and headaches.   Psychiatric/Behavioral: Negative for sleep disturbance.          Objective:   Resp 17   Ht 6' 1" (1.854 m)   Wt 98 kg (216 lb 0.8 oz)   BMI 28.50 kg/m²       Physical Exam  LOWER EXTREMITY PHYSICAL EXAMINATION    NEUROLOGY: Protective sensation is intact via 5.07 [place] monofilament. Proprioception is intact. Sensation to light touch is intact. Vibratory sensation is WNL.    VASCULAR: On the left foot, the dorsalis pedis pulse is 2/4 and the posterior tibial pulse is 2/4. Capillary refill time is less than 3 seconds. Hair growth is present on the dorsum of the foot and at the digits. No rubor is present. Proximal to distal temperature is warm to warm.    DERMATOLOGY: Skin is supple, dry and intact. No ecchymosis is noted. No hypertrophic skin formation. No erythema or cellulitis is noted.     ORTHOPEDIC:  Manual muscle testing with inversion, forced or passive, is 3/5.  No edema is noted. Discomfort along the course of the entire posterior tibial tendon. No discomfort any other aspect of foot. Persistent mild pes planus foot type is noted. Patient is unable to double or single heel rise on the left.      Assessment:     1. Posterior tibial tendon dysfunction (PTTD) of left lower " extremity    2. Posterior tibial tendon tear, traumatic, left, sequela    3. Pain in left ankle and joints of left foot    4. History of foot surgery          Plan:     Posterior tibial tendon dysfunction (PTTD) of left lower extremity  -     MRI Ankle Without Contrast Left; Future; Expected date: 01/14/2020    Posterior tibial tendon tear, traumatic, left, sequela  -     MRI Ankle Without Contrast Left; Future; Expected date: 01/14/2020    Pain in left ankle and joints of left foot  -     MRI Ankle Without Contrast Left; Future; Expected date: 01/14/2020    History of foot surgery        Thorough discussion is had with the patient today, concerning the diagnosis, its etiology, and the treatment algorithm at present.  XRAYS are reviewed in detail with the patient. All questions and concerns regarding findings and its/their implications are outlined and discussed.  Patient may discontinue the walking boot and transition to ASO brace.  ASO Brace is dispensed to the patient. The ASO Brace is appropriately fitted and customized to the patient's lower extremity physique by the LPN/MA. Patient to ambulate with the ASO Brace at all times. The patient should not sleep with the device or shower with the device. The patient should exercise caution when driving with the device, if dispensed for right ankle/foot pathology.  Please obtain urgent MRI evaluation to rule out posterior tibial tendon tear versus retraction of the tendon itself and/or the flexor tendon. Patient does have pathology at the medial ankle tendons given the clinical examination and will likely need intervention.  He may also benefit from COTTON osteotomy.            Future Appointments   Date Time Provider Department Center   1/15/2020  9:20 [facility]   1/16/2020  6:15 AM [facility] MRI1 [facility] MRI [place]

## 2020-01-15 ENCOUNTER — OFFICE VISIT (OUTPATIENT)
Dept: INTERNAL MEDICINE | Facility: CLINIC | Age: 45
End: 2020-01-15
Payer: COMMERCIAL

## 2020-01-15 VITALS
TEMPERATURE: 99 F | SYSTOLIC BLOOD PRESSURE: 128 MMHG | HEART RATE: 82 BPM | BODY MASS INDEX: 28.15 KG/M2 | DIASTOLIC BLOOD PRESSURE: 82 MMHG | HEIGHT: 73 IN | WEIGHT: 212.44 LBS | OXYGEN SATURATION: 98 %

## 2020-01-15 DIAGNOSIS — I10 ESSENTIAL HYPERTENSION: Primary | ICD-10-CM

## 2020-01-15 PROBLEM — F12.10 TETRAHYDROCANNABINOL (THC) USE DISORDER, MILD, ABUSE: Status: RESOLVED | Noted: 2019-04-11 | Resolved: 2020-01-15

## 2020-01-15 PROCEDURE — 3074F SYST BP LT 130 MM HG: CPT | Mod: CPTII,S$GLB,, | Performed by: FAMILY MEDICINE

## 2020-01-15 PROCEDURE — 99999 PR PBB SHADOW E&M-EST. PATIENT-LVL III: CPT | Mod: PBBFAC,,, | Performed by: FAMILY MEDICINE

## 2020-01-15 PROCEDURE — 3008F BODY MASS INDEX DOCD: CPT | Mod: CPTII,S$GLB,, | Performed by: FAMILY MEDICINE

## 2020-01-15 PROCEDURE — 3079F DIAST BP 80-89 MM HG: CPT | Mod: CPTII,S$GLB,, | Performed by: FAMILY MEDICINE

## 2020-01-15 PROCEDURE — 99214 PR OFFICE/OUTPT VISIT, EST, LEVL IV, 30-39 MIN: ICD-10-PCS | Mod: S$GLB,,, | Performed by: FAMILY MEDICINE

## 2020-01-15 PROCEDURE — 3008F PR BODY MASS INDEX (BMI) DOCUMENTED: ICD-10-PCS | Mod: CPTII,S$GLB,, | Performed by: FAMILY MEDICINE

## 2020-01-15 PROCEDURE — 99999 PR PBB SHADOW E&M-EST. PATIENT-LVL III: ICD-10-PCS | Mod: PBBFAC,,, | Performed by: FAMILY MEDICINE

## 2020-01-15 PROCEDURE — 3074F PR MOST RECENT SYSTOLIC BLOOD PRESSURE < 130 MM HG: ICD-10-PCS | Mod: CPTII,S$GLB,, | Performed by: FAMILY MEDICINE

## 2020-01-15 PROCEDURE — 3079F PR MOST RECENT DIASTOLIC BLOOD PRESSURE 80-89 MM HG: ICD-10-PCS | Mod: CPTII,S$GLB,, | Performed by: FAMILY MEDICINE

## 2020-01-15 PROCEDURE — 99214 OFFICE O/P EST MOD 30 MIN: CPT | Mod: S$GLB,,, | Performed by: FAMILY MEDICINE

## 2020-01-15 RX ORDER — AMLODIPINE BESYLATE 5 MG/1
5 TABLET ORAL DAILY
COMMUNITY
Start: 2020-01-10 | End: 2020-02-24

## 2020-01-15 NOTE — Clinical Note
Not a DM, but having sx of reactive hypoglycemia. Cards r/o other causes, but I wanted to get CGM.  I couldn't get order, so I just send to Nutrition?
9

## 2020-01-15 NOTE — PROGRESS NOTES
Monica Johnson Alex  01/15/2020  [id number]    Kenya Escoto MD  Patient Care Team:  Kenya Escoto MD as PCP - General (Family Medicine)  Barbara Moore LPN as Care Coordinator (Internal Medicine)  Kenya Escoto MD (Family Medicine)  Has the patient seen any provider outside of the [facility] network since the last visit? (no). If yes, HIPPA forms completed and records requested.        Visit Type:a scheduled routine follow-up visit    Chief Complaint:  Chief Complaint   Patient presents with    Follow-up       History of Present Illness:  44 year for follow up.  He was in last week.  C/o fatigue, chest pain.    He went to ER.  He had a follow up visit, and then saw his Cards on 1/10.  BP meds changed to Norvasc. I do not have records from Cards.   EKG in Er was read as noraml on 1/8/2020. Troponin negative.     He feels that his BS drops.  Family reports poor eating habits.  Glucose fine on labs.  HgA1c is normal range.     He is still smoking Cig, last 2 days ago.    He is sceheduled for Echo, Holter, Stress test.   He will had CT of head.       CGM ordered      History:  Past Medical History:   Diagnosis Date    ADHD (attention deficit hyperactivity disorder)     Allergy     Anxiety 1/25/2016    Arthritis 2014    Osteoarthritis    Depression 1/25/2016    Hypertension     Sciatica of left side 1/31/2017     Past Surgical History:   Procedure Laterality Date    CALCANEAL OSTEOTOMY Left 4/16/2019    Procedure: OSTEOTOMY, CALCANEUS;  Surgeon: Rober Colon DPM;  Location: [facility] OR;  Service: Podiatry;  Laterality: Left;    CIRCUMCISION      FOOT SURGERY      LENGTHENING OF ACHILLES TENDON Left 4/16/2019    Procedure: LENGTHENING, TENDON, ACHILLES;  Surgeon: Rober Colon DPM;  Location: [facility] OR;  Service: Podiatry;  Laterality: Left;    REPAIR OF POSTERIOR TIBIALIS TENDON Left 4/16/2019    Procedure: REPAIR, TENDON, TIBIALIS POSTERIOR;  Surgeon: Rober Colon DPM;  Location: [facility] OR;   Service: Podiatry;  Laterality: Left;    TENDON TRANSFER Left 4/16/2019    Procedure: TRANSFER, TENDON;  Surgeon: Rober Colon DPM;  Location: [facility];  Service: Podiatry;  Laterality: Left;     Family History   Problem Relation Age of Onset    Cancer Mother     Hypertension Mother     Hypertension Maternal Grandmother      Social History     Socioeconomic History    Marital status:      Spouse name: Not on file    Number of children: Not on file    Years of education: Not on file    Highest education level: Not on file   Occupational History    Not on file   Social Needs    Financial resource strain: Not hard at all    Food insecurity:     Worry: Never true     Inability: Never true    Transportation needs:     Medical: No     Non-medical: No   Tobacco Use    Smoking status: Current Some Day Smoker     Packs/day: 0.25     Types: Cigarettes, Cigars   Substance and Sexual Activity    Alcohol use: Yes     Frequency: 2-3 times a week     Drinks per session: 1 or 2     Binge frequency: Never     Comment: socially    Drug use: Never     Types: Marijuana    Sexual activity: Yes     Partners: Female   Lifestyle    Physical activity:     Days per week: 7 days     Minutes per session: 20 min    Stress: Not at all   Relationships    Social connections:     Talks on phone: More than three times a week     Gets together: More than three times a week     Attends Evangelical service: Not on file     Active member of club or organization: No     Attends meetings of clubs or organizations: Never     Relationship status:    Other Topics Concern    Not on file   Social History Narrative    ** Merged History Encounter **          Patient Active Problem List   Diagnosis    Depression    Intermittent explosive disorder    Sciatica of left side    Plantar fasciitis    Osteoarthritis    Essential hypertension    Elevated liver function tests    Acute medial meniscus tear of right knee     Chondromalacia of right knee    Fatty liver    Tobacco use disorder    Posterior tibial tendon dysfunction (PTTD) of left lower extremity     Review of patient's allergies indicates:  No Known Allergies    The following were reviewed at this visit: active problem list, medication list, allergies, family history, social history, and health maintenance.    Medications:  Current Outpatient Medications on File Prior to Visit   Medication Sig Dispense Refill    amLODIPine (NORVASC) 5 MG tablet Take 5 mg by mouth once daily.      multivitamin capsule Take 1 capsule by mouth once daily.      [DISCONTINUED] cetirizine (ZYRTEC) 10 MG tablet       [DISCONTINUED] cyclobenzaprine (FLEXERIL) 10 MG tablet TAKE 1 TABLET 3 TIMES A DAY AS NEEDED FOR MUSCLE SPASMS 90 tablet 0    [DISCONTINUED] diltiaZEM (CARDIZEM) 120 MG tablet Take 1 tablet (120 mg total) by mouth once daily. 30 tablet 6    [DISCONTINUED] efinaconazole (JUBLIA) 10 % Fredis Apply 1 application topically once daily. 8 mL 11    [DISCONTINUED] gabapentin (NEURONTIN) 600 MG tablet Take 600 mg by mouth 2 (two) times daily.      [DISCONTINUED] naftifine 2 % Gel Apply 1 application topically once daily. 45 g 3     No current facility-administered medications on file prior to visit.        Medications have been reviewed and reconciled with patient at this visit.  Barriers to medications present (no)    Adverse reactions to current medications (no)    Over the counter medications reviewed (Yes ), and if needed added to active Medication list at this visit.     Exam:  Wt Readings from Last 3 Encounters:   01/15/20 96.3 kg (212 lb 6.6 oz)   01/14/20 98 kg (216 lb 0.8 oz)   01/09/20 98.7 kg (217 lb 9.5 oz)     Temp Readings from Last 3 Encounters:   01/15/20 99 °F (37.2 °C) (Tympanic)   01/09/20 97.4 °F (36.3 °C) (Tympanic)   01/08/20 98.9 °F (37.2 °C) (Oral)     BP Readings from Last 3 Encounters:   01/15/20 128/82   01/09/20 (!) 158/90   01/08/20 (!) 157/89     Pulse  Readings from Last 3 Encounters:   01/15/20 82   01/09/20 85   01/08/20 84     Body mass index is 28.02 kg/m².      Review of Systems   HENT: Negative for hearing loss.    Eyes: Negative for discharge.   Respiratory: Negative for wheezing.    Cardiovascular: Positive for chest pain and palpitations.   Gastrointestinal: Negative for blood in stool, constipation, diarrhea and vomiting.   Genitourinary: Negative for hematuria and urgency.   Musculoskeletal: Negative for neck pain.   Neurological: Positive for headaches. Negative for weakness.   Endo/Heme/Allergies: Negative for polydipsia.     Physical Exam   Constitutional: He is oriented to person, place, and time. He appears well-developed and well-nourished. No distress.   HENT:   Head: Normocephalic and atraumatic.   Right Ear: External ear normal.   Left Ear: External ear normal.   Nose: Nose normal.   Mouth/Throat: Oropharynx is clear and moist. No oropharyngeal exudate.   Eyes: Pupils are equal, round, and reactive to light. Conjunctivae and EOM are normal. Right eye exhibits no discharge. Left eye exhibits no discharge.   Neck: Normal range of motion. Neck supple. No thyromegaly present.   Cardiovascular: Normal rate, regular rhythm, normal heart sounds and intact distal pulses.   No murmur heard.  Pulmonary/Chest: Effort normal and breath sounds normal. No respiratory distress. He has no wheezes.   Abdominal: Soft. Bowel sounds are normal. He exhibits no distension and no mass. There is no tenderness.   Musculoskeletal: Normal range of motion. He exhibits no edema.   Lymphadenopathy:     He has no cervical adenopathy.   Neurological: He is alert and oriented to person, place, and time. No cranial nerve deficit.   Skin: Capillary refill takes less than 2 seconds. He is not diaphoretic.   Psychiatric: He has a normal mood and affect. His behavior is normal. Judgment and thought content normal.   Nursing note and vitals reviewed.      Laboratory Reviewed  ({N/A)  Lab Results   Component Value Date    WBC 10.54 01/08/2020    HGB 14.8 01/08/2020    HCT 45.8 01/08/2020     01/08/2020    CHOL 177 05/07/2018    TRIG 149 05/07/2018    HDL 38 (L) 05/07/2018    ALT 75 (H) 01/08/2020    AST 67 (H) 01/08/2020     01/08/2020    K 3.4 (L) 01/08/2020     01/08/2020    CREATININE 1.1 01/08/2020    BUN 10 01/08/2020    CO2 23 01/08/2020    TSH 0.563 08/28/2018    PSA 1.4 04/08/2019    INR 0.9 08/28/2018    HGBA1C 5.6 01/09/2020       Monica was seen today for follow-up.    Diagnoses and all orders for this visit:    Essential hypertension    BP better on Norvasc  Follow up External Cards, Dr. Kearns.   Echo, Nuclear stress     Will do CGM, will need to get DM to help set up.  Will review for reactive hypoglycemia  Discussed diet changes.              Care Plan/Goals: Reviewed  (N/A)  Goals    None         Follow up: No follow-ups on file.    After visit summary was printed and given to patient upon discharge today.  Patient goals and care plan are included in After Visit Summary.    Answers for HPI/ROS submitted by the patient on 1/11/2020   activity change: Yes  unexpected weight change: No  rhinorrhea: No  trouble swallowing: No  visual disturbance: Yes  chest tightness: Yes  polyuria: No  difficulty urinating: No  joint swelling: Yes  arthralgias: Yes  confusion: No  dysphoric mood: No

## 2020-01-16 ENCOUNTER — TELEPHONE (OUTPATIENT)
Dept: DIABETES | Facility: CLINIC | Age: 45
End: 2020-01-16

## 2020-01-16 ENCOUNTER — HOSPITAL ENCOUNTER (OUTPATIENT)
Dept: RADIOLOGY | Facility: HOSPITAL | Age: 45
Discharge: HOME | End: 2020-01-16
Attending: PODIATRIST
Payer: COMMERCIAL

## 2020-01-16 DIAGNOSIS — E16.2 HYPOGLYCEMIA: Primary | ICD-10-CM

## 2020-01-16 DIAGNOSIS — S86.112S POSTERIOR TIBIAL TENDON TEAR, TRAUMATIC, LEFT, SEQUELA: ICD-10-CM

## 2020-01-16 DIAGNOSIS — M25.572 PAIN IN LEFT ANKLE AND JOINTS OF LEFT FOOT: ICD-10-CM

## 2020-01-16 DIAGNOSIS — M76.822 POSTERIOR TIBIAL TENDON DYSFUNCTION (PTTD) OF LEFT LOWER EXTREMITY: ICD-10-CM

## 2020-01-16 PROCEDURE — 73721 MRI JNT OF LWR EXTRE W/O DYE: CPT | Mod: TC,LT

## 2020-01-20 ENCOUNTER — TELEPHONE (OUTPATIENT)
Dept: DIABETES | Facility: CLINIC | Age: 45
End: 2020-01-20

## 2020-01-20 ENCOUNTER — CLINICAL SUPPORT (OUTPATIENT)
Dept: DIABETES | Facility: CLINIC | Age: 45
End: 2020-01-20
Payer: COMMERCIAL

## 2020-01-20 DIAGNOSIS — E16.2 HYPOGLYCEMIA: ICD-10-CM

## 2020-01-20 NOTE — TELEPHONE ENCOUNTER
Called back and spoke with patient's wife. Advise patient that they can come at a later time. Patient verbalized understanding.     ----- Message from Bessie Deal sent at 1/20/2020  7:59 AM CST -----  Contact: pt wife   Type:  Needs Medical Advice    Who Called: pt wife   Symptoms (please be specific):   How long has patient had these symptoms:   Pharmacy name and phone #:   Would the patient rather a call back or a response via [facility]? Call   Best Call Back Number:  [phone]  Additional Information:   Pt is requesting a call back from the nurse in regards to the pt needing to push his apt time back because he is still at cardiologist please

## 2020-01-20 NOTE — PROGRESS NOTES
Patient is here in clinic today for placement of continuous glucose monitoring system (CGMS) Freestyle Pita. Site on back of patient's right upper arm was cleaned and sensor was placed and started. No further questions from patient. Explained to patient that this is a blind procedure and will not actually see BG in real time. Instructed pt. its ok to shower with sensor on and informed patient of need to check blood sugars as prescribed. Patient was also informed to avoid all imaging procedures and acetaminophen during the procedure. Voiced understanding of all instructions given.

## 2020-02-03 ENCOUNTER — CLINICAL SUPPORT (OUTPATIENT)
Dept: DIABETES | Facility: CLINIC | Age: 45
End: 2020-02-03
Payer: COMMERCIAL

## 2020-02-03 ENCOUNTER — TELEPHONE (OUTPATIENT)
Dept: INTERNAL MEDICINE | Facility: CLINIC | Age: 45
End: 2020-02-03

## 2020-02-03 DIAGNOSIS — E16.2 HYPOGLYCEMIA: Primary | ICD-10-CM

## 2020-02-03 NOTE — PROGRESS NOTES
Patient returned to clinic today to return Glucose Sensor  used in CMGS procedure.   The CGMS Sensor will be scanned and downloaded. All reports will be imported into the patient's electronic medical record.  Nurse Practitioner will complete data interpretation and make recommendations.

## 2020-02-03 NOTE — TELEPHONE ENCOUNTER
I will send his chart to [facility].  He needs appt    Reactive Hypoglycemia.  Diet is not good and needs counseling to prevent episodes.    CGM removed today and scanned into Media.    5 lows reported over the last 3 weeks.      I will message Chica Georges for her to schedule.  We have to message her to schedule these appt.    Please notify patient need for referral.    Dr. Escoto

## 2020-02-24 ENCOUNTER — NUTRITION (OUTPATIENT)
Dept: NUTRITION | Facility: CLINIC | Age: 45
End: 2020-02-24
Payer: COMMERCIAL

## 2020-02-24 VITALS — BODY MASS INDEX: 29.65 KG/M2 | HEIGHT: 73 IN | WEIGHT: 223.75 LBS

## 2020-02-24 DIAGNOSIS — E16.2 HYPOGLYCEMIA: Primary | ICD-10-CM

## 2020-02-24 DIAGNOSIS — Z71.3 DIETARY COUNSELING: ICD-10-CM

## 2020-02-24 PROCEDURE — 99999 PR PBB SHADOW E&M-EST. PATIENT-LVL II: ICD-10-PCS | Mod: PBBFAC,,, | Performed by: DIETITIAN, REGISTERED

## 2020-02-24 PROCEDURE — 97802 MEDICAL NUTRITION INDIV IN: CPT | Mod: S$GLB,,, | Performed by: DIETITIAN, REGISTERED

## 2020-02-24 PROCEDURE — 99999 PR PBB SHADOW E&M-EST. PATIENT-LVL II: CPT | Mod: PBBFAC,,, | Performed by: DIETITIAN, REGISTERED

## 2020-02-24 PROCEDURE — 97802 PR MED NUTR THER, 1ST, INDIV, EA 15 MIN: ICD-10-PCS | Mod: S$GLB,,, | Performed by: DIETITIAN, REGISTERED

## 2020-02-24 RX ORDER — ISOSORBIDE MONONITRATE 30 MG/1
30 TABLET, EXTENDED RELEASE ORAL DAILY
COMMUNITY
Start: 2020-02-10 | End: 2020-04-06

## 2020-02-24 RX ORDER — METOPROLOL SUCCINATE 25 MG/1
25 TABLET, EXTENDED RELEASE ORAL DAILY
COMMUNITY
Start: 2020-01-10 | End: 2023-02-22 | Stop reason: SDUPTHER

## 2020-02-24 NOTE — PROGRESS NOTES
"Nutrition Assessment  Client name:  Monica Johnson  :  1975  Age:  44 y.o.  Gender:  male    Client states: pt reports headaches, dizziness, weak, pass out, and vision issues after activty and a few hours of not eating. Normally hungry again 30 minutes- 1 hour after eating.     Anthropometrics  Height:  6'1"    Weight:  223 lbs  BMI:  29.52 kg/m2      Clinical Signs/Symptoms  N/V/D:  No  Appetite (Good, Fair, or Poor):  Good    Past Medical History:   Diagnosis Date    ADHD (attention deficit hyperactivity disorder)     Allergy     Anxiety 2016    Arthritis 2014    Osteoarthritis    Depression 2016    Hypertension     Sciatica of left side 2017       Past Surgical History:   Procedure Laterality Date    CALCANEAL OSTEOTOMY Left 2019    Procedure: OSTEOTOMY, CALCANEUS;  Surgeon: Rober Colon DPM;  Location: [facility] OR;  Service: Podiatry;  Laterality: Left;    CIRCUMCISION      FOOT SURGERY      LENGTHENING OF ACHILLES TENDON Left 2019    Procedure: LENGTHENING, TENDON, ACHILLES;  Surgeon: Rober Colon DPM;  Location: [facility] OR;  Service: Podiatry;  Laterality: Left;    REPAIR OF POSTERIOR TIBIALIS TENDON Left 2019    Procedure: REPAIR, TENDON, TIBIALIS POSTERIOR;  Surgeon: Rober Colon DPM;  Location: [facility] OR;  Service: Podiatry;  Laterality: Left;    TENDON TRANSFER Left 2019    Procedure: TRANSFER, TENDON;  Surgeon: Rober Colon DPM;  Location: [facility] OR;  Service: Podiatry;  Laterality: Left;       Medications    has a current medication list which includes the following prescription(s): amlodipine and multivitamin.    Vitamins, Minerals, and/or Supplements:  No.    Food/Medication Interactions:  Reviewed     Food Allergies or Intolerances:  Whole milk     Social History    Marital status:    Employment:   at [employer]    Social History     Tobacco Use    Smoking status: Current Some Day Smoker     Packs/day: 0.25     " Types: Cigarettes, Cigars   Substance Use Topics    Alcohol use: Yes     Frequency: 2-3 times a week     Drinks per session: 1 or 2     Binge frequency: Never     Comment: socially          Food History  Breakfast:  3 eggs + Beet Juice,Bagel and Banana    Mid-morning Snack 9 or 10 AM: Greek Yogurt and String Cheese   Lunch:  Sandwhich (Pepperjack, turkey, ham, wheat bread), Rice cakes   Mid-afternoon Snack 1 or 2PM:  Another Sandwhich and Rice Cakes   3 or 4PM: Oranges   Dinner:  Baked Fish, Vegetable wrap, fajita's, Salad  H.S. Snack:  [place], almonds, and pecans  *Fluid intake:  Sparkling Water( 2 cans per day), Coffee, Tea w/ Stevia+ Water, Organic Sugar    Exercise History:  Active and lifting a lot at work (10-15 hours/ day at work), walking the dogs    Cultural/Spiritual/Personal Preferences:  No    Support System:  Strong    State of Change:  Action    Barriers to Change:  None    Diagnosis    Food and Nutrition related knowledge deficit related to new diagnosis as evidenced by hypoglycemia.    Intervention    Calorie Needs (via [place] Jeor):  Activity Factor:  1.0   - Maintenance:    -Loss of 1-2 pounds per week:  Protein (via 0.8 g/kg):  Fluid (30 ml/kg):       Goals:  1.  Eat a consistenct amount of carbohydrates at each time of eating  2.  Only eat high sugar foods, caffeine, and alcohol with food  3.  Read food labels to know what foods are a serving of carbohydrates    Nutrition Education  Educated pt and wife on a steady carb diet and proper food and timing of foods to combat hypoglycemia.     Patient verbalized understanding of nutrition education and recommendations received and demonstrated teachback.     Handouts Provided  [facility] Hypoglycemia handout    Monitoring/Evaluation    Monitor the following:  Weight  BMI  Symptoms    Follow Up Plan:  Follow up in 6 months.

## 2020-04-06 ENCOUNTER — OFFICE VISIT (OUTPATIENT)
Dept: INTERNAL MEDICINE | Facility: CLINIC | Age: 45
End: 2020-04-06
Payer: COMMERCIAL

## 2020-04-06 ENCOUNTER — PATIENT MESSAGE (OUTPATIENT)
Dept: INTERNAL MEDICINE | Facility: CLINIC | Age: 45
End: 2020-04-06

## 2020-04-06 VITALS — DIASTOLIC BLOOD PRESSURE: 82 MMHG | SYSTOLIC BLOOD PRESSURE: 128 MMHG

## 2020-04-06 DIAGNOSIS — K11.20 SUBMANDIBULAR GLAND INFLAMMATION: ICD-10-CM

## 2020-04-06 DIAGNOSIS — F41.9 ANXIETY: Primary | ICD-10-CM

## 2020-04-06 PROCEDURE — 3079F DIAST BP 80-89 MM HG: CPT | Mod: CPTII,,, | Performed by: FAMILY MEDICINE

## 2020-04-06 PROCEDURE — 3079F PR MOST RECENT DIASTOLIC BLOOD PRESSURE 80-89 MM HG: ICD-10-PCS | Mod: CPTII,,, | Performed by: FAMILY MEDICINE

## 2020-04-06 PROCEDURE — 3074F PR MOST RECENT SYSTOLIC BLOOD PRESSURE < 130 MM HG: ICD-10-PCS | Mod: CPTII,,, | Performed by: FAMILY MEDICINE

## 2020-04-06 PROCEDURE — 99213 PR OFFICE/OUTPT VISIT, EST, LEVL III, 20-29 MIN: ICD-10-PCS | Mod: GT,,, | Performed by: FAMILY MEDICINE

## 2020-04-06 PROCEDURE — 3074F SYST BP LT 130 MM HG: CPT | Mod: CPTII,,, | Performed by: FAMILY MEDICINE

## 2020-04-06 PROCEDURE — 99213 OFFICE O/P EST LOW 20 MIN: CPT | Mod: GT,,, | Performed by: FAMILY MEDICINE

## 2020-04-06 RX ORDER — SULFAMETHOXAZOLE AND TRIMETHOPRIM 800; 160 MG/1; MG/1
1 TABLET ORAL 2 TIMES DAILY
Qty: 20 TABLET | Refills: 0 | Status: SHIPPED | OUTPATIENT
Start: 2020-04-06 | End: 2020-12-03 | Stop reason: ALTCHOICE

## 2020-04-06 RX ORDER — MICONAZOLE NITRATE 2 %
CREAM (GRAM) TOPICAL
COMMUNITY
Start: 2020-01-20 | End: 2021-01-05

## 2020-04-06 RX ORDER — ISOSORBIDE MONONITRATE 30 MG/1
15 TABLET, EXTENDED RELEASE ORAL DAILY
COMMUNITY
Start: 2020-02-10 | End: 2021-08-05

## 2020-04-06 RX ORDER — ATORVASTATIN CALCIUM 20 MG/1
20 TABLET, FILM COATED ORAL NIGHTLY
COMMUNITY
Start: 2020-03-23 | End: 2023-02-22 | Stop reason: SDUPTHER

## 2020-04-06 RX ORDER — LISINOPRIL 5 MG/1
5 TABLET ORAL DAILY
COMMUNITY
Start: 2020-04-01 | End: 2021-06-15 | Stop reason: SDUPTHER

## 2020-04-06 RX ORDER — SERTRALINE HYDROCHLORIDE 50 MG/1
50 TABLET, FILM COATED ORAL DAILY
Qty: 30 TABLET | Refills: 11 | Status: SHIPPED | OUTPATIENT
Start: 2020-04-06 | End: 2021-02-11

## 2020-04-06 NOTE — PROGRESS NOTES
Monica Johnson Alex  04/06/2020  [id number]    Kenya Escoto MD  Patient Care Team:  Kenya Escoto MD as PCP - General (Family Medicine)  Barbara Moore LPN as Care Coordinator (Internal Medicine)  Kenya Escoto MD (Family Medicine)  Elisa Chao RD as Dietitian (Nutrition)  Has the patient seen any provider outside of the [facility] network since the last visit? (no). If yes, HIPPA forms completed and records requested.    The patient location is: Home  The chief complaint leading to consultation is: facial infection  Visit type: Virtual visit with synchronous audio and video  Total time spent with patient: 3:25-  Each patient to whom he or she provides medical services by telemedicine is:  (1) informed of the relationship between the physician and patient and the respective role of any other health care provider with respect to management of the patient; and (2) notified that he or she may decline to receive medical services by telemedicine and may withdraw from such care at any time.        Visit Type:an urgent visit for a new problem    Chief Complaint:Facial infection    History of Present Illness:    44 year old here for facial infection.  Noted a swelling under his chin, right side.   Noted two days ago. He has had a bump on this side before, but it has gotten bigger. Mild tenderness.    He is also have anxiety.  He has been treated for anxiety before, on Zoloft. Desires to start.  More aggravated while being at work, anxious.    He has seen Cards. BP meds reviewed.  He is on Cholesterol meds now.          History:  Past Medical History:   Diagnosis Date    ADHD (attention deficit hyperactivity disorder)     Allergy     Anxiety 1/25/2016    Arthritis 2014    Osteoarthritis    Depression 1/25/2016    Hypertension     Sciatica of left side 1/31/2017     Past Surgical History:   Procedure Laterality Date    CALCANEAL OSTEOTOMY Left 4/16/2019    Procedure: OSTEOTOMY, CALCANEUS;  Surgeon: Rober  CRISTOFER Colon DPM;  Location: [facility] OR;  Service: Podiatry;  Laterality: Left;    CIRCUMCISION      FOOT SURGERY      LENGTHENING OF ACHILLES TENDON Left 4/16/2019    Procedure: LENGTHENING, TENDON, ACHILLES;  Surgeon: Rober Colon DPM;  Location: [facility] OR;  Service: Podiatry;  Laterality: Left;    REPAIR OF POSTERIOR TIBIALIS TENDON Left 4/16/2019    Procedure: REPAIR, TENDON, TIBIALIS POSTERIOR;  Surgeon: Rober Colon DPM;  Location: [facility] OR;  Service: Podiatry;  Laterality: Left;    TENDON TRANSFER Left 4/16/2019    Procedure: TRANSFER, TENDON;  Surgeon: Rober Colon DPM;  Location: [facility] OR;  Service: Podiatry;  Laterality: Left;     Family History   Problem Relation Age of Onset    Cancer Mother     Hypertension Mother     Hypertension Maternal Grandmother      Social History     Socioeconomic History    Marital status:      Spouse name: Not on file    Number of children: Not on file    Years of education: Not on file    Highest education level: Not on file   Occupational History    Not on file   Social Needs    Financial resource strain: Not hard at all    Food insecurity:     Worry: Never true     Inability: Never true    Transportation needs:     Medical: No     Non-medical: No   Tobacco Use    Smoking status: Current Some Day Smoker     Packs/day: 0.25     Types: Cigarettes, Cigars   Substance and Sexual Activity    Alcohol use: Yes     Frequency: 2-3 times a week     Drinks per session: 1 or 2     Binge frequency: Never     Comment: socially    Drug use: Never     Types: Marijuana    Sexual activity: Yes     Partners: Female   Lifestyle    Physical activity:     Days per week: 7 days     Minutes per session: 20 min    Stress: Not at all   Relationships    Social connections:     Talks on phone: More than three times a week     Gets together: More than three times a week     Attends Baptism service: Not on file     Active member of club or organization: No     Attends  meetings of clubs or organizations: Never     Relationship status:    Other Topics Concern    Not on file   Social History Narrative    ** Merged History Encounter **          Patient Active Problem List   Diagnosis    Depression    Intermittent explosive disorder    Sciatica of left side    Plantar fasciitis    Osteoarthritis    Essential hypertension    Elevated liver function tests    Acute medial meniscus tear of right knee    Chondromalacia of right knee    Fatty liver    Tobacco use disorder    Posterior tibial tendon dysfunction (PTTD) of left lower extremity     Review of patient's allergies indicates:  No Known Allergies    The following were reviewed at this visit: active problem list, medication list, allergies, family history, social history, and health maintenance.    Medications:  Current Outpatient Medications on File Prior to Visit   Medication Sig Dispense Refill    atorvastatin (LIPITOR) 20 MG tablet       isosorbide mononitrate (IMDUR) 30 MG 24 hr tablet       lisinopriL (PRINIVIL,ZESTRIL) 5 MG tablet       metoprolol succinate (TOPROL XL) 25 MG 24 hr tablet       multivitamin capsule Take 1 capsule by mouth once daily.      nicotine, polacrilex, (NICORETTE) 2 mg Gum       [DISCONTINUED] isosorbide mononitrate (IMDUR) 30 MG 24 hr tablet Take 30 mg by mouth once daily.       No current facility-administered medications on file prior to visit.        Medications have been reviewed and reconciled with patient at this visit.  Barriers to medications present (no)    Adverse reactions to current medications (no)    Over the counter medications reviewed (Yes ), and if needed added to active Medication list at this visit.     Exam:  Wt Readings from Last 3 Encounters:   02/24/20 101.5 kg (223 lb 12.3 oz)   01/15/20 96.3 kg (212 lb 6.6 oz)   01/14/20 98 kg (216 lb 0.8 oz)     Temp Readings from Last 3 Encounters:   01/15/20 99 °F (37.2 °C) (Tympanic)   01/09/20 97.4 °F (36.3 °C)  (Tympanic)   01/08/20 98.9 °F (37.2 °C) (Oral)     BP Readings from Last 3 Encounters:   04/06/20 128/82   01/15/20 128/82   01/09/20 (!) 158/90     Pulse Readings from Last 3 Encounters:   01/15/20 82   01/09/20 85   01/08/20 84     There is no height or weight on file to calculate BMI.      Review of Systems   Constitutional: Negative for chills and fever.   Respiratory: Negative for cough and shortness of breath.    Gastrointestinal: Negative for abdominal pain.   Skin:        Right submandibular swelling.   Psychiatric/Behavioral: The patient is nervous/anxious.      Physical Exam   Constitutional: He appears well-developed and well-nourished.   Pulmonary/Chest: Effort normal. No respiratory distress.   Lymphadenopathy:        Head (right side): Submandibular adenopathy present.   Large submanibular mass/node  Some sheen to skin, appears more infectious than holden on images.  Around inch in diameter.       Neurological: He is alert.   Psychiatric: He has a normal mood and affect. His behavior is normal. Judgment and thought content normal.   Vitals reviewed.      Laboratory Reviewed ({N/A)  Lab Results   Component Value Date    WBC 10.54 01/08/2020    HGB 14.8 01/08/2020    HCT 45.8 01/08/2020     01/08/2020    CHOL 177 05/07/2018    TRIG 149 05/07/2018    HDL 38 (L) 05/07/2018    ALT 75 (H) 01/08/2020    AST 67 (H) 01/08/2020     01/08/2020    K 3.4 (L) 01/08/2020     01/08/2020    CREATININE 1.1 01/08/2020    BUN 10 01/08/2020    CO2 23 01/08/2020    TSH 0.563 08/28/2018    PSA 1.4 04/08/2019    INR 0.9 08/28/2018    HGBA1C 5.6 01/09/2020       Diagnoses and all orders for this visit:    Anxiety  -     sertraline (ZOLOFT) 50 MG tablet; Take 1 tablet (50 mg total) by mouth once daily.    Submandibular gland inflammation  -     sulfamethoxazole-trimethoprim 800-160mg (BACTRIM DS) 800-160 mg Tab; Take 1 tablet by mouth 2 (two) times daily.      Warm compresses  First trial of antibotics. If not  better, will image.    Start Zoloft    Recheck 3 days            Care Plan/Goals: Reviewed  (Yes)  Goals    None         Follow up: No follow-ups on file.    After visit summary was printed and given to patient upon discharge today.  Patient goals and care plan are included in After Visit Summary.

## 2020-04-07 ENCOUNTER — HOSPITAL ENCOUNTER (EMERGENCY)
Facility: HOSPITAL | Age: 45
Discharge: HOME | End: 2020-04-07
Attending: EMERGENCY MEDICINE
Payer: COMMERCIAL

## 2020-04-07 ENCOUNTER — TELEPHONE (OUTPATIENT)
Dept: INTERNAL MEDICINE | Facility: CLINIC | Age: 45
End: 2020-04-07

## 2020-04-07 ENCOUNTER — PATIENT MESSAGE (OUTPATIENT)
Dept: INTERNAL MEDICINE | Facility: CLINIC | Age: 45
End: 2020-04-07

## 2020-04-07 VITALS
OXYGEN SATURATION: 100 % | HEIGHT: 72 IN | HEART RATE: 72 BPM | SYSTOLIC BLOOD PRESSURE: 128 MMHG | TEMPERATURE: 99 F | RESPIRATION RATE: 18 BRPM | BODY MASS INDEX: 29.41 KG/M2 | DIASTOLIC BLOOD PRESSURE: 66 MMHG | WEIGHT: 217.13 LBS

## 2020-04-07 DIAGNOSIS — L72.3 INFECTED SEBACEOUS CYST OF SKIN: Primary | ICD-10-CM

## 2020-04-07 DIAGNOSIS — L08.9 INFECTED SEBACEOUS CYST OF SKIN: Primary | ICD-10-CM

## 2020-04-07 PROCEDURE — 99282 EMERGENCY DEPT VISIT SF MDM: CPT | Mod: 25

## 2020-04-07 PROCEDURE — 10060 I&D ABSCESS SIMPLE/SINGLE: CPT

## 2020-04-07 PROCEDURE — 25000003 PHARM REV CODE 250: Performed by: REGISTERED NURSE

## 2020-04-07 RX ORDER — LIDOCAINE HYDROCHLORIDE 10 MG/ML
10 INJECTION, SOLUTION EPIDURAL; INFILTRATION; INTRACAUDAL; PERINEURAL
Status: COMPLETED | OUTPATIENT
Start: 2020-04-07 | End: 2020-04-07

## 2020-04-07 RX ADMIN — LIDOCAINE HYDROCHLORIDE 100 MG: 10 INJECTION, SOLUTION EPIDURAL; INFILTRATION; INTRACAUDAL; PERINEURAL at 12:04

## 2020-04-07 NOTE — TELEPHONE ENCOUNTER
Spoke with pt voiced understanding of results.   States he is getting someone to cover him at work so he can leave.

## 2020-04-07 NOTE — TELEPHONE ENCOUNTER
I am not sure why he needs a referral to go to ER?  I am not at work, so I cannot call. They can see his chart notes when he arrives/  Are they still in parking lot?

## 2020-04-07 NOTE — TELEPHONE ENCOUNTER
Hope- I need to him got to ER.  He sent me the picture of the underlying lump ( we tx with antibotics with video visit yesterday)  However this am, its much larger. I need acute imaging.    He needs CT head and Neck, so I would advise ER visit now.    Please call him.  I have sent his wife a [employer] message, but I don't want him to wait to read it

## 2020-04-07 NOTE — ED NOTES
Patient identifiers verified and correct for Monica Johnson Alex. Patient has complaints of large bump under right side of chin for several days. Patient states he is currently taking antibiotics for it and it is very tender to touch.    LOC: The patient is awake, alert and aware of environment with an appropriate affect, the patient is oriented x 3 and speaking appropriately.  APPEARANCE: Patient resting comfortably and in no acute distress, patient is clean and well groomed, patient's clothing is properly fastened.  SKIN: The skin is warm and dry, color consistent with ethnicity, patient has normal skin turgor and moist mucus membranes, skin intact, no breakdown or bruising noted.  MUSCULOSKELETAL: Patient moving all extremities spontaneously.  RESPIRATORY: Airway is open and patent, respirations are spontaneous.  CARDIAC: Patient has a normal rate, no periphreal edema noted, capillary refill < 3 seconds.  ABDOMEN: Soft and non tender to palpation.

## 2020-04-07 NOTE — ED PROVIDER NOTES
Encounter Date: 4/7/2020       History     Chief Complaint   Patient presents with    Abscess     under chin, noticed on Sunday, reports its gotten bigger since then; sent by MD after video visit yesterday     The history is provided by the patient.   Abscess    This is a new problem. The current episode started yesterday. The problem occurs continuously. The problem has been unchanged. The abscess is present on the face (under chin). The pain is at a severity of 6/10. The abscess is characterized by redness, painfulness and swelling. Pertinent negatives include no fever and no sore throat.     Review of patient's allergies indicates:  No Known Allergies  Past Medical History:   Diagnosis Date    ADHD (attention deficit hyperactivity disorder)     Allergy     Anxiety 1/25/2016    Arthritis 2014    Osteoarthritis    Depression 1/25/2016    Hypertension     Sciatica of left side 1/31/2017     Past Surgical History:   Procedure Laterality Date    CALCANEAL OSTEOTOMY Left 4/16/2019    Procedure: OSTEOTOMY, CALCANEUS;  Surgeon: Rober Colon DPM;  Location: [facility] OR;  Service: Podiatry;  Laterality: Left;    CIRCUMCISION      FOOT SURGERY      LENGTHENING OF ACHILLES TENDON Left 4/16/2019    Procedure: LENGTHENING, TENDON, ACHILLES;  Surgeon: Rober Colon DPM;  Location: [facility] OR;  Service: Podiatry;  Laterality: Left;    REPAIR OF POSTERIOR TIBIALIS TENDON Left 4/16/2019    Procedure: REPAIR, TENDON, TIBIALIS POSTERIOR;  Surgeon: Rober Colon DPM;  Location: [facility] OR;  Service: Podiatry;  Laterality: Left;    TENDON TRANSFER Left 4/16/2019    Procedure: TRANSFER, TENDON;  Surgeon: Rober Colon DPM;  Location: [facility] OR;  Service: Podiatry;  Laterality: Left;     Family History   Problem Relation Age of Onset    Cancer Mother     Hypertension Mother     Hypertension Maternal Grandmother      Social History     Tobacco Use    Smoking status: Current Some Day Smoker     Packs/day: 0.25      Types: Cigarettes, Cigars   Substance Use Topics    Alcohol use: Yes     Frequency: 2-3 times a week     Drinks per session: 1 or 2     Binge frequency: Never     Comment: socially    Drug use: Never     Types: Marijuana     Review of Systems   Constitutional: Negative for fever.   HENT: Negative for sore throat.    Respiratory: Negative for shortness of breath.    Cardiovascular: Negative for chest pain.   Gastrointestinal: Negative for nausea.   Genitourinary: Negative for dysuria.   Musculoskeletal: Negative for back pain.   Skin: Negative for rash.        + Abscess under chin   Neurological: Negative for weakness.   Hematological: Does not bruise/bleed easily.   All other systems reviewed and are negative.      Physical Exam     Initial Vitals [04/07/20 1216]   BP Pulse Resp Temp SpO2   133/61 80 20 98.8 °F (37.1 °C) 99 %      MAP       --         Physical Exam    Constitutional: He appears well-developed and well-nourished. No distress.   HENT:   Head: Normocephalic and atraumatic.   Nose: Nose normal.   Mouth/Throat: Uvula is midline and oropharynx is clear and moist.   Eyes: Conjunctivae and EOM are normal. Pupils are equal, round, and reactive to light.   Neck: Normal range of motion. Neck supple.   Cardiovascular: Normal rate and regular rhythm.   Pulmonary/Chest: Effort normal and breath sounds normal. No respiratory distress. He has no decreased breath sounds. He has no wheezes. He has no rales.   Abdominal: Soft. Normal appearance and bowel sounds are normal. There is no tenderness.   Musculoskeletal: Normal range of motion.   Neurological: He is alert and oriented to person, place, and time. He has normal strength. GCS eye subscore is 4. GCS verbal subscore is 5. GCS motor subscore is 6.   Skin: Skin is warm and dry. Capillary refill takes less than 2 seconds. No rash noted.   1.5 cm fluctuant abscess under R chin, erythematous and tender to touch   Psychiatric: He has a normal mood and affect. His  speech is normal and behavior is normal.         ED Course   I & D - Incision and Drainage  Date/Time: 4/7/2020 12:53 PM  Performed by: LIDIA Romero Jr.  Authorized by: Yuki Beltran MD   Consent Done: Yes  Consent: Verbal consent obtained.  Type: cyst  Body area: head/neck  Location details: neck  Anesthesia: local infiltration    Anesthesia:  Local Anesthetic: lidocaine 1% without epinephrine  Anesthetic total: 3 mL  Patient sedated: no  Scalpel size: 11  Incision type: single straight  Complexity: simple  Drainage: pus and  purulent  Drainage amount: copious  Wound treatment: incision,  drainage,  deloculation,  expression of material,  sebum removal and  removal of cyst capsule  Packing material: none  Complications: No  Patient tolerance: Patient tolerated the procedure well with no immediate complications        Labs Reviewed   HIV 1 / 2 ANTIBODY          Imaging Results    None                                          Clinical Impression:       ICD-10-CM ICD-9-CM   1. Infected sebaceous cyst of skin L72.3 706.2    L08.9                                 LIDIA Romero Jr.  04/07/20 1254

## 2020-04-08 ENCOUNTER — PATIENT MESSAGE (OUTPATIENT)
Dept: INTERNAL MEDICINE | Facility: CLINIC | Age: 45
End: 2020-04-08

## 2020-04-08 RX ORDER — IBUPROFEN 800 MG/1
800 TABLET ORAL 3 TIMES DAILY
Qty: 15 TABLET | Refills: 0 | Status: SHIPPED | OUTPATIENT
Start: 2020-04-08 | End: 2020-04-13

## 2020-04-09 ENCOUNTER — OFFICE VISIT (OUTPATIENT)
Dept: INTERNAL MEDICINE | Facility: CLINIC | Age: 45
End: 2020-04-09
Payer: COMMERCIAL

## 2020-04-09 DIAGNOSIS — L02.91 ABSCESS: Primary | ICD-10-CM

## 2020-04-09 PROCEDURE — 99213 OFFICE O/P EST LOW 20 MIN: CPT | Mod: GT,,, | Performed by: FAMILY MEDICINE

## 2020-04-09 PROCEDURE — 99213 PR OFFICE/OUTPT VISIT, EST, LEVL III, 20-29 MIN: ICD-10-PCS | Mod: GT,,, | Performed by: FAMILY MEDICINE

## 2020-04-09 NOTE — PROGRESS NOTES
Monica Johnson Alex  04/09/2020  [id number]    Kenya Escoto MD  Patient Care Team:  Kenya Escoto MD as PCP - General (Family Medicine)  Barbara Moore LPN as Care Coordinator (Internal Medicine)  Kenya Escoto MD (Family Medicine)  Elisa Chao RD as Dietitian (Nutrition)  Has the patient seen any provider outside of the [facility] network since the last visit? (no). If yes, HIPPA forms completed and records requested.    The patient location is: home  The chief complaint leading to consultation is: Chin cyst  Visit type: Virtual visit with synchronous audio and video  Total time spent with patient: 3:02-3;12  Each patient to whom he or she provides medical services by telemedicine is:  (1) informed of the relationship between the physician and patient and the respective role of any other health care provider with respect to management of the patient; and (2) notified that he or she may decline to receive medical services by telemedicine and may withdraw from such care at any time.    Notes    Visit Type:a scheduled routine follow-up visit    Chief Complaint:ER follow up cyst     History of Present Illness:  Patient seen Monday, Cyst/abscess to chin.  Given bactrim. Radipily got worse  I & D in ER.    Still with pain.  Taking bactrim        History:  Past Medical History:   Diagnosis Date    ADHD (attention deficit hyperactivity disorder)     Allergy     Anxiety 1/25/2016    Arthritis 2014    Osteoarthritis    Depression 1/25/2016    Hypertension     Sciatica of left side 1/31/2017     Past Surgical History:   Procedure Laterality Date    CALCANEAL OSTEOTOMY Left 4/16/2019    Procedure: OSTEOTOMY, CALCANEUS;  Surgeon: Rober Colon DPM;  Location: [facility] OR;  Service: Podiatry;  Laterality: Left;    CIRCUMCISION      FOOT SURGERY      LENGTHENING OF ACHILLES TENDON Left 4/16/2019    Procedure: LENGTHENING, TENDON, ACHILLES;  Surgeon: Rober Colon DPM;  Location: [facility] OR;  Service:  Podiatry;  Laterality: Left;    REPAIR OF POSTERIOR TIBIALIS TENDON Left 4/16/2019    Procedure: REPAIR, TENDON, TIBIALIS POSTERIOR;  Surgeon: Rober Colon DPM;  Location: [facility] OR;  Service: Podiatry;  Laterality: Left;    TENDON TRANSFER Left 4/16/2019    Procedure: TRANSFER, TENDON;  Surgeon: Rober Colon DPM;  Location: [facility] OR;  Service: Podiatry;  Laterality: Left;     Family History   Problem Relation Age of Onset    Cancer Mother     Hypertension Mother     Hypertension Maternal Grandmother      Social History     Socioeconomic History    Marital status:      Spouse name: Not on file    Number of children: Not on file    Years of education: Not on file    Highest education level: Not on file   Occupational History    Not on file   Social Needs    Financial resource strain: Not hard at all    Food insecurity:     Worry: Never true     Inability: Never true    Transportation needs:     Medical: No     Non-medical: No   Tobacco Use    Smoking status: Current Some Day Smoker     Packs/day: 0.25     Types: Cigarettes, Cigars   Substance and Sexual Activity    Alcohol use: Yes     Frequency: 2-3 times a week     Drinks per session: 1 or 2     Binge frequency: Never     Comment: socially    Drug use: Never     Types: Marijuana    Sexual activity: Yes     Partners: Female   Lifestyle    Physical activity:     Days per week: 7 days     Minutes per session: 20 min    Stress: Not at all   Relationships    Social connections:     Talks on phone: More than three times a week     Gets together: More than three times a week     Attends [employer] service: Not on file     Active member of club or organization: No     Attends meetings of clubs or organizations: Never     Relationship status:    Other Topics Concern    Not on file   Social History Narrative    ** Merged History Encounter **          Patient Active Problem List   Diagnosis    Depression    Intermittent explosive  disorder    Sciatica of left side    Plantar fasciitis    Osteoarthritis    Essential hypertension    Elevated liver function tests    Acute medial meniscus tear of right knee    Chondromalacia of right knee    Fatty liver    Tobacco use disorder    Posterior tibial tendon dysfunction (PTTD) of left lower extremity     Review of patient's allergies indicates:  No Known Allergies    The following were reviewed at this visit: active problem list, medication list, allergies, family history, social history, and health maintenance.    Medications:  Current Outpatient Medications on File Prior to Visit   Medication Sig Dispense Refill    atorvastatin (LIPITOR) 20 MG tablet       ibuprofen (ADVIL,MOTRIN) 800 MG tablet Take 1 tablet (800 mg total) by mouth 3 (three) times daily. for 5 days 15 tablet 0    isosorbide mononitrate (IMDUR) 30 MG 24 hr tablet       lisinopriL (PRINIVIL,ZESTRIL) 5 MG tablet       metoprolol succinate (TOPROL XL) 25 MG 24 hr tablet       multivitamin capsule Take 1 capsule by mouth once daily.      nicotine, polacrilex, (NICORETTE) 2 mg Gum       sertraline (ZOLOFT) 50 MG tablet Take 1 tablet (50 mg total) by mouth once daily. 30 tablet 11    sulfamethoxazole-trimethoprim 800-160mg (BACTRIM DS) 800-160 mg Tab Take 1 tablet by mouth 2 (two) times daily. 20 tablet 0     No current facility-administered medications on file prior to visit.        Medications have been reviewed and reconciled with patient at this visit.  Barriers to medications present (no)    Adverse reactions to current medications (no)    Over the counter medications reviewed (Yes ), and if needed added to active Medication list at this visit.     Exam:  Wt Readings from Last 3 Encounters:   04/07/20 98.5 kg (217 lb 2.5 oz)   02/24/20 101.5 kg (223 lb 12.3 oz)   01/15/20 96.3 kg (212 lb 6.6 oz)     Temp Readings from Last 3 Encounters:   04/07/20 98.6 °F (37 °C) (Oral)   01/15/20 99 °F (37.2 °C) (Tympanic)    01/09/20 97.4 °F (36.3 °C) (Tympanic)     BP Readings from Last 3 Encounters:   04/07/20 128/66   04/06/20 128/82   01/15/20 128/82     Pulse Readings from Last 3 Encounters:   04/07/20 72   01/15/20 82   01/09/20 85     There is no height or weight on file to calculate BMI.      Review of Systems   Skin:        Chin abscess     Physical Exam   Constitutional: He is oriented to person, place, and time. He appears well-developed and well-nourished. No distress.   Pulmonary/Chest: Effort normal. No respiratory distress.   Neurological: He is alert and oriented to person, place, and time.   Skin:        Under right side of jaw line, opening from I & D  Still draining.  Less swollen, but still area that is enlarged.  Active drainage when removing bandage.     Psychiatric: He has a normal mood and affect. His behavior is normal. Judgment and thought content normal.       Laboratory Reviewed ({N/A)  Lab Results   Component Value Date    WBC 10.54 01/08/2020    HGB 14.8 01/08/2020    HCT 45.8 01/08/2020     01/08/2020    CHOL 177 05/07/2018    TRIG 149 05/07/2018    HDL 38 (L) 05/07/2018    ALT 75 (H) 01/08/2020    AST 67 (H) 01/08/2020     01/08/2020    K 3.4 (L) 01/08/2020     01/08/2020    CREATININE 1.1 01/08/2020    BUN 10 01/08/2020    CO2 23 01/08/2020    TSH 0.563 08/28/2018    PSA 1.4 04/08/2019    INR 0.9 08/28/2018    HGBA1C 5.6 01/09/2020       Diagnoses and all orders for this visit:    Abscess      Warm Compresses  Continue Bactrim.          Care Plan/Goals: Reviewed  (N/A)  Goals    None         Follow up: No follow-ups on file.    After visit summary was printed and given to patient upon discharge today.  Patient goals and care plan are included in After Visit Summary.

## 2020-04-14 PROBLEM — I42.8 NON-ISCHEMIC CARDIOMYOPATHY: Status: ACTIVE | Noted: 2020-04-14

## 2020-04-14 PROBLEM — I25.10 CORONARY ARTERY DISEASE INVOLVING NATIVE CORONARY ARTERY OF NATIVE HEART WITHOUT ANGINA PECTORIS: Status: ACTIVE | Noted: 2020-04-14

## 2020-09-24 ENCOUNTER — OFFICE VISIT (OUTPATIENT)
Dept: PODIATRY | Facility: CLINIC | Age: 45
End: 2020-09-24
Payer: COMMERCIAL

## 2020-09-24 ENCOUNTER — OFFICE VISIT (OUTPATIENT)
Dept: PAIN MEDICINE | Facility: CLINIC | Age: 45
End: 2020-09-24
Payer: COMMERCIAL

## 2020-09-24 VITALS
HEART RATE: 86 BPM | WEIGHT: 217 LBS | HEIGHT: 72 IN | BODY MASS INDEX: 29.39 KG/M2 | SYSTOLIC BLOOD PRESSURE: 145 MMHG | DIASTOLIC BLOOD PRESSURE: 84 MMHG

## 2020-09-24 VITALS
DIASTOLIC BLOOD PRESSURE: 86 MMHG | HEIGHT: 72 IN | SYSTOLIC BLOOD PRESSURE: 131 MMHG | BODY MASS INDEX: 30.09 KG/M2 | HEART RATE: 85 BPM | WEIGHT: 222.13 LBS

## 2020-09-24 DIAGNOSIS — M47.816 LUMBAR SPONDYLOSIS: Primary | ICD-10-CM

## 2020-09-24 DIAGNOSIS — M53.3 SACROILIAC JOINT PAIN: ICD-10-CM

## 2020-09-24 DIAGNOSIS — Z98.890 HISTORY OF ANKLE SURGERY: ICD-10-CM

## 2020-09-24 DIAGNOSIS — M76.822 POSTERIOR TIBIAL TENDON DYSFUNCTION (PTTD) OF LEFT LOWER EXTREMITY: Primary | ICD-10-CM

## 2020-09-24 DIAGNOSIS — M21.42 ACQUIRED PES PLANUS, LEFT: ICD-10-CM

## 2020-09-24 DIAGNOSIS — F17.200 TOBACCO USE DISORDER: ICD-10-CM

## 2020-09-24 DIAGNOSIS — M25.572 PAIN IN LEFT ANKLE AND JOINTS OF LEFT FOOT: ICD-10-CM

## 2020-09-24 DIAGNOSIS — M47.816 LUMBAR FACET ARTHROPATHY: ICD-10-CM

## 2020-09-24 DIAGNOSIS — G89.29 CHRONIC PAIN OF RIGHT KNEE: ICD-10-CM

## 2020-09-24 DIAGNOSIS — M25.561 CHRONIC PAIN OF RIGHT KNEE: ICD-10-CM

## 2020-09-24 DIAGNOSIS — Z98.890 HISTORY OF FOOT SURGERY: ICD-10-CM

## 2020-09-24 PROCEDURE — 99213 PR OFFICE/OUTPT VISIT, EST, LEVL III, 20-29 MIN: ICD-10-PCS | Mod: S$GLB,,, | Performed by: ANESTHESIOLOGY

## 2020-09-24 PROCEDURE — 3075F PR MOST RECENT SYSTOLIC BLOOD PRESS GE 130-139MM HG: ICD-10-PCS | Mod: CPTII,S$GLB,, | Performed by: PODIATRIST

## 2020-09-24 PROCEDURE — 99999 PR PBB SHADOW E&M-EST. PATIENT-LVL III: CPT | Mod: PBBFAC,,, | Performed by: PODIATRIST

## 2020-09-24 PROCEDURE — 3008F PR BODY MASS INDEX (BMI) DOCUMENTED: ICD-10-PCS | Mod: CPTII,S$GLB,, | Performed by: PODIATRIST

## 2020-09-24 PROCEDURE — 3008F PR BODY MASS INDEX (BMI) DOCUMENTED: ICD-10-PCS | Mod: CPTII,S$GLB,, | Performed by: ANESTHESIOLOGY

## 2020-09-24 PROCEDURE — 3079F DIAST BP 80-89 MM HG: CPT | Mod: CPTII,S$GLB,, | Performed by: ANESTHESIOLOGY

## 2020-09-24 PROCEDURE — 99213 OFFICE O/P EST LOW 20 MIN: CPT | Mod: S$GLB,,, | Performed by: ANESTHESIOLOGY

## 2020-09-24 PROCEDURE — 99999 PR PBB SHADOW E&M-EST. PATIENT-LVL III: ICD-10-PCS | Mod: PBBFAC,,, | Performed by: ANESTHESIOLOGY

## 2020-09-24 PROCEDURE — 3079F DIAST BP 80-89 MM HG: CPT | Mod: CPTII,S$GLB,, | Performed by: PODIATRIST

## 2020-09-24 PROCEDURE — 3075F SYST BP GE 130 - 139MM HG: CPT | Mod: CPTII,S$GLB,, | Performed by: PODIATRIST

## 2020-09-24 PROCEDURE — 3079F PR MOST RECENT DIASTOLIC BLOOD PRESSURE 80-89 MM HG: ICD-10-PCS | Mod: CPTII,S$GLB,, | Performed by: ANESTHESIOLOGY

## 2020-09-24 PROCEDURE — 3008F BODY MASS INDEX DOCD: CPT | Mod: CPTII,S$GLB,, | Performed by: PODIATRIST

## 2020-09-24 PROCEDURE — 3079F PR MOST RECENT DIASTOLIC BLOOD PRESSURE 80-89 MM HG: ICD-10-PCS | Mod: CPTII,S$GLB,, | Performed by: PODIATRIST

## 2020-09-24 PROCEDURE — 3008F BODY MASS INDEX DOCD: CPT | Mod: CPTII,S$GLB,, | Performed by: ANESTHESIOLOGY

## 2020-09-24 PROCEDURE — 3077F SYST BP >= 140 MM HG: CPT | Mod: CPTII,S$GLB,, | Performed by: ANESTHESIOLOGY

## 2020-09-24 PROCEDURE — 99214 OFFICE O/P EST MOD 30 MIN: CPT | Mod: S$GLB,,, | Performed by: PODIATRIST

## 2020-09-24 PROCEDURE — 99999 PR PBB SHADOW E&M-EST. PATIENT-LVL III: CPT | Mod: PBBFAC,,, | Performed by: ANESTHESIOLOGY

## 2020-09-24 PROCEDURE — 99214 PR OFFICE/OUTPT VISIT, EST, LEVL IV, 30-39 MIN: ICD-10-PCS | Mod: S$GLB,,, | Performed by: PODIATRIST

## 2020-09-24 PROCEDURE — 3077F PR MOST RECENT SYSTOLIC BLOOD PRESSURE >= 140 MM HG: ICD-10-PCS | Mod: CPTII,S$GLB,, | Performed by: ANESTHESIOLOGY

## 2020-09-24 PROCEDURE — 99999 PR PBB SHADOW E&M-EST. PATIENT-LVL III: ICD-10-PCS | Mod: PBBFAC,,, | Performed by: PODIATRIST

## 2020-09-24 RX ORDER — HYDROCODONE BITARTRATE AND ACETAMINOPHEN 5; 325 MG/1; MG/1
1 TABLET ORAL EVERY 6 HOURS PRN
Qty: 28 TABLET | Refills: 0 | Status: SHIPPED | OUTPATIENT
Start: 2020-09-24 | End: 2020-10-01

## 2020-09-24 RX ORDER — METHOCARBAMOL 750 MG/1
750 TABLET, FILM COATED ORAL 2 TIMES DAILY PRN
Qty: 60 TABLET | Refills: 0 | Status: SHIPPED | OUTPATIENT
Start: 2020-09-24 | End: 2020-10-24

## 2020-09-24 RX ORDER — IBUPROFEN 800 MG/1
800 TABLET ORAL 3 TIMES DAILY
Qty: 90 TABLET | Refills: 1 | Status: SHIPPED | OUTPATIENT
Start: 2020-09-24 | End: 2020-10-24

## 2020-09-24 NOTE — PROGRESS NOTES
Chief Pain Complaint:  Lumbar Back Pain  Right Knee Pain    History of Present Illness:   Monica Johnson is a 45 y.o. male  who is presenting with a chief complaint of Lumbar Back Pain. The patient began experiencing this problem insidiously, and the pain has been gradually worsening over the past 3 year(s). The pain is described as throbbing, cramping, aching and heavy and is located in the bilateral lumbar spine. Pain is intermittent and lasts hours. The  pain is nonradiating. The patient rates his pain a 3 out of ten and interferes with activities of daily living a 5 out of ten. Pain is exacerbated by extension of the lumbar spine, and is improved by rest. Patient reports no prior trauma, no prior spinal surgery     Monica Johnson is a 45 y.o. male  who is presenting with a chief complaint of right buttock pain. The patient began experiencing this problem insidiously, and the pain has been gradually worsening over the past 3 month(s). The pain is described as throbbing, cramping, aching and heavy and is located in the right buttock . Pain is intermittent and lasts hours. The  pain is nonradiating. The patient rates his pain a 8 out of ten and interferes with activities of daily living a 7 out of ten. Pain is exacerbated by getting up from a seated position, and is improved by rest. Patient reports no prior trauma, no prior spinal surgery       - pertinent negatives: No fever, No chills, No weight loss, No bladder dysfunction, No bowel dysfunction, No saddle anesthesia  - pertinent positives: none    - medications, other therapies tried (physical therapy, injections):     >> NSAIDs, Tylenol, gabapentin and flexeril    >> Has previously undergone Physical Therapy    >> Has NOT previously undergone spinal injection/s      Imaging / Labs / Studies (reviewed on 9/24/2020):    Review of Systems:  CONSTITUTIONAL: patient denies any fever, chills, or weight loss  SKIN: patient denies any rash or  itching  RESPIRATORY: patient denies having any shortness of breath  GASTROINTESTINAL: patient denies having any diarrhea, constipation, or bowel incontinence  GENITOURINARY: patient denies having any abnormal bladder function    MUSCULOSKELETAL:  - patient complains of the above noted pain/s (see chief pain complaint)    NEUROLOGICAL:   - pain as above  - strength in Lower extremities is intact, BILATERALLY  - sensation in Lower extremities is intact, BILATERALLY  - patient denies any loss of bowel or bladder control      PSYCHIATRIC: patient denies any change in mood    Other:  All other systems reviewed and are negative      Physical Exam:  BP (!) 145/84 (BP Location: Right arm, Patient Position: Sitting)   Pulse 86   Ht 6' (1.829 m)   Wt 98.4 kg (217 lb)   BMI 29.43 kg/m²  (reviewed on 9/24/2020)  General: Alert and oriented, in no apparent distress.  Gait: normal gait.  Skin: No rashes, No discoloration, No obvious lesions  HEENT: Normocephalic, atraumatic. Pupils equal and round.  Cardiovascular: Regular rate and rhythm , no significant peripheral edema present  Respiratory: Without audible wheezing, without use of accessory muscles of respiration.    Musculoskeletal:    Cervical Spine    - Pain on flexion of cervical spine Absent  - Spurling's Test:  Absent    - Pain on extension of cervical spine Absent  - TTP over the cervical facet joints Absent  - Cervical facet loading Absent  -TTP over bilateral Rhomboids    Lumbar Spine    - Pain on flexion of lumbar spine Absent  - Straight Leg Raise:  Absent    - Pain on extension of lumbar spine Present  - TTP over the lumbar facet joints Present Bilateral L5-S1  - Lumbar facet loading Present    -Pain on palpation over the SI joint  Present on right   - VENICE: present     Neuro:    Strength:  LE R/L: HF: 5/5, HE: 5/5, KF: 5/5; KE: 5/5; FE: 5/5; FF: 5/5    Extremity Reflexes: Brisk and symmetric throughout.      Extremity Sensory: Sensation to pinprick and  temperature symmetric. Proprioception intact.      Psych:  Mood and affect is appropriate      Assessment:    Monica Johnson is a 45 y.o. year old male who is presenting with   Encounter Diagnoses   Name Primary?    Lumbar spondylosis Yes    Sacroiliac joint pain     Lumbar facet arthropathy     Chronic pain of right knee        Plan:    1. Interventional: Consider Right SI joint and Right Knee injection patients wants to hold off for now.     2. Pharmacologic: Tylenol, NSAID's PRN. Robaxin 750 mg PO BID PRN.     3. Rehabilitative: Encouraged PT but patient not interested at this time.    4. Diagnostic: Lumbar Xray reviewed.    5. Follow up: PRN.     20 minutes were spent in this encounter with more than 50% of the time used for counseling and review of the plan.  Imaging / studies reviewed, detailed above.  I discussed in detail the risks, benefits, and alternatives to any and all potential treatment options.  All questions and concerns were fully addressed today in clinic. Medical decision making moderate.    Thank you for the opportunity to assist in the care of this patient.    Best wishes,    Signed:    Ivan Davey MD          Disclaimer:  This note may have been prepared using voice recognition software, it may have not been extensively proofed, as such there could be errors within the text such as sound alike errors.

## 2020-09-25 NOTE — PROGRESS NOTES
Subjective:       Patient ID: Monica Johnson is a 45 y.o. male.    Chief Complaint: Foot Pain (Pt c/o left ankle pain and swelling. Pt rates pain 7/10 at present. Wears ankle brace w/ tennis shoes and socks. Non diabetic pt. PCP Dr Kearns.) and Ankle Pain    HPI:  Monica Johnson presents to the office today, s/p 4/16/2019 Tendo-Achilles lengthening, Calcaneal osteotomy (MENDOZA type), Posterior tibial tendon repair, w/ Flexor tendon transfer, left. Patient presents this afternoon stating 7/10 to the left ankle, medially. Patient presents ambulatory sneakers and an ASO brace on the left lower extremity.  Patient states on a consistent daily basis, his pains are at least a 6/10.  Medicated with NSAIDs and/or Tylenol per in antalgic gait pattern.  Does stand for several hours at work, daily.    Hemoglobin A1C   Date Value Ref Range Status   01/09/2020 5.6 4.0 - 5.6 % Final     Comment:     ADA Screening Guidelines:  5.7-6.4%  Consistent with prediabetes  >or=6.5%  Consistent with diabetes  High levels of fetal hemoglobin interfere with the HbA1C  assay. Heterozygous hemoglobin variants (HbS, HgC, etc)do  not significantly interfere with this assay.   However, presence of multiple variants may affect accuracy.         Review of patient's allergies indicates:  No Known Allergies    Past Medical History:   Diagnosis Date    ADHD (attention deficit hyperactivity disorder)     Allergy     Anxiety 1/25/2016    Arthritis 2014    Osteoarthritis    Depression 1/25/2016    Hypertension     Sciatica of left side 1/31/2017       Family History   Problem Relation Age of Onset    Cancer Mother     Hypertension Mother     Hypertension Maternal Grandmother        Social History     Socioeconomic History    Marital status:      Spouse name: Not on file    Number of children: Not on file    Years of education: Not on file    Highest education level: Not on file   Occupational History    Not on file   Social  Needs    Financial resource strain: Not very hard    Food insecurity     Worry: Never true     Inability: Never true    Transportation needs     Medical: No     Non-medical: No   Tobacco Use    Smoking status: Current Some Day Smoker     Packs/day: 0.25     Types: Cigarettes, Cigars   Substance and Sexual Activity    Alcohol use: Yes     Frequency: Monthly or less     Drinks per session: 1 or 2     Binge frequency: Never     Comment: socially    Drug use: Never     Types: Marijuana    Sexual activity: Yes     Partners: Female   Lifestyle    Physical activity     Days per week: 7 days     Minutes per session: 60 min    Stress: Only a little   Relationships    Social connections     Talks on phone: Once a week     Gets together: Once a week     Attends [employer] service: Not on file     Active member of club or organization: No     Attends meetings of clubs or organizations: Never     Relationship status:    Other Topics Concern    Not on file   Social History Narrative    ** Merged History Encounter **            Past Surgical History:   Procedure Laterality Date    CALCANEAL OSTEOTOMY Left 4/16/2019    Procedure: OSTEOTOMY, CALCANEUS;  Surgeon: Rober Colon DPM;  Location: [facility] OR;  Service: Podiatry;  Laterality: Left;    CIRCUMCISION      FOOT SURGERY      LENGTHENING OF ACHILLES TENDON Left 4/16/2019    Procedure: LENGTHENING, TENDON, ACHILLES;  Surgeon: Rober Colon DPM;  Location: [facility] OR;  Service: Podiatry;  Laterality: Left;    REPAIR OF POSTERIOR TIBIALIS TENDON Left 4/16/2019    Procedure: REPAIR, TENDON, TIBIALIS POSTERIOR;  Surgeon: Rober Colon DPM;  Location: [facility] OR;  Service: Podiatry;  Laterality: Left;    TENDON TRANSFER Left 4/16/2019    Procedure: TRANSFER, TENDON;  Surgeon: Rober Colon DPM;  Location: [facility] OR;  Service: Podiatry;  Laterality: Left;       Review of Systems   Constitutional: Negative for chills, fatigue and fever.   HENT: Negative for  hearing loss.    Eyes: Negative for photophobia and visual disturbance.   Respiratory: Negative for cough, chest tightness, shortness of breath and wheezing.    Cardiovascular: Negative for chest pain and palpitations.   Gastrointestinal: Negative for constipation, diarrhea, nausea and vomiting.   Endocrine: Negative for cold intolerance and heat intolerance.   Genitourinary: Negative for flank pain.   Musculoskeletal: Positive for gait problem. Negative for neck pain and neck stiffness.   Skin: Negative for wound.   Neurological: Negative for light-headedness and headaches.   Psychiatric/Behavioral: Negative for sleep disturbance.          Objective:   /86   Pulse 85   Ht 6' (1.829 m)   Wt 100.8 kg (222 lb 1.8 oz)   BMI 30.12 kg/m²       Physical Exam  LOWER EXTREMITY PHYSICAL EXAMINATION    NEUROLOGY: Sensation to light touch is intact. Proprioception is intact. Sensation to pin prick is intact.     VASCULAR: On the left foot, the dorsalis pedis pulse is 2/4 and the posterior tibial pulse is 2/4. Capillary refill time is less than 3 seconds. Hair growth is present on the dorsum of the foot and at the digits. No rubor is present. Proximal to distal temperature is warm to warm.    DERMATOLOGY: Skin is supple, dry and intact. No ecchymosis is noted. No hypertrophic skin formation. No erythema or cellulitis is noted.     ORTHOPEDIC:  Manual muscle testing with inversion, forced or passive, is 3/5.  No excursion of the posterior tibial tendon is noted.  No edema is noted. Discomfort along the course of the entire posterior tibial tendon, model.  Slight discomfort to palpation to the area of the calcaneal osteotomy.  Persistent mild pes planus foot type is noted. Patient is unable to single heel rise on the left.  Discomfort to palpation at the navicular tuberosity.      Assessment:     1. Posterior tibial tendon dysfunction (PTTD) of left lower extremity    2. Pain in left ankle and joints of left foot    3.  History of foot surgery    4. Acquired pes planus, left    5. History of ankle surgery    6. Tobacco use disorder          Plan:     Posterior tibial tendon dysfunction (PTTD) of left lower extremity  History of foot surgery  Acquired pes planus, left  History of ankle surgery  Pain in left ankle and joints of left foot  -     HME - OTHER  -     HYDROcodone-acetaminophen (NORCO) 5-325 mg per tablet; Take 1 tablet by mouth every 6 (six) hours as needed for Pain.  Dispense: 28 tablet; Refill: 0    Thorough discussion is had with the patient today, concerning the diagnosis, its etiology, and the treatment algorithm at present.  XRAYS are reviewed in detail with the patient. All questions and concerns regarding findings and its/their implications are outlined and discussed.  MRI is reviewed in detail with the patient. All questions and concerns regarding findings and its/their implications are outlined and discussed.    Persistent symptomology along the course of the posterior tibial tendon, left lower extremity. No apparent excursion of the tendon is noted with manual muscle testing and evaluation.  Patient does likely have adhesion the posterior tibial tendon to the posterior border of the medial malleolus.  Treatment options include [place] bracing verses surgical intervention.    The procedure of (examination under anesthesia with repair of the posterior tibial tendon with application of allograft around the tendon at the posterior border of the malleolus) was thoroughly explained to the patient. Its necessity and/or purpose and the implications therein were outlined, including any pertinent advantages and/or disadvantages, and possible complications, if any. Possible complications include recurrence of pathology and/or deformity, infection (cellulitis, drainage, purulence, malodor, etc...), pain, numbness, neuritis, edema, burning, loss of function, need for further surgery, possible need for removal of any  implanted hardware, soft tissue contracture and/or scarring, etc... No guarantees were given and/or implied. Post-operative expectations and weightbearing protocol is thoroughly explained the patient, who acknowledges understanding.     For now, patient will start conservative measures with [place] brace.     Tobacco use disorder  Did discuss in detail the harmful effects of nicotine/tobacco/cigarette smoking, especially in relation to the lower extremity. I do rec. consultation with primary care provider for further discussed of smoking cessation methods. Smoking & Tobacco use cessation couseling was rendered at today's visit; intermediate, bewteen 3 and 10 minutes.          Future Appointments   Date Time Provider Department Center   9/28/2020  1:40 PM Kenya Escoto MD [facility]

## 2020-09-28 ENCOUNTER — OFFICE VISIT (OUTPATIENT)
Dept: INTERNAL MEDICINE | Facility: CLINIC | Age: 45
End: 2020-09-28
Payer: COMMERCIAL

## 2020-09-28 ENCOUNTER — HOSPITAL ENCOUNTER (OUTPATIENT)
Dept: RADIOLOGY | Facility: HOSPITAL | Age: 45
Discharge: HOME | End: 2020-09-28
Attending: FAMILY MEDICINE
Payer: COMMERCIAL

## 2020-09-28 VITALS
HEIGHT: 72 IN | TEMPERATURE: 99 F | HEART RATE: 83 BPM | SYSTOLIC BLOOD PRESSURE: 148 MMHG | DIASTOLIC BLOOD PRESSURE: 84 MMHG | OXYGEN SATURATION: 98 % | WEIGHT: 227.75 LBS | BODY MASS INDEX: 30.85 KG/M2

## 2020-09-28 DIAGNOSIS — M46.96 UNSPECIFIED INFLAMMATORY SPONDYLOPATHY, LUMBAR REGION: ICD-10-CM

## 2020-09-28 DIAGNOSIS — R07.89 CHEST WALL PAIN: Primary | ICD-10-CM

## 2020-09-28 DIAGNOSIS — R07.89 CHEST WALL PAIN: ICD-10-CM

## 2020-09-28 PROCEDURE — 3077F PR MOST RECENT SYSTOLIC BLOOD PRESSURE >= 140 MM HG: ICD-10-PCS | Mod: CPTII,S$GLB,, | Performed by: FAMILY MEDICINE

## 2020-09-28 PROCEDURE — 3079F PR MOST RECENT DIASTOLIC BLOOD PRESSURE 80-89 MM HG: ICD-10-PCS | Mod: CPTII,S$GLB,, | Performed by: FAMILY MEDICINE

## 2020-09-28 PROCEDURE — 99213 OFFICE O/P EST LOW 20 MIN: CPT | Mod: S$GLB,,, | Performed by: FAMILY MEDICINE

## 2020-09-28 PROCEDURE — 71101 X-RAY EXAM UNILAT RIBS/CHEST: CPT | Mod: 26,LT,, | Performed by: RADIOLOGY

## 2020-09-28 PROCEDURE — 3077F SYST BP >= 140 MM HG: CPT | Mod: CPTII,S$GLB,, | Performed by: FAMILY MEDICINE

## 2020-09-28 PROCEDURE — 3079F DIAST BP 80-89 MM HG: CPT | Mod: CPTII,S$GLB,, | Performed by: FAMILY MEDICINE

## 2020-09-28 PROCEDURE — 3008F BODY MASS INDEX DOCD: CPT | Mod: CPTII,S$GLB,, | Performed by: FAMILY MEDICINE

## 2020-09-28 PROCEDURE — 3008F PR BODY MASS INDEX (BMI) DOCUMENTED: ICD-10-PCS | Mod: CPTII,S$GLB,, | Performed by: FAMILY MEDICINE

## 2020-09-28 PROCEDURE — 99213 PR OFFICE/OUTPT VISIT, EST, LEVL III, 20-29 MIN: ICD-10-PCS | Mod: S$GLB,,, | Performed by: FAMILY MEDICINE

## 2020-09-28 PROCEDURE — 99999 PR PBB SHADOW E&M-EST. PATIENT-LVL IV: CPT | Mod: PBBFAC,,, | Performed by: FAMILY MEDICINE

## 2020-09-28 PROCEDURE — 99999 PR PBB SHADOW E&M-EST. PATIENT-LVL IV: ICD-10-PCS | Mod: PBBFAC,,, | Performed by: FAMILY MEDICINE

## 2020-09-28 PROCEDURE — 71101 XR RIBS MIN 3 VIEWS W/ PA CHEST LEFT: ICD-10-PCS | Mod: 26,LT,, | Performed by: RADIOLOGY

## 2020-09-28 NOTE — PROGRESS NOTES
Monica Johnson Alex  09/28/2020  [id number]    Neto Kearns MD  Patient Care Team:  Neto Kearns MD as PCP - General (Cardiology)  Barbara Moore LPN as Care Coordinator (Internal Medicine)  Kenya Escoto MD (Family Medicine)  Elisa Chao RD as Dietitian (Nutrition)  Has the patient seen any provider outside of the [facility] network since the last visit? (no). If yes, HIPPA forms completed and records requested.        Visit Type:an urgent visit for a new problem    Chief Complaint:  Chief Complaint   Patient presents with    Muscle Pain     on his left side of his chest under his arm       History of Present Illness:  45 year old here for pain, left side of his chest, under arm.  In last two weeks, had more muscle pain.  Starts axillary line on left, and radiates under pec muscle.    He reports that takes a deep breath or move his arm, feels a pulling sensation.    He reports only trauma, he was wrestling with son in 2 weeks ago, more using his arms and scrapping than one hit.  He reports that its hurt since then.  Thought it would get better, but it hasn't.    Cards is Dr. Kearns. Saw him for Chest pain. He has stress test.  He reports negative.    He has Sleep apnea  He wants a CPAP machine.  He had a study done.         Answers for HPI/ROS submitted by the patient on 9/24/2020   activity change: No  unexpected weight change: No  rhinorrhea: No  trouble swallowing: No  visual disturbance: No  chest tightness: No  polyuria: No  difficulty urinating: No  joint swelling: No  arthralgias: No  confusion: No  dysphoric mood: No    History:  Past Medical History:   Diagnosis Date    ADHD (attention deficit hyperactivity disorder)     Allergy     Anxiety 1/25/2016    Arthritis 2014    Osteoarthritis    Depression 1/25/2016    Hypertension     Sciatica of left side 1/31/2017     Past Surgical History:   Procedure Laterality Date    CALCANEAL OSTEOTOMY Left 4/16/2019    Procedure: OSTEOTOMY, CALCANEUS;   Surgeon: Rober Colon DPM;  Location: [facility] OR;  Service: Podiatry;  Laterality: Left;    CIRCUMCISION      FOOT SURGERY      LENGTHENING OF ACHILLES TENDON Left 4/16/2019    Procedure: LENGTHENING, TENDON, ACHILLES;  Surgeon: Rober Colon DPM;  Location: [facility] OR;  Service: Podiatry;  Laterality: Left;    REPAIR OF POSTERIOR TIBIALIS TENDON Left 4/16/2019    Procedure: REPAIR, TENDON, TIBIALIS POSTERIOR;  Surgeon: Rober Colon DPM;  Location: [facility] OR;  Service: Podiatry;  Laterality: Left;    TENDON TRANSFER Left 4/16/2019    Procedure: TRANSFER, TENDON;  Surgeon: Rober Colon DPM;  Location: [facility] OR;  Service: Podiatry;  Laterality: Left;     Family History   Problem Relation Age of Onset    Cancer Mother     Hypertension Mother     Hypertension Maternal Grandmother      Social History     Socioeconomic History    Marital status:      Spouse name: Not on file    Number of children: Not on file    Years of education: Not on file    Highest education level: Not on file   Occupational History    Not on file   Social Needs    Financial resource strain: Not very hard    Food insecurity     Worry: Never true     Inability: Never true    Transportation needs     Medical: No     Non-medical: No   Tobacco Use    Smoking status: Current Some Day Smoker     Packs/day: 0.25     Types: Cigarettes, Cigars   Substance and Sexual Activity    Alcohol use: Yes     Frequency: Monthly or less     Drinks per session: 1 or 2     Binge frequency: Never     Comment: socially    Drug use: Never     Types: Marijuana    Sexual activity: Yes     Partners: Female   Lifestyle    Physical activity     Days per week: 7 days     Minutes per session: 60 min    Stress: Only a little   Relationships    Social connections     Talks on phone: Once a week     Gets together: Once a week     Attends Protestant service: Not on file     Active member of club or organization: No     Attends meetings of clubs or  organizations: Never     Relationship status:    Other Topics Concern    Not on file   Social History Narrative    ** Merged History Encounter **          Patient Active Problem List   Diagnosis    Depression    Intermittent explosive disorder    Sciatica of left side    Plantar fasciitis    Osteoarthritis    Essential hypertension    Elevated liver function tests    Acute medial meniscus tear of right knee    Chondromalacia of right knee    Fatty liver    Tobacco use disorder    Posterior tibial tendon dysfunction (PTTD) of left lower extremity    Non-ischemic cardiomyopathy    Coronary artery disease involving native coronary artery of native heart without angina pectoris    Unspecified inflammatory spondylopathy, lumbar region     Review of patient's allergies indicates:  No Known Allergies    The following were reviewed at this visit: active problem list, medication list, allergies, family history, social history, and health maintenance.    Medications:  Current Outpatient Medications on File Prior to Visit   Medication Sig Dispense Refill    atorvastatin (LIPITOR) 20 MG tablet       HYDROcodone-acetaminophen (NORCO) 5-325 mg per tablet Take 1 tablet by mouth every 6 (six) hours as needed for Pain. 28 tablet 0    ibuprofen (ADVIL,MOTRIN) 800 MG tablet Take 1 tablet (800 mg total) by mouth 3 (three) times daily. 90 tablet 1    isosorbide mononitrate (IMDUR) 30 MG 24 hr tablet       lisinopriL (PRINIVIL,ZESTRIL) 5 MG tablet       methocarbamoL (ROBAXIN) 750 MG Tab Take 1 tablet (750 mg total) by mouth 2 (two) times daily as needed (muscle spasm (may cause drowsiness)). 60 tablet 0    metoprolol succinate (TOPROL XL) 25 MG 24 hr tablet       multivitamin capsule Take 1 capsule by mouth once daily.      nicotine, polacrilex, (NICORETTE) 2 mg Gum       sertraline (ZOLOFT) 50 MG tablet Take 1 tablet (50 mg total) by mouth once daily. 30 tablet 11    sulfamethoxazole-trimethoprim  800-160mg (BACTRIM DS) 800-160 mg Tab Take 1 tablet by mouth 2 (two) times daily. 20 tablet 0     No current facility-administered medications on file prior to visit.        Medications have been reviewed and reconciled with patient at this visit.  Barriers to medications present (no)    Adverse reactions to current medications (no)    Over the counter medications reviewed (No ), and if needed added to active Medication list at this visit.     Exam:  Wt Readings from Last 3 Encounters:   09/28/20 103.3 kg (227 lb 11.8 oz)   09/24/20 100.8 kg (222 lb 1.8 oz)   09/24/20 98.4 kg (217 lb)     Temp Readings from Last 3 Encounters:   09/28/20 98.6 °F (37 °C) (Tympanic)   04/07/20 98.6 °F (37 °C) (Oral)   01/15/20 99 °F (37.2 °C) (Tympanic)     BP Readings from Last 3 Encounters:   09/28/20 (!) 148/84   09/24/20 131/86   09/24/20 (!) 145/84     Pulse Readings from Last 3 Encounters:   09/28/20 83   09/24/20 85   09/24/20 86     Body mass index is 30.89 kg/m².      Review of Systems   HENT: Negative for hearing loss.    Eyes: Negative for discharge.   Respiratory: Negative for wheezing.    Cardiovascular: Positive for chest pain. Negative for palpitations.   Gastrointestinal: Negative for blood in stool, constipation, diarrhea and vomiting.   Genitourinary: Negative for hematuria and urgency.   Musculoskeletal: Positive for joint pain and myalgias. Negative for neck pain.        Foot pain, knee pain     Neurological: Negative for weakness and headaches.   Endo/Heme/Allergies: Negative for polydipsia.     Physical Exam  Vitals signs and nursing note reviewed.   Constitutional:       General: He is not in acute distress.     Appearance: He is well-developed. He is not diaphoretic.   HENT:      Head: Normocephalic and atraumatic.      Right Ear: External ear normal.      Left Ear: External ear normal.      Nose: Nose normal.      Mouth/Throat:      Pharynx: No oropharyngeal exudate.   Eyes:      General:         Right eye: No  discharge.         Left eye: No discharge.      Conjunctiva/sclera: Conjunctivae normal.      Pupils: Pupils are equal, round, and reactive to light.   Neck:      Musculoskeletal: Normal range of motion and neck supple.      Thyroid: No thyromegaly.   Cardiovascular:      Rate and Rhythm: Normal rate and regular rhythm.      Heart sounds: Normal heart sounds. No murmur.   Pulmonary:      Effort: Pulmonary effort is normal. No respiratory distress.      Breath sounds: Normal breath sounds. No wheezing.   Chest:      Chest wall: Tenderness present.          Comments: Tenderness , axillary line, 5 rib extend to nipple.  Worse with movement, tender and reproducible with palpation.    Abdominal:      General: Bowel sounds are normal. There is no distension.      Palpations: Abdomen is soft. There is no mass.      Tenderness: There is no abdominal tenderness.   Musculoskeletal: Normal range of motion.   Lymphadenopathy:      Cervical: No cervical adenopathy.   Skin:     Capillary Refill: Capillary refill takes less than 2 seconds.   Neurological:      Mental Status: He is alert and oriented to person, place, and time.      Cranial Nerves: No cranial nerve deficit.   Psychiatric:         Behavior: Behavior normal.         Thought Content: Thought content normal.         Judgment: Judgment normal.         Laboratory Reviewed ({N/A)  Lab Results   Component Value Date    WBC 10.54 01/08/2020    HGB 14.8 01/08/2020    HCT 45.8 01/08/2020     01/08/2020    CHOL 177 05/07/2018    TRIG 149 05/07/2018    HDL 38 (L) 05/07/2018    ALT 75 (H) 01/08/2020    AST 67 (H) 01/08/2020     01/08/2020    K 3.4 (L) 01/08/2020     01/08/2020    CREATININE 1.1 01/08/2020    BUN 10 01/08/2020    CO2 23 01/08/2020    TSH 0.563 08/28/2018    PSA 1.4 04/08/2019    INR 0.9 08/28/2018    HGBA1C 5.6 01/09/2020       Monica was seen today for muscle pain.    Diagnoses and all orders for this visit:    Chest wall pain  -     XR Ribs Min  3 Views w/PA Chest Left; Future    Unspecified inflammatory spondylopathy, lumbar region    Suspect muscle injury with wrestling with son  Xray rib    Had [place] with Podiatry, can use prn Tylenol    Await Rx report    Check BP, mild elevation today  Recheck at home and notify me of readings.              Care Plan/Goals: Reviewed  (N/A)  Goals    None         Follow up: No follow-ups on file.    After visit summary was printed and given to patient upon discharge today.  Patient goals and care plan are included in After Visit Summary.

## 2020-10-29 ENCOUNTER — TELEPHONE (OUTPATIENT)
Dept: PAIN MEDICINE | Facility: CLINIC | Age: 45
End: 2020-10-29

## 2020-10-29 NOTE — TELEPHONE ENCOUNTER
Attempted to call patient regarding rescheduling appointment with Dr. Davey on 11/12/2020. Left v/m for patient to call back to reschedule appointment.

## 2020-11-17 ENCOUNTER — OFFICE VISIT (OUTPATIENT)
Dept: PAIN MEDICINE | Facility: CLINIC | Age: 45
End: 2020-11-17
Attending: FAMILY MEDICINE
Payer: COMMERCIAL

## 2020-11-17 VITALS — BODY MASS INDEX: 30.75 KG/M2 | WEIGHT: 227.06 LBS | HEIGHT: 72 IN

## 2020-11-17 DIAGNOSIS — M53.3 SACROILIAC JOINT PAIN: ICD-10-CM

## 2020-11-17 DIAGNOSIS — M47.816 LUMBAR SPONDYLOSIS: Primary | ICD-10-CM

## 2020-11-17 DIAGNOSIS — G89.29 CHRONIC PAIN OF RIGHT KNEE: ICD-10-CM

## 2020-11-17 DIAGNOSIS — M25.561 CHRONIC PAIN OF RIGHT KNEE: ICD-10-CM

## 2020-11-17 PROCEDURE — 3008F PR BODY MASS INDEX (BMI) DOCUMENTED: ICD-10-PCS | Mod: CPTII,S$GLB,, | Performed by: ANESTHESIOLOGY

## 2020-11-17 PROCEDURE — 99999 PR PBB SHADOW E&M-EST. PATIENT-LVL III: ICD-10-PCS | Mod: PBBFAC,,, | Performed by: ANESTHESIOLOGY

## 2020-11-17 PROCEDURE — 99999 PR PBB SHADOW E&M-EST. PATIENT-LVL III: CPT | Mod: PBBFAC,,, | Performed by: ANESTHESIOLOGY

## 2020-11-17 PROCEDURE — 1125F AMNT PAIN NOTED PAIN PRSNT: CPT | Mod: S$GLB,,, | Performed by: ANESTHESIOLOGY

## 2020-11-17 PROCEDURE — 99213 PR OFFICE/OUTPT VISIT, EST, LEVL III, 20-29 MIN: ICD-10-PCS | Mod: S$GLB,,, | Performed by: ANESTHESIOLOGY

## 2020-11-17 PROCEDURE — 3008F BODY MASS INDEX DOCD: CPT | Mod: CPTII,S$GLB,, | Performed by: ANESTHESIOLOGY

## 2020-11-17 PROCEDURE — 99213 OFFICE O/P EST LOW 20 MIN: CPT | Mod: S$GLB,,, | Performed by: ANESTHESIOLOGY

## 2020-11-17 PROCEDURE — 1125F PR PAIN SEVERITY QUANTIFIED, PAIN PRESENT: ICD-10-PCS | Mod: S$GLB,,, | Performed by: ANESTHESIOLOGY

## 2020-11-17 RX ORDER — METHOCARBAMOL 750 MG/1
750 TABLET, FILM COATED ORAL 3 TIMES DAILY PRN
Qty: 90 TABLET | Refills: 0 | Status: SHIPPED | OUTPATIENT
Start: 2020-11-17 | End: 2020-12-17

## 2020-11-17 NOTE — H&P (VIEW-ONLY)
Chief Pain Complaint:  Lumbar Back Pain  Right Knee Pain    History of Present Illness:   Monica Johnson is a 45 y.o. male  who is presenting with a chief complaint of Lumbar Back Pain. The patient began experiencing this problem insidiously, and the pain has been gradually worsening over the past 3 year(s). The pain is described as throbbing, cramping, aching and heavy and is located in the bilateral lumbar spine. Pain is intermittent and lasts hours. The  pain is nonradiating. The patient rates his pain a 3 out of ten and interferes with activities of daily living a 5 out of ten. Pain is exacerbated by extension of the lumbar spine, and is improved by rest. Patient reports no prior trauma, no prior spinal surgery     Monica Johnson is a 45 y.o. male  who is presenting with a chief complaint of right buttock pain. The patient began experiencing this problem insidiously, and the pain has been gradually worsening over the past 3 month(s). The pain is described as throbbing, cramping, aching and heavy and is located in the right buttock . Pain is intermittent and lasts hours. The  pain is nonradiating. The patient rates his pain a 8 out of ten and interferes with activities of daily living a 7 out of ten. Pain is exacerbated by getting up from a seated position, and is improved by rest. Patient reports no prior trauma, no prior spinal surgery       - pertinent negatives: No fever, No chills, No weight loss, No bladder dysfunction, No bowel dysfunction, No saddle anesthesia  - pertinent positives: none    - medications, other therapies tried (physical therapy, injections):     >> NSAIDs, Tylenol, gabapentin and flexeril    >> Has previously undergone Physical Therapy    >> Has NOT previously undergone spinal injection/s      Imaging / Labs / Studies (reviewed on 11/17/2020):    Review of Systems:  CONSTITUTIONAL: patient denies any fever, chills, or weight loss  SKIN: patient denies any rash or  itching  RESPIRATORY: patient denies having any shortness of breath  GASTROINTESTINAL: patient denies having any diarrhea, constipation, or bowel incontinence  GENITOURINARY: patient denies having any abnormal bladder function    MUSCULOSKELETAL:  - patient complains of the above noted pain/s (see chief pain complaint)    NEUROLOGICAL:   - pain as above  - strength in Lower extremities is intact, BILATERALLY  - sensation in Lower extremities is intact, BILATERALLY  - patient denies any loss of bowel or bladder control      PSYCHIATRIC: patient denies any change in mood    Other:  All other systems reviewed and are negative      Physical Exam:  Ht 6' (1.829 m)   Wt 103 kg (227 lb 1.2 oz)   BMI 30.80 kg/m²  (reviewed on 11/17/2020)  General: Alert and oriented, in no apparent distress.  Gait: normal gait.  Skin: No rashes, No discoloration, No obvious lesions  HEENT: Normocephalic, atraumatic. Pupils equal and round.  Cardiovascular: Regular rate and rhythm , no significant peripheral edema present  Respiratory: Without audible wheezing, without use of accessory muscles of respiration.    Musculoskeletal:    Cervical Spine    - Pain on flexion of cervical spine Absent  - Spurling's Test:  Absent    - Pain on extension of cervical spine Absent  - TTP over the cervical facet joints Absent  - Cervical facet loading Absent  -TTP over bilateral Rhomboids    Lumbar Spine    - Pain on flexion of lumbar spine Absent  - Straight Leg Raise:  Absent    - Pain on extension of lumbar spine Present  - TTP over the lumbar facet joints Present Bilateral L5-S1  - Lumbar facet loading Present    -Pain on palpation over the SI joint  Present on right   - VENICE: present     Neuro:    Strength:  LE R/L: HF: 5/5, HE: 5/5, KF: 5/5; KE: 5/5; FE: 5/5; FF: 5/5    Extremity Reflexes: Brisk and symmetric throughout.      Extremity Sensory: Sensation to pinprick and temperature symmetric. Proprioception intact.      Psych:  Mood and affect is  appropriate      Assessment:    Monica Johnson is a 45 y.o. year old male who is presenting with   Encounter Diagnoses   Name Primary?    Lumbar spondylosis Yes    Sacroiliac joint pain     Chronic pain of right knee        Plan:    1. Interventional: Proceed with Right SI joint and Right Knee injection.    2. Pharmacologic: Tylenol, NSAID's PRN. Robaxin 750 mg PO BID PRN.     3. Rehabilitative: Encouraged PT but patient not interested at this time.    4. Diagnostic: Lumbar Xray reviewed.    5. Follow up: Post Inejction.     20 minutes were spent in this encounter with more than 50% of the time used for counseling and review of the plan.  Imaging / studies reviewed, detailed above.  I discussed in detail the risks, benefits, and alternatives to any and all potential treatment options.  All questions and concerns were fully addressed today in clinic. Medical decision making moderate.    Thank you for the opportunity to assist in the care of this patient.    Best wishes,    Signed:    Ivan Davey MD          Disclaimer:  This note may have been prepared using voice recognition software, it may have not been extensively proofed, as such there could be errors within the text such as sound alike errors.

## 2020-11-19 NOTE — PRE-PROCEDURE INSTRUCTIONS
Spoke with patient regarding procedure scheduled on 11/24    Arrival time 1050    Has patient been sick with fever or on antibiotics within the last 7 days? No    Does the patient have any open wounds, sores or rashes? No    Has patient received a vaccination within the last 7 days? No    Has the patient stopped all medications as directed? Na    Does patient have a pacemaker and or defibrillator? no    Does the patient have a ride to and from procedure and someone reliable to remain with patient? Coordinating transportation. Explained to pt in detail policy. Verbalized understanding.    Is the patient diabetic? no    Does the patient have sleep apnea? Or use O2 at home? YVETTE    Is the patient receiving sedation? yes    Is the patient instructed to remain NPO beginning at midnight the night before their procedure? yes    Procedure location confirmed with patient? Yes    Covid- Denies signs/symptoms. Instructed to notify PAT/MD if any changes.

## 2020-11-24 ENCOUNTER — HOSPITAL ENCOUNTER (OUTPATIENT)
Facility: HOSPITAL | Age: 45
Discharge: HOME | End: 2020-11-24
Attending: ANESTHESIOLOGY | Admitting: ANESTHESIOLOGY
Payer: COMMERCIAL

## 2020-11-24 VITALS
BODY MASS INDEX: 29.07 KG/M2 | SYSTOLIC BLOOD PRESSURE: 139 MMHG | HEIGHT: 72 IN | RESPIRATION RATE: 18 BRPM | TEMPERATURE: 98 F | OXYGEN SATURATION: 99 % | HEART RATE: 62 BPM | DIASTOLIC BLOOD PRESSURE: 83 MMHG | WEIGHT: 214.63 LBS

## 2020-11-24 DIAGNOSIS — M53.3 SACROILIAC JOINT DYSFUNCTION: ICD-10-CM

## 2020-11-24 PROCEDURE — 27096 INJECT SACROILIAC JOINT: CPT | Mod: RT,,, | Performed by: ANESTHESIOLOGY

## 2020-11-24 PROCEDURE — 25000003 PHARM REV CODE 250: Performed by: ANESTHESIOLOGY

## 2020-11-24 PROCEDURE — 20610 DRAIN/INJ JOINT/BURSA W/O US: CPT | Performed by: ANESTHESIOLOGY

## 2020-11-24 PROCEDURE — 27096 PR INJECTION,SACROILIAC JOINT: ICD-10-PCS | Mod: RT,,, | Performed by: ANESTHESIOLOGY

## 2020-11-24 PROCEDURE — 27096 INJECT SACROILIAC JOINT: CPT | Performed by: ANESTHESIOLOGY

## 2020-11-24 PROCEDURE — 20610 DRAIN/INJ JOINT/BURSA W/O US: CPT | Mod: 59,RT,, | Performed by: ANESTHESIOLOGY

## 2020-11-24 PROCEDURE — 63600175 PHARM REV CODE 636 W HCPCS: Performed by: ANESTHESIOLOGY

## 2020-11-24 PROCEDURE — 25500020 PHARM REV CODE 255: Performed by: ANESTHESIOLOGY

## 2020-11-24 PROCEDURE — 20610 PR DRAIN/INJECT LARGE JOINT/BURSA: ICD-10-PCS | Mod: 59,RT,, | Performed by: ANESTHESIOLOGY

## 2020-11-24 PROCEDURE — 64450 NJX AA&/STRD OTHER PN/BRANCH: CPT | Performed by: ANESTHESIOLOGY

## 2020-11-24 RX ORDER — LIDOCAINE HYDROCHLORIDE 20 MG/ML
INJECTION, SOLUTION EPIDURAL; INFILTRATION; INTRACAUDAL; PERINEURAL
Status: DISCONTINUED | OUTPATIENT
Start: 2020-11-24 | End: 2020-11-24 | Stop reason: HOSPADM

## 2020-11-24 RX ORDER — METHYLPREDNISOLONE ACETATE 40 MG/ML
INJECTION, SUSPENSION INTRA-ARTICULAR; INTRALESIONAL; INTRAMUSCULAR; SOFT TISSUE
Status: DISCONTINUED | OUTPATIENT
Start: 2020-11-24 | End: 2020-11-24 | Stop reason: HOSPADM

## 2020-11-24 NOTE — PLAN OF CARE
Pt aaa o x's 4, denies any pain/discomfort, what to expect during recovery discussed with pt and friend at bedside. Pt and friend verbalized understanding of all post op instructions. All questions and concerns addressed and answered, will continue to monitor pt until discharged.

## 2020-11-24 NOTE — DISCHARGE INSTRUCTIONS

## 2020-11-24 NOTE — PLAN OF CARE
Pt discharged home, awake, alert, oriented x's 4, friend at bedside, denies any pain, no apparent distress noted. All questions and concerns addressed and answered, pt and friend verbalizes understanding of discharge process, pt meets discharge criteria and is being discharged to car via wheelchair.

## 2020-11-25 NOTE — DISCHARGE SUMMARY
[facility]  Discharge Note       Description of Procedure: Right Sacroiliac joint injection and Right Knee Injection under fluoroscopic guidance    Procedure Date: 11/25/2020    Admit Date: 11/24/2020  Discharge Date: 11/24/2020     Attending Physician: Ivan Davey   Discharge Provider: Ivan Davey    Preoperative Diagnosis: Sacroiliitis, Right knee pain     Postoperative Diagnosis: as above, same as preoperative diagnosis    Discharged Condition: Stable    Hospital Course: Patient was admitted for an outpatient procedure. The procedure was tolerated well with no complications.    Final Diagnoses: Same as principal problem.    Disposition: Home, self-care.    Follow up/Patient Instructions:  Follow-up in clinic in 2-3 weeks.    Medications: No medications were prescribed today. The patient was advised to resume normal medication regimen without change.  Specific information was provided regarding restarting any anticoagulant/s.    Discharge Procedure Orders (must include Diet, Follow-up, Activity):  Light activity for the remainder of the day, resume normal activity tomorrow. Resume normal diet. Follow-up in clinic in 2-3 weeks.

## 2020-12-03 ENCOUNTER — OFFICE VISIT (OUTPATIENT)
Dept: RHEUMATOLOGY | Facility: CLINIC | Age: 45
End: 2020-12-03
Payer: COMMERCIAL

## 2020-12-03 ENCOUNTER — OFFICE VISIT (OUTPATIENT)
Dept: UROLOGY | Facility: CLINIC | Age: 45
End: 2020-12-03
Payer: COMMERCIAL

## 2020-12-03 ENCOUNTER — HOSPITAL ENCOUNTER (OUTPATIENT)
Dept: RADIOLOGY | Facility: HOSPITAL | Age: 45
Discharge: HOME | End: 2020-12-03
Attending: INTERNAL MEDICINE
Payer: COMMERCIAL

## 2020-12-03 ENCOUNTER — OFFICE VISIT (OUTPATIENT)
Dept: INTERNAL MEDICINE | Facility: CLINIC | Age: 45
End: 2020-12-03
Payer: COMMERCIAL

## 2020-12-03 VITALS
BODY MASS INDEX: 29.13 KG/M2 | HEART RATE: 87 BPM | SYSTOLIC BLOOD PRESSURE: 138 MMHG | HEIGHT: 72 IN | WEIGHT: 214.5 LBS | WEIGHT: 215.06 LBS | DIASTOLIC BLOOD PRESSURE: 84 MMHG | BODY MASS INDEX: 29.05 KG/M2 | SYSTOLIC BLOOD PRESSURE: 134 MMHG | HEIGHT: 72 IN | TEMPERATURE: 99 F | OXYGEN SATURATION: 97 % | DIASTOLIC BLOOD PRESSURE: 84 MMHG

## 2020-12-03 VITALS
HEART RATE: 70 BPM | BODY MASS INDEX: 29.25 KG/M2 | DIASTOLIC BLOOD PRESSURE: 76 MMHG | HEIGHT: 72 IN | WEIGHT: 215.94 LBS | SYSTOLIC BLOOD PRESSURE: 121 MMHG

## 2020-12-03 DIAGNOSIS — R79.89 ELEVATED LIVER FUNCTION TESTS: ICD-10-CM

## 2020-12-03 DIAGNOSIS — G89.29 CHRONIC LOW BACK PAIN, UNSPECIFIED BACK PAIN LATERALITY, UNSPECIFIED WHETHER SCIATICA PRESENT: ICD-10-CM

## 2020-12-03 DIAGNOSIS — M54.50 CHRONIC LOW BACK PAIN, UNSPECIFIED BACK PAIN LATERALITY, UNSPECIFIED WHETHER SCIATICA PRESENT: ICD-10-CM

## 2020-12-03 DIAGNOSIS — K76.0 FATTY LIVER: ICD-10-CM

## 2020-12-03 DIAGNOSIS — Z00.00 ANNUAL PHYSICAL EXAM: Primary | ICD-10-CM

## 2020-12-03 DIAGNOSIS — E16.2 HYPOGLYCEMIA: ICD-10-CM

## 2020-12-03 DIAGNOSIS — M54.50 CHRONIC LOW BACK PAIN, UNSPECIFIED BACK PAIN LATERALITY, UNSPECIFIED WHETHER SCIATICA PRESENT: Primary | ICD-10-CM

## 2020-12-03 DIAGNOSIS — Z12.5 PROSTATE CANCER SCREENING: Primary | ICD-10-CM

## 2020-12-03 DIAGNOSIS — M71.9 BURSOPATHY: ICD-10-CM

## 2020-12-03 DIAGNOSIS — Z71.89 COUNSELING ON HEALTH PROMOTION AND DISEASE PREVENTION: ICD-10-CM

## 2020-12-03 DIAGNOSIS — G89.29 CHRONIC LOW BACK PAIN, UNSPECIFIED BACK PAIN LATERALITY, UNSPECIFIED WHETHER SCIATICA PRESENT: Primary | ICD-10-CM

## 2020-12-03 PROCEDURE — 3075F SYST BP GE 130 - 139MM HG: CPT | Mod: CPTII,S$GLB,, | Performed by: FAMILY MEDICINE

## 2020-12-03 PROCEDURE — 99999 PR PBB SHADOW E&M-EST. PATIENT-LVL III: CPT | Mod: PBBFAC,,, | Performed by: INTERNAL MEDICINE

## 2020-12-03 PROCEDURE — 99999 PR PBB SHADOW E&M-EST. PATIENT-LVL III: ICD-10-PCS | Mod: PBBFAC,,, | Performed by: FAMILY MEDICINE

## 2020-12-03 PROCEDURE — 99396 PR PREVENTIVE VISIT,EST,40-64: ICD-10-PCS | Mod: S$GLB,,, | Performed by: FAMILY MEDICINE

## 2020-12-03 PROCEDURE — 1125F PR PAIN SEVERITY QUANTIFIED, PAIN PRESENT: ICD-10-PCS | Mod: S$GLB,,, | Performed by: INTERNAL MEDICINE

## 2020-12-03 PROCEDURE — 3008F PR BODY MASS INDEX (BMI) DOCUMENTED: ICD-10-PCS | Mod: CPTII,S$GLB,, | Performed by: INTERNAL MEDICINE

## 2020-12-03 PROCEDURE — 3008F BODY MASS INDEX DOCD: CPT | Mod: CPTII,S$GLB,, | Performed by: UROLOGY

## 2020-12-03 PROCEDURE — 3075F PR MOST RECENT SYSTOLIC BLOOD PRESS GE 130-139MM HG: ICD-10-PCS | Mod: CPTII,S$GLB,, | Performed by: UROLOGY

## 2020-12-03 PROCEDURE — 1125F AMNT PAIN NOTED PAIN PRSNT: CPT | Mod: S$GLB,,, | Performed by: UROLOGY

## 2020-12-03 PROCEDURE — 99213 PR OFFICE/OUTPT VISIT, EST, LEVL III, 20-29 MIN: ICD-10-PCS | Mod: S$GLB,,, | Performed by: UROLOGY

## 2020-12-03 PROCEDURE — 99205 OFFICE O/P NEW HI 60 MIN: CPT | Mod: S$GLB,,, | Performed by: INTERNAL MEDICINE

## 2020-12-03 PROCEDURE — 72200 XR SACROILIAC JOINTS 3 VIEWS: ICD-10-PCS | Mod: 26,,, | Performed by: RADIOLOGY

## 2020-12-03 PROCEDURE — 3079F DIAST BP 80-89 MM HG: CPT | Mod: CPTII,S$GLB,, | Performed by: FAMILY MEDICINE

## 2020-12-03 PROCEDURE — 3008F PR BODY MASS INDEX (BMI) DOCUMENTED: ICD-10-PCS | Mod: CPTII,S$GLB,, | Performed by: UROLOGY

## 2020-12-03 PROCEDURE — 99213 OFFICE O/P EST LOW 20 MIN: CPT | Mod: S$GLB,,, | Performed by: UROLOGY

## 2020-12-03 PROCEDURE — 72200 X-RAY EXAM SI JOINTS: CPT | Mod: TC

## 2020-12-03 PROCEDURE — 3008F PR BODY MASS INDEX (BMI) DOCUMENTED: ICD-10-PCS | Mod: CPTII,S$GLB,, | Performed by: FAMILY MEDICINE

## 2020-12-03 PROCEDURE — 99205 PR OFFICE/OUTPT VISIT, NEW, LEVL V, 60-74 MIN: ICD-10-PCS | Mod: S$GLB,,, | Performed by: INTERNAL MEDICINE

## 2020-12-03 PROCEDURE — 3079F PR MOST RECENT DIASTOLIC BLOOD PRESSURE 80-89 MM HG: ICD-10-PCS | Mod: CPTII,S$GLB,, | Performed by: UROLOGY

## 2020-12-03 PROCEDURE — 1125F PR PAIN SEVERITY QUANTIFIED, PAIN PRESENT: ICD-10-PCS | Mod: S$GLB,,, | Performed by: FAMILY MEDICINE

## 2020-12-03 PROCEDURE — 3008F BODY MASS INDEX DOCD: CPT | Mod: CPTII,S$GLB,, | Performed by: FAMILY MEDICINE

## 2020-12-03 PROCEDURE — 99999 PR PBB SHADOW E&M-EST. PATIENT-LVL III: CPT | Mod: PBBFAC,,, | Performed by: UROLOGY

## 2020-12-03 PROCEDURE — 99999 PR PBB SHADOW E&M-EST. PATIENT-LVL III: ICD-10-PCS | Mod: PBBFAC,,, | Performed by: INTERNAL MEDICINE

## 2020-12-03 PROCEDURE — 3079F DIAST BP 80-89 MM HG: CPT | Mod: CPTII,S$GLB,, | Performed by: UROLOGY

## 2020-12-03 PROCEDURE — 1125F AMNT PAIN NOTED PAIN PRSNT: CPT | Mod: S$GLB,,, | Performed by: INTERNAL MEDICINE

## 2020-12-03 PROCEDURE — 99999 PR PBB SHADOW E&M-EST. PATIENT-LVL III: CPT | Mod: PBBFAC,,, | Performed by: FAMILY MEDICINE

## 2020-12-03 PROCEDURE — 3008F BODY MASS INDEX DOCD: CPT | Mod: CPTII,S$GLB,, | Performed by: INTERNAL MEDICINE

## 2020-12-03 PROCEDURE — 72200 X-RAY EXAM SI JOINTS: CPT | Mod: 26,,, | Performed by: RADIOLOGY

## 2020-12-03 PROCEDURE — 1125F AMNT PAIN NOTED PAIN PRSNT: CPT | Mod: S$GLB,,, | Performed by: FAMILY MEDICINE

## 2020-12-03 PROCEDURE — 1125F PR PAIN SEVERITY QUANTIFIED, PAIN PRESENT: ICD-10-PCS | Mod: S$GLB,,, | Performed by: UROLOGY

## 2020-12-03 PROCEDURE — 99396 PREV VISIT EST AGE 40-64: CPT | Mod: S$GLB,,, | Performed by: FAMILY MEDICINE

## 2020-12-03 PROCEDURE — 3075F SYST BP GE 130 - 139MM HG: CPT | Mod: CPTII,S$GLB,, | Performed by: UROLOGY

## 2020-12-03 PROCEDURE — 3075F PR MOST RECENT SYSTOLIC BLOOD PRESS GE 130-139MM HG: ICD-10-PCS | Mod: CPTII,S$GLB,, | Performed by: FAMILY MEDICINE

## 2020-12-03 PROCEDURE — 3079F PR MOST RECENT DIASTOLIC BLOOD PRESSURE 80-89 MM HG: ICD-10-PCS | Mod: CPTII,S$GLB,, | Performed by: FAMILY MEDICINE

## 2020-12-03 PROCEDURE — 99999 PR PBB SHADOW E&M-EST. PATIENT-LVL III: ICD-10-PCS | Mod: PBBFAC,,, | Performed by: UROLOGY

## 2020-12-03 NOTE — PROGRESS NOTES
Monica Johnson  12/03/2020  [id number]    Neto Kearns MD  Patient Care Team:  Neto Kearns MD as PCP - General (Cardiology)  Barbara Moore LPN as Care Coordinator (Internal Medicine)  Kenya Escoto MD (Family Medicine)  Elisa Chao RD as Dietitian (Nutrition)  Has the patient seen any provider outside of the [facility] network since the last visit? (no). If yes, HIPPA forms completed and records requested.        Visit Type:a scheduled routine follow-up visit    Chief Complaint:  Chief Complaint   Patient presents with    Annual Exam     patient said he felt groggy so he went to the restroom and passed out in the restroom for about two hours he thought it was 10 mins. He said his sugars haven't been right. He said he hasn't eatting like he should as far as actually meals while he is at work       History of Present Illness:  Annual  Cards is Dr. Kearns. Saw him for Chest pain/dizziness and syncope. He has stress test.He reports negative. He is on his BP meds, and taking chol meds.    He had another episode of the passing out.   He is reports that he went into the bathroom, and he reports had some dizziness, and then he passed out again.  We feel that its more due to hypoglycemia.     He has Sleep apnea  He wants a CPAP machine.  He had a study done.     He has history of elevated liver function.  US liver 2018 mild hepatomegaly  Hep Panel neg  Saw GI, [facility] for consult, Fatty liver    Saw Urology today  PSA ordered  [facility] Prostate Cancer    He is still smoking.    Answers for HPI/ROS submitted by the patient on 12/1/2020   activity change: No  unexpected weight change: No  rhinorrhea: No  trouble swallowing: No  visual disturbance: No  chest tightness: No  polyuria: No  difficulty urinating: No  joint swelling: Yes  arthralgias: Yes  confusion: No  dysphoric mood: No        History:  Past Medical History:   Diagnosis Date    ADHD (attention deficit hyperactivity disorder)     Allergy      Anxiety 1/25/2016    Arthritis 2014    Osteoarthritis    Depression 1/25/2016    Hypertension     Sciatica of left side 1/31/2017     Past Surgical History:   Procedure Laterality Date    CALCANEAL OSTEOTOMY Left 4/16/2019    Procedure: OSTEOTOMY, CALCANEUS;  Surgeon: Rober Colon DPM;  Location: [facility] OR;  Service: Podiatry;  Laterality: Left;    CIRCUMCISION      FOOT SURGERY      INJECTION OF ANESTHETIC AGENT INTO SACROILIAC JOINT Right 11/24/2020    Procedure: Right BLOCK, SACROILIAC JOINT and Right Knee Injection with RN IV sedation;  Surgeon: Ivan Davey MD;  Location: [facility] PAIN MGT;  Service: Pain Management;  Laterality: Right;    LENGTHENING OF ACHILLES TENDON Left 4/16/2019    Procedure: LENGTHENING, TENDON, ACHILLES;  Surgeon: Rober Colon DPM;  Location: [facility] OR;  Service: Podiatry;  Laterality: Left;    REPAIR OF POSTERIOR TIBIALIS TENDON Left 4/16/2019    Procedure: REPAIR, TENDON, TIBIALIS POSTERIOR;  Surgeon: Rober Colon DPM;  Location: [facility] OR;  Service: Podiatry;  Laterality: Left;    TENDON TRANSFER Left 4/16/2019    Procedure: TRANSFER, TENDON;  Surgeon: Rober Colon DPM;  Location: [facility] OR;  Service: Podiatry;  Laterality: Left;     Family History   Problem Relation Age of Onset    Cancer Mother     Hypertension Mother     Hypertension Maternal Grandmother      Social History     Socioeconomic History    Marital status:      Spouse name: Not on file    Number of children: Not on file    Years of education: Not on file    Highest education level: Not on file   Occupational History    Not on file   Social Needs    Financial resource strain: Not very hard    Food insecurity     Worry: Never true     Inability: Never true    Transportation needs     Medical: No     Non-medical: No   Tobacco Use    Smoking status: Current Some Day Smoker     Packs/day: 0.25     Types: Cigarettes, Cigars   Substance and Sexual Activity    Alcohol use: Yes     Frequency:  Monthly or less     Drinks per session: 1 or 2     Binge frequency: Never     Comment: socially    Drug use: Never     Types: Marijuana    Sexual activity: Yes     Partners: Female   Lifestyle    Physical activity     Days per week: 7 days     Minutes per session: 60 min    Stress: Only a little   Relationships    Social connections     Talks on phone: Once a week     Gets together: Once a week     Attends Mandaen service: Not on file     Active member of club or organization: No     Attends meetings of clubs or organizations: Never     Relationship status:    Other Topics Concern    Not on file   Social History Narrative    ** Merged History Encounter **          Patient Active Problem List   Diagnosis    Depression    Intermittent explosive disorder    Sciatica of left side    Plantar fasciitis    Osteoarthritis    Essential hypertension    Elevated liver function tests    Acute medial meniscus tear of right knee    Chondromalacia of right knee    Fatty liver    Tobacco use disorder    Posterior tibial tendon dysfunction (PTTD) of left lower extremity    Non-ischemic cardiomyopathy    Coronary artery disease involving native coronary artery of native heart without angina pectoris    Unspecified inflammatory spondylopathy, lumbar region    Sacroiliac joint dysfunction     Review of patient's allergies indicates:  No Known Allergies    The following were reviewed at this visit: active problem list, medication list, allergies, family history, social history, and health maintenance.    Medications:  Current Outpatient Medications on File Prior to Visit   Medication Sig Dispense Refill    atorvastatin (LIPITOR) 20 MG tablet       isosorbide mononitrate (IMDUR) 30 MG 24 hr tablet       lisinopriL (PRINIVIL,ZESTRIL) 5 MG tablet       methocarbamoL (ROBAXIN) 750 MG Tab Take 1 tablet (750 mg total) by mouth 3 (three) times daily as needed (muscle spasm (may cause drowsiness)). 90 tablet 0     metoprolol succinate (TOPROL XL) 25 MG 24 hr tablet       multivitamin capsule Take 1 capsule by mouth once daily.      nicotine, polacrilex, (NICORETTE) 2 mg Gum       sertraline (ZOLOFT) 50 MG tablet Take 1 tablet (50 mg total) by mouth once daily. 30 tablet 11    [DISCONTINUED] sulfamethoxazole-trimethoprim 800-160mg (BACTRIM DS) 800-160 mg Tab Take 1 tablet by mouth 2 (two) times daily. 20 tablet 0     No current facility-administered medications on file prior to visit.        Medications have been reviewed and reconciled with patient at this visit.  Barriers to medications present (no)    Adverse reactions to current medications (yes)    Over the counter medications reviewed (No ), and if needed added to active Medication list at this visit.     Exam:  Wt Readings from Last 3 Encounters:   12/03/20 97.6 kg (215 lb 0.9 oz)   12/03/20 97.3 kg (214 lb 8.1 oz)   11/24/20 97.3 kg (214 lb 9.9 oz)     Temp Readings from Last 3 Encounters:   12/03/20 98.8 °F (37.1 °C) (Temporal)   11/24/20 98.2 °F (36.8 °C) (Temporal)   09/28/20 98.6 °F (37 °C) (Tympanic)     BP Readings from Last 3 Encounters:   12/03/20 134/84   12/03/20 138/84   11/24/20 139/83     Pulse Readings from Last 3 Encounters:   12/03/20 87   11/24/20 62   09/28/20 83     Body mass index is 29.17 kg/m².      Review of Systems   HENT: Negative for hearing loss.    Eyes: Negative for discharge.   Respiratory: Negative for wheezing.    Cardiovascular: Negative for chest pain and palpitations.   Gastrointestinal: Negative for blood in stool, constipation, diarrhea and vomiting.   Genitourinary: Negative for hematuria and urgency.   Musculoskeletal: Negative for neck pain.   Neurological: Negative for weakness and headaches.   Endo/Heme/Allergies: Negative for polydipsia.     Physical Exam  Vitals signs and nursing note reviewed.   Constitutional:       General: He is not in acute distress.     Appearance: He is well-developed. He is not diaphoretic.    HENT:      Head: Normocephalic and atraumatic.      Right Ear: External ear normal.      Left Ear: External ear normal.      Nose: Nose normal.      Mouth/Throat:      Pharynx: No oropharyngeal exudate.   Eyes:      General:         Right eye: No discharge.         Left eye: No discharge.      Conjunctiva/sclera: Conjunctivae normal.      Pupils: Pupils are equal, round, and reactive to light.   Neck:      Musculoskeletal: Normal range of motion and neck supple.      Thyroid: No thyromegaly.   Cardiovascular:      Rate and Rhythm: Normal rate and regular rhythm.      Heart sounds: Normal heart sounds. No murmur.   Pulmonary:      Effort: Pulmonary effort is normal. No respiratory distress.      Breath sounds: Normal breath sounds. No wheezing.   Abdominal:      General: Bowel sounds are normal. There is no distension.      Palpations: Abdomen is soft. There is no mass.      Tenderness: There is no abdominal tenderness.   Musculoskeletal: Normal range of motion.   Lymphadenopathy:      Cervical: No cervical adenopathy.   Skin:     Capillary Refill: Capillary refill takes less than 2 seconds.   Neurological:      Mental Status: He is alert and oriented to person, place, and time.      Cranial Nerves: No cranial nerve deficit.   Psychiatric:         Behavior: Behavior normal.         Thought Content: Thought content normal.         Judgment: Judgment normal.         Laboratory Reviewed ({N/A)  Lab Results   Component Value Date    WBC 10.54 01/08/2020    HGB 14.8 01/08/2020    HCT 45.8 01/08/2020     01/08/2020    CHOL 177 05/07/2018    TRIG 149 05/07/2018    HDL 38 (L) 05/07/2018    ALT 75 (H) 01/08/2020    AST 67 (H) 01/08/2020     01/08/2020    K 3.4 (L) 01/08/2020     01/08/2020    CREATININE 1.1 01/08/2020    BUN 10 01/08/2020    CO2 23 01/08/2020    TSH 0.563 08/28/2018    PSA 1.4 04/08/2019    INR 0.9 08/28/2018    HGBA1C 5.6 01/09/2020       Monica was seen today for annual exam.    Diagnoses  and all orders for this visit:    Annual physical exam  -     Comprehensive Metabolic Panel; Future  -     Lipid Panel; Future    Fatty liver  Elevated liver function tests   Check CMP    Hypoglycemia  -     Hemoglobin A1C; Future      Small meals- prevent hypogylcemia  Decline flu            Care Plan/Goals: Reviewed  (N/A)  Goals    None         Follow up: No follow-ups on file.    After visit summary was printed and given to patient upon discharge today.  Patient goals and care plan are included in After Visit Summary.

## 2020-12-03 NOTE — PROGRESS NOTES
Chief Complaint: Prostate Cancer Screening    HPI:   12/3/20: Doing fine overall. PSA not done yet.  4/8/19: 44 yo man has a father dx with prostate cancer in 60s and he is here to get checked.  No abd/pelvic pain and no exac/rel factors.  No hematuria.  No urolithiasis.  No urinary bother.  No  history.  Normal sexual function.    Allergies:  Patient has no known allergies.    Medications:  has a current medication list which includes the following prescription(s): atorvastatin, isosorbide mononitrate, lisinopril, methocarbamol, metoprolol succinate, multivitamin, nicotine (polacrilex), and sertraline.    Review of Systems:  General: No fever, chills, fatigability, or weight loss.  Skin: No rashes, itching, or changes in color or texture of skin.  Chest: Denies LOOMIS, cyanosis, wheezing, cough, and sputum production.  Abdomen: Appetite fine. No weight loss. Denies diarrhea, abdominal pain, hematemesis, or blood in stool.  Musculoskeletal: No joint stiffness or swelling. Denies back pain.  : As above.  All other review of systems negative.    PMH:   has a past medical history of ADHD (attention deficit hyperactivity disorder), Allergy, Anxiety (1/25/2016), Arthritis (2014), Depression (1/25/2016), Hypertension, and Sciatica of left side (1/31/2017).    PSH:   has a past surgical history that includes Circumcision; Calcaneal osteotomy (Left, 4/16/2019); Tendon transfer (Left, 4/16/2019); Lengthening of Achilles tendon (Left, 4/16/2019); Repair of posterior tibialis tendon (Left, 4/16/2019); Foot surgery; and Injection of anesthetic agent into sacroiliac joint (Right, 11/24/2020).    FamHx: family history includes Cancer in his mother; Hypertension in his maternal grandmother and mother.    SocHx:  reports that he has been smoking cigarettes and cigars. He has been smoking about 0.25 packs per day. He does not have any smokeless tobacco history on file. He reports current alcohol use. He reports that he does not use  drugs.      Physical Exam:  Vitals:    12/03/20 1125   BP: 138/84     General: A&Ox3, no apparent distress, no deformities  Neck: No masses, normal thyroid  Lungs: normal inspiration, no use of accessory muscles  Heart: normal pulse, no arrhythmias  Abdomen: Soft, NT, ND  Skin: The skin is warm and dry. No jaundice.  Ext: No c/c/e.  :   4/19: Test desc funmi, no abnormalities of epididymus. Penis normal, with normal penile and scrotal skin. Meatus normal. Normal rectal tone, no hemorrhoids. Prost 40 gm no nodules or masses appreciated. SV not palpable. Perineum and anus normal.    Labs/Studies: Urinalysis performed in clinic, summary: UA normal    Impression/Plan:   1. Low risk of prostate cancer.   PSA today and in a year.

## 2020-12-22 ENCOUNTER — OFFICE VISIT (OUTPATIENT)
Dept: PAIN MEDICINE | Facility: CLINIC | Age: 45
End: 2020-12-22
Payer: COMMERCIAL

## 2020-12-22 VITALS
BODY MASS INDEX: 29.26 KG/M2 | HEART RATE: 74 BPM | DIASTOLIC BLOOD PRESSURE: 85 MMHG | SYSTOLIC BLOOD PRESSURE: 133 MMHG | HEIGHT: 72 IN | WEIGHT: 216.06 LBS

## 2020-12-22 DIAGNOSIS — M53.3 SACROILIAC JOINT PAIN: ICD-10-CM

## 2020-12-22 DIAGNOSIS — G89.29 CHRONIC PAIN OF RIGHT KNEE: Primary | ICD-10-CM

## 2020-12-22 DIAGNOSIS — M47.816 LUMBAR SPONDYLOSIS: ICD-10-CM

## 2020-12-22 DIAGNOSIS — M25.561 CHRONIC PAIN OF RIGHT KNEE: Primary | ICD-10-CM

## 2020-12-22 PROCEDURE — 1125F PR PAIN SEVERITY QUANTIFIED, PAIN PRESENT: ICD-10-PCS | Mod: S$GLB,,, | Performed by: ANESTHESIOLOGY

## 2020-12-22 PROCEDURE — 3008F PR BODY MASS INDEX (BMI) DOCUMENTED: ICD-10-PCS | Mod: CPTII,S$GLB,, | Performed by: ANESTHESIOLOGY

## 2020-12-22 PROCEDURE — 3079F PR MOST RECENT DIASTOLIC BLOOD PRESSURE 80-89 MM HG: ICD-10-PCS | Mod: CPTII,S$GLB,, | Performed by: ANESTHESIOLOGY

## 2020-12-22 PROCEDURE — 99999 PR PBB SHADOW E&M-EST. PATIENT-LVL IV: ICD-10-PCS | Mod: PBBFAC,,, | Performed by: ANESTHESIOLOGY

## 2020-12-22 PROCEDURE — 1125F AMNT PAIN NOTED PAIN PRSNT: CPT | Mod: S$GLB,,, | Performed by: ANESTHESIOLOGY

## 2020-12-22 PROCEDURE — 3008F BODY MASS INDEX DOCD: CPT | Mod: CPTII,S$GLB,, | Performed by: ANESTHESIOLOGY

## 2020-12-22 PROCEDURE — 99213 OFFICE O/P EST LOW 20 MIN: CPT | Mod: S$GLB,,, | Performed by: ANESTHESIOLOGY

## 2020-12-22 PROCEDURE — 99999 PR PBB SHADOW E&M-EST. PATIENT-LVL IV: CPT | Mod: PBBFAC,,, | Performed by: ANESTHESIOLOGY

## 2020-12-22 PROCEDURE — 3075F PR MOST RECENT SYSTOLIC BLOOD PRESS GE 130-139MM HG: ICD-10-PCS | Mod: CPTII,S$GLB,, | Performed by: ANESTHESIOLOGY

## 2020-12-22 PROCEDURE — 3079F DIAST BP 80-89 MM HG: CPT | Mod: CPTII,S$GLB,, | Performed by: ANESTHESIOLOGY

## 2020-12-22 PROCEDURE — 3075F SYST BP GE 130 - 139MM HG: CPT | Mod: CPTII,S$GLB,, | Performed by: ANESTHESIOLOGY

## 2020-12-22 PROCEDURE — 99213 PR OFFICE/OUTPT VISIT, EST, LEVL III, 20-29 MIN: ICD-10-PCS | Mod: S$GLB,,, | Performed by: ANESTHESIOLOGY

## 2020-12-22 NOTE — PROGRESS NOTES
Chief Pain Complaint:  Lumbar Back Pain  Right Knee Pain    History of Present Illness:   Monica Johnson is a 45 y.o. male  who is presenting with a chief complaint of Lumbar Back Pain. The patient began experiencing this problem insidiously, and the pain has been gradually worsening over the past 3 year(s). The pain is described as throbbing, cramping, aching and heavy and is located in the bilateral lumbar spine. Pain is intermittent and lasts hours. The  pain is nonradiating. The patient rates his pain a 3 out of ten and interferes with activities of daily living a 5 out of ten. Pain is exacerbated by extension of the lumbar spine, and is improved by rest. Patient reports no prior trauma, no prior spinal surgery     Monica Johnson is a 45 y.o. male  who is presenting with a chief complaint of right buttock pain. The patient began experiencing this problem insidiously, and the pain has been gradually worsening over the past 3 month(s). The pain is described as throbbing, cramping, aching and heavy and is located in the right buttock . Pain is intermittent and lasts hours. The  pain is nonradiating. The patient rates his pain a 8 out of ten and interferes with activities of daily living a 7 out of ten. Pain is exacerbated by getting up from a seated position, and is improved by rest. Patient reports no prior trauma, no prior spinal surgery       - pertinent negatives: No fever, No chills, No weight loss, No bladder dysfunction, No bowel dysfunction, No saddle anesthesia  - pertinent positives: none    - medications, other therapies tried (physical therapy, injections):     >> NSAIDs, Tylenol, gabapentin and flexeril    >> Has previously undergone Physical Therapy    >> Has previously undergone spinal injection/s         Right SI joint and Right Knee injection on 11/25/2020 with 50% relief.     Imaging / Labs / Studies (reviewed on 12/22/2020):    Review of Systems:  CONSTITUTIONAL: patient denies any fever,  chills, or weight loss  SKIN: patient denies any rash or itching  RESPIRATORY: patient denies having any shortness of breath  GASTROINTESTINAL: patient denies having any diarrhea, constipation, or bowel incontinence  GENITOURINARY: patient denies having any abnormal bladder function    MUSCULOSKELETAL:  - patient complains of the above noted pain/s (see chief pain complaint)    NEUROLOGICAL:   - pain as above  - strength in Lower extremities is intact, BILATERALLY  - sensation in Lower extremities is intact, BILATERALLY  - patient denies any loss of bowel or bladder control      PSYCHIATRIC: patient denies any change in mood    Other:  All other systems reviewed and are negative      Physical Exam:  /85 (BP Location: Left arm, Patient Position: Sitting, BP Method: Large (Automatic))   Pulse 74   Ht 6' (1.829 m)   Wt 98 kg (216 lb 0.8 oz)   BMI 29.30 kg/m²  (reviewed on 12/22/2020)  General: Alert and oriented, in no apparent distress.  Gait: normal gait.  Skin: No rashes, No discoloration, No obvious lesions  HEENT: Normocephalic, atraumatic. Pupils equal and round.  Cardiovascular: Regular rate and rhythm , no significant peripheral edema present  Respiratory: Without audible wheezing, without use of accessory muscles of respiration.    Musculoskeletal:    Cervical Spine    - Pain on flexion of cervical spine Absent  - Spurling's Test:  Absent    - Pain on extension of cervical spine Absent  - TTP over the cervical facet joints Absent  - Cervical facet loading Absent  -TTP over bilateral Rhomboids    Lumbar Spine    - Pain on flexion of lumbar spine Absent  - Straight Leg Raise:  Absent    - Pain on extension of lumbar spine Present  - TTP over the lumbar facet joints Present Bilateral L5-S1  - Lumbar facet loading Present    -Pain on palpation over the SI joint  Present on right   - VENICE: present     Neuro:    Strength:  LE R/L: HF: 5/5, HE: 5/5, KF: 5/5; KE: 5/5; FE: 5/5; FF: 5/5    Extremity Reflexes:  Brisk and symmetric throughout.      Extremity Sensory: Sensation to pinprick and temperature symmetric. Proprioception intact.      Psych:  Mood and affect is appropriate      Assessment:    Monica Johnson is a 45 y.o. year old male who is presenting with   Encounter Diagnoses   Name Primary?    Chronic pain of right knee Yes    Sacroiliac joint pain     Lumbar spondylosis        Plan:    1. Interventional: None for now.       S/p Right SI joint and Right Knee injection on 11/25/2020 with 50% relief.     2. Pharmacologic: Tylenol, NSAID's PRN. Robaxin 750 mg PO BID PRN.     3. Rehabilitative: Encouraged PT but patient not interested at this time.    4. Diagnostic: Lumbar Xray reviewed.    5. Consult: Ortho for Synvisc injection.     5. Follow up: Post Inejction.     20 minutes were spent in this encounter with more than 50% of the time used for counseling and review of the plan.  Imaging / studies reviewed, detailed above.  I discussed in detail the risks, benefits, and alternatives to any and all potential treatment options.  All questions and concerns were fully addressed today in clinic. Medical decision making moderate.    Thank you for the opportunity to assist in the care of this patient.    Best wishes,    Signed:    Ivan Davey MD          Disclaimer:  This note may have been prepared using voice recognition software, it may have not been extensively proofed, as such there could be errors within the text such as sound alike errors.

## 2020-12-26 ENCOUNTER — PATIENT MESSAGE (OUTPATIENT)
Dept: PAIN MEDICINE | Facility: CLINIC | Age: 45
End: 2020-12-26

## 2020-12-28 DIAGNOSIS — M25.561 PAIN IN BOTH KNEES, UNSPECIFIED CHRONICITY: Primary | ICD-10-CM

## 2020-12-28 DIAGNOSIS — M25.562 PAIN IN BOTH KNEES, UNSPECIFIED CHRONICITY: Primary | ICD-10-CM

## 2020-12-28 RX ORDER — METHOCARBAMOL 750 MG/1
750 TABLET, FILM COATED ORAL 3 TIMES DAILY PRN
Qty: 90 TABLET | Refills: 0 | Status: SHIPPED | OUTPATIENT
Start: 2020-12-28 | End: 2021-01-27

## 2021-01-05 ENCOUNTER — OFFICE VISIT (OUTPATIENT)
Dept: ORTHOPEDICS | Facility: CLINIC | Age: 46
End: 2021-01-05
Payer: COMMERCIAL

## 2021-01-05 ENCOUNTER — HOSPITAL ENCOUNTER (OUTPATIENT)
Dept: RADIOLOGY | Facility: HOSPITAL | Age: 46
Discharge: HOME | End: 2021-01-05
Attending: PHYSICIAN ASSISTANT
Payer: COMMERCIAL

## 2021-01-05 DIAGNOSIS — M25.561 PAIN IN BOTH KNEES, UNSPECIFIED CHRONICITY: ICD-10-CM

## 2021-01-05 DIAGNOSIS — M25.562 PAIN IN BOTH KNEES, UNSPECIFIED CHRONICITY: ICD-10-CM

## 2021-01-05 DIAGNOSIS — M23.91 INTERNAL DERANGEMENT OF MULTIPLE SITES OF RIGHT KNEE: Primary | ICD-10-CM

## 2021-01-05 DIAGNOSIS — G89.29 CHRONIC PAIN OF RIGHT KNEE: ICD-10-CM

## 2021-01-05 DIAGNOSIS — M25.561 CHRONIC PAIN OF RIGHT KNEE: ICD-10-CM

## 2021-01-05 PROCEDURE — 73564 X-RAY EXAM KNEE 4 OR MORE: CPT | Mod: 26,RT,, | Performed by: RADIOLOGY

## 2021-01-05 PROCEDURE — 99214 OFFICE O/P EST MOD 30 MIN: CPT | Mod: S$GLB,,, | Performed by: PHYSICIAN ASSISTANT

## 2021-01-05 PROCEDURE — 73564 X-RAY EXAM KNEE 4 OR MORE: CPT | Mod: TC,50

## 2021-01-05 PROCEDURE — 73564 X-RAY EXAM KNEE 4 OR MORE: CPT | Mod: 26,LT,, | Performed by: RADIOLOGY

## 2021-01-05 PROCEDURE — 99999 PR PBB SHADOW E&M-EST. PATIENT-LVL III: CPT | Mod: PBBFAC,,, | Performed by: PHYSICIAN ASSISTANT

## 2021-01-05 PROCEDURE — 99214 PR OFFICE/OUTPT VISIT, EST, LEVL IV, 30-39 MIN: ICD-10-PCS | Mod: S$GLB,,, | Performed by: PHYSICIAN ASSISTANT

## 2021-01-05 PROCEDURE — 99999 PR PBB SHADOW E&M-EST. PATIENT-LVL III: ICD-10-PCS | Mod: PBBFAC,,, | Performed by: PHYSICIAN ASSISTANT

## 2021-01-05 PROCEDURE — 73564 PR  X-RAY KNEE 4+ VIEW: ICD-10-PCS | Mod: 26,LT,, | Performed by: RADIOLOGY

## 2021-01-05 RX ORDER — IBUPROFEN 800 MG/1
800 TABLET ORAL 3 TIMES DAILY
COMMUNITY
Start: 2020-12-26 | End: 2021-01-21

## 2021-01-07 ENCOUNTER — PATIENT MESSAGE (OUTPATIENT)
Dept: ORTHOPEDICS | Facility: CLINIC | Age: 46
End: 2021-01-07

## 2021-01-07 ENCOUNTER — TELEPHONE (OUTPATIENT)
Dept: ORTHOPEDICS | Facility: CLINIC | Age: 46
End: 2021-01-07

## 2021-01-07 ENCOUNTER — HOSPITAL ENCOUNTER (OUTPATIENT)
Dept: RADIOLOGY | Facility: HOSPITAL | Age: 46
Discharge: HOME | End: 2021-01-07
Attending: PHYSICIAN ASSISTANT
Payer: COMMERCIAL

## 2021-01-07 DIAGNOSIS — M25.561 CHRONIC PAIN OF RIGHT KNEE: ICD-10-CM

## 2021-01-07 DIAGNOSIS — G89.29 CHRONIC PAIN OF RIGHT KNEE: ICD-10-CM

## 2021-01-07 DIAGNOSIS — M23.91 INTERNAL DERANGEMENT OF MULTIPLE SITES OF RIGHT KNEE: ICD-10-CM

## 2021-01-07 PROCEDURE — 73721 MRI JNT OF LWR EXTRE W/O DYE: CPT | Mod: TC,RT

## 2021-01-11 ENCOUNTER — OFFICE VISIT (OUTPATIENT)
Dept: ORTHOPEDICS | Facility: CLINIC | Age: 46
End: 2021-01-11
Payer: COMMERCIAL

## 2021-01-11 VITALS
DIASTOLIC BLOOD PRESSURE: 70 MMHG | HEIGHT: 72 IN | HEART RATE: 83 BPM | SYSTOLIC BLOOD PRESSURE: 115 MMHG | BODY MASS INDEX: 29.26 KG/M2 | WEIGHT: 216.06 LBS

## 2021-01-11 DIAGNOSIS — G89.29 CHRONIC PAIN OF RIGHT KNEE: Primary | ICD-10-CM

## 2021-01-11 DIAGNOSIS — M25.561 CHRONIC PAIN OF RIGHT KNEE: Primary | ICD-10-CM

## 2021-01-11 PROCEDURE — 3008F BODY MASS INDEX DOCD: CPT | Mod: CPTII,S$GLB,, | Performed by: PHYSICIAN ASSISTANT

## 2021-01-11 PROCEDURE — 1125F PR PAIN SEVERITY QUANTIFIED, PAIN PRESENT: ICD-10-PCS | Mod: S$GLB,,, | Performed by: PHYSICIAN ASSISTANT

## 2021-01-11 PROCEDURE — 3074F PR MOST RECENT SYSTOLIC BLOOD PRESSURE < 130 MM HG: ICD-10-PCS | Mod: CPTII,S$GLB,, | Performed by: PHYSICIAN ASSISTANT

## 2021-01-11 PROCEDURE — 3078F PR MOST RECENT DIASTOLIC BLOOD PRESSURE < 80 MM HG: ICD-10-PCS | Mod: CPTII,S$GLB,, | Performed by: PHYSICIAN ASSISTANT

## 2021-01-11 PROCEDURE — 99213 PR OFFICE/OUTPT VISIT, EST, LEVL III, 20-29 MIN: ICD-10-PCS | Mod: S$GLB,,, | Performed by: PHYSICIAN ASSISTANT

## 2021-01-11 PROCEDURE — 3074F SYST BP LT 130 MM HG: CPT | Mod: CPTII,S$GLB,, | Performed by: PHYSICIAN ASSISTANT

## 2021-01-11 PROCEDURE — 3078F DIAST BP <80 MM HG: CPT | Mod: CPTII,S$GLB,, | Performed by: PHYSICIAN ASSISTANT

## 2021-01-11 PROCEDURE — 99213 OFFICE O/P EST LOW 20 MIN: CPT | Mod: S$GLB,,, | Performed by: PHYSICIAN ASSISTANT

## 2021-01-11 PROCEDURE — 99999 PR PBB SHADOW E&M-EST. PATIENT-LVL IV: ICD-10-PCS | Mod: PBBFAC,,, | Performed by: PHYSICIAN ASSISTANT

## 2021-01-11 PROCEDURE — 1125F AMNT PAIN NOTED PAIN PRSNT: CPT | Mod: S$GLB,,, | Performed by: PHYSICIAN ASSISTANT

## 2021-01-11 PROCEDURE — 3008F PR BODY MASS INDEX (BMI) DOCUMENTED: ICD-10-PCS | Mod: CPTII,S$GLB,, | Performed by: PHYSICIAN ASSISTANT

## 2021-01-11 PROCEDURE — 99999 PR PBB SHADOW E&M-EST. PATIENT-LVL IV: CPT | Mod: PBBFAC,,, | Performed by: PHYSICIAN ASSISTANT

## 2021-01-11 RX ORDER — METHYLPREDNISOLONE 4 MG/1
TABLET ORAL
Qty: 1 PACKAGE | Refills: 0 | Status: SHIPPED | OUTPATIENT
Start: 2021-01-11 | End: 2021-02-11

## 2021-02-10 ENCOUNTER — PATIENT MESSAGE (OUTPATIENT)
Dept: RHEUMATOLOGY | Facility: CLINIC | Age: 46
End: 2021-02-10

## 2021-02-10 ENCOUNTER — TELEPHONE (OUTPATIENT)
Dept: RHEUMATOLOGY | Facility: CLINIC | Age: 46
End: 2021-02-10

## 2021-02-11 ENCOUNTER — HOSPITAL ENCOUNTER (OUTPATIENT)
Dept: RADIOLOGY | Facility: HOSPITAL | Age: 46
Discharge: HOME | End: 2021-02-11
Attending: PODIATRIST
Payer: COMMERCIAL

## 2021-02-11 ENCOUNTER — PATIENT MESSAGE (OUTPATIENT)
Dept: PODIATRY | Facility: CLINIC | Age: 46
End: 2021-02-11

## 2021-02-11 ENCOUNTER — OFFICE VISIT (OUTPATIENT)
Dept: PODIATRY | Facility: CLINIC | Age: 46
End: 2021-02-11
Payer: COMMERCIAL

## 2021-02-11 ENCOUNTER — OFFICE VISIT (OUTPATIENT)
Dept: RHEUMATOLOGY | Facility: CLINIC | Age: 46
End: 2021-02-11
Payer: COMMERCIAL

## 2021-02-11 ENCOUNTER — HOSPITAL ENCOUNTER (OUTPATIENT)
Dept: CARDIOLOGY | Facility: HOSPITAL | Age: 46
Discharge: HOME | End: 2021-02-11
Attending: PODIATRIST
Payer: COMMERCIAL

## 2021-02-11 VITALS — HEIGHT: 72 IN | WEIGHT: 218.5 LBS | BODY MASS INDEX: 29.59 KG/M2

## 2021-02-11 VITALS
WEIGHT: 218.5 LBS | HEIGHT: 72 IN | HEART RATE: 68 BPM | DIASTOLIC BLOOD PRESSURE: 75 MMHG | BODY MASS INDEX: 29.59 KG/M2 | SYSTOLIC BLOOD PRESSURE: 119 MMHG

## 2021-02-11 DIAGNOSIS — I42.8 NON-ISCHEMIC CARDIOMYOPATHY: ICD-10-CM

## 2021-02-11 DIAGNOSIS — Z01.818 PREOP TESTING: ICD-10-CM

## 2021-02-11 DIAGNOSIS — G89.29 CHRONIC LOW BACK PAIN, UNSPECIFIED BACK PAIN LATERALITY, UNSPECIFIED WHETHER SCIATICA PRESENT: Primary | ICD-10-CM

## 2021-02-11 DIAGNOSIS — M53.3 SACROCOCCYGEAL DISORDERS, NOT ELSEWHERE CLASSIFIED: ICD-10-CM

## 2021-02-11 DIAGNOSIS — M76.821 TIBIAL TENDINITIS, POSTERIOR, RIGHT: Primary | ICD-10-CM

## 2021-02-11 DIAGNOSIS — M54.50 CHRONIC LOW BACK PAIN, UNSPECIFIED BACK PAIN LATERALITY, UNSPECIFIED WHETHER SCIATICA PRESENT: Primary | ICD-10-CM

## 2021-02-11 DIAGNOSIS — M25.572 PAIN IN LEFT ANKLE AND JOINTS OF LEFT FOOT: ICD-10-CM

## 2021-02-11 DIAGNOSIS — F17.200 TOBACCO USE DISORDER: ICD-10-CM

## 2021-02-11 DIAGNOSIS — M76.822 POSTERIOR TIBIAL TENDON DYSFUNCTION (PTTD) OF LEFT LOWER EXTREMITY: ICD-10-CM

## 2021-02-11 DIAGNOSIS — I25.10 CORONARY ARTERY DISEASE INVOLVING NATIVE CORONARY ARTERY OF NATIVE HEART WITHOUT ANGINA PECTORIS: ICD-10-CM

## 2021-02-11 DIAGNOSIS — Z98.890 HISTORY OF FOOT SURGERY: ICD-10-CM

## 2021-02-11 DIAGNOSIS — Z98.890 HISTORY OF ANKLE SURGERY: ICD-10-CM

## 2021-02-11 DIAGNOSIS — Z71.89 COUNSELING ON HEALTH PROMOTION AND DISEASE PREVENTION: ICD-10-CM

## 2021-02-11 PROCEDURE — 1125F PR PAIN SEVERITY QUANTIFIED, PAIN PRESENT: ICD-10-PCS | Mod: S$GLB,,, | Performed by: PODIATRIST

## 2021-02-11 PROCEDURE — 99214 PR OFFICE/OUTPT VISIT, EST, LEVL IV, 30-39 MIN: ICD-10-PCS | Mod: S$GLB,,, | Performed by: PODIATRIST

## 2021-02-11 PROCEDURE — 93010 EKG 12-LEAD: ICD-10-PCS | Mod: ,,, | Performed by: INTERNAL MEDICINE

## 2021-02-11 PROCEDURE — 3074F PR MOST RECENT SYSTOLIC BLOOD PRESSURE < 130 MM HG: ICD-10-PCS | Mod: CPTII,S$GLB,, | Performed by: PODIATRIST

## 2021-02-11 PROCEDURE — 3078F PR MOST RECENT DIASTOLIC BLOOD PRESSURE < 80 MM HG: ICD-10-PCS | Mod: CPTII,S$GLB,, | Performed by: PODIATRIST

## 2021-02-11 PROCEDURE — 3078F PR MOST RECENT DIASTOLIC BLOOD PRESSURE < 80 MM HG: ICD-10-PCS | Mod: CPTII,S$GLB,, | Performed by: INTERNAL MEDICINE

## 2021-02-11 PROCEDURE — 71046 XR CHEST PA AND LATERAL: ICD-10-PCS | Mod: 26,,, | Performed by: RADIOLOGY

## 2021-02-11 PROCEDURE — 3008F BODY MASS INDEX DOCD: CPT | Mod: CPTII,S$GLB,, | Performed by: PODIATRIST

## 2021-02-11 PROCEDURE — 99214 PR OFFICE/OUTPT VISIT, EST, LEVL IV, 30-39 MIN: ICD-10-PCS | Mod: S$GLB,,, | Performed by: INTERNAL MEDICINE

## 2021-02-11 PROCEDURE — 3078F DIAST BP <80 MM HG: CPT | Mod: CPTII,S$GLB,, | Performed by: INTERNAL MEDICINE

## 2021-02-11 PROCEDURE — 1125F AMNT PAIN NOTED PAIN PRSNT: CPT | Mod: S$GLB,,, | Performed by: PODIATRIST

## 2021-02-11 PROCEDURE — 93010 ELECTROCARDIOGRAM REPORT: CPT | Mod: ,,, | Performed by: INTERNAL MEDICINE

## 2021-02-11 PROCEDURE — 3008F BODY MASS INDEX DOCD: CPT | Mod: CPTII,S$GLB,, | Performed by: INTERNAL MEDICINE

## 2021-02-11 PROCEDURE — 99999 PR PBB SHADOW E&M-EST. PATIENT-LVL IV: CPT | Mod: PBBFAC,,, | Performed by: PODIATRIST

## 2021-02-11 PROCEDURE — 99999 PR PBB SHADOW E&M-EST. PATIENT-LVL III: CPT | Mod: PBBFAC,,, | Performed by: INTERNAL MEDICINE

## 2021-02-11 PROCEDURE — 3074F SYST BP LT 130 MM HG: CPT | Mod: CPTII,S$GLB,, | Performed by: PODIATRIST

## 2021-02-11 PROCEDURE — 1125F AMNT PAIN NOTED PAIN PRSNT: CPT | Mod: S$GLB,,, | Performed by: INTERNAL MEDICINE

## 2021-02-11 PROCEDURE — 71046 X-RAY EXAM CHEST 2 VIEWS: CPT | Mod: TC

## 2021-02-11 PROCEDURE — 99214 OFFICE O/P EST MOD 30 MIN: CPT | Mod: S$GLB,,, | Performed by: PODIATRIST

## 2021-02-11 PROCEDURE — 93005 ELECTROCARDIOGRAM TRACING: CPT

## 2021-02-11 PROCEDURE — 3008F PR BODY MASS INDEX (BMI) DOCUMENTED: ICD-10-PCS | Mod: CPTII,S$GLB,, | Performed by: INTERNAL MEDICINE

## 2021-02-11 PROCEDURE — 99214 OFFICE O/P EST MOD 30 MIN: CPT | Mod: S$GLB,,, | Performed by: INTERNAL MEDICINE

## 2021-02-11 PROCEDURE — 1125F PR PAIN SEVERITY QUANTIFIED, PAIN PRESENT: ICD-10-PCS | Mod: S$GLB,,, | Performed by: INTERNAL MEDICINE

## 2021-02-11 PROCEDURE — 3008F PR BODY MASS INDEX (BMI) DOCUMENTED: ICD-10-PCS | Mod: CPTII,S$GLB,, | Performed by: PODIATRIST

## 2021-02-11 PROCEDURE — 3074F PR MOST RECENT SYSTOLIC BLOOD PRESSURE < 130 MM HG: ICD-10-PCS | Mod: CPTII,S$GLB,, | Performed by: INTERNAL MEDICINE

## 2021-02-11 PROCEDURE — 3074F SYST BP LT 130 MM HG: CPT | Mod: CPTII,S$GLB,, | Performed by: INTERNAL MEDICINE

## 2021-02-11 PROCEDURE — 99999 PR PBB SHADOW E&M-EST. PATIENT-LVL IV: ICD-10-PCS | Mod: PBBFAC,,, | Performed by: PODIATRIST

## 2021-02-11 PROCEDURE — 71046 X-RAY EXAM CHEST 2 VIEWS: CPT | Mod: 26,,, | Performed by: RADIOLOGY

## 2021-02-11 PROCEDURE — 99999 PR PBB SHADOW E&M-EST. PATIENT-LVL III: ICD-10-PCS | Mod: PBBFAC,,, | Performed by: INTERNAL MEDICINE

## 2021-02-11 PROCEDURE — 3078F DIAST BP <80 MM HG: CPT | Mod: CPTII,S$GLB,, | Performed by: PODIATRIST

## 2021-02-11 RX ORDER — ACETAMINOPHEN 500 MG
5000 TABLET ORAL DAILY
COMMUNITY

## 2021-02-11 RX ORDER — CETIRIZINE HYDROCHLORIDE 10 MG/1
10 TABLET ORAL DAILY
COMMUNITY

## 2021-02-11 RX ORDER — METHOCARBAMOL 750 MG/1
500 TABLET, FILM COATED ORAL 3 TIMES DAILY
COMMUNITY
End: 2021-03-12

## 2021-02-14 ENCOUNTER — LAB VISIT (OUTPATIENT)
Dept: OTOLARYNGOLOGY | Facility: CLINIC | Age: 46
End: 2021-02-14
Payer: COMMERCIAL

## 2021-02-14 DIAGNOSIS — Z01.818 PREOP TESTING: ICD-10-CM

## 2021-02-14 LAB — SARS-COV-2 RNA RESP QL NAA+PROBE: NOT DETECTED

## 2021-02-14 PROCEDURE — U0003 INFECTIOUS AGENT DETECTION BY NUCLEIC ACID (DNA OR RNA); SEVERE ACUTE RESPIRATORY SYNDROME CORONAVIRUS 2 (SARS-COV-2) (CORONAVIRUS DISEASE [COVID-19]), AMPLIFIED PROBE TECHNIQUE, MAKING USE OF HIGH THROUGHPUT TECHNOLOGIES AS DESCRIBED BY CMS-2020-01-R: HCPCS

## 2021-02-14 PROCEDURE — U0005 INFEC AGEN DETEC AMPLI PROBE: HCPCS

## 2021-02-16 ENCOUNTER — PATIENT MESSAGE (OUTPATIENT)
Dept: SURGERY | Facility: HOSPITAL | Age: 46
End: 2021-02-16

## 2021-02-17 ENCOUNTER — ANESTHESIA EVENT (OUTPATIENT)
Dept: SURGERY | Facility: HOSPITAL | Age: 46
End: 2021-02-17
Payer: COMMERCIAL

## 2021-02-17 ENCOUNTER — HOSPITAL ENCOUNTER (OUTPATIENT)
Facility: HOSPITAL | Age: 46
Discharge: HOME | End: 2021-02-17
Attending: PODIATRIST | Admitting: PODIATRIST
Payer: COMMERCIAL

## 2021-02-17 ENCOUNTER — PATIENT MESSAGE (OUTPATIENT)
Dept: SURGERY | Facility: HOSPITAL | Age: 46
End: 2021-02-17

## 2021-02-17 ENCOUNTER — ANESTHESIA (OUTPATIENT)
Dept: SURGERY | Facility: HOSPITAL | Age: 46
End: 2021-02-17
Payer: COMMERCIAL

## 2021-02-17 DIAGNOSIS — M76.822 POSTERIOR TIBIAL TENDINITIS, LEFT: ICD-10-CM

## 2021-02-17 PROCEDURE — 36000707: Performed by: PODIATRIST

## 2021-02-17 PROCEDURE — 36000706: Performed by: PODIATRIST

## 2021-02-17 PROCEDURE — 63600175 PHARM REV CODE 636 W HCPCS

## 2021-02-17 PROCEDURE — 71000015 HC POSTOP RECOV 1ST HR: Performed by: PODIATRIST

## 2021-02-17 PROCEDURE — 37000009 HC ANESTHESIA EA ADD 15 MINS: Performed by: PODIATRIST

## 2021-02-17 PROCEDURE — 25000003 PHARM REV CODE 250

## 2021-02-17 PROCEDURE — 25000003 PHARM REV CODE 250: Performed by: PODIATRIST

## 2021-02-17 PROCEDURE — 27800903 OPTIME MED/SURG SUP & DEVICES OTHER IMPLANTS: Performed by: PODIATRIST

## 2021-02-17 PROCEDURE — 27659 PR REPAIR FLEX LEG TENDON,SECOND,EA: ICD-10-PCS | Mod: LT,,, | Performed by: PODIATRIST

## 2021-02-17 PROCEDURE — 37000008 HC ANESTHESIA 1ST 15 MINUTES: Performed by: PODIATRIST

## 2021-02-17 PROCEDURE — 25000003 PHARM REV CODE 250: Performed by: ANESTHESIOLOGY

## 2021-02-17 PROCEDURE — 71000033 HC RECOVERY, INTIAL HOUR: Performed by: PODIATRIST

## 2021-02-17 PROCEDURE — 27659 REPAIR OF LEG TENDON EACH: CPT | Mod: LT,,, | Performed by: PODIATRIST

## 2021-02-17 PROCEDURE — 71000039 HC RECOVERY, EACH ADD'L HOUR: Performed by: PODIATRIST

## 2021-02-17 DEVICE — IMPLANTABLE DEVICE: Type: IMPLANTABLE DEVICE | Site: FOOT | Status: FUNCTIONAL

## 2021-02-17 RX ORDER — OXYCODONE AND ACETAMINOPHEN 10; 325 MG/1; MG/1
1 TABLET ORAL EVERY 6 HOURS PRN
Qty: 28 TABLET | Refills: 0 | Status: SHIPPED | OUTPATIENT
Start: 2021-02-17 | End: 2021-02-17 | Stop reason: HOSPADM

## 2021-02-17 RX ORDER — PROPOFOL 10 MG/ML
VIAL (ML) INTRAVENOUS CONTINUOUS PRN
Status: DISCONTINUED | OUTPATIENT
Start: 2021-02-17 | End: 2021-02-17

## 2021-02-17 RX ORDER — ONDANSETRON 2 MG/ML
4 INJECTION INTRAMUSCULAR; INTRAVENOUS DAILY PRN
Status: DISCONTINUED | OUTPATIENT
Start: 2021-02-17 | End: 2021-02-17 | Stop reason: HOSPADM

## 2021-02-17 RX ORDER — FENTANYL CITRATE 50 UG/ML
INJECTION, SOLUTION INTRAMUSCULAR; INTRAVENOUS
Status: DISCONTINUED | OUTPATIENT
Start: 2021-02-17 | End: 2021-02-17

## 2021-02-17 RX ORDER — PROPOFOL 10 MG/ML
VIAL (ML) INTRAVENOUS
Status: DISCONTINUED | OUTPATIENT
Start: 2021-02-17 | End: 2021-02-17

## 2021-02-17 RX ORDER — LIDOCAINE HCL/EPINEPHRINE/PF 2%-1:200K
VIAL (ML) INJECTION
Status: DISCONTINUED | OUTPATIENT
Start: 2021-02-17 | End: 2021-02-17 | Stop reason: HOSPADM

## 2021-02-17 RX ORDER — MIDAZOLAM HYDROCHLORIDE 1 MG/ML
INJECTION, SOLUTION INTRAMUSCULAR; INTRAVENOUS
Status: DISCONTINUED | OUTPATIENT
Start: 2021-02-17 | End: 2021-02-17

## 2021-02-17 RX ORDER — BUPIVACAINE HYDROCHLORIDE 5 MG/ML
INJECTION, SOLUTION EPIDURAL; INTRACAUDAL
Status: DISCONTINUED | OUTPATIENT
Start: 2021-02-17 | End: 2021-02-17 | Stop reason: HOSPADM

## 2021-02-17 RX ORDER — CLINDAMYCIN PHOSPHATE 900 MG/50ML
900 INJECTION, SOLUTION INTRAVENOUS
Status: COMPLETED | OUTPATIENT
Start: 2021-02-17 | End: 2021-02-17

## 2021-02-17 RX ORDER — ACETAMINOPHEN 325 MG/1
650 TABLET ORAL ONCE
Status: COMPLETED | OUTPATIENT
Start: 2021-02-17 | End: 2021-02-17

## 2021-02-17 RX ORDER — HYDROCODONE BITARTRATE AND ACETAMINOPHEN 10; 325 MG/1; MG/1
1 TABLET ORAL EVERY 6 HOURS PRN
Qty: 28 TABLET | Refills: 0 | Status: SHIPPED | OUTPATIENT
Start: 2021-02-17 | End: 2021-02-23 | Stop reason: SDUPTHER

## 2021-02-17 RX ORDER — CEFADROXIL 500 MG/1
500 CAPSULE ORAL EVERY 12 HOURS
Qty: 10 CAPSULE | Refills: 0 | Status: SHIPPED | OUTPATIENT
Start: 2021-02-17 | End: 2021-02-22

## 2021-02-17 RX ORDER — HYDROMORPHONE HYDROCHLORIDE 2 MG/ML
0.2 INJECTION, SOLUTION INTRAMUSCULAR; INTRAVENOUS; SUBCUTANEOUS EVERY 5 MIN PRN
Status: DISCONTINUED | OUTPATIENT
Start: 2021-02-17 | End: 2021-02-17 | Stop reason: HOSPADM

## 2021-02-17 RX ORDER — LIDOCAINE HYDROCHLORIDE 20 MG/ML
INJECTION INTRAVENOUS
Status: DISCONTINUED | OUTPATIENT
Start: 2021-02-17 | End: 2021-02-17

## 2021-02-17 RX ORDER — GABAPENTIN 300 MG/1
300 CAPSULE ORAL 3 TIMES DAILY
Qty: 90 CAPSULE | Refills: 11 | Status: SHIPPED | OUTPATIENT
Start: 2021-02-17 | End: 2021-03-30

## 2021-02-17 RX ORDER — DEXMEDETOMIDINE HYDROCHLORIDE 100 UG/ML
INJECTION, SOLUTION INTRAVENOUS
Status: DISCONTINUED | OUTPATIENT
Start: 2021-02-17 | End: 2021-02-17

## 2021-02-17 RX ADMIN — MIDAZOLAM HYDROCHLORIDE 2 MG: 1 INJECTION, SOLUTION INTRAMUSCULAR; INTRAVENOUS at 09:02

## 2021-02-17 RX ADMIN — ACETAMINOPHEN 650 MG: 325 TABLET ORAL at 11:02

## 2021-02-17 RX ADMIN — PROPOFOL 50 MCG/KG/MIN: 10 INJECTION, EMULSION INTRAVENOUS at 09:02

## 2021-02-17 RX ADMIN — FENTANYL CITRATE 50 MCG: 50 INJECTION, SOLUTION INTRAMUSCULAR; INTRAVENOUS at 09:02

## 2021-02-17 RX ADMIN — CLINDAMYCIN PHOSPHATE 900 MG: 900 INJECTION, SOLUTION INTRAVENOUS at 09:02

## 2021-02-17 RX ADMIN — DEXMEDETOMIDINE HYDROCHLORIDE 10 MCG: 100 INJECTION, SOLUTION, CONCENTRATE INTRAVENOUS at 09:02

## 2021-02-17 RX ADMIN — PROPOFOL 40 MG: 10 INJECTION, EMULSION INTRAVENOUS at 09:02

## 2021-02-17 RX ADMIN — LIDOCAINE HYDROCHLORIDE 60 MG: 20 INJECTION, SOLUTION INTRAVENOUS at 09:02

## 2021-02-22 VITALS
HEART RATE: 55 BPM | WEIGHT: 215.63 LBS | SYSTOLIC BLOOD PRESSURE: 130 MMHG | HEIGHT: 72 IN | RESPIRATION RATE: 16 BRPM | BODY MASS INDEX: 29.21 KG/M2 | DIASTOLIC BLOOD PRESSURE: 80 MMHG | TEMPERATURE: 98 F | OXYGEN SATURATION: 99 %

## 2021-02-23 ENCOUNTER — PATIENT MESSAGE (OUTPATIENT)
Dept: PODIATRY | Facility: CLINIC | Age: 46
End: 2021-02-23

## 2021-02-23 DIAGNOSIS — M25.572 PAIN IN LEFT ANKLE AND JOINTS OF LEFT FOOT: Primary | ICD-10-CM

## 2021-02-23 RX ORDER — HYDROCODONE BITARTRATE AND ACETAMINOPHEN 10; 325 MG/1; MG/1
1 TABLET ORAL EVERY 6 HOURS PRN
Qty: 28 TABLET | Refills: 0 | Status: SHIPPED | OUTPATIENT
Start: 2021-02-23 | End: 2021-03-02

## 2021-02-26 ENCOUNTER — PATIENT MESSAGE (OUTPATIENT)
Dept: PODIATRY | Facility: CLINIC | Age: 46
End: 2021-02-26

## 2021-02-26 ENCOUNTER — OFFICE VISIT (OUTPATIENT)
Dept: PODIATRY | Facility: CLINIC | Age: 46
End: 2021-02-26
Payer: COMMERCIAL

## 2021-02-26 VITALS — HEIGHT: 72 IN | BODY MASS INDEX: 29.24 KG/M2

## 2021-02-26 DIAGNOSIS — Z98.890 HISTORY OF FOOT SURGERY: ICD-10-CM

## 2021-02-26 DIAGNOSIS — M76.822 POSTERIOR TIBIAL TENDON DYSFUNCTION (PTTD) OF LEFT LOWER EXTREMITY: ICD-10-CM

## 2021-02-26 DIAGNOSIS — Z09 POSTOP CHECK: Primary | ICD-10-CM

## 2021-02-26 DIAGNOSIS — Z98.890 HISTORY OF ANKLE SURGERY: ICD-10-CM

## 2021-02-26 DIAGNOSIS — M76.821 TIBIAL TENDINITIS, POSTERIOR, RIGHT: ICD-10-CM

## 2021-02-26 DIAGNOSIS — F17.200 TOBACCO USE DISORDER: ICD-10-CM

## 2021-02-26 DIAGNOSIS — M25.572 PAIN IN LEFT ANKLE AND JOINTS OF LEFT FOOT: ICD-10-CM

## 2021-02-26 PROCEDURE — 3008F PR BODY MASS INDEX (BMI) DOCUMENTED: ICD-10-PCS | Mod: CPTII,S$GLB,, | Performed by: PODIATRIST

## 2021-02-26 PROCEDURE — 99999 PR PBB SHADOW E&M-EST. PATIENT-LVL III: ICD-10-PCS | Mod: PBBFAC,,, | Performed by: PODIATRIST

## 2021-02-26 PROCEDURE — 99024 PR POST-OP FOLLOW-UP VISIT: ICD-10-PCS | Mod: S$GLB,,, | Performed by: PODIATRIST

## 2021-02-26 PROCEDURE — 1125F AMNT PAIN NOTED PAIN PRSNT: CPT | Mod: S$GLB,,, | Performed by: PODIATRIST

## 2021-02-26 PROCEDURE — 99999 PR PBB SHADOW E&M-EST. PATIENT-LVL III: CPT | Mod: PBBFAC,,, | Performed by: PODIATRIST

## 2021-02-26 PROCEDURE — 1125F PR PAIN SEVERITY QUANTIFIED, PAIN PRESENT: ICD-10-PCS | Mod: S$GLB,,, | Performed by: PODIATRIST

## 2021-02-26 PROCEDURE — 3008F BODY MASS INDEX DOCD: CPT | Mod: CPTII,S$GLB,, | Performed by: PODIATRIST

## 2021-02-26 PROCEDURE — 99024 POSTOP FOLLOW-UP VISIT: CPT | Mod: S$GLB,,, | Performed by: PODIATRIST

## 2021-03-11 ENCOUNTER — PATIENT OUTREACH (OUTPATIENT)
Dept: ADMINISTRATIVE | Facility: OTHER | Age: 46
End: 2021-03-11

## 2021-03-12 ENCOUNTER — OFFICE VISIT (OUTPATIENT)
Dept: PODIATRY | Facility: CLINIC | Age: 46
End: 2021-03-12
Payer: MEDICAID

## 2021-03-12 ENCOUNTER — OFFICE VISIT (OUTPATIENT)
Dept: PAIN MEDICINE | Facility: CLINIC | Age: 46
End: 2021-03-12
Payer: MEDICAID

## 2021-03-12 VITALS
RESPIRATION RATE: 18 BRPM | DIASTOLIC BLOOD PRESSURE: 75 MMHG | SYSTOLIC BLOOD PRESSURE: 126 MMHG | HEIGHT: 72 IN | HEART RATE: 76 BPM | BODY MASS INDEX: 29.12 KG/M2 | WEIGHT: 215 LBS

## 2021-03-12 VITALS — BODY MASS INDEX: 29.21 KG/M2 | HEIGHT: 72 IN | WEIGHT: 215.63 LBS

## 2021-03-12 DIAGNOSIS — T81.89XA DELAYED SURGICAL WOUND HEALING, INITIAL ENCOUNTER: ICD-10-CM

## 2021-03-12 DIAGNOSIS — M76.821 TIBIAL TENDINITIS, POSTERIOR, RIGHT: ICD-10-CM

## 2021-03-12 DIAGNOSIS — Z98.890 HISTORY OF FOOT SURGERY: ICD-10-CM

## 2021-03-12 DIAGNOSIS — F17.200 TOBACCO USE DISORDER: ICD-10-CM

## 2021-03-12 DIAGNOSIS — Z09 POSTOP CHECK: Primary | ICD-10-CM

## 2021-03-12 DIAGNOSIS — M25.572 PAIN IN LEFT ANKLE AND JOINTS OF LEFT FOOT: ICD-10-CM

## 2021-03-12 DIAGNOSIS — M76.822 POSTERIOR TIBIAL TENDON DYSFUNCTION (PTTD) OF LEFT LOWER EXTREMITY: ICD-10-CM

## 2021-03-12 DIAGNOSIS — M47.816 LUMBAR SPONDYLOSIS: Primary | ICD-10-CM

## 2021-03-12 DIAGNOSIS — M54.16 LUMBAR RADICULOPATHY: ICD-10-CM

## 2021-03-12 DIAGNOSIS — Z98.890 HISTORY OF ANKLE SURGERY: ICD-10-CM

## 2021-03-12 DIAGNOSIS — M54.9 DORSALGIA, UNSPECIFIED: ICD-10-CM

## 2021-03-12 PROCEDURE — 99024 POSTOP FOLLOW-UP VISIT: CPT | Mod: ,,, | Performed by: PODIATRIST

## 2021-03-12 PROCEDURE — 99213 PR OFFICE/OUTPT VISIT, EST, LEVL III, 20-29 MIN: ICD-10-PCS | Mod: S$PBB,,, | Performed by: ANESTHESIOLOGY

## 2021-03-12 PROCEDURE — 99999 PR PBB SHADOW E&M-EST. PATIENT-LVL III: ICD-10-PCS | Mod: PBBFAC,,, | Performed by: ANESTHESIOLOGY

## 2021-03-12 PROCEDURE — 99024 PR POST-OP FOLLOW-UP VISIT: ICD-10-PCS | Mod: ,,, | Performed by: PODIATRIST

## 2021-03-12 PROCEDURE — 99213 OFFICE O/P EST LOW 20 MIN: CPT | Mod: PBBFAC,27 | Performed by: ANESTHESIOLOGY

## 2021-03-12 PROCEDURE — 99213 OFFICE O/P EST LOW 20 MIN: CPT | Mod: PBBFAC | Performed by: PODIATRIST

## 2021-03-12 PROCEDURE — 99213 OFFICE O/P EST LOW 20 MIN: CPT | Mod: S$PBB,,, | Performed by: ANESTHESIOLOGY

## 2021-03-12 PROCEDURE — 99999 PR PBB SHADOW E&M-EST. PATIENT-LVL III: ICD-10-PCS | Mod: PBBFAC,,, | Performed by: PODIATRIST

## 2021-03-12 PROCEDURE — 99999 PR PBB SHADOW E&M-EST. PATIENT-LVL III: CPT | Mod: PBBFAC,,, | Performed by: ANESTHESIOLOGY

## 2021-03-12 PROCEDURE — 99999 PR PBB SHADOW E&M-EST. PATIENT-LVL III: CPT | Mod: PBBFAC,,, | Performed by: PODIATRIST

## 2021-03-12 RX ORDER — HYDROCODONE BITARTRATE AND ACETAMINOPHEN 10; 325 MG/1; MG/1
1 TABLET ORAL EVERY 6 HOURS PRN
Qty: 28 TABLET | Refills: 0 | Status: SHIPPED | OUTPATIENT
Start: 2021-03-12 | End: 2021-03-19

## 2021-03-12 RX ORDER — METHOCARBAMOL 750 MG/1
750 TABLET, FILM COATED ORAL 2 TIMES DAILY PRN
Qty: 60 TABLET | Refills: 1 | Status: SHIPPED | OUTPATIENT
Start: 2021-03-12 | End: 2021-03-30

## 2021-03-29 ENCOUNTER — HOSPITAL ENCOUNTER (OUTPATIENT)
Dept: RADIOLOGY | Facility: HOSPITAL | Age: 46
Discharge: HOME | End: 2021-03-29
Attending: ANESTHESIOLOGY
Payer: MEDICAID

## 2021-03-29 ENCOUNTER — HOSPITAL ENCOUNTER (OUTPATIENT)
Dept: RADIOLOGY | Facility: HOSPITAL | Age: 46
Discharge: HOME | End: 2021-03-29
Attending: INTERNAL MEDICINE
Payer: MEDICAID

## 2021-03-29 DIAGNOSIS — M53.3 SACROCOCCYGEAL DISORDERS, NOT ELSEWHERE CLASSIFIED: ICD-10-CM

## 2021-03-29 DIAGNOSIS — M54.9 DORSALGIA, UNSPECIFIED: ICD-10-CM

## 2021-03-29 PROCEDURE — 72197 MRI PELVIS W/O & W/DYE: CPT | Mod: 26,,, | Performed by: RADIOLOGY

## 2021-03-29 PROCEDURE — 72197 MRI PELVIS W/O & W/DYE: CPT | Mod: TC,PO

## 2021-03-29 PROCEDURE — A9585 GADOBUTROL INJECTION: HCPCS | Mod: PO | Performed by: INTERNAL MEDICINE

## 2021-03-29 PROCEDURE — 25500020 PHARM REV CODE 255: Mod: PO | Performed by: INTERNAL MEDICINE

## 2021-03-29 PROCEDURE — 72148 MRI LUMBAR SPINE W/O DYE: CPT | Mod: 26,,, | Performed by: RADIOLOGY

## 2021-03-29 PROCEDURE — 72197 MRI SACROILIAC JOINTS W W/O CONTRAST: ICD-10-PCS | Mod: 26,,, | Performed by: RADIOLOGY

## 2021-03-29 PROCEDURE — 72148 MRI LUMBAR SPINE W/O DYE: CPT | Mod: TC,PO

## 2021-03-29 PROCEDURE — 72148 MRI LUMBAR SPINE WITHOUT CONTRAST: ICD-10-PCS | Mod: 26,,, | Performed by: RADIOLOGY

## 2021-03-29 RX ORDER — GADOBUTROL 604.72 MG/ML
10 INJECTION INTRAVENOUS
Status: COMPLETED | OUTPATIENT
Start: 2021-03-29 | End: 2021-03-29

## 2021-03-29 RX ADMIN — GADOBUTROL 10 ML: 604.72 INJECTION INTRAVENOUS at 12:03

## 2021-03-30 ENCOUNTER — HOSPITAL ENCOUNTER (OUTPATIENT)
Dept: RADIOLOGY | Facility: HOSPITAL | Age: 46
Discharge: HOME | End: 2021-03-30
Attending: PODIATRIST
Payer: MEDICAID

## 2021-03-30 ENCOUNTER — PATIENT MESSAGE (OUTPATIENT)
Dept: INTERNAL MEDICINE | Facility: CLINIC | Age: 46
End: 2021-03-30

## 2021-03-30 ENCOUNTER — OFFICE VISIT (OUTPATIENT)
Dept: PODIATRY | Facility: CLINIC | Age: 46
End: 2021-03-30
Payer: MEDICAID

## 2021-03-30 ENCOUNTER — OFFICE VISIT (OUTPATIENT)
Dept: PAIN MEDICINE | Facility: CLINIC | Age: 46
End: 2021-03-30
Payer: MEDICAID

## 2021-03-30 VITALS — BODY MASS INDEX: 29.11 KG/M2 | WEIGHT: 214.94 LBS | HEIGHT: 72 IN

## 2021-03-30 VITALS
DIASTOLIC BLOOD PRESSURE: 91 MMHG | HEIGHT: 72 IN | BODY MASS INDEX: 29.12 KG/M2 | HEART RATE: 92 BPM | WEIGHT: 215 LBS | SYSTOLIC BLOOD PRESSURE: 147 MMHG

## 2021-03-30 DIAGNOSIS — M25.572 PAIN IN LEFT ANKLE AND JOINTS OF LEFT FOOT: ICD-10-CM

## 2021-03-30 DIAGNOSIS — M53.3 SACROILIAC JOINT PAIN: Primary | ICD-10-CM

## 2021-03-30 DIAGNOSIS — M25.561 CHRONIC PAIN OF RIGHT KNEE: ICD-10-CM

## 2021-03-30 DIAGNOSIS — F17.200 TOBACCO USE DISORDER: ICD-10-CM

## 2021-03-30 DIAGNOSIS — Z09 POSTOP CHECK: Primary | ICD-10-CM

## 2021-03-30 DIAGNOSIS — M19.072 OSTEOARTHRITIS OF LEFT ANKLE OR FOOT: ICD-10-CM

## 2021-03-30 DIAGNOSIS — M21.42 ACQUIRED PES PLANUS, LEFT: ICD-10-CM

## 2021-03-30 DIAGNOSIS — M70.60 GREATER TROCHANTERIC BURSITIS, UNSPECIFIED LATERALITY: ICD-10-CM

## 2021-03-30 DIAGNOSIS — M76.821 TIBIAL TENDINITIS, POSTERIOR, RIGHT: ICD-10-CM

## 2021-03-30 DIAGNOSIS — M62.838 MUSCLE SPASM: ICD-10-CM

## 2021-03-30 DIAGNOSIS — Z09 POSTOP CHECK: ICD-10-CM

## 2021-03-30 DIAGNOSIS — M25.559 HIP PAIN: ICD-10-CM

## 2021-03-30 DIAGNOSIS — M47.816 LUMBAR FACET ARTHROPATHY: ICD-10-CM

## 2021-03-30 DIAGNOSIS — G89.29 CHRONIC PAIN OF RIGHT KNEE: ICD-10-CM

## 2021-03-30 DIAGNOSIS — M47.816 LUMBAR SPONDYLOSIS: ICD-10-CM

## 2021-03-30 PROCEDURE — 99999 PR PBB SHADOW E&M-EST. PATIENT-LVL III: ICD-10-PCS | Mod: PBBFAC,,, | Performed by: ANESTHESIOLOGY

## 2021-03-30 PROCEDURE — 20605 PR DRAIN/INJECT INTERMEDIATE JOINT/BURSA: ICD-10-PCS | Mod: 79,S$PBB,LT, | Performed by: PODIATRIST

## 2021-03-30 PROCEDURE — 99213 OFFICE O/P EST LOW 20 MIN: CPT | Mod: PBBFAC,25,27 | Performed by: PODIATRIST

## 2021-03-30 PROCEDURE — 20605 DRAIN/INJ JOINT/BURSA W/O US: CPT | Mod: 79,PBBFAC | Performed by: PODIATRIST

## 2021-03-30 PROCEDURE — 99999 PR PBB SHADOW E&M-EST. PATIENT-LVL III: ICD-10-PCS | Mod: PBBFAC,,, | Performed by: PODIATRIST

## 2021-03-30 PROCEDURE — 20605 DRAIN/INJ JOINT/BURSA W/O US: CPT | Mod: 79,S$PBB,LT, | Performed by: PODIATRIST

## 2021-03-30 PROCEDURE — 99024 PR POST-OP FOLLOW-UP VISIT: ICD-10-PCS | Mod: ,,, | Performed by: PODIATRIST

## 2021-03-30 PROCEDURE — 73630 XR FOOT COMPLETE 3 VIEW LEFT: ICD-10-PCS | Mod: 26,LT,, | Performed by: RADIOLOGY

## 2021-03-30 PROCEDURE — 73630 X-RAY EXAM OF FOOT: CPT | Mod: TC,LT

## 2021-03-30 PROCEDURE — 99999 PR PBB SHADOW E&M-EST. PATIENT-LVL III: CPT | Mod: PBBFAC,,, | Performed by: ANESTHESIOLOGY

## 2021-03-30 PROCEDURE — 99214 OFFICE O/P EST MOD 30 MIN: CPT | Mod: S$PBB,,, | Performed by: ANESTHESIOLOGY

## 2021-03-30 PROCEDURE — 99999 PR PBB SHADOW E&M-EST. PATIENT-LVL III: CPT | Mod: PBBFAC,,, | Performed by: PODIATRIST

## 2021-03-30 PROCEDURE — 99024 POSTOP FOLLOW-UP VISIT: CPT | Mod: ,,, | Performed by: PODIATRIST

## 2021-03-30 PROCEDURE — 99214 PR OFFICE/OUTPT VISIT, EST, LEVL IV, 30-39 MIN: ICD-10-PCS | Mod: S$PBB,,, | Performed by: ANESTHESIOLOGY

## 2021-03-30 PROCEDURE — 99213 OFFICE O/P EST LOW 20 MIN: CPT | Mod: PBBFAC | Performed by: ANESTHESIOLOGY

## 2021-03-30 PROCEDURE — 73630 X-RAY EXAM OF FOOT: CPT | Mod: 26,LT,, | Performed by: RADIOLOGY

## 2021-03-30 RX ORDER — DEXAMETHASONE SODIUM PHOSPHATE 4 MG/ML
4 INJECTION, SOLUTION INTRA-ARTICULAR; INTRALESIONAL; INTRAMUSCULAR; INTRAVENOUS; SOFT TISSUE ONCE
Status: COMPLETED | OUTPATIENT
Start: 2021-03-30 | End: 2021-03-30

## 2021-03-30 RX ORDER — METHYLPREDNISOLONE ACETATE 40 MG/ML
40 INJECTION, SUSPENSION INTRA-ARTICULAR; INTRALESIONAL; INTRAMUSCULAR; SOFT TISSUE
Status: COMPLETED | OUTPATIENT
Start: 2021-03-30 | End: 2021-03-30

## 2021-03-30 RX ORDER — OXYCODONE HYDROCHLORIDE 15 MG/1
15 TABLET ORAL EVERY 6 HOURS PRN
Qty: 28 TABLET | Refills: 0 | Status: SHIPPED | OUTPATIENT
Start: 2021-03-30 | End: 2021-04-06

## 2021-03-30 RX ORDER — GABAPENTIN 300 MG/1
CAPSULE ORAL
Qty: 180 CAPSULE | Refills: 1 | Status: SHIPPED | OUTPATIENT
Start: 2021-03-30 | End: 2021-06-02

## 2021-03-30 RX ORDER — TIZANIDINE HYDROCHLORIDE 6 MG/1
6 CAPSULE, GELATIN COATED ORAL 2 TIMES DAILY PRN
Qty: 60 CAPSULE | Refills: 0 | Status: SHIPPED | OUTPATIENT
Start: 2021-03-30 | End: 2021-05-08 | Stop reason: SDUPTHER

## 2021-03-30 RX ADMIN — DEXAMETHASONE SODIUM PHOSPHATE 4 MG: 4 INJECTION, SOLUTION INTRA-ARTICULAR; INTRALESIONAL; INTRAMUSCULAR; INTRAVENOUS; SOFT TISSUE at 11:03

## 2021-03-30 RX ADMIN — METHYLPREDNISOLONE ACETATE 40 MG: 40 INJECTION, SUSPENSION INTRALESIONAL; INTRAMUSCULAR; INTRASYNOVIAL; SOFT TISSUE at 11:03

## 2021-04-05 ENCOUNTER — PATIENT MESSAGE (OUTPATIENT)
Dept: INTERNAL MEDICINE | Facility: CLINIC | Age: 46
End: 2021-04-05

## 2021-04-05 ENCOUNTER — OFFICE VISIT (OUTPATIENT)
Dept: INTERNAL MEDICINE | Facility: CLINIC | Age: 46
End: 2021-04-05
Payer: COMMERCIAL

## 2021-04-05 DIAGNOSIS — I42.8 NON-ISCHEMIC CARDIOMYOPATHY: ICD-10-CM

## 2021-04-05 DIAGNOSIS — I25.118 ATHEROSCLEROTIC HEART DISEASE OF NATIVE CORONARY ARTERY WITH OTHER FORMS OF ANGINA PECTORIS: ICD-10-CM

## 2021-04-05 DIAGNOSIS — I10 ESSENTIAL HYPERTENSION: ICD-10-CM

## 2021-04-05 DIAGNOSIS — M46.96 UNSPECIFIED INFLAMMATORY SPONDYLOPATHY, LUMBAR REGION: ICD-10-CM

## 2021-04-05 DIAGNOSIS — F41.9 ANXIETY: Primary | ICD-10-CM

## 2021-04-05 PROCEDURE — 99213 OFFICE O/P EST LOW 20 MIN: CPT | Mod: 95,,, | Performed by: FAMILY MEDICINE

## 2021-04-05 PROCEDURE — 99213 PR OFFICE/OUTPT VISIT, EST, LEVL III, 20-29 MIN: ICD-10-PCS | Mod: 95,,, | Performed by: FAMILY MEDICINE

## 2021-04-05 RX ORDER — BUSPIRONE HYDROCHLORIDE 7.5 MG/1
7.5 TABLET ORAL 2 TIMES DAILY
Qty: 60 TABLET | Refills: 0 | Status: SHIPPED | OUTPATIENT
Start: 2021-04-05 | End: 2022-03-09

## 2021-04-06 ENCOUNTER — PATIENT MESSAGE (OUTPATIENT)
Dept: PAIN MEDICINE | Facility: HOSPITAL | Age: 46
End: 2021-04-06

## 2021-04-16 ENCOUNTER — HOSPITAL ENCOUNTER (OUTPATIENT)
Facility: HOSPITAL | Age: 46
Discharge: HOME | End: 2021-04-16
Attending: ANESTHESIOLOGY | Admitting: ANESTHESIOLOGY
Payer: COMMERCIAL

## 2021-04-16 ENCOUNTER — PATIENT MESSAGE (OUTPATIENT)
Dept: PAIN MEDICINE | Facility: HOSPITAL | Age: 46
End: 2021-04-16

## 2021-04-16 VITALS
BODY MASS INDEX: 29.46 KG/M2 | SYSTOLIC BLOOD PRESSURE: 135 MMHG | DIASTOLIC BLOOD PRESSURE: 75 MMHG | HEART RATE: 73 BPM | OXYGEN SATURATION: 98 % | TEMPERATURE: 98 F | HEIGHT: 72 IN | RESPIRATION RATE: 20 BRPM | WEIGHT: 217.5 LBS

## 2021-04-16 DIAGNOSIS — M53.3 SACROILIAC JOINT DYSFUNCTION: Primary | ICD-10-CM

## 2021-04-16 LAB — POCT GLUCOSE: 101 MG/DL (ref 70–110)

## 2021-04-16 PROCEDURE — 20610 DRAIN/INJ JOINT/BURSA W/O US: CPT | Mod: 59,RT,, | Performed by: ANESTHESIOLOGY

## 2021-04-16 PROCEDURE — 25500020 PHARM REV CODE 255: Performed by: ANESTHESIOLOGY

## 2021-04-16 PROCEDURE — 27096 INJECT SACROILIAC JOINT: CPT | Mod: RT,,, | Performed by: ANESTHESIOLOGY

## 2021-04-16 PROCEDURE — 27096 PR INJECTION,SACROILIAC JOINT: ICD-10-PCS | Mod: RT,,, | Performed by: ANESTHESIOLOGY

## 2021-04-16 PROCEDURE — 20610 PR DRAIN/INJECT LARGE JOINT/BURSA: ICD-10-PCS | Mod: 59,RT,, | Performed by: ANESTHESIOLOGY

## 2021-04-16 PROCEDURE — 20610 DRAIN/INJ JOINT/BURSA W/O US: CPT | Performed by: ANESTHESIOLOGY

## 2021-04-16 PROCEDURE — 27096 INJECT SACROILIAC JOINT: CPT | Performed by: ANESTHESIOLOGY

## 2021-04-16 PROCEDURE — 25000003 PHARM REV CODE 250: Performed by: ANESTHESIOLOGY

## 2021-04-16 PROCEDURE — 63600175 PHARM REV CODE 636 W HCPCS: Performed by: ANESTHESIOLOGY

## 2021-04-16 PROCEDURE — 99152 MOD SED SAME PHYS/QHP 5/>YRS: CPT | Performed by: ANESTHESIOLOGY

## 2021-04-16 RX ORDER — MIDAZOLAM HYDROCHLORIDE 1 MG/ML
INJECTION, SOLUTION INTRAMUSCULAR; INTRAVENOUS
Status: DISCONTINUED | OUTPATIENT
Start: 2021-04-16 | End: 2021-04-16 | Stop reason: HOSPADM

## 2021-04-16 RX ORDER — METHYLPREDNISOLONE ACETATE 40 MG/ML
INJECTION, SUSPENSION INTRA-ARTICULAR; INTRALESIONAL; INTRAMUSCULAR; SOFT TISSUE
Status: DISCONTINUED | OUTPATIENT
Start: 2021-04-16 | End: 2021-04-16 | Stop reason: HOSPADM

## 2021-04-16 RX ORDER — FENTANYL CITRATE 50 UG/ML
INJECTION, SOLUTION INTRAMUSCULAR; INTRAVENOUS
Status: DISCONTINUED | OUTPATIENT
Start: 2021-04-16 | End: 2021-04-16 | Stop reason: HOSPADM

## 2021-04-16 RX ORDER — LIDOCAINE HYDROCHLORIDE 20 MG/ML
INJECTION, SOLUTION EPIDURAL; INFILTRATION; INTRACAUDAL; PERINEURAL
Status: DISCONTINUED | OUTPATIENT
Start: 2021-04-16 | End: 2021-04-16 | Stop reason: HOSPADM

## 2021-04-23 ENCOUNTER — IMMUNIZATION (OUTPATIENT)
Dept: INTERNAL MEDICINE | Facility: CLINIC | Age: 46
End: 2021-04-23
Payer: COMMERCIAL

## 2021-04-23 DIAGNOSIS — Z23 NEED FOR VACCINATION: Primary | ICD-10-CM

## 2021-04-23 PROCEDURE — 91300 COVID-19, MRNA, LNP-S, PF, 30 MCG/0.3 ML DOSE VACCINE: CPT | Mod: PBBFAC

## 2021-04-29 ENCOUNTER — PATIENT OUTREACH (OUTPATIENT)
Dept: ADMINISTRATIVE | Facility: OTHER | Age: 46
End: 2021-04-29

## 2021-04-30 ENCOUNTER — OFFICE VISIT (OUTPATIENT)
Dept: PODIATRY | Facility: CLINIC | Age: 46
End: 2021-04-30
Payer: COMMERCIAL

## 2021-04-30 VITALS — HEIGHT: 72 IN | WEIGHT: 217.38 LBS | BODY MASS INDEX: 29.44 KG/M2

## 2021-04-30 DIAGNOSIS — M76.821 TIBIAL TENDINITIS, POSTERIOR, RIGHT: ICD-10-CM

## 2021-04-30 DIAGNOSIS — Z09 POSTOP CHECK: Primary | ICD-10-CM

## 2021-04-30 DIAGNOSIS — M21.42 ACQUIRED PES PLANUS, LEFT: ICD-10-CM

## 2021-04-30 DIAGNOSIS — F17.200 TOBACCO USE DISORDER: ICD-10-CM

## 2021-04-30 DIAGNOSIS — M19.072 OSTEOARTHRITIS OF LEFT ANKLE OR FOOT: ICD-10-CM

## 2021-04-30 DIAGNOSIS — M25.572 PAIN IN LEFT ANKLE AND JOINTS OF LEFT FOOT: ICD-10-CM

## 2021-04-30 PROCEDURE — 99213 OFFICE O/P EST LOW 20 MIN: CPT | Mod: PBBFAC | Performed by: PODIATRIST

## 2021-04-30 PROCEDURE — 99024 POSTOP FOLLOW-UP VISIT: CPT | Mod: S$GLB,,, | Performed by: PODIATRIST

## 2021-04-30 PROCEDURE — 99999 PR PBB SHADOW E&M-EST. PATIENT-LVL III: ICD-10-PCS | Mod: PBBFAC,,, | Performed by: PODIATRIST

## 2021-04-30 PROCEDURE — 99999 PR PBB SHADOW E&M-EST. PATIENT-LVL III: CPT | Mod: PBBFAC,,, | Performed by: PODIATRIST

## 2021-04-30 PROCEDURE — 99024 PR POST-OP FOLLOW-UP VISIT: ICD-10-PCS | Mod: S$GLB,,, | Performed by: PODIATRIST

## 2021-04-30 RX ORDER — METHOCARBAMOL 750 MG/1
750 TABLET, FILM COATED ORAL 2 TIMES DAILY PRN
COMMUNITY
Start: 2021-04-16 | End: 2021-05-12

## 2021-05-02 ENCOUNTER — PATIENT MESSAGE (OUTPATIENT)
Dept: PAIN MEDICINE | Facility: CLINIC | Age: 46
End: 2021-05-02

## 2021-05-02 DIAGNOSIS — M54.16 LUMBAR RADICULOPATHY: ICD-10-CM

## 2021-05-02 DIAGNOSIS — M53.3 SACROILIAC JOINT DYSFUNCTION: Primary | ICD-10-CM

## 2021-05-02 DIAGNOSIS — G89.29 CHRONIC PAIN OF RIGHT KNEE: ICD-10-CM

## 2021-05-02 DIAGNOSIS — M25.561 CHRONIC PAIN OF RIGHT KNEE: ICD-10-CM

## 2021-05-05 ENCOUNTER — PATIENT MESSAGE (OUTPATIENT)
Dept: PSYCHIATRY | Facility: CLINIC | Age: 46
End: 2021-05-05

## 2021-05-05 ENCOUNTER — OFFICE VISIT (OUTPATIENT)
Dept: PSYCHIATRY | Facility: CLINIC | Age: 46
End: 2021-05-05
Payer: MEDICAID

## 2021-05-05 DIAGNOSIS — F43.10 PTSD (POST-TRAUMATIC STRESS DISORDER): Primary | ICD-10-CM

## 2021-05-05 DIAGNOSIS — F41.9 ANXIETY: ICD-10-CM

## 2021-05-05 PROCEDURE — 90792 PR PSYCHIATRIC DIAGNOSTIC EVALUATION W/MEDICAL SERVICES: ICD-10-PCS | Mod: 95,AF,HB, | Performed by: PSYCHIATRY & NEUROLOGY

## 2021-05-05 PROCEDURE — 90792 PSYCH DIAG EVAL W/MED SRVCS: CPT | Mod: 95,AF,HB, | Performed by: PSYCHIATRY & NEUROLOGY

## 2021-05-05 RX ORDER — TRAZODONE HYDROCHLORIDE 100 MG/1
TABLET ORAL
Qty: 30 TABLET | Refills: 2 | Status: SHIPPED | OUTPATIENT
Start: 2021-05-05 | End: 2022-05-01 | Stop reason: SDUPTHER

## 2021-05-05 RX ORDER — BUSPIRONE HYDROCHLORIDE 30 MG/1
30 TABLET ORAL 2 TIMES DAILY
Qty: 60 TABLET | Refills: 2 | Status: SHIPPED | OUTPATIENT
Start: 2021-05-05 | End: 2021-07-29 | Stop reason: SDUPTHER

## 2021-05-06 ENCOUNTER — PATIENT MESSAGE (OUTPATIENT)
Dept: PAIN MEDICINE | Facility: CLINIC | Age: 46
End: 2021-05-06

## 2021-05-19 RX ORDER — TIZANIDINE HYDROCHLORIDE 6 MG/1
6 CAPSULE, GELATIN COATED ORAL 2 TIMES DAILY PRN
Qty: 60 CAPSULE | Refills: 0 | Status: CANCELLED | OUTPATIENT
Start: 2021-05-19 | End: 2021-06-18

## 2021-05-20 ENCOUNTER — PATIENT MESSAGE (OUTPATIENT)
Dept: PAIN MEDICINE | Facility: CLINIC | Age: 46
End: 2021-05-20

## 2021-05-25 ENCOUNTER — PATIENT MESSAGE (OUTPATIENT)
Dept: INTERNAL MEDICINE | Facility: CLINIC | Age: 46
End: 2021-05-25

## 2021-05-26 ENCOUNTER — PATIENT MESSAGE (OUTPATIENT)
Dept: INTERNAL MEDICINE | Facility: CLINIC | Age: 46
End: 2021-05-26

## 2021-05-26 ENCOUNTER — OFFICE VISIT (OUTPATIENT)
Dept: OPHTHALMOLOGY | Facility: CLINIC | Age: 46
End: 2021-05-26
Payer: COMMERCIAL

## 2021-05-26 DIAGNOSIS — I10 ESSENTIAL HYPERTENSION: Primary | ICD-10-CM

## 2021-05-26 DIAGNOSIS — H04.123 DRY EYES, BILATERAL: ICD-10-CM

## 2021-05-26 DIAGNOSIS — H11.32 SUBCONJUNCTIVAL HEMORRHAGE, LEFT: Primary | ICD-10-CM

## 2021-05-26 DIAGNOSIS — H10.13 ALLERGIC CONJUNCTIVITIS, BILATERAL: ICD-10-CM

## 2021-05-26 DIAGNOSIS — H57.89 RED EYE: ICD-10-CM

## 2021-05-26 PROCEDURE — 92002 INTRM OPH EXAM NEW PATIENT: CPT | Mod: S$PBB,,, | Performed by: OPTOMETRIST

## 2021-05-26 PROCEDURE — 92002 PR EYE EXAM, NEW PATIENT,INTERMED: ICD-10-PCS | Mod: S$PBB,,, | Performed by: OPTOMETRIST

## 2021-05-26 PROCEDURE — 99999 PR PBB SHADOW E&M-EST. PATIENT-LVL III: CPT | Mod: PBBFAC,,, | Performed by: OPTOMETRIST

## 2021-05-26 PROCEDURE — 99999 PR PBB SHADOW E&M-EST. PATIENT-LVL III: ICD-10-PCS | Mod: PBBFAC,,, | Performed by: OPTOMETRIST

## 2021-05-26 PROCEDURE — 99213 OFFICE O/P EST LOW 20 MIN: CPT | Mod: PBBFAC | Performed by: OPTOMETRIST

## 2021-05-27 ENCOUNTER — TELEPHONE (OUTPATIENT)
Dept: INTERNAL MEDICINE | Facility: CLINIC | Age: 46
End: 2021-05-27

## 2021-05-28 ENCOUNTER — OFFICE VISIT (OUTPATIENT)
Dept: PAIN MEDICINE | Facility: CLINIC | Age: 46
End: 2021-05-28
Payer: COMMERCIAL

## 2021-05-28 VITALS
RESPIRATION RATE: 18 BRPM | HEIGHT: 72 IN | BODY MASS INDEX: 31.29 KG/M2 | WEIGHT: 231 LBS | SYSTOLIC BLOOD PRESSURE: 129 MMHG | DIASTOLIC BLOOD PRESSURE: 77 MMHG | HEART RATE: 87 BPM

## 2021-05-28 DIAGNOSIS — M70.60 GREATER TROCHANTERIC BURSITIS, UNSPECIFIED LATERALITY: ICD-10-CM

## 2021-05-28 DIAGNOSIS — G89.29 CHRONIC PAIN OF RIGHT KNEE: ICD-10-CM

## 2021-05-28 DIAGNOSIS — M53.3 SACROILIAC JOINT PAIN: ICD-10-CM

## 2021-05-28 DIAGNOSIS — M53.3 SACROILIAC JOINT DYSFUNCTION: Primary | ICD-10-CM

## 2021-05-28 DIAGNOSIS — M25.561 CHRONIC PAIN OF RIGHT KNEE: ICD-10-CM

## 2021-05-28 DIAGNOSIS — M54.16 LUMBAR RADICULOPATHY: ICD-10-CM

## 2021-05-28 PROCEDURE — 99999 PR PBB SHADOW E&M-EST. PATIENT-LVL IV: ICD-10-PCS | Mod: PBBFAC,,, | Performed by: PHYSICIAN ASSISTANT

## 2021-05-28 PROCEDURE — 99214 OFFICE O/P EST MOD 30 MIN: CPT | Mod: S$GLB,,, | Performed by: PHYSICIAN ASSISTANT

## 2021-05-28 PROCEDURE — 99214 PR OFFICE/OUTPT VISIT, EST, LEVL IV, 30-39 MIN: ICD-10-PCS | Mod: S$GLB,,, | Performed by: PHYSICIAN ASSISTANT

## 2021-05-28 PROCEDURE — 99214 OFFICE O/P EST MOD 30 MIN: CPT | Mod: PBBFAC | Performed by: PHYSICIAN ASSISTANT

## 2021-05-28 PROCEDURE — 99999 PR PBB SHADOW E&M-EST. PATIENT-LVL IV: CPT | Mod: PBBFAC,,, | Performed by: PHYSICIAN ASSISTANT

## 2021-05-28 RX ORDER — TIZANIDINE HYDROCHLORIDE 6 MG/1
6 CAPSULE, GELATIN COATED ORAL 2 TIMES DAILY PRN
Qty: 60 CAPSULE | Refills: 5 | Status: SHIPPED | OUTPATIENT
Start: 2021-05-28 | End: 2021-06-27

## 2021-06-02 RX ORDER — GABAPENTIN 300 MG/1
CAPSULE ORAL
Qty: 180 CAPSULE | Refills: 5 | Status: SHIPPED | OUTPATIENT
Start: 2021-06-02 | End: 2021-08-01

## 2021-06-14 ENCOUNTER — PATIENT MESSAGE (OUTPATIENT)
Dept: INTERNAL MEDICINE | Facility: CLINIC | Age: 46
End: 2021-06-14

## 2021-06-15 RX ORDER — LISINOPRIL 5 MG/1
10 TABLET ORAL DAILY
Qty: 90 TABLET | Refills: 3 | Status: SHIPPED | OUTPATIENT
Start: 2021-06-15 | End: 2022-03-09

## 2021-06-23 ENCOUNTER — PATIENT MESSAGE (OUTPATIENT)
Dept: ADMINISTRATIVE | Facility: OTHER | Age: 46
End: 2021-06-23

## 2021-07-06 ENCOUNTER — PATIENT MESSAGE (OUTPATIENT)
Dept: OPHTHALMOLOGY | Facility: CLINIC | Age: 46
End: 2021-07-06

## 2021-07-06 ENCOUNTER — PATIENT MESSAGE (OUTPATIENT)
Dept: INTERNAL MEDICINE | Facility: CLINIC | Age: 46
End: 2021-07-06

## 2021-07-07 ENCOUNTER — PATIENT MESSAGE (OUTPATIENT)
Dept: INTERNAL MEDICINE | Facility: CLINIC | Age: 46
End: 2021-07-07

## 2021-07-07 DIAGNOSIS — H10.13 ALLERGIC CONJUNCTIVITIS, BILATERAL: Primary | ICD-10-CM

## 2021-07-07 RX ORDER — NEOMYCIN SULFATE, POLYMYXIN B SULFATE AND DEXAMETHASONE 3.5; 10000; 1 MG/ML; [USP'U]/ML; MG/ML
1 SUSPENSION/ DROPS OPHTHALMIC 4 TIMES DAILY
Qty: 10 ML | Refills: 0 | Status: SHIPPED | OUTPATIENT
Start: 2021-07-07 | End: 2021-07-17

## 2021-07-08 ENCOUNTER — PATIENT MESSAGE (OUTPATIENT)
Dept: PRIMARY CARE CLINIC | Facility: CLINIC | Age: 46
End: 2021-07-08

## 2021-07-08 ENCOUNTER — OFFICE VISIT (OUTPATIENT)
Dept: PRIMARY CARE CLINIC | Facility: CLINIC | Age: 46
End: 2021-07-08
Payer: COMMERCIAL

## 2021-07-08 DIAGNOSIS — J20.8 ACUTE BACTERIAL BRONCHITIS: Primary | ICD-10-CM

## 2021-07-08 DIAGNOSIS — B96.89 ACUTE BACTERIAL BRONCHITIS: Primary | ICD-10-CM

## 2021-07-08 DIAGNOSIS — F17.218 CIGARETTE NICOTINE DEPENDENCE WITH OTHER NICOTINE-INDUCED DISORDER: ICD-10-CM

## 2021-07-08 PROCEDURE — 99213 PR OFFICE/OUTPT VISIT, EST, LEVL III, 20-29 MIN: ICD-10-PCS | Mod: 95,,, | Performed by: FAMILY MEDICINE

## 2021-07-08 PROCEDURE — 99213 OFFICE O/P EST LOW 20 MIN: CPT | Mod: 95,,, | Performed by: FAMILY MEDICINE

## 2021-07-08 RX ORDER — BENZONATATE 100 MG/1
100 CAPSULE ORAL 3 TIMES DAILY PRN
Qty: 30 CAPSULE | Refills: 0 | Status: SHIPPED | OUTPATIENT
Start: 2021-07-08 | End: 2021-07-18

## 2021-07-08 RX ORDER — DOXYCYCLINE 100 MG/1
100 CAPSULE ORAL 2 TIMES DAILY
Qty: 20 CAPSULE | Refills: 0 | Status: SHIPPED | OUTPATIENT
Start: 2021-07-08 | End: 2021-07-18

## 2021-07-13 ENCOUNTER — PATIENT MESSAGE (OUTPATIENT)
Dept: INTERNAL MEDICINE | Facility: CLINIC | Age: 46
End: 2021-07-13

## 2021-07-14 ENCOUNTER — PATIENT MESSAGE (OUTPATIENT)
Dept: INTERNAL MEDICINE | Facility: CLINIC | Age: 46
End: 2021-07-14

## 2021-07-15 ENCOUNTER — TELEPHONE (OUTPATIENT)
Dept: PAIN MEDICINE | Facility: CLINIC | Age: 46
End: 2021-07-15

## 2021-07-16 ENCOUNTER — PATIENT MESSAGE (OUTPATIENT)
Dept: INTERNAL MEDICINE | Facility: CLINIC | Age: 46
End: 2021-07-16

## 2021-07-30 ENCOUNTER — PATIENT OUTREACH (OUTPATIENT)
Dept: ADMINISTRATIVE | Facility: HOSPITAL | Age: 46
End: 2021-07-30

## 2021-07-30 RX ORDER — BUSPIRONE HYDROCHLORIDE 30 MG/1
30 TABLET ORAL 2 TIMES DAILY
Qty: 60 TABLET | Refills: 1 | Status: SHIPPED | OUTPATIENT
Start: 2021-07-30 | End: 2021-08-31 | Stop reason: SDUPTHER

## 2021-08-03 ENCOUNTER — TELEPHONE (OUTPATIENT)
Dept: PAIN MEDICINE | Facility: CLINIC | Age: 46
End: 2021-08-03

## 2021-08-05 ENCOUNTER — PATIENT OUTREACH (OUTPATIENT)
Dept: ADMINISTRATIVE | Facility: OTHER | Age: 46
End: 2021-08-05

## 2021-08-06 ENCOUNTER — OFFICE VISIT (OUTPATIENT)
Dept: PAIN MEDICINE | Facility: CLINIC | Age: 46
End: 2021-08-06
Payer: COMMERCIAL

## 2021-08-06 ENCOUNTER — TELEPHONE (OUTPATIENT)
Dept: PAIN MEDICINE | Facility: CLINIC | Age: 46
End: 2021-08-06

## 2021-08-06 VITALS — HEIGHT: 72 IN | RESPIRATION RATE: 17 BRPM | WEIGHT: 231 LBS | BODY MASS INDEX: 31.29 KG/M2

## 2021-08-06 DIAGNOSIS — M47.816 LUMBAR SPONDYLOSIS: ICD-10-CM

## 2021-08-06 DIAGNOSIS — M53.3 SACROILIAC JOINT DYSFUNCTION: Primary | ICD-10-CM

## 2021-08-06 DIAGNOSIS — M70.61 GREATER TROCHANTERIC BURSITIS OF RIGHT HIP: ICD-10-CM

## 2021-08-06 PROCEDURE — 99214 OFFICE O/P EST MOD 30 MIN: CPT | Mod: 95,,, | Performed by: PHYSICIAN ASSISTANT

## 2021-08-06 PROCEDURE — 99214 PR OFFICE/OUTPT VISIT, EST, LEVL IV, 30-39 MIN: ICD-10-PCS | Mod: 95,,, | Performed by: PHYSICIAN ASSISTANT

## 2021-08-06 RX ORDER — METHYLPREDNISOLONE 4 MG/1
TABLET ORAL
Qty: 1 PACKAGE | Refills: 0 | Status: ON HOLD | OUTPATIENT
Start: 2021-08-06 | End: 2021-11-15

## 2021-08-15 ENCOUNTER — PATIENT MESSAGE (OUTPATIENT)
Dept: PAIN MEDICINE | Facility: CLINIC | Age: 46
End: 2021-08-15

## 2021-08-31 ENCOUNTER — PATIENT MESSAGE (OUTPATIENT)
Dept: PODIATRY | Facility: CLINIC | Age: 46
End: 2021-08-31

## 2021-09-01 RX ORDER — BUSPIRONE HYDROCHLORIDE 30 MG/1
30 TABLET ORAL 2 TIMES DAILY
Qty: 60 TABLET | Refills: 1 | Status: SHIPPED | OUTPATIENT
Start: 2021-09-01 | End: 2021-10-18

## 2021-09-02 ENCOUNTER — OFFICE VISIT (OUTPATIENT)
Dept: RHEUMATOLOGY | Facility: CLINIC | Age: 46
End: 2021-09-02
Payer: COMMERCIAL

## 2021-09-02 VITALS
SYSTOLIC BLOOD PRESSURE: 116 MMHG | DIASTOLIC BLOOD PRESSURE: 76 MMHG | WEIGHT: 237.88 LBS | HEART RATE: 94 BPM | BODY MASS INDEX: 32.22 KG/M2 | HEIGHT: 72 IN

## 2021-09-02 DIAGNOSIS — Z71.89 COUNSELING ON HEALTH PROMOTION AND DISEASE PREVENTION: ICD-10-CM

## 2021-09-02 DIAGNOSIS — M54.50 CHRONIC LOW BACK PAIN, UNSPECIFIED BACK PAIN LATERALITY, UNSPECIFIED WHETHER SCIATICA PRESENT: Primary | ICD-10-CM

## 2021-09-02 DIAGNOSIS — G89.29 CHRONIC LOW BACK PAIN, UNSPECIFIED BACK PAIN LATERALITY, UNSPECIFIED WHETHER SCIATICA PRESENT: Primary | ICD-10-CM

## 2021-09-02 PROCEDURE — 99999 PR PBB SHADOW E&M-EST. PATIENT-LVL III: CPT | Mod: PBBFAC,,, | Performed by: INTERNAL MEDICINE

## 2021-09-02 PROCEDURE — 99213 OFFICE O/P EST LOW 20 MIN: CPT | Mod: PBBFAC | Performed by: INTERNAL MEDICINE

## 2021-09-02 PROCEDURE — 99214 PR OFFICE/OUTPT VISIT, EST, LEVL IV, 30-39 MIN: ICD-10-PCS | Mod: S$GLB,,, | Performed by: INTERNAL MEDICINE

## 2021-09-02 PROCEDURE — 99999 PR PBB SHADOW E&M-EST. PATIENT-LVL III: ICD-10-PCS | Mod: PBBFAC,,, | Performed by: INTERNAL MEDICINE

## 2021-09-02 PROCEDURE — 99214 OFFICE O/P EST MOD 30 MIN: CPT | Mod: S$GLB,,, | Performed by: INTERNAL MEDICINE

## 2021-09-02 RX ORDER — TIZANIDINE HYDROCHLORIDE 6 MG/1
CAPSULE, GELATIN COATED ORAL
COMMUNITY
Start: 2021-08-31 | End: 2021-09-02 | Stop reason: SDUPTHER

## 2021-09-02 RX ORDER — TIZANIDINE HYDROCHLORIDE 6 MG/1
6 CAPSULE, GELATIN COATED ORAL NIGHTLY PRN
Qty: 30 CAPSULE | Refills: 1 | Status: SHIPPED | OUTPATIENT
Start: 2021-09-02 | End: 2021-09-12

## 2021-09-23 ENCOUNTER — PATIENT OUTREACH (OUTPATIENT)
Dept: ADMINISTRATIVE | Facility: OTHER | Age: 46
End: 2021-09-23

## 2021-09-23 ENCOUNTER — TELEPHONE (OUTPATIENT)
Dept: PAIN MEDICINE | Facility: CLINIC | Age: 46
End: 2021-09-23

## 2021-09-24 ENCOUNTER — OFFICE VISIT (OUTPATIENT)
Dept: PAIN MEDICINE | Facility: CLINIC | Age: 46
End: 2021-09-24
Payer: COMMERCIAL

## 2021-09-24 ENCOUNTER — TELEPHONE (OUTPATIENT)
Dept: PAIN MEDICINE | Facility: CLINIC | Age: 46
End: 2021-09-24

## 2021-09-24 VITALS
HEART RATE: 68 BPM | SYSTOLIC BLOOD PRESSURE: 115 MMHG | WEIGHT: 243.06 LBS | HEIGHT: 73 IN | DIASTOLIC BLOOD PRESSURE: 76 MMHG | BODY MASS INDEX: 32.21 KG/M2

## 2021-09-24 DIAGNOSIS — M47.816 LUMBAR SPONDYLOSIS: Primary | ICD-10-CM

## 2021-09-24 DIAGNOSIS — M25.559 HIP PAIN: ICD-10-CM

## 2021-09-24 DIAGNOSIS — M53.3 SACROILIAC JOINT DYSFUNCTION: ICD-10-CM

## 2021-09-24 PROCEDURE — 3074F PR MOST RECENT SYSTOLIC BLOOD PRESSURE < 130 MM HG: ICD-10-PCS | Mod: CPTII,S$GLB,, | Performed by: ANESTHESIOLOGY

## 2021-09-24 PROCEDURE — 1159F MED LIST DOCD IN RCRD: CPT | Mod: CPTII,S$GLB,, | Performed by: ANESTHESIOLOGY

## 2021-09-24 PROCEDURE — 99999 PR PBB SHADOW E&M-EST. PATIENT-LVL III: CPT | Mod: PBBFAC,,, | Performed by: ANESTHESIOLOGY

## 2021-09-24 PROCEDURE — 1160F PR REVIEW ALL MEDS BY PRESCRIBER/CLIN PHARMACIST DOCUMENTED: ICD-10-PCS | Mod: CPTII,S$GLB,, | Performed by: ANESTHESIOLOGY

## 2021-09-24 PROCEDURE — 99214 OFFICE O/P EST MOD 30 MIN: CPT | Mod: 25,S$GLB,, | Performed by: ANESTHESIOLOGY

## 2021-09-24 PROCEDURE — 3008F BODY MASS INDEX DOCD: CPT | Mod: CPTII,S$GLB,, | Performed by: ANESTHESIOLOGY

## 2021-09-24 PROCEDURE — 4010F ACE/ARB THERAPY RXD/TAKEN: CPT | Mod: CPTII,S$GLB,, | Performed by: ANESTHESIOLOGY

## 2021-09-24 PROCEDURE — 1160F RVW MEDS BY RX/DR IN RCRD: CPT | Mod: CPTII,S$GLB,, | Performed by: ANESTHESIOLOGY

## 2021-09-24 PROCEDURE — 96372 THER/PROPH/DIAG INJ SC/IM: CPT | Mod: S$GLB,,, | Performed by: ANESTHESIOLOGY

## 2021-09-24 PROCEDURE — 99999 PR PBB SHADOW E&M-EST. PATIENT-LVL III: ICD-10-PCS | Mod: PBBFAC,,, | Performed by: ANESTHESIOLOGY

## 2021-09-24 PROCEDURE — 99214 PR OFFICE/OUTPT VISIT, EST, LEVL IV, 30-39 MIN: ICD-10-PCS | Mod: 25,S$GLB,, | Performed by: ANESTHESIOLOGY

## 2021-09-24 PROCEDURE — 1159F PR MEDICATION LIST DOCUMENTED IN MEDICAL RECORD: ICD-10-PCS | Mod: CPTII,S$GLB,, | Performed by: ANESTHESIOLOGY

## 2021-09-24 PROCEDURE — 4010F PR ACE/ARB THEARPY RXD/TAKEN: ICD-10-PCS | Mod: CPTII,S$GLB,, | Performed by: ANESTHESIOLOGY

## 2021-09-24 PROCEDURE — 96372 PR INJECTION,THERAP/PROPH/DIAG2ST, IM OR SUBCUT: ICD-10-PCS | Mod: S$GLB,,, | Performed by: ANESTHESIOLOGY

## 2021-09-24 PROCEDURE — 3008F PR BODY MASS INDEX (BMI) DOCUMENTED: ICD-10-PCS | Mod: CPTII,S$GLB,, | Performed by: ANESTHESIOLOGY

## 2021-09-24 PROCEDURE — 3074F SYST BP LT 130 MM HG: CPT | Mod: CPTII,S$GLB,, | Performed by: ANESTHESIOLOGY

## 2021-09-24 PROCEDURE — 3078F DIAST BP <80 MM HG: CPT | Mod: CPTII,S$GLB,, | Performed by: ANESTHESIOLOGY

## 2021-09-24 PROCEDURE — 3078F PR MOST RECENT DIASTOLIC BLOOD PRESSURE < 80 MM HG: ICD-10-PCS | Mod: CPTII,S$GLB,, | Performed by: ANESTHESIOLOGY

## 2021-09-24 RX ORDER — KETOROLAC TROMETHAMINE 30 MG/ML
30 INJECTION, SOLUTION INTRAMUSCULAR; INTRAVENOUS ONCE
Status: COMPLETED | OUTPATIENT
Start: 2021-09-24 | End: 2021-09-24

## 2021-09-24 RX ADMIN — KETOROLAC TROMETHAMINE 30 MG: 30 INJECTION, SOLUTION INTRAMUSCULAR; INTRAVENOUS at 12:09

## 2021-10-05 ENCOUNTER — TELEPHONE (OUTPATIENT)
Dept: PAIN MEDICINE | Facility: CLINIC | Age: 46
End: 2021-10-05

## 2021-10-06 DIAGNOSIS — M53.3 SACROCOCCYGEAL DISORDERS, NOT ELSEWHERE CLASSIFIED: ICD-10-CM

## 2021-10-06 RX ORDER — GABAPENTIN 300 MG/1
CAPSULE ORAL
Qty: 180 CAPSULE | Refills: 1 | Status: SHIPPED | OUTPATIENT
Start: 2021-10-06 | End: 2021-10-11 | Stop reason: SDUPTHER

## 2021-10-08 ENCOUNTER — TELEPHONE (OUTPATIENT)
Dept: PAIN MEDICINE | Facility: CLINIC | Age: 46
End: 2021-10-08

## 2021-10-11 ENCOUNTER — OFFICE VISIT (OUTPATIENT)
Dept: PAIN MEDICINE | Facility: CLINIC | Age: 46
End: 2021-10-11
Payer: MEDICAID

## 2021-10-11 ENCOUNTER — HOSPITAL ENCOUNTER (OUTPATIENT)
Dept: RADIOLOGY | Facility: HOSPITAL | Age: 46
Discharge: HOME | End: 2021-10-11
Attending: ANESTHESIOLOGY
Payer: MEDICAID

## 2021-10-11 VITALS
SYSTOLIC BLOOD PRESSURE: 133 MMHG | HEART RATE: 86 BPM | HEIGHT: 73 IN | WEIGHT: 242.5 LBS | DIASTOLIC BLOOD PRESSURE: 78 MMHG | BODY MASS INDEX: 32.14 KG/M2

## 2021-10-11 DIAGNOSIS — M53.3 SACROCOCCYGEAL DISORDERS, NOT ELSEWHERE CLASSIFIED: ICD-10-CM

## 2021-10-11 DIAGNOSIS — M47.816 LUMBAR SPONDYLOSIS: ICD-10-CM

## 2021-10-11 DIAGNOSIS — M47.812 CERVICAL SPONDYLOSIS: ICD-10-CM

## 2021-10-11 DIAGNOSIS — M53.3 SACROILIAC JOINT DYSFUNCTION: ICD-10-CM

## 2021-10-11 DIAGNOSIS — M25.559 HIP PAIN: ICD-10-CM

## 2021-10-11 DIAGNOSIS — M70.61 GREATER TROCHANTERIC BURSITIS OF RIGHT HIP: ICD-10-CM

## 2021-10-11 DIAGNOSIS — M47.812 CERVICAL FACET JOINT SYNDROME: Primary | ICD-10-CM

## 2021-10-11 DIAGNOSIS — M47.812 CERVICAL FACET JOINT SYNDROME: ICD-10-CM

## 2021-10-11 PROCEDURE — 72052 XR CERVICAL SPINE 5 VIEW WITH FLEX AND EXT: ICD-10-PCS | Mod: 26,,, | Performed by: RADIOLOGY

## 2021-10-11 PROCEDURE — 99999 PR PBB SHADOW E&M-EST. PATIENT-LVL III: ICD-10-PCS | Mod: PBBFAC,,, | Performed by: ANESTHESIOLOGY

## 2021-10-11 PROCEDURE — 72052 X-RAY EXAM NECK SPINE 6/>VWS: CPT | Mod: 26,,, | Performed by: RADIOLOGY

## 2021-10-11 PROCEDURE — 99214 PR OFFICE/OUTPT VISIT, EST, LEVL IV, 30-39 MIN: ICD-10-PCS | Mod: S$PBB,,, | Performed by: ANESTHESIOLOGY

## 2021-10-11 PROCEDURE — 99999 PR PBB SHADOW E&M-EST. PATIENT-LVL III: CPT | Mod: PBBFAC,,, | Performed by: ANESTHESIOLOGY

## 2021-10-11 PROCEDURE — 99213 OFFICE O/P EST LOW 20 MIN: CPT | Mod: PBBFAC | Performed by: ANESTHESIOLOGY

## 2021-10-11 PROCEDURE — 99214 OFFICE O/P EST MOD 30 MIN: CPT | Mod: S$PBB,,, | Performed by: ANESTHESIOLOGY

## 2021-10-11 PROCEDURE — 72052 X-RAY EXAM NECK SPINE 6/>VWS: CPT | Mod: TC

## 2021-10-11 RX ORDER — GABAPENTIN 300 MG/1
CAPSULE ORAL
Qty: 180 CAPSULE | Refills: 2 | Status: SHIPPED | OUTPATIENT
Start: 2021-10-11 | End: 2021-11-08

## 2021-10-26 ENCOUNTER — PATIENT MESSAGE (OUTPATIENT)
Dept: PAIN MEDICINE | Facility: HOSPITAL | Age: 46
End: 2021-10-26
Payer: MEDICAID

## 2021-10-26 ENCOUNTER — PATIENT MESSAGE (OUTPATIENT)
Dept: PAIN MEDICINE | Facility: CLINIC | Age: 46
End: 2021-10-26
Payer: MEDICAID

## 2021-11-15 ENCOUNTER — HOSPITAL ENCOUNTER (OUTPATIENT)
Facility: HOSPITAL | Age: 46
Discharge: HOME | End: 2021-11-15
Attending: ANESTHESIOLOGY | Admitting: ANESTHESIOLOGY
Payer: MEDICAID

## 2021-11-15 VITALS
WEIGHT: 238.88 LBS | DIASTOLIC BLOOD PRESSURE: 81 MMHG | RESPIRATION RATE: 18 BRPM | SYSTOLIC BLOOD PRESSURE: 132 MMHG | HEART RATE: 67 BPM | OXYGEN SATURATION: 95 % | BODY MASS INDEX: 32.35 KG/M2 | TEMPERATURE: 98 F | HEIGHT: 72 IN

## 2021-11-15 DIAGNOSIS — M47.812 CERVICAL FACET JOINT SYNDROME: Primary | ICD-10-CM

## 2021-11-15 PROCEDURE — 64491 PR INJ DX/THER AGNT PARAVERT FACET JOINT,IMG GUIDE,CERV/THORAC, 2ND LEVEL: ICD-10-PCS | Mod: 50,,, | Performed by: ANESTHESIOLOGY

## 2021-11-15 PROCEDURE — 64491 INJ PARAVERT F JNT C/T 2 LEV: CPT | Mod: 50,,, | Performed by: ANESTHESIOLOGY

## 2021-11-15 PROCEDURE — 25000003 PHARM REV CODE 250: Performed by: ANESTHESIOLOGY

## 2021-11-15 PROCEDURE — 99152 MOD SED SAME PHYS/QHP 5/>YRS: CPT | Performed by: ANESTHESIOLOGY

## 2021-11-15 PROCEDURE — 63600175 PHARM REV CODE 636 W HCPCS: Performed by: ANESTHESIOLOGY

## 2021-11-15 PROCEDURE — 64490 INJ PARAVERT F JNT C/T 1 LEV: CPT | Mod: 50,,, | Performed by: ANESTHESIOLOGY

## 2021-11-15 PROCEDURE — 64490 PR INJ DX/THER AGNT PARAVERT FACET JOINT,IMG GUIDE,CERV/THORAC, 1ST LEVEL: ICD-10-PCS | Mod: 50,,, | Performed by: ANESTHESIOLOGY

## 2021-11-15 PROCEDURE — 64491 INJ PARAVERT F JNT C/T 2 LEV: CPT | Mod: 50 | Performed by: ANESTHESIOLOGY

## 2021-11-15 PROCEDURE — 64490 INJ PARAVERT F JNT C/T 1 LEV: CPT | Mod: 50 | Performed by: ANESTHESIOLOGY

## 2021-11-15 RX ORDER — BUSPIRONE HYDROCHLORIDE 30 MG/1
30 TABLET ORAL 2 TIMES DAILY
Qty: 60 TABLET | Refills: 1 | Status: SHIPPED | OUTPATIENT
Start: 2021-11-15 | End: 2021-12-14

## 2021-11-15 RX ORDER — LIDOCAINE HYDROCHLORIDE 20 MG/ML
INJECTION, SOLUTION EPIDURAL; INFILTRATION; INTRACAUDAL; PERINEURAL
Status: DISCONTINUED | OUTPATIENT
Start: 2021-11-15 | End: 2021-11-15 | Stop reason: HOSPADM

## 2021-11-15 RX ORDER — INDOMETHACIN 25 MG/1
CAPSULE ORAL
Status: DISCONTINUED | OUTPATIENT
Start: 2021-11-15 | End: 2021-11-15 | Stop reason: HOSPADM

## 2021-11-15 RX ORDER — MIDAZOLAM HYDROCHLORIDE 1 MG/ML
INJECTION, SOLUTION INTRAMUSCULAR; INTRAVENOUS
Status: DISCONTINUED | OUTPATIENT
Start: 2021-11-15 | End: 2021-11-15 | Stop reason: HOSPADM

## 2021-11-15 RX ORDER — FENTANYL CITRATE 50 UG/ML
INJECTION, SOLUTION INTRAMUSCULAR; INTRAVENOUS
Status: DISCONTINUED | OUTPATIENT
Start: 2021-11-15 | End: 2021-11-15 | Stop reason: HOSPADM

## 2021-11-15 RX ORDER — DEXAMETHASONE SODIUM PHOSPHATE 10 MG/ML
INJECTION INTRAMUSCULAR; INTRAVENOUS
Status: DISCONTINUED | OUTPATIENT
Start: 2021-11-15 | End: 2021-11-15 | Stop reason: HOSPADM

## 2021-11-16 LAB — POCT GLUCOSE: 89 MG/DL (ref 70–110)

## 2021-11-20 ENCOUNTER — PATIENT MESSAGE (OUTPATIENT)
Dept: INTERNAL MEDICINE | Facility: CLINIC | Age: 46
End: 2021-11-20
Payer: MEDICAID

## 2021-11-20 ENCOUNTER — PATIENT MESSAGE (OUTPATIENT)
Dept: PAIN MEDICINE | Facility: CLINIC | Age: 46
End: 2021-11-20
Payer: MEDICAID

## 2021-11-29 ENCOUNTER — OFFICE VISIT (OUTPATIENT)
Dept: INTERNAL MEDICINE | Facility: CLINIC | Age: 46
End: 2021-11-29
Payer: MEDICAID

## 2021-11-29 ENCOUNTER — LAB VISIT (OUTPATIENT)
Dept: LAB | Facility: HOSPITAL | Age: 46
End: 2021-11-29
Payer: MEDICAID

## 2021-11-29 VITALS
HEIGHT: 72 IN | HEART RATE: 96 BPM | OXYGEN SATURATION: 96 % | BODY MASS INDEX: 31.32 KG/M2 | TEMPERATURE: 99 F | WEIGHT: 231.25 LBS | DIASTOLIC BLOOD PRESSURE: 68 MMHG | SYSTOLIC BLOOD PRESSURE: 118 MMHG

## 2021-11-29 DIAGNOSIS — E55.9 VITAMIN D DEFICIENCY: ICD-10-CM

## 2021-11-29 DIAGNOSIS — Z12.5 ENCOUNTER FOR PROSTATE CANCER SCREENING: ICD-10-CM

## 2021-11-29 DIAGNOSIS — Z00.00 ANNUAL PHYSICAL EXAM: ICD-10-CM

## 2021-11-29 DIAGNOSIS — I25.118 ATHEROSCLEROTIC HEART DISEASE OF NATIVE CORONARY ARTERY WITH OTHER FORMS OF ANGINA PECTORIS: ICD-10-CM

## 2021-11-29 DIAGNOSIS — K76.0 FATTY LIVER: ICD-10-CM

## 2021-11-29 DIAGNOSIS — I10 ESSENTIAL HYPERTENSION: ICD-10-CM

## 2021-11-29 DIAGNOSIS — F41.9 ANXIETY: ICD-10-CM

## 2021-11-29 DIAGNOSIS — I25.10 CORONARY ARTERY DISEASE INVOLVING NATIVE CORONARY ARTERY OF NATIVE HEART WITHOUT ANGINA PECTORIS: ICD-10-CM

## 2021-11-29 DIAGNOSIS — Z00.00 ANNUAL PHYSICAL EXAM: Primary | ICD-10-CM

## 2021-11-29 DIAGNOSIS — F17.200 TOBACCO USE DISORDER: ICD-10-CM

## 2021-11-29 DIAGNOSIS — I50.9 CONGESTIVE HEART FAILURE, UNSPECIFIED HF CHRONICITY, UNSPECIFIED HEART FAILURE TYPE: ICD-10-CM

## 2021-11-29 DIAGNOSIS — E66.9 OBESITY (BMI 30-39.9): ICD-10-CM

## 2021-11-29 DIAGNOSIS — E16.2 HYPOGLYCEMIA: ICD-10-CM

## 2021-11-29 LAB
25(OH)D3+25(OH)D2 SERPL-MCNC: 57 NG/ML (ref 30–96)
ALBUMIN SERPL BCP-MCNC: 4.4 G/DL (ref 3.5–5.2)
ALP SERPL-CCNC: 78 U/L (ref 55–135)
ALT SERPL W/O P-5'-P-CCNC: 66 U/L (ref 10–44)
ANION GAP SERPL CALC-SCNC: 11 MMOL/L (ref 8–16)
AST SERPL-CCNC: 38 U/L (ref 10–40)
BILIRUB SERPL-MCNC: 0.4 MG/DL (ref 0.1–1)
BUN SERPL-MCNC: 7 MG/DL (ref 6–20)
CALCIUM SERPL-MCNC: 10.2 MG/DL (ref 8.7–10.5)
CHLORIDE SERPL-SCNC: 104 MMOL/L (ref 95–110)
CHOLEST SERPL-MCNC: 117 MG/DL (ref 120–199)
CHOLEST/HDLC SERPL: 3.5 {RATIO} (ref 2–5)
CO2 SERPL-SCNC: 24 MMOL/L (ref 23–29)
COMPLEXED PSA SERPL-MCNC: 1.2 NG/ML (ref 0–4)
CREAT SERPL-MCNC: 1.2 MG/DL (ref 0.5–1.4)
EST. GFR  (AFRICAN AMERICAN): >60 ML/MIN/1.73 M^2
EST. GFR  (NON AFRICAN AMERICAN): >60 ML/MIN/1.73 M^2
GLUCOSE SERPL-MCNC: 76 MG/DL (ref 70–110)
HDLC SERPL-MCNC: 33 MG/DL (ref 40–75)
HDLC SERPL: 28.2 % (ref 20–50)
LDLC SERPL CALC-MCNC: 55.2 MG/DL (ref 63–159)
NONHDLC SERPL-MCNC: 84 MG/DL
POTASSIUM SERPL-SCNC: 4.7 MMOL/L (ref 3.5–5.1)
PROT SERPL-MCNC: 7.2 G/DL (ref 6–8.4)
SODIUM SERPL-SCNC: 139 MMOL/L (ref 136–145)
TRIGL SERPL-MCNC: 144 MG/DL (ref 30–150)
TSH SERPL DL<=0.005 MIU/L-ACNC: 0.49 UIU/ML (ref 0.4–4)

## 2021-11-29 PROCEDURE — 82306 VITAMIN D 25 HYDROXY: CPT

## 2021-11-29 PROCEDURE — 99214 PR OFFICE/OUTPT VISIT, EST, LEVL IV, 30-39 MIN: ICD-10-PCS | Mod: S$PBB,25,,

## 2021-11-29 PROCEDURE — 99214 OFFICE O/P EST MOD 30 MIN: CPT | Mod: S$PBB,25,,

## 2021-11-29 PROCEDURE — 80053 COMPREHEN METABOLIC PANEL: CPT

## 2021-11-29 PROCEDURE — 84443 ASSAY THYROID STIM HORMONE: CPT

## 2021-11-29 PROCEDURE — 84153 ASSAY OF PSA TOTAL: CPT

## 2021-11-29 PROCEDURE — 36415 COLL VENOUS BLD VENIPUNCTURE: CPT

## 2021-11-29 PROCEDURE — 99999 PR PBB SHADOW E&M-EST. PATIENT-LVL IV: CPT | Mod: PBBFAC,,,

## 2021-11-29 PROCEDURE — 85025 COMPLETE CBC W/AUTO DIFF WBC: CPT

## 2021-11-29 PROCEDURE — 99999 PR PBB SHADOW E&M-EST. PATIENT-LVL IV: ICD-10-PCS | Mod: PBBFAC,,,

## 2021-11-29 PROCEDURE — 80061 LIPID PANEL: CPT

## 2021-11-29 PROCEDURE — 99214 OFFICE O/P EST MOD 30 MIN: CPT | Mod: PBBFAC

## 2021-11-30 LAB
ANISOCYTOSIS BLD QL SMEAR: SLIGHT
BASOPHILS # BLD AUTO: 0.11 K/UL (ref 0–0.2)
BASOPHILS NFR BLD: 1.2 % (ref 0–1.9)
BURR CELLS BLD QL SMEAR: ABNORMAL
DIFFERENTIAL METHOD: ABNORMAL
EOSINOPHIL # BLD AUTO: 0.3 K/UL (ref 0–0.5)
EOSINOPHIL NFR BLD: 2.8 % (ref 0–8)
ERYTHROCYTE [DISTWIDTH] IN BLOOD BY AUTOMATED COUNT: 14.4 % (ref 11.5–14.5)
HCT VFR BLD AUTO: 42.7 % (ref 40–54)
HGB BLD-MCNC: 13.9 G/DL (ref 14–18)
IMM GRANULOCYTES # BLD AUTO: 0.02 K/UL (ref 0–0.04)
IMM GRANULOCYTES NFR BLD AUTO: 0.2 % (ref 0–0.5)
LYMPHOCYTES # BLD AUTO: 4.5 K/UL (ref 1–4.8)
LYMPHOCYTES NFR BLD: 48.5 % (ref 18–48)
MCH RBC QN AUTO: 28 PG (ref 27–31)
MCHC RBC AUTO-ENTMCNC: 32.6 G/DL (ref 32–36)
MCV RBC AUTO: 86 FL (ref 82–98)
MONOCYTES # BLD AUTO: 0.5 K/UL (ref 0.3–1)
MONOCYTES NFR BLD: 5.6 % (ref 4–15)
NEUTROPHILS # BLD AUTO: 3.9 K/UL (ref 1.8–7.7)
NEUTROPHILS NFR BLD: 41.7 % (ref 38–73)
NRBC BLD-RTO: 0 /100 WBC
PLATELET # BLD AUTO: 320 K/UL (ref 150–450)
PLATELET BLD QL SMEAR: ABNORMAL
PMV BLD AUTO: 11.1 FL (ref 9.2–12.9)
POIKILOCYTOSIS BLD QL SMEAR: ABNORMAL
RBC # BLD AUTO: 4.96 M/UL (ref 4.6–6.2)
WBC # BLD AUTO: 9.26 K/UL (ref 3.9–12.7)

## 2021-12-03 ENCOUNTER — PATIENT MESSAGE (OUTPATIENT)
Dept: INTERNAL MEDICINE | Facility: CLINIC | Age: 46
End: 2021-12-03
Payer: MEDICAID

## 2021-12-03 ENCOUNTER — PATIENT MESSAGE (OUTPATIENT)
Dept: PAIN MEDICINE | Facility: CLINIC | Age: 46
End: 2021-12-03
Payer: MEDICAID

## 2021-12-03 RX ORDER — TIZANIDINE 4 MG/1
4 TABLET ORAL 3 TIMES DAILY PRN
Qty: 90 TABLET | Refills: 0 | Status: SHIPPED | OUTPATIENT
Start: 2021-12-03 | End: 2021-12-15 | Stop reason: SDUPTHER

## 2021-12-14 ENCOUNTER — PATIENT OUTREACH (OUTPATIENT)
Dept: ADMINISTRATIVE | Facility: OTHER | Age: 46
End: 2021-12-14
Payer: MEDICAID

## 2021-12-15 ENCOUNTER — OFFICE VISIT (OUTPATIENT)
Dept: PAIN MEDICINE | Facility: CLINIC | Age: 46
End: 2021-12-15
Payer: MEDICAID

## 2021-12-15 VITALS — HEIGHT: 72 IN | BODY MASS INDEX: 31.24 KG/M2 | WEIGHT: 230.63 LBS

## 2021-12-15 DIAGNOSIS — M53.3 SACROILIAC JOINT DYSFUNCTION: Primary | ICD-10-CM

## 2021-12-15 DIAGNOSIS — M25.559 HIP PAIN: ICD-10-CM

## 2021-12-15 DIAGNOSIS — M47.816 LUMBAR SPONDYLOSIS: ICD-10-CM

## 2021-12-15 DIAGNOSIS — M47.812 CERVICAL SPONDYLOSIS: ICD-10-CM

## 2021-12-15 DIAGNOSIS — M47.812 CERVICAL FACET JOINT SYNDROME: ICD-10-CM

## 2021-12-15 DIAGNOSIS — M53.3 SACROCOCCYGEAL DISORDERS, NOT ELSEWHERE CLASSIFIED: ICD-10-CM

## 2021-12-15 PROCEDURE — 4010F ACE/ARB THERAPY RXD/TAKEN: CPT | Mod: CPTII,,, | Performed by: ANESTHESIOLOGY

## 2021-12-15 PROCEDURE — 99214 OFFICE O/P EST MOD 30 MIN: CPT | Mod: S$PBB,,, | Performed by: ANESTHESIOLOGY

## 2021-12-15 PROCEDURE — 99213 OFFICE O/P EST LOW 20 MIN: CPT | Mod: PBBFAC | Performed by: ANESTHESIOLOGY

## 2021-12-15 PROCEDURE — 99214 PR OFFICE/OUTPT VISIT, EST, LEVL IV, 30-39 MIN: ICD-10-PCS | Mod: S$PBB,,, | Performed by: ANESTHESIOLOGY

## 2021-12-15 PROCEDURE — 4010F PR ACE/ARB THEARPY RXD/TAKEN: ICD-10-PCS | Mod: CPTII,,, | Performed by: ANESTHESIOLOGY

## 2021-12-15 PROCEDURE — 99999 PR PBB SHADOW E&M-EST. PATIENT-LVL III: CPT | Mod: PBBFAC,,, | Performed by: ANESTHESIOLOGY

## 2021-12-15 PROCEDURE — 99999 PR PBB SHADOW E&M-EST. PATIENT-LVL III: ICD-10-PCS | Mod: PBBFAC,,, | Performed by: ANESTHESIOLOGY

## 2021-12-15 RX ORDER — GABAPENTIN 300 MG/1
CAPSULE ORAL
Qty: 180 CAPSULE | Refills: 2 | Status: SHIPPED | OUTPATIENT
Start: 2021-12-15 | End: 2022-05-01 | Stop reason: SDUPTHER

## 2021-12-15 RX ORDER — TIZANIDINE 4 MG/1
4 TABLET ORAL 3 TIMES DAILY PRN
Qty: 90 TABLET | Refills: 2 | Status: SHIPPED | OUTPATIENT
Start: 2021-12-15 | End: 2022-03-09

## 2022-02-17 ENCOUNTER — PATIENT MESSAGE (OUTPATIENT)
Dept: PAIN MEDICINE | Facility: CLINIC | Age: 47
End: 2022-02-17
Payer: COMMERCIAL

## 2022-02-17 DIAGNOSIS — M47.816 LUMBAR SPONDYLOSIS: Primary | ICD-10-CM

## 2022-03-02 NOTE — PRE-PROCEDURE INSTRUCTIONS
Spoke with patient regarding procedure scheduled on 3.7     Arrival time 1100     Has patient been sick with fever or on antibiotics within the last 7 days? No     Does the patient have any open wounds, sores or rashes? No     Does the patient have any recent fractures? no     Has patient received a vaccination within the last 7 days? No     Received the COVID vaccination? yes     Has the patient stopped all medications as directed? na     Does patient have a pacemaker and or defibrillator? no     Does the patient have a ride to and from procedure and someone reliable to remain with patient? mother     Is the patient diabetic? no     Does the patient have sleep apnea? Or use O2 at home? No and no      Is the patient receiving sedation? yes     Is the patient instructed to remain NPO beginning at midnight the night before their procedure? yes     Procedure location confirmed with patient? Yes     Covid- Denies signs/symptoms. Instructed to notify PAT/MD if any changes.

## 2022-03-07 ENCOUNTER — HOSPITAL ENCOUNTER (OUTPATIENT)
Facility: HOSPITAL | Age: 47
Discharge: HOME | End: 2022-03-07
Attending: ANESTHESIOLOGY | Admitting: ANESTHESIOLOGY
Payer: COMMERCIAL

## 2022-03-07 VITALS
SYSTOLIC BLOOD PRESSURE: 126 MMHG | BODY MASS INDEX: 31.74 KG/M2 | OXYGEN SATURATION: 100 % | DIASTOLIC BLOOD PRESSURE: 72 MMHG | TEMPERATURE: 98 F | HEART RATE: 65 BPM | HEIGHT: 72 IN | RESPIRATION RATE: 18 BRPM | WEIGHT: 234.38 LBS

## 2022-03-07 DIAGNOSIS — M47.816 LUMBAR FACET ARTHROPATHY: Primary | ICD-10-CM

## 2022-03-07 PROCEDURE — 25000003 PHARM REV CODE 250: Performed by: ANESTHESIOLOGY

## 2022-03-07 PROCEDURE — 64493 INJ PARAVERT F JNT L/S 1 LEV: CPT | Mod: 50,,, | Performed by: ANESTHESIOLOGY

## 2022-03-07 PROCEDURE — 64493 PR INJ DX/THER AGNT PARAVERT FACET JOINT,IMG GUIDE,LUMBAR/SAC,1ST LVL: ICD-10-PCS | Mod: 50,,, | Performed by: ANESTHESIOLOGY

## 2022-03-07 PROCEDURE — 64493 INJ PARAVERT F JNT L/S 1 LEV: CPT | Mod: 50 | Performed by: ANESTHESIOLOGY

## 2022-03-07 PROCEDURE — 64494 INJ PARAVERT F JNT L/S 2 LEV: CPT | Mod: LT

## 2022-03-07 PROCEDURE — 64494 INJ PARAVERT F JNT L/S 2 LEV: CPT | Mod: RT | Performed by: ANESTHESIOLOGY

## 2022-03-07 PROCEDURE — 64494 INJ PARAVERT F JNT L/S 2 LEV: CPT | Mod: 50,,, | Performed by: ANESTHESIOLOGY

## 2022-03-07 PROCEDURE — 64495 INJ PARAVERT F JNT L/S 3 LEV: CPT | Mod: LT

## 2022-03-07 PROCEDURE — 64494 PR INJ DX/THER AGNT PARAVERT FACET JOINT,IMG GUIDE,LUMBAR/SAC, 2ND LEVEL: ICD-10-PCS | Mod: 50,,, | Performed by: ANESTHESIOLOGY

## 2022-03-07 PROCEDURE — 63600175 PHARM REV CODE 636 W HCPCS: Performed by: ANESTHESIOLOGY

## 2022-03-07 RX ORDER — MIDAZOLAM HYDROCHLORIDE 1 MG/ML
INJECTION, SOLUTION INTRAMUSCULAR; INTRAVENOUS
Status: DISCONTINUED | OUTPATIENT
Start: 2022-03-07 | End: 2022-03-07 | Stop reason: HOSPADM

## 2022-03-07 RX ORDER — LIDOCAINE HYDROCHLORIDE 20 MG/ML
INJECTION, SOLUTION EPIDURAL; INFILTRATION; INTRACAUDAL; PERINEURAL
Status: DISCONTINUED | OUTPATIENT
Start: 2022-03-07 | End: 2022-03-07 | Stop reason: HOSPADM

## 2022-03-07 RX ORDER — METHYLPREDNISOLONE ACETATE 40 MG/ML
INJECTION, SUSPENSION INTRA-ARTICULAR; INTRALESIONAL; INTRAMUSCULAR; SOFT TISSUE
Status: DISCONTINUED | OUTPATIENT
Start: 2022-03-07 | End: 2022-03-07 | Stop reason: HOSPADM

## 2022-03-07 RX ORDER — FENTANYL CITRATE 50 UG/ML
INJECTION, SOLUTION INTRAMUSCULAR; INTRAVENOUS
Status: DISCONTINUED | OUTPATIENT
Start: 2022-03-07 | End: 2022-03-07 | Stop reason: HOSPADM

## 2022-03-07 NOTE — H&P
HPI  Patient presenting for Procedure(s) (LRB):  Bilateral L5/s1 facet joint inj- per dr. divya QUINTERO (Bilateral)     Patient on Anti-coagulation No    No health changes since previous encounter    Past Medical History:   Diagnosis Date    ADHD (attention deficit hyperactivity disorder)     Allergy     Anxiety 1/25/2016    Arthritis 2014    Osteoarthritis    CHF (congestive heart failure)     Degenerative disc disease at L5-S1 level     Depression 1/25/2016    Hypertension     Sciatica of left side 1/31/2017     Past Surgical History:   Procedure Laterality Date    CALCANEAL OSTEOTOMY Left 4/16/2019    Procedure: OSTEOTOMY, CALCANEUS;  Surgeon: Rober Colon DPM;  Location: [facility] OR;  Service: Podiatry;  Laterality: Left;    CIRCUMCISION      FOOT SURGERY      INJECTION OF ANESTHETIC AGENT AROUND MEDIAL BRANCH NERVES INNERVATING CERVICAL FACET JOINT Bilateral 11/15/2021    Procedure: Block-nerve-medial branch-cervical bilateral C5, 6,7 RN IV sedation;  Surgeon: Ivan Davey MD;  Location: [facility]IN [facility]GT;  Service: Pain Management;  Laterality: Bilateral;    INJECTION OF ANESTHETIC AGENT INTO SACROILIAC JOINT Right 11/24/2020    Procedure: Right BLOCK, SACROILIAC JOINT and Right Knee Injection with RN IV sedation;  Surgeon: Ivan Davey MD;  Location: [facility];  Service: Pain Management;  Laterality: Right;    INJECTION OF ANESTHETIC AGENT INTO SACROILIAC JOINT Right 4/16/2021    Procedure: Right BLOCK, SACROILIAC JOINT RIght Hip Right GTB RN IV sedation;  Surgeon: Ivan Davey MD;  Location: [facility]IN [facility];  Service: Pain Management;  Laterality: Right;    LENGTHENING OF ACHILLES TENDON Left 4/16/2019    Procedure: LENGTHENING, TENDON, ACHILLES;  Surgeon: Rober Colon DPM;  Location: [facility] OR;  Service: Podiatry;  Laterality: Left;    PLACEMENT OF ACELLULAR HUMAN DERMAL ALLOGRAFT Left 2/17/2021    Procedure: APPLICATION, ACELLULAR HUMAN DERMAL ALLOGRAFT;  Surgeon: Rober Colon DPM;   Location: [facility] OR;  Service: Podiatry;  Laterality: Left;    REPAIR OF POSTERIOR TIBIALIS TENDON Left 4/16/2019    Procedure: REPAIR, TENDON, TIBIALIS POSTERIOR;  Surgeon: Rober Colon DPM;  Location: [facility] OR;  Service: Podiatry;  Laterality: Left;    REPAIR OF TENDON OF LOWER EXTREMITY Left 2/17/2021    Procedure: REPAIR, TENDON, LOWER EXTREMITY;  Surgeon: Rober Colon DPM;  Location: [facility] OR;  Service: Podiatry;  Laterality: Left;    TENDON TRANSFER Left 4/16/2019    Procedure: TRANSFER, TENDON;  Surgeon: Rober Colon DPM;  Location: [facility] OR;  Service: Podiatry;  Laterality: Left;     Review of patient's allergies indicates:  No Known Allergies     No current facility-administered medications on file prior to encounter.     Current Outpatient Medications on File Prior to Encounter   Medication Sig Dispense Refill    amLODIPine (NORVASC) 5 MG tablet Take 5 mg by mouth once daily.      aspirin (ECOTRIN) 81 MG EC tablet Take 81 mg by mouth once daily.      atorvastatin (LIPITOR) 20 MG tablet Take 20 mg by mouth every evening.       busPIRone (BUSPAR) 30 MG Tab TAKE ONE TABLET TWICE DAILY 60 tablet 1    busPIRone (BUSPAR) 7.5 MG tablet Take 1 tablet (7.5 mg total) by mouth 2 (two) times a day. 60 tablet 0    cholecalciferol, vitamin D3, 125 mcg (5,000 unit) Tab Take 5,000 Units by mouth once daily.      gabapentin (NEURONTIN) 300 MG capsule Take 1 capsule (300 mg total) by mouth 2 (two) times daily AND 4 capsules (1,200 mg total) every evening. TAKE ONE CAPSULE BY MOUTH TWICE DAILY AND 4 CAPSULES EVERY EVENING. 180 capsule 2    lisinopriL (PRINIVIL,ZESTRIL) 5 MG tablet Take 2 tablets (10 mg total) by mouth once daily. 90 tablet 3    methocarbamoL (ROBAXIN) 750 MG Tab TAKE ONE TABLET TWICE DAILY AS NEEDED FOR MUSCLE SPASMS 60 tablet 1    metoprolol succinate (TOPROL-XL) 25 MG 24 hr tablet Take 25 mg by mouth once daily.       tiZANidine (ZANAFLEX) 4 MG tablet Take 1 tablet (4 mg total) by  mouth 3 (three) times daily as needed (pain). 90 tablet 2    traZODone (DESYREL) 100 MG tablet Take 1/2 to 1 tablet at bedtime as needed for sleep. 30 tablet 2    cetirizine (ZYRTEC) 10 MG tablet Take 10 mg by mouth once daily.      ibuprofen (ADVIL,MOTRIN) 800 MG tablet TAKE ONE TABLET THREE TIMES DAILY 90 tablet 1    multivitamin capsule Take 1 capsule by mouth once daily.          PMHx, PSHx, Allergies, Medications reviewed in epic    ROS negative except pain complaints in HPI    OBJECTIVE:    /64 (BP Location: Right arm, Patient Position: Sitting)   Pulse 78   Temp 97.6 °F (36.4 °C) (Temporal)   Resp 18   Ht 6' (1.829 m)   Wt 106.3 kg (234 lb 5.6 oz)   SpO2 99%   BMI 31.78 kg/m²     PHYSICAL EXAMINATION:    GENERAL: Well appearing, in no acute distress, alert and oriented x3.  PSYCH:  Mood and affect appropriate.  SKIN: Skin color, texture, turgor normal, no rashes or lesions which will impact the procedure.  CV: RRR with palpation of the radial artery.  PULM: No evidence of respiratory difficulty, symmetric chest rise. Clear to auscultation.  NEURO: Cranial nerves grossly intact.    Plan:    Proceed with procedure as planned Procedure(s) (LRB):  Bilateral L5/s1 facet joint inj- per dr. divya QUINTERO (Bilateral)    Ivan Davey MD  03/07/2022

## 2022-03-07 NOTE — OP NOTE
Procedure: Lumbar Medial Branch Block under Fluoroscopic Guidance    Side: bilateral     Level:  Sacral ala (Corresponding to the L5 dorsal ramus), L5 transverse process (Corresponding to the L4 medial branch) and L4 transverse process (Corresponding to the L3 medial branch)     PROCEDURE DATE: 3/7/2022    Pre-operative Diagnosis: Lumbar Spondylosis  Post-operative Diagnosis: Lumbar Spondylosis    Provider: Ivan Davey MD  Assistant(s): none    Anesthesia: Local, IV Sedation    >> 2 mg of VERSED    >> 100 mcg of FENTANYL     Indication: Low back pain without radiculopathy. Symptoms unresponsive to conservative treatments. Fluoroscopy was used to optimize visualization of needle placement and to maximize safety.     Procedure Description / Technique:  The patient was seen and identified in the preoperative area. Risks, benefits, complications, and alternatives were discussed with the patient. The patient agreed to proceed with the procedure and signed the consent. The site and side of the procedure was identified and marked. An iv was started.     The patient was taken to the procedural suite and positioned in prone orientation on the procedure table. A pillow was placed under the abdomen to reduce lumbar lordosis. A time out was performed. The procedure, site, side, and allergies were stated and agreed to by all present. The lumbosacral area was widely prepped with ChloraPrep. The procedural site was draped in usual sterile fashion. Vital signs were closely monitored throughout this procedure. Conscious sedation was used for this procedure to decrease patient anxiety.    The Right sacral ala and superior articular process was identified and marked on AP fluoroscopic imaging. Subcutaneous tissues were localized using 1% PF Lidocaine to improve patient comfort. A 25 gauge 3.5 inch spinal needle was advance until the needle rested on OS at the interface of the sacral ala and the base of the sacral superior articular  "process. After negative aspiration, 0.75 mL of a solution containing 4 mL of 1% PF Lidocaine and 2 mL of Methylprednisolone (40 mg/mL) was injected. No pain or paresthesia was noted on injection. After right side injection, the fluoroscope was obliqued to the Right until the priti dog outline of the L5 vertebrae came into view. The spinal needle was advanced to the "eye of the priti dog" and after negative aspiration a 0.75 mL of the above noted solution was injected. No pain or paresthesia was noted. This technique was repeated at each of the above noted levels. The spinal needle was removed intact following injection at each targeted site. The stylet was replaced prior to needle removal at each site.    The Left sacral ala and superior articular process was identified and marked on AP fluoroscopic imaging. Subcutaneous tissues were localized using 1% PF Lidocaine to improve patient comfort. A 25 gauge 3.5 inch spinal needle was advance until the needle rested on OS at the interface of the sacral ala and the base of the sacral superior articular process. After negative aspiration, 0.75 mL of a solution containing 4 mL of 1% PF Lidocaine and 2 mL of Methylprednisolone (40 mg/mL) was injected. No pain or paresthesia was noted on injection. After left side injection, the fluoroscope was obliqued to the Left until the priti dog outline of the L5 vertebrae came into view. The spinal needle was advanced to the "eye of the priti dog" and after negative aspiration a 0.75 mL of the above noted solution was injected. No pain or paresthesia was noted. This technique was repeated at each of the above noted levels. The spinal needle was removed intact following injection at each targeted site. The stylet was replaced prior to needle removal at each site    Description of Findings: Not applicable    Prosthetic devices, grafts, tissues, or devices implanted: None    Specimen Removed: No    ESTIMATED BLOOD LOSS: " minimal    COMPLICATIONS: None    DISPOSITION / PLANS: The patient was transferred to the recovery area in a stable condition for observation. The patient was reexamined prior to discharge. There was no evidence of acute neurologic injury following the procedure.  Patient was discharged from the recovery room after meeting discharge criteria. Home discharge instructions were given to the patient by the staff.

## 2022-03-07 NOTE — DISCHARGE SUMMARY
[facility]  Discharge Note       Description of Procedure: Lumbar Medial Branch Block under Fluoroscopic Guidance    Procedure Date: 3/7/2022    Admit Date: 3/7/2022  Discharge Date: 3/7/2022     Attending Physician: Ivan Davey   Discharge Provider: Ivan Davey    Preoperative Diagnosis: Lumbar Facet Arthropathy     Postoperative Diagnosis: as above, same as preoperative diagnosis    Discharged Condition: Stable    Hospital Course: Patient was admitted for an outpatient procedure. The procedure was tolerated well with no complications.    Final Diagnoses: Same as principal problem.    Disposition: Home, self-care.    Follow up/Patient Instructions:  Follow-up in clinic in 2-3 weeks.    Medications: No medications were prescribed today. The patient was advised to resume normal medication regimen without change.  Specific information was provided regarding restarting any anticoagulant/s.    Discharge Procedure Orders (must include Diet, Follow-up, Activity):  Light activity for the remainder of the day, resume normal activity tomorrow. Resume normal diet. Follow-up in clinic in 2-3 weeks.

## 2022-03-07 NOTE — DISCHARGE INSTRUCTIONS

## 2022-03-08 NOTE — PROGRESS NOTES
Monica Johnson  03/09/2022  [id number]    Kenya Escoto MD  Patient Care Team:  Kenya Escoto MD as PCP - General (Family Medicine)  Barbara Moore LPN as Care Coordinator (Internal Medicine)  Kenya Escoto MD (Family Medicine)  Elisa Chao RD (Inactive) as Dietitian (Nutrition)  Jian Foster as Digital Medicine Health   Tkeyah Bazin, PharmD as Hypertension Digital Medicine Clinician  Kenya Escoto MD as Hypertension Digital Medicine Responsible Provider (Family Medicine)  Medicaid (Non[place]) as Hypertension Digital Medicine Contract  Has the patient seen any provider outside of the [facility] network since the last visit? (no). If yes, HIPPA forms completed and records requested.        Visit Type:a scheduled routine follow-up visit    Chief Complaint:  Chief Complaint   Patient presents with    Follow-up     3 month f/u       History of Present Illness:    3 month follow up    Seeing pain management  Saw Dr. Davey  He had L3-L5 MBBB for his pain.  He reports that it didn't help with the pain.     HTN- Active in digital med  It looks stable    He is followed by External Cards  Stopped the Imdur due to HA  He is on Norvasc, Lisinopril and Metoprolol.  He is on statin and ASA.    He is not 46 and is due for colon.    Sinus-  He has Zyrtec  Declines any nasal spray    He says he has pain when he urinates, he thinks it coming from his back.  He also report that with ejaculation it does cause him pain.  MRI in 2021  FINDINGS:  Vertebral body heights and alignment maintained without spondylolisthesis.  No concerning marrow signal abnormality.  Intervertebral discs maintain normal signal without height loss.     Conus terminates normally.  Visualized intra-abdominal/pelvic structures are unremarkable.     L1-L2 through L5-S1: No significant posterior disc bulge, spinal canal stenosis or neural foraminal narrowing.  Facet degenerative hypertrophy findings noted, greater at the lower lumbar  spine.     Impression:     Mild degenerative findings without high-grade spinal canal stenosis or neural foraminal narrowing.        History:  Past Medical History:   Diagnosis Date    ADHD (attention deficit hyperactivity disorder)     Allergy     Anxiety 1/25/2016    Arthritis 2014    Osteoarthritis    CHF (congestive heart failure)     Degenerative disc disease at L5-S1 level     Depression 1/25/2016    Hypertension     Sciatica of left side 1/31/2017     Past Surgical History:   Procedure Laterality Date    CALCANEAL OSTEOTOMY Left 4/16/2019    Procedure: OSTEOTOMY, CALCANEUS;  Surgeon: Rober Colon DPM;  Location: [facility] OR;  Service: Podiatry;  Laterality: Left;    CIRCUMCISION      FOOT SURGERY      INJECTION OF ANESTHETIC AGENT AROUND MEDIAL BRANCH NERVES INNERVATING CERVICAL FACET JOINT Bilateral 11/15/2021    Procedure: Block-nerve-medial branch-cervical bilateral C5, 6,7 RN IV sedation;  Surgeon: Ivan Davey MD;  Location: [facility] PAIN [facility]GT;  Service: Pain Management;  Laterality: Bilateral;    INJECTION OF ANESTHETIC AGENT AROUND MEDIAL BRANCH NERVES INNERVATING LUMBAR FACET JOINT Bilateral 3/7/2022    Procedure: bilateral L3-L5 MBB;  Surgeon: Ivan Davey MD;  Location: [facility]IN [facility]GT;  Service: Pain Management;  Laterality: Bilateral;    INJECTION OF ANESTHETIC AGENT INTO SACROILIAC JOINT Right 11/24/2020    Procedure: Right BLOCK, SACROILIAC JOINT and Right Knee Injection with RN IV sedation;  Surgeon: Ivan Davey MD;  Location: [facility]IN [facility]GT;  Service: Pain Management;  Laterality: Right;    INJECTION OF ANESTHETIC AGENT INTO SACROILIAC JOINT Right 4/16/2021    Procedure: Right BLOCK, SACROILIAC JOINT RIght Hip Right GTB RN IV sedation;  Surgeon: Ivan Davey MD;  Location: [facility]IN [facility]GT;  Service: Pain Management;  Laterality: Right;    LENGTHENING OF ACHILLES TENDON Left 4/16/2019    Procedure: LENGTHENING, TENDON, ACHILLES;  Surgeon: Rober Colon DPM;  Location: [facility] OR;   Service: Podiatry;  Laterality: Left;    PLACEMENT OF ACELLULAR HUMAN DERMAL ALLOGRAFT Left 2/17/2021    Procedure: APPLICATION, ACELLULAR HUMAN DERMAL ALLOGRAFT;  Surgeon: Rober Colon DPM;  Location: [facility] OR;  Service: Podiatry;  Laterality: Left;    REPAIR OF POSTERIOR TIBIALIS TENDON Left 4/16/2019    Procedure: REPAIR, TENDON, TIBIALIS POSTERIOR;  Surgeon: Rober Colon DPM;  Location: [facility] OR;  Service: Podiatry;  Laterality: Left;    REPAIR OF TENDON OF LOWER EXTREMITY Left 2/17/2021    Procedure: REPAIR, TENDON, LOWER EXTREMITY;  Surgeon: Rober Colon DPM;  Location: [facility] OR;  Service: Podiatry;  Laterality: Left;    TENDON TRANSFER Left 4/16/2019    Procedure: TRANSFER, TENDON;  Surgeon: Rober Colon DPM;  Location: [facility] OR;  Service: Podiatry;  Laterality: Left;     Family History   Problem Relation Age of Onset    Cancer Mother     Hypertension Mother     Cataracts Mother     Hypertension Maternal Grandmother     Glaucoma Father      Social History     Socioeconomic History    Marital status:    Tobacco Use    Smoking status: Current Some Day Smoker     Packs/day: 0.25     Types: Cigarettes, Cigars    Smokeless tobacco: Never Used    Tobacco comment: HOLD MIDNIGHT TO SURGERY   Substance and Sexual Activity    Alcohol use: Yes     Comment: socially: HOLD 72HRS PRIOR TO SURGERY    Drug use: Never     Types: Marijuana     Comment: HOLD TODAY PRIOR TO SURGERY    Sexual activity: Yes     Partners: Female   Social History Narrative    ** Merged History Encounter **          Social Determinants of Health     Financial Resource Strain: High Risk    Difficulty of Paying Living Expenses: Very hard   Food Insecurity: Food Insecurity Present    Worried About Running Out of Food in the Last Year: Sometimes true    Ran Out of Food in the Last Year: Sometimes true   Transportation Needs: No Transportation Needs    Lack of Transportation (Medical): No    Lack of Transportation  (Non-Medical): No   Physical Activity: Sufficiently Active    Days of Exercise per Week: 7 days    Minutes of Exercise per Session: 30 min   Stress: Stress Concern Present    Feeling of Stress : Very much   Social Connections: Unknown    Frequency of Communication with Friends and Family: Twice a week    Frequency of Social Gatherings with Friends and Family: Once a week    Active Member of Clubs or Organizations: No    Attends Club or Organization Meetings: Never    Marital Status:    Housing Stability: Low Risk     Unable to Pay for Housing in the Last Year: No    Number of Places Lived in the Last Year: 1    Unstable Housing in the Last Year: No     Patient Active Problem List   Diagnosis    Depression    Intermittent explosive disorder    Sciatica of left side    Plantar fasciitis    Osteoarthritis    Essential hypertension    Elevated liver function tests    Acute medial meniscus tear of right knee    Chondromalacia of right knee    Fatty liver    Tobacco use disorder    Posterior tibial tendon dysfunction (PTTD) of left lower extremity    Non-ischemic cardiomyopathy    Coronary artery disease involving native coronary artery of native heart without angina pectoris    Unspecified inflammatory spondylopathy, lumbar region    Sacroiliac joint dysfunction    Posterior tibial tendinitis, left    Atherosclerotic heart disease of native coronary artery with other forms of angina pectoris     Review of patient's allergies indicates:  No Known Allergies    The following were reviewed at this visit: active problem list, medication list, allergies, family history, social history, and health maintenance.    Medications:  Current Outpatient Medications on File Prior to Visit   Medication Sig Dispense Refill    amLODIPine (NORVASC) 5 MG tablet Take 5 mg by mouth once daily.      aspirin (ECOTRIN) 81 MG EC tablet Take 81 mg by mouth once daily.      atorvastatin (LIPITOR) 20 MG tablet  Take 20 mg by mouth every evening.       busPIRone (BUSPAR) 30 MG Tab TAKE ONE TABLET TWICE DAILY 60 tablet 1    lisinopriL 10 MG tablet Take 10 mg by mouth once daily.      metoprolol succinate (TOPROL-XL) 25 MG 24 hr tablet Take 25 mg by mouth once daily.       cetirizine (ZYRTEC) 10 MG tablet Take 10 mg by mouth once daily.      cholecalciferol, vitamin D3, 125 mcg (5,000 unit) Tab Take 5,000 Units by mouth once daily.      gabapentin (NEURONTIN) 300 MG capsule Take 1 capsule (300 mg total) by mouth 2 (two) times daily AND 4 capsules (1,200 mg total) every evening. TAKE ONE CAPSULE BY MOUTH TWICE DAILY AND 4 CAPSULES EVERY EVENING. 180 capsule 2    ibuprofen (ADVIL,MOTRIN) 800 MG tablet TAKE ONE TABLET THREE TIMES DAILY 90 tablet 1    methocarbamoL (ROBAXIN) 750 MG Tab TAKE ONE TABLET TWICE DAILY AS NEEDED FOR MUSCLE SPASMS 60 tablet 1    multivitamin capsule Take 1 capsule by mouth once daily.      tiZANidine (ZANAFLEX) 6 mg capsule TAKE ONE CAPSULE BY MOUTH EVERY NIGHT AS NEEDED FOR MUSCLE SPASMS      traZODone (DESYREL) 100 MG tablet Take 1/2 to 1 tablet at bedtime as needed for sleep. 30 tablet 2    [DISCONTINUED] busPIRone (BUSPAR) 7.5 MG tablet Take 1 tablet (7.5 mg total) by mouth 2 (two) times a day. 60 tablet 0    [DISCONTINUED] lisinopriL (PRINIVIL,ZESTRIL) 5 MG tablet Take 2 tablets (10 mg total) by mouth once daily. 90 tablet 3    [DISCONTINUED] tiZANidine (ZANAFLEX) 4 MG tablet Take 1 tablet (4 mg total) by mouth 3 (three) times daily as needed (pain). 90 tablet 2     No current facility-administered medications on file prior to visit.       Medications have been reviewed and reconciled with patient at this visit.  Barriers to medications reviewed with patient.    Adverse reactions to current medications reviewed with patient..    Over the counter medications reviewed and reconciled with patient.    Exam:  Wt Readings from Last 3 Encounters:   03/09/22 105.4 kg (232 lb 7.6 oz)    03/07/22 106.3 kg (234 lb 5.6 oz)   12/15/21 104.6 kg (230 lb 9.6 oz)     Temp Readings from Last 3 Encounters:   03/09/22 97.6 °F (36.4 °C) (Tympanic)   03/07/22 97.6 °F (36.4 °C) (Temporal)   11/29/21 98.6 °F (37 °C) (Tympanic)     BP Readings from Last 3 Encounters:   03/07/22 126/72   11/29/21 118/68   11/15/21 132/81     Pulse Readings from Last 3 Encounters:   03/09/22 94   03/07/22 65   11/29/21 96     Body mass index is 31.53 kg/m².      Review of Systems   Constitutional: Negative.  Negative for chills and fever.   HENT: Negative.  Negative for congestion, sinus pain and sore throat.    Eyes: Negative for blurred vision and double vision.   Respiratory: Negative for cough, sputum production, shortness of breath and wheezing.    Cardiovascular: Negative for chest pain, palpitations and leg swelling.   Gastrointestinal: Negative for abdominal pain, constipation, diarrhea, heartburn, nausea and vomiting.   Genitourinary: Negative.    Musculoskeletal: Positive for back pain and joint pain.   Skin: Negative.  Negative for rash.   Neurological: Negative.    Endo/Heme/Allergies: Negative.  Negative for polydipsia. Does not bruise/bleed easily.   Psychiatric/Behavioral: Negative for depression and substance abuse.     Physical Exam  Nursing note reviewed.   Cardiovascular:      Rate and Rhythm: Normal rate and regular rhythm.   Pulmonary:      Effort: Pulmonary effort is normal. No respiratory distress.   Neurological:      Mental Status: He is alert and oriented to person, place, and time.   Psychiatric:         Mood and Affect: Mood normal.         Behavior: Behavior normal.         Thought Content: Thought content normal.         Judgment: Judgment normal.         Laboratory Reviewed ({Yes)  Lab Results   Component Value Date    WBC 9.26 11/29/2021    HGB 13.9 (L) 11/29/2021    HCT 42.7 11/29/2021     11/29/2021    CHOL 117 (L) 11/29/2021    TRIG 144 11/29/2021    HDL 33 (L) 11/29/2021    ALT 66 (H)  11/29/2021    AST 38 11/29/2021     11/29/2021    K 4.7 11/29/2021     11/29/2021    CREATININE 1.2 11/29/2021    BUN 7 11/29/2021    CO2 24 11/29/2021    TSH 0.492 11/29/2021    PSA 1.2 11/29/2021    INR 0.9 08/28/2018    HGBA1C 5.5 12/03/2020       Monica was seen today for follow-up.    Diagnoses and all orders for this visit:    Essential hypertension  HTN controlled  Unspecified inflammatory spondylopathy, lumbar region  Pain management  Non-ischemic cardiomyopathy  Stable  No sign of decompensation    Fatty liver  Lab Results   Component Value Date    ALT 66 (H) 11/29/2021    AST 38 11/29/2021    ALKPHOS 78 11/29/2021    BILITOT 0.4 11/29/2021       Elevated liver function tests  Diet and exercise  Sinus congestion  Zyrtec    Colon cancer screen  Cologuard    Urology consult for his pain with urination  May be due to arthritis, but need to be sure.            Care Plan/Goals: Reviewed    Goals        Patient Stated      Blood Pressure < 130/80 (pt-stated)        Other      Exercise at least 150 minutes per week.       Take at least one BP reading per week at various times of the day           Follow up: Follow up in about 6 months (around 9/9/2022).    After visit summary was printed and given to patient upon discharge today.  Patient goals and care plan are included in After Visit Summary.

## 2022-03-09 ENCOUNTER — OFFICE VISIT (OUTPATIENT)
Dept: INTERNAL MEDICINE | Facility: CLINIC | Age: 47
End: 2022-03-09
Payer: MEDICAID

## 2022-03-09 VITALS
WEIGHT: 232.5 LBS | TEMPERATURE: 98 F | BODY MASS INDEX: 31.49 KG/M2 | HEART RATE: 94 BPM | OXYGEN SATURATION: 98 % | HEIGHT: 72 IN

## 2022-03-09 DIAGNOSIS — R30.9 PAIN WITH URINATION: ICD-10-CM

## 2022-03-09 DIAGNOSIS — I42.8 NON-ISCHEMIC CARDIOMYOPATHY: ICD-10-CM

## 2022-03-09 DIAGNOSIS — K76.0 FATTY LIVER: ICD-10-CM

## 2022-03-09 DIAGNOSIS — R79.89 ELEVATED LIVER FUNCTION TESTS: ICD-10-CM

## 2022-03-09 DIAGNOSIS — M46.96 UNSPECIFIED INFLAMMATORY SPONDYLOPATHY, LUMBAR REGION: ICD-10-CM

## 2022-03-09 DIAGNOSIS — Z12.11 SCREEN FOR COLON CANCER: ICD-10-CM

## 2022-03-09 DIAGNOSIS — R09.81 SINUS CONGESTION: ICD-10-CM

## 2022-03-09 DIAGNOSIS — I10 ESSENTIAL HYPERTENSION: Primary | ICD-10-CM

## 2022-03-09 PROCEDURE — 99999 PR PBB SHADOW E&M-EST. PATIENT-LVL III: CPT | Mod: PBBFAC,,, | Performed by: FAMILY MEDICINE

## 2022-03-09 PROCEDURE — 99213 OFFICE O/P EST LOW 20 MIN: CPT | Mod: PBBFAC | Performed by: FAMILY MEDICINE

## 2022-03-09 PROCEDURE — 99214 PR OFFICE/OUTPT VISIT, EST, LEVL IV, 30-39 MIN: ICD-10-PCS | Mod: S$GLB,,, | Performed by: FAMILY MEDICINE

## 2022-03-09 PROCEDURE — 99214 OFFICE O/P EST MOD 30 MIN: CPT | Mod: S$GLB,,, | Performed by: FAMILY MEDICINE

## 2022-03-09 PROCEDURE — 99999 PR PBB SHADOW E&M-EST. PATIENT-LVL III: ICD-10-PCS | Mod: PBBFAC,,, | Performed by: FAMILY MEDICINE

## 2022-03-09 RX ORDER — TIZANIDINE HYDROCHLORIDE 6 MG/1
CAPSULE, GELATIN COATED ORAL
COMMUNITY
Start: 2022-02-09 | End: 2022-03-17 | Stop reason: SDUPTHER

## 2022-03-09 RX ORDER — LISINOPRIL 10 MG/1
10 TABLET ORAL DAILY
COMMUNITY
Start: 2022-03-01 | End: 2023-02-22 | Stop reason: SDUPTHER

## 2022-03-15 ENCOUNTER — OFFICE VISIT (OUTPATIENT)
Dept: UROLOGY | Facility: CLINIC | Age: 47
End: 2022-03-15
Payer: MEDICAID

## 2022-03-15 ENCOUNTER — LAB VISIT (OUTPATIENT)
Dept: LAB | Facility: HOSPITAL | Age: 47
End: 2022-03-15
Attending: NURSE PRACTITIONER
Payer: MEDICAID

## 2022-03-15 VITALS
HEIGHT: 72 IN | SYSTOLIC BLOOD PRESSURE: 129 MMHG | HEART RATE: 101 BPM | TEMPERATURE: 98 F | BODY MASS INDEX: 32.01 KG/M2 | WEIGHT: 236.31 LBS | DIASTOLIC BLOOD PRESSURE: 73 MMHG

## 2022-03-15 DIAGNOSIS — R30.0 DYSURIA: Primary | ICD-10-CM

## 2022-03-15 DIAGNOSIS — N40.0 BENIGN PROSTATIC HYPERPLASIA WITHOUT LOWER URINARY TRACT SYMPTOMS: ICD-10-CM

## 2022-03-15 DIAGNOSIS — Z80.42 FAMILY HISTORY OF PROSTATE CANCER IN FATHER: ICD-10-CM

## 2022-03-15 DIAGNOSIS — R30.9 PAIN WITH URINATION: ICD-10-CM

## 2022-03-15 DIAGNOSIS — Z12.5 PROSTATE CANCER SCREENING: ICD-10-CM

## 2022-03-15 PROBLEM — S83.241A ACUTE MEDIAL MENISCUS TEAR OF RIGHT KNEE: Status: RESOLVED | Noted: 2018-05-23 | Resolved: 2022-03-15

## 2022-03-15 PROBLEM — M72.2 PLANTAR FASCIITIS: Status: RESOLVED | Noted: 2018-05-03 | Resolved: 2022-03-15

## 2022-03-15 PROBLEM — M46.96 UNSPECIFIED INFLAMMATORY SPONDYLOPATHY, LUMBAR REGION: Status: RESOLVED | Noted: 2020-09-28 | Resolved: 2022-03-15

## 2022-03-15 PROBLEM — M76.822 POSTERIOR TIBIAL TENDINITIS, LEFT: Status: RESOLVED | Noted: 2021-02-17 | Resolved: 2022-03-15

## 2022-03-15 PROBLEM — M76.822 POSTERIOR TIBIAL TENDON DYSFUNCTION (PTTD) OF LEFT LOWER EXTREMITY: Status: RESOLVED | Noted: 2019-04-16 | Resolved: 2022-03-15

## 2022-03-15 LAB
BILIRUB SERPL-MCNC: ABNORMAL MG/DL
BLOOD URINE, POC: ABNORMAL
CLARITY, POC UA: CLEAR
COLOR, POC UA: YELLOW
COMPLEXED PSA SERPL-MCNC: 1.1 NG/ML (ref 0–4)
GLUCOSE UR QL STRIP: ABNORMAL
KETONES UR QL STRIP: ABNORMAL
LEUKOCYTE ESTERASE URINE, POC: ABNORMAL
NITRITE, POC UA: ABNORMAL
PH, POC UA: 5
POC RESIDUAL URINE VOLUME: 63 ML (ref 0–100)
PROTEIN, POC: ABNORMAL
SPECIFIC GRAVITY, POC UA: 1.02
UROBILINOGEN, POC UA: ABNORMAL

## 2022-03-15 PROCEDURE — 36415 COLL VENOUS BLD VENIPUNCTURE: CPT | Performed by: NURSE PRACTITIONER

## 2022-03-15 PROCEDURE — 99214 OFFICE O/P EST MOD 30 MIN: CPT | Mod: PBBFAC | Performed by: NURSE PRACTITIONER

## 2022-03-15 PROCEDURE — 87086 URINE CULTURE/COLONY COUNT: CPT | Performed by: NURSE PRACTITIONER

## 2022-03-15 PROCEDURE — 99214 OFFICE O/P EST MOD 30 MIN: CPT | Mod: S$PBB,,, | Performed by: NURSE PRACTITIONER

## 2022-03-15 PROCEDURE — 99999 PR PBB SHADOW E&M-EST. PATIENT-LVL IV: CPT | Mod: PBBFAC,,, | Performed by: NURSE PRACTITIONER

## 2022-03-15 PROCEDURE — 84153 ASSAY OF PSA TOTAL: CPT | Performed by: NURSE PRACTITIONER

## 2022-03-15 PROCEDURE — 99214 PR OFFICE/OUTPT VISIT, EST, LEVL IV, 30-39 MIN: ICD-10-PCS | Mod: S$PBB,,, | Performed by: NURSE PRACTITIONER

## 2022-03-15 PROCEDURE — 81002 URINALYSIS NONAUTO W/O SCOPE: CPT | Mod: PBBFAC | Performed by: NURSE PRACTITIONER

## 2022-03-15 PROCEDURE — 99999 PR PBB SHADOW E&M-EST. PATIENT-LVL IV: ICD-10-PCS | Mod: PBBFAC,,, | Performed by: NURSE PRACTITIONER

## 2022-03-15 PROCEDURE — 51798 US URINE CAPACITY MEASURE: CPT | Mod: PBBFAC | Performed by: NURSE PRACTITIONER

## 2022-03-15 RX ORDER — TAMSULOSIN HYDROCHLORIDE 0.4 MG/1
0.4 CAPSULE ORAL DAILY
Qty: 30 CAPSULE | Refills: 11 | Status: SHIPPED | OUTPATIENT
Start: 2022-03-15 | End: 2022-05-01 | Stop reason: SDUPTHER

## 2022-03-15 NOTE — PROGRESS NOTES
Chief Complaint:   Slow stream    HPI:   Patient is a 46-year-old male that is presenting with an increase in nocturia, 3-4 times nightly, and a slow stream.  Urine in clinic was negative and PVR is 63 mL.  Patient reports over the last 2-3 months he has had difficulty getting stream started.  No BPH meds.  Father has a history of prostate cancer.  Patient states that when he urinates he has lower back pain.  No history of renal stones.  Denies hematuria.        Allergies:  Patient has no known allergies.    Medications:  has a current medication list which includes the following prescription(s): amlodipine, aspirin, atorvastatin, buspirone, cetirizine, cholecalciferol (vitamin d3), gabapentin, ibuprofen, lisinopril, methocarbamol, metoprolol succinate, multivitamin, tizanidine, trazodone, and tamsulosin.    Review of Systems:  General: No fever, chills, fatigability, or weight loss.  Skin: No rashes, itching, or changes in color or texture of skin.  Chest: Denies LOOMIS, cyanosis, wheezing, cough, and sputum production.  Abdomen: Appetite fine. No weight loss. Denies diarrhea, abdominal pain, hematemesis, or blood in stool.  Musculoskeletal: No joint stiffness or swelling. Denies back pain.  : As above.  All other review of systems negative.    PMH:   has a past medical history of ADHD (attention deficit hyperactivity disorder), Allergy, Anxiety (1/25/2016), Arthritis (2014), CHF (congestive heart failure), Degenerative disc disease at L5-S1 level, Depression (1/25/2016), Hypertension, and Sciatica of left side (1/31/2017).    PSH:   has a past surgical history that includes Circumcision; Calcaneal osteotomy (Left, 4/16/2019); Tendon transfer (Left, 4/16/2019); Lengthening of Achilles tendon (Left, 4/16/2019); Repair of posterior tibialis tendon (Left, 4/16/2019); Foot surgery; Injection of anesthetic agent into sacroiliac joint (Right, 11/24/2020); Repair of tendon of lower extremity (Left, 2/17/2021); Placement of  acellular human dermal allograft (Left, 2/17/2021); Injection of anesthetic agent into sacroiliac joint (Right, 4/16/2021); Injection of anesthetic agent around medial branch nerves innervating cervical facet joint (Bilateral, 11/15/2021); and Injection of anesthetic agent around medial branch nerves innervating lumbar facet joint (Bilateral, 3/7/2022).    FamHx: family history includes Cancer in his mother; Cataracts in his mother; Glaucoma in his father; Hypertension in his maternal grandmother and mother.    SocHx:  reports that he has been smoking cigarettes and cigars. He has been smoking about 0.25 packs per day. He has never used smokeless tobacco. He reports current alcohol use. He reports that he does not use drugs.      Physical Exam:  Vitals:    03/15/22 1011   BP: 129/73   Pulse: 101   Temp: 98.1 °F (36.7 °C)     General: A&Ox3, no apparent distress, no deformities  Neck: No masses, normal thyroid  Lungs: normal inspiration, no use of accessory muscles  Heart: normal pulse, no arrhythmias  Abdomen: Soft, NT, ND, no masses, no hernias, no hepatosplenomegaly    Labs/Studies:   See HPI    Impression/Plan:   1. Prostate cancer screen  Father has a history of prostate cancer, will proceed with PSA.  Will perform LAYTON at return visit in 2 months.    2. BPH  Patient was started on tamsulosin and return to clinic as a follow-up in 2 months.  If lower back pain does not resolve will proceed with imaging.

## 2022-03-16 ENCOUNTER — PATIENT MESSAGE (OUTPATIENT)
Dept: PAIN MEDICINE | Facility: CLINIC | Age: 47
End: 2022-03-16
Payer: COMMERCIAL

## 2022-03-16 ENCOUNTER — TELEPHONE (OUTPATIENT)
Dept: UROLOGY | Facility: CLINIC | Age: 47
End: 2022-03-16
Payer: COMMERCIAL

## 2022-03-16 LAB — BACTERIA UR CULT: NO GROWTH

## 2022-03-17 ENCOUNTER — TELEPHONE (OUTPATIENT)
Dept: UROLOGY | Facility: CLINIC | Age: 47
End: 2022-03-17
Payer: COMMERCIAL

## 2022-03-17 ENCOUNTER — PATIENT MESSAGE (OUTPATIENT)
Dept: PAIN MEDICINE | Facility: CLINIC | Age: 47
End: 2022-03-17
Payer: COMMERCIAL

## 2022-03-17 RX ORDER — TIZANIDINE HYDROCHLORIDE 6 MG/1
6 CAPSULE, GELATIN COATED ORAL NIGHTLY PRN
Qty: 30 CAPSULE | Refills: 1 | Status: SHIPPED | OUTPATIENT
Start: 2022-03-17 | End: 2022-05-01 | Stop reason: SDUPTHER

## 2022-03-17 NOTE — TELEPHONE ENCOUNTER
Attempted to call pt to inform her that her urine culture was negative for infection; no answer. Unable to leave voice message.

## 2022-04-25 ENCOUNTER — PATIENT MESSAGE (OUTPATIENT)
Dept: ADMINISTRATIVE | Facility: HOSPITAL | Age: 47
End: 2022-04-25
Payer: COMMERCIAL

## 2022-04-27 LAB — NONINV COLON CA DNA+OCC BLD SCRN STL QL: NEGATIVE

## 2022-05-01 ENCOUNTER — PATIENT MESSAGE (OUTPATIENT)
Dept: ADMINISTRATIVE | Facility: OTHER | Age: 47
End: 2022-05-01
Payer: COMMERCIAL

## 2022-05-01 DIAGNOSIS — M53.3 SACROCOCCYGEAL DISORDERS, NOT ELSEWHERE CLASSIFIED: ICD-10-CM

## 2022-05-02 RX ORDER — TAMSULOSIN HYDROCHLORIDE 0.4 MG/1
0.4 CAPSULE ORAL DAILY
Qty: 30 CAPSULE | Refills: 11 | Status: SHIPPED | OUTPATIENT
Start: 2022-05-02 | End: 2023-04-08 | Stop reason: SDUPTHER

## 2022-05-02 RX ORDER — METHOCARBAMOL 750 MG/1
TABLET, FILM COATED ORAL
Qty: 60 TABLET | Refills: 1 | Status: CANCELLED | OUTPATIENT
Start: 2022-05-02

## 2022-05-02 RX ORDER — GABAPENTIN 300 MG/1
CAPSULE ORAL
Qty: 180 CAPSULE | Refills: 2 | Status: SHIPPED | OUTPATIENT
Start: 2022-05-02 | End: 2022-08-17 | Stop reason: SDUPTHER

## 2022-05-02 RX ORDER — TIZANIDINE 4 MG/1
4 TABLET ORAL 3 TIMES DAILY PRN
Qty: 90 TABLET | Refills: 2 | Status: CANCELLED | OUTPATIENT
Start: 2022-05-02

## 2022-05-02 RX ORDER — TIZANIDINE HYDROCHLORIDE 6 MG/1
6 CAPSULE, GELATIN COATED ORAL NIGHTLY PRN
Qty: 30 CAPSULE | Refills: 1 | Status: SHIPPED | OUTPATIENT
Start: 2022-05-02 | End: 2022-05-10 | Stop reason: SDUPTHER

## 2022-05-02 NOTE — TELEPHONE ENCOUNTER
Last visit:  12/15/21  Proc:      Next navin:  None    Requesting refill of:  Tizanidine 4 mg  One tab three times a day as needed  #90 give on 3/23/22 with 2 refills. The patient should have one refill remaining.

## 2022-05-02 NOTE — TELEPHONE ENCOUNTER
Requesting refill on:  Gabapentin 300 mg.    Takes one in the morning, one in the afternoon and four at night.  Last filled:  12/15/21 #180 caps with 2 refills    Robaxin 750 mg  Takes one tab twice a day as needed  Last filled 3/10/22 #60 with 1 refill    Zanaflex 4mg  Takes one tab three times a day as needed for pain  Last filled 3/23/22 #90 with 2 refills    Patient moved and is using a new pharmacy now.  [facility] [address] [facility]

## 2022-05-09 ENCOUNTER — PATIENT MESSAGE (OUTPATIENT)
Dept: PAIN MEDICINE | Facility: CLINIC | Age: 47
End: 2022-05-09
Payer: COMMERCIAL

## 2022-05-09 DIAGNOSIS — M79.10 MUSCLE PAIN: Primary | ICD-10-CM

## 2022-05-10 RX ORDER — TIZANIDINE HYDROCHLORIDE 6 MG/1
6 CAPSULE, GELATIN COATED ORAL NIGHTLY PRN
Qty: 30 CAPSULE | Refills: 1 | Status: SHIPPED | OUTPATIENT
Start: 2022-05-10 | End: 2022-08-17

## 2022-05-18 ENCOUNTER — OFFICE VISIT (OUTPATIENT)
Dept: PSYCHIATRY | Facility: CLINIC | Age: 47
End: 2022-05-18
Payer: MEDICAID

## 2022-05-18 DIAGNOSIS — G47.00 INSOMNIA, UNSPECIFIED TYPE: ICD-10-CM

## 2022-05-18 DIAGNOSIS — F43.10 PTSD (POST-TRAUMATIC STRESS DISORDER): Primary | ICD-10-CM

## 2022-05-18 PROCEDURE — 90833 PR PSYCHOTHERAPY W/PATIENT W/E&M, 30 MIN (ADD ON): ICD-10-PCS | Mod: 95,,, | Performed by: PSYCHIATRY & NEUROLOGY

## 2022-05-18 PROCEDURE — 90833 PSYTX W PT W E/M 30 MIN: CPT | Mod: 95,,, | Performed by: PSYCHIATRY & NEUROLOGY

## 2022-05-18 PROCEDURE — 99214 PR OFFICE/OUTPT VISIT, EST, LEVL IV, 30-39 MIN: ICD-10-PCS | Mod: 95,,, | Performed by: PSYCHIATRY & NEUROLOGY

## 2022-05-18 PROCEDURE — 4010F ACE/ARB THERAPY RXD/TAKEN: CPT | Mod: AF,HB,CPTII,95 | Performed by: PSYCHIATRY & NEUROLOGY

## 2022-05-18 PROCEDURE — 99214 OFFICE O/P EST MOD 30 MIN: CPT | Mod: 95,,, | Performed by: PSYCHIATRY & NEUROLOGY

## 2022-05-18 PROCEDURE — 4010F PR ACE/ARB THEARPY RXD/TAKEN: ICD-10-PCS | Mod: AF,HB,CPTII,95 | Performed by: PSYCHIATRY & NEUROLOGY

## 2022-05-18 RX ORDER — BUSPIRONE HYDROCHLORIDE 30 MG/1
30 TABLET ORAL 2 TIMES DAILY
Qty: 60 TABLET | Refills: 3 | Status: SHIPPED | OUTPATIENT
Start: 2022-05-18 | End: 2022-11-11 | Stop reason: SDUPTHER

## 2022-05-18 RX ORDER — TRAZODONE HYDROCHLORIDE 100 MG/1
TABLET ORAL
Qty: 30 TABLET | Refills: 3 | Status: SHIPPED | OUTPATIENT
Start: 2022-05-18
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
## 2022-06-15 ENCOUNTER — PATIENT MESSAGE (OUTPATIENT)
Dept: PAIN MEDICINE | Facility: CLINIC | Age: 47
End: 2022-06-15
Payer: COMMERCIAL

## 2022-07-08 ENCOUNTER — TELEPHONE (OUTPATIENT)
Dept: PAIN MEDICINE | Facility: CLINIC | Age: 47
End: 2022-07-08
Payer: MEDICAID

## 2022-07-15 ENCOUNTER — PATIENT MESSAGE (OUTPATIENT)
Dept: PAIN MEDICINE | Facility: CLINIC | Age: 47
End: 2022-07-15
Payer: MEDICAID

## 2022-07-21 ENCOUNTER — TELEPHONE (OUTPATIENT)
Dept: PAIN MEDICINE | Facility: CLINIC | Age: 47
End: 2022-07-21
Payer: MEDICAID

## 2022-07-28 ENCOUNTER — PATIENT MESSAGE (OUTPATIENT)
Dept: PAIN MEDICINE | Facility: CLINIC | Age: 47
End: 2022-07-28
Payer: MEDICAID

## 2022-07-29 NOTE — TELEPHONE ENCOUNTER
Reached out to patient to schedule appointment from messages. Apt has been made.   Pt understand. All questions answered.     Samuel Jackson  Medical Assistant

## 2022-08-17 ENCOUNTER — OFFICE VISIT (OUTPATIENT)
Dept: PAIN MEDICINE | Facility: CLINIC | Age: 47
End: 2022-08-17
Payer: MEDICAID

## 2022-08-17 VITALS
SYSTOLIC BLOOD PRESSURE: 116 MMHG | DIASTOLIC BLOOD PRESSURE: 73 MMHG | HEART RATE: 83 BPM | RESPIRATION RATE: 17 BRPM | HEIGHT: 72 IN | BODY MASS INDEX: 31.08 KG/M2 | WEIGHT: 229.5 LBS

## 2022-08-17 DIAGNOSIS — M47.816 LUMBAR FACET ARTHROPATHY: ICD-10-CM

## 2022-08-17 DIAGNOSIS — M53.3 SACROCOCCYGEAL DISORDERS, NOT ELSEWHERE CLASSIFIED: ICD-10-CM

## 2022-08-17 DIAGNOSIS — M79.10 MUSCLE PAIN: Primary | ICD-10-CM

## 2022-08-17 PROCEDURE — 4010F PR ACE/ARB THEARPY RXD/TAKEN: ICD-10-PCS | Mod: CPTII,,, | Performed by: PHYSICIAN ASSISTANT

## 2022-08-17 PROCEDURE — 1160F PR REVIEW ALL MEDS BY PRESCRIBER/CLIN PHARMACIST DOCUMENTED: ICD-10-PCS | Mod: CPTII,,, | Performed by: PHYSICIAN ASSISTANT

## 2022-08-17 PROCEDURE — 4010F ACE/ARB THERAPY RXD/TAKEN: CPT | Mod: CPTII,,, | Performed by: PHYSICIAN ASSISTANT

## 2022-08-17 PROCEDURE — 1160F RVW MEDS BY RX/DR IN RCRD: CPT | Mod: CPTII,,, | Performed by: PHYSICIAN ASSISTANT

## 2022-08-17 PROCEDURE — 3008F PR BODY MASS INDEX (BMI) DOCUMENTED: ICD-10-PCS | Mod: CPTII,,, | Performed by: PHYSICIAN ASSISTANT

## 2022-08-17 PROCEDURE — 99214 OFFICE O/P EST MOD 30 MIN: CPT | Mod: S$PBB,,, | Performed by: PHYSICIAN ASSISTANT

## 2022-08-17 PROCEDURE — 99999 PR PBB SHADOW E&M-EST. PATIENT-LVL IV: ICD-10-PCS | Mod: PBBFAC,,, | Performed by: PHYSICIAN ASSISTANT

## 2022-08-17 PROCEDURE — 3078F DIAST BP <80 MM HG: CPT | Mod: CPTII,,, | Performed by: PHYSICIAN ASSISTANT

## 2022-08-17 PROCEDURE — 1159F PR MEDICATION LIST DOCUMENTED IN MEDICAL RECORD: ICD-10-PCS | Mod: CPTII,,, | Performed by: PHYSICIAN ASSISTANT

## 2022-08-17 PROCEDURE — 3074F PR MOST RECENT SYSTOLIC BLOOD PRESSURE < 130 MM HG: ICD-10-PCS | Mod: CPTII,,, | Performed by: PHYSICIAN ASSISTANT

## 2022-08-17 PROCEDURE — 99999 PR PBB SHADOW E&M-EST. PATIENT-LVL IV: CPT | Mod: PBBFAC,,, | Performed by: PHYSICIAN ASSISTANT

## 2022-08-17 PROCEDURE — 99214 OFFICE O/P EST MOD 30 MIN: CPT | Mod: PBBFAC | Performed by: PHYSICIAN ASSISTANT

## 2022-08-17 PROCEDURE — 3008F BODY MASS INDEX DOCD: CPT | Mod: CPTII,,, | Performed by: PHYSICIAN ASSISTANT

## 2022-08-17 PROCEDURE — 3078F PR MOST RECENT DIASTOLIC BLOOD PRESSURE < 80 MM HG: ICD-10-PCS | Mod: CPTII,,, | Performed by: PHYSICIAN ASSISTANT

## 2022-08-17 PROCEDURE — 3074F SYST BP LT 130 MM HG: CPT | Mod: CPTII,,, | Performed by: PHYSICIAN ASSISTANT

## 2022-08-17 PROCEDURE — 99214 PR OFFICE/OUTPT VISIT, EST, LEVL IV, 30-39 MIN: ICD-10-PCS | Mod: S$PBB,,, | Performed by: PHYSICIAN ASSISTANT

## 2022-08-17 PROCEDURE — 1159F MED LIST DOCD IN RCRD: CPT | Mod: CPTII,,, | Performed by: PHYSICIAN ASSISTANT

## 2022-08-17 RX ORDER — TIZANIDINE 4 MG/1
4 TABLET ORAL 3 TIMES DAILY PRN
Qty: 90 TABLET | Refills: 5 | Status: SHIPPED | OUTPATIENT
Start: 2022-08-17 | End: 2023-02-15 | Stop reason: SDUPTHER

## 2022-08-17 RX ORDER — GABAPENTIN 300 MG/1
CAPSULE ORAL
Qty: 180 CAPSULE | Refills: 5 | Status: SHIPPED | OUTPATIENT
Start: 2022-08-17 | End: 2023-02-15 | Stop reason: SDUPTHER

## 2022-08-17 RX ORDER — CYCLOBENZAPRINE HCL 10 MG
10 TABLET ORAL NIGHTLY
Qty: 30 TABLET | Refills: 5 | Status: SHIPPED | OUTPATIENT
Start: 2022-08-17 | End: 2022-09-16

## 2022-08-17 NOTE — PROGRESS NOTES
Chief Pain Complaint:  Cervical Neck Pain   Lumbar Back Pain  Right Knee Pain    History of Present Illness:     Interval History (8/17/2022):  Moncia Johnson presents today for follow-up visit.  Patient was last seen 12/15/2021. Reports persistent throbbing low back pain in a band-like distribution across his lower back with intermittent radiation into his buttocks and groin. Reports his pain is primarily axial. Last injection was L3-5 MBB on 03/07/2022 which offered significant relief. Patient reports he would like to hold off on a repeat injection at this time as his pain has not reached the severity that he had prior to the injection. He also reports that he experiences excellent pain control with Gabapentin and tizanidine during the day and flexeril at night. Denies sedation with tizanidine. Patient reports pain as 6/10 today.    Interval HPI  Monica Johnson is a 47 y.o. male  who is presenting with a chief complaint of Lumbar Back Pain. The patient began experiencing this problem insidiously, and the pain has been gradually worsening over the past 3 year(s). The pain is described as throbbing, cramping, aching and heavy and is located in the bilateral lumbar spine. Pain is intermittent and lasts hours. The  pain is nonradiating. The patient rates his pain a 3 out of ten and interferes with activities of daily living a 5 out of ten. Pain is exacerbated by extension of the lumbar spine, and is improved by rest. Patient reports no prior trauma, no prior spinal surgery     Monica Johnson is a 47 y.o. male  who is presenting with a chief complaint of right buttock pain. The patient began experiencing this problem insidiously, and the pain has been gradually worsening over the past 3 month(s). The pain is described as throbbing, cramping, aching and heavy and is located in the right buttock . Pain is intermittent and lasts hours. The  pain is nonradiating. The patient rates his pain a 8 out of ten and  interferes with activities of daily living a 7 out of ten. Pain is exacerbated by getting up from a seated position, and is improved by rest. Patient reports no prior trauma, no prior spinal surgery     Monica Johnson is a 47 y.o. male  who is presenting with a chief complaint of neck pain. The patient began experiencing this problem insidiously, and the pain has been gradually worsening over the past 6 month(s). The pain is described as throbbing, cramping, aching and heavy and is located in the bilateral cervical spine. Pain is intermittent and lasts hours. The  pain is nonradiating. The patient rates his pain a 7 out of ten and interferes with activities of daily living a 7 out of ten. Pain is exacerbated by extension/ rotation of the cervical spine, and is improved by rest. Patient reports no prior trauma, no prior spinal surgery       - pertinent negatives: No fever, No chills, No weight loss, No bladder dysfunction, No bowel dysfunction, No saddle anesthesia  - pertinent positives: none    - medications, other therapies tried (physical therapy, injections):     >> NSAIDs, Tylenol, gabapentin and flexeril    >> Has previously undergone Physical Therapy    >> Has previously undergone spinal injection/s   - Right SI joint and Right Knee injection on 11/25/2020 with 50% relief.    - Right SIJ + right GTB + Right Hip injection on 4/16/21 with 60% pain relief    - Bilateral C5, 6,7 MBB on 11/15/2021 with minimal relief. As requested by Dr Washington Li       Imaging / Labs / Studies (reviewed on 8/17/2022):    3/29/21 MRI Lumbar Spine Without Contrast  TECHNIQUE:  Multiplanar, multisequence MR images were acquired from the thoracolumbar junction to the sacrum without the administration of contrast.  COMPARISON:  08/07/2018 radiographs  FINDINGS:  Vertebral body heights and alignment maintained without spondylolisthesis.  No concerning marrow signal abnormality.  Intervertebral discs maintain normal signal  without height loss.  Conus terminates normally.  Visualized intra-abdominal/pelvic structures are unremarkable.  L1-L2 through L5-S1: No significant posterior disc bulge, spinal canal stenosis or neural foraminal narrowing.  Facet degenerative hypertrophy findings noted, greater at the lower lumbar spine.  Impression  Mild degenerative findings without high-grade spinal canal stenosis or neural foraminal narrowing.        8/07/18 X-Ray Lumbar Complete With Flex And Ext  COMPARISON:  None.  FINDINGS:  There is mild leftward convexity of the lower thoracic and upper lumbar spine which may be in part positional in nature.  Vertebral body heights are well maintained.  No spondylolisthesis demonstrated.  No change in spinal alignment with flexion or extension to suggest instability.  Intervertebral disk spaces are well preserved.  No pars defects visualized.  Posterior elements appear grossly intact. No acute fractures or subluxations are demonstrated.  The remaining visualized osseous and soft tissue structures demonstrate no appreciable abnormality.      1/05/21 X-ray Knee Ortho Bilateral with Flexion  TECHNIQUE:  AP standing of both knees, PA flexion standing views of both knees, and Merchant views of both knees were performed.  Lateral views of both knees were also performed.    COMPARISON  05/22/2018    FINDINGS:  The joint spaces of all 3 compartments of both knees appear to be well maintained.  No joint effusions are suggested.  No acute fracture or dislocation.    Impression  1.  As above      5/22/18 X-ray Knee Ortho Bilateral    Narrative  EXAMINATION:  XR KNEE ORTHO BILAT    CLINICAL HISTORY:  Pain in right knee    TECHNIQUE:  AP standing, lateral and Merchant views of both knees were performed.    COMPARISON:  None    FINDINGS:  Mild bilateral patellar tilt is seen.  No significant joint effusion on either side.  The joint spaces are maintained.  No marginal spurring.  No acute osseous abnormality.          Review of Systems:  CONSTITUTIONAL: patient denies any fever, chills, or weight loss  SKIN: patient denies any rash or itching  RESPIRATORY: patient denies having any shortness of breath  GASTROINTESTINAL: patient denies having any diarrhea, constipation, or bowel incontinence  GENITOURINARY: patient denies having any abnormal bladder function    MUSCULOSKELETAL:  - patient complains of the above noted pain/s (see chief pain complaint)    NEUROLOGICAL:   - pain as above  - strength in Lower extremities is intact, BILATERALLY  - sensation in Lower extremities is intact, BILATERALLY  - patient denies any loss of bowel or bladder control      PSYCHIATRIC: patient denies any change in mood    Other:  All other systems reviewed and are negative      Physical Exam:  Telemedicine Exam  Vitals:    08/17/22 1044   BP: 116/73   Pulse: 83   Resp: 17   Weight: 104.1 kg (229 lb 8 oz)   Height: 6' (1.829 m)     Body mass index is 31.13 kg/m².   (reviewed on 8/17/2022)     GENERAL: Well appearing, in no acute distress, alert and oriented x3.    PSYCH:  Mood and affect appropriate.  SKIN: Skin color, texture, turgor normal, no rashes or lesions.  HEAD/FACE:  Normocephalic, atraumatic. Cranial nerves grossly intact.  PULM:  No difficulty breathing  Neuro/MSK:  BACK: Palpation over the lumbar paraspinous muscles causes pain. TTP over SIJ.  Some pain with flexion, extension, or lateral flexion. Limited ROM secondary to pain reproduction.  EXTREMITIES: Peripheral joint active ROM is full and pain free without obvious instability or laxity in all four extremities. No deformities, edema, or skin discoloration.   MUSCULOSKELETAL: No atrophy or tone abnormalities are noted.  NEURO:  UNable to toe walk and heel walk without difficulty  GAIT: normal.     Physical Exam: last in clinic visit:  General: Alert and oriented, in no apparent distress.  Gait: normal gait.  Skin: No rashes, No discoloration, No obvious lesions  HEENT:  Normocephalic, atraumatic. Pupils equal and round.  Cardiovascular:  no significant peripheral edema present  Respiratory: Without audible wheezing, without use of accessory muscles of respiration.    Musculoskeletal:    Lumbar Spine  - Pain on flexion of lumbar spine Absent  - Straight Leg Raise:  Absent  - Pain on extension of lumbar spine Present  - TTP over the lumbar facet joints Present Bilateral L5-S1  - Lumbar facet loading Present  -Pain on palpation over the SI joint Absent  - TTP over GTB: Absent   - VENICE: Absent  -TTP along bilateral quadratus lumborum    Neuro:  Strength:  LE R/L: HF: 5/5, HE: 5/5, KF: 5/5; KE: 5/5; FE: 5/5; FF: 5/5  Extremity Reflexes: Brisk and symmetric throughout.  Extremity Sensory: Sensation to pinprick and temperature symmetric. Proprioception intact.      Psych:  Mood and affect is appropriate      Assessment:  Monica Johnson is a 47 y.o. year old male who is presenting with   Encounter Diagnoses   Name Primary?    Sacrococcygeal disorders, not elsewhere classified     Muscle pain Yes    Lumbar facet arthropathy        Plan:    1. Interventional: None for now. Consider repeat bilateral L3-5 MBB  - S/p Bilateral C5, 6,7 MBB on 11/15/2021 with minimal relief. As requested by Dr Washington Li.    - S/p Right SIJ + right GTB + Right Hip injection on 4/16/21 with 60% pain relief   - S/p Right SI joint and Right Knee injection on 11/25/2020 with 50% relief.     2. Pharmacologic:   - Continue Gabapentin 300/300/1200 mg PO TID   - Continue Tizanidine 4 mg PO TID PRN.   -Flexeril 10 mg at night  - NO tylenol due to h/o fatty liver.    3. Rehabilitative: Continue at home exercises learned in PT.     4. Diagnostic: Lumbar MRI reviewed.     5. Consult: [facility]     6. Follow up: 6 months or PRN    Dorothy Rocha PA-C

## 2022-11-11 ENCOUNTER — PATIENT MESSAGE (OUTPATIENT)
Dept: ADMINISTRATIVE | Facility: OTHER | Age: 47
End: 2022-11-11
Payer: MEDICAID

## 2022-12-04 ENCOUNTER — HOSPITAL ENCOUNTER (EMERGENCY)
Facility: HOSPITAL | Age: 47
Discharge: HOME | End: 2022-12-04
Attending: EMERGENCY MEDICINE
Payer: MEDICAID

## 2022-12-04 VITALS
DIASTOLIC BLOOD PRESSURE: 68 MMHG | OXYGEN SATURATION: 98 % | TEMPERATURE: 98 F | HEART RATE: 63 BPM | RESPIRATION RATE: 18 BRPM | SYSTOLIC BLOOD PRESSURE: 119 MMHG

## 2022-12-04 DIAGNOSIS — R53.83 FATIGUE: ICD-10-CM

## 2022-12-04 LAB
ALBUMIN SERPL BCP-MCNC: 4 G/DL (ref 3.5–5.2)
ALP SERPL-CCNC: 63 U/L (ref 55–135)
ALT SERPL W/O P-5'-P-CCNC: 53 U/L (ref 10–44)
ANION GAP SERPL CALC-SCNC: 12 MMOL/L (ref 8–16)
AST SERPL-CCNC: 35 U/L (ref 10–40)
BASOPHILS # BLD AUTO: 0.12 K/UL (ref 0–0.2)
BASOPHILS NFR BLD: 1.3 % (ref 0–1.9)
BILIRUB SERPL-MCNC: 0.3 MG/DL (ref 0.1–1)
BNP SERPL-MCNC: <10 PG/ML (ref 0–99)
BUN SERPL-MCNC: 7 MG/DL (ref 6–20)
CALCIUM SERPL-MCNC: 9.3 MG/DL (ref 8.7–10.5)
CHLORIDE SERPL-SCNC: 106 MMOL/L (ref 95–110)
CO2 SERPL-SCNC: 22 MMOL/L (ref 23–29)
CREAT SERPL-MCNC: 1.1 MG/DL (ref 0.5–1.4)
CTP QC/QA: YES
CTP QC/QA: YES
DIFFERENTIAL METHOD: NORMAL
EOSINOPHIL # BLD AUTO: 0.4 K/UL (ref 0–0.5)
EOSINOPHIL NFR BLD: 4 % (ref 0–8)
ERYTHROCYTE [DISTWIDTH] IN BLOOD BY AUTOMATED COUNT: 14.2 % (ref 11.5–14.5)
EST. GFR  (NO RACE VARIABLE): >60 ML/MIN/1.73 M^2
GLUCOSE SERPL-MCNC: 113 MG/DL (ref 70–110)
HCT VFR BLD AUTO: 42.7 % (ref 40–54)
HGB BLD-MCNC: 14.3 G/DL (ref 14–18)
IMM GRANULOCYTES # BLD AUTO: 0.03 K/UL (ref 0–0.04)
IMM GRANULOCYTES NFR BLD AUTO: 0.3 % (ref 0–0.5)
LYMPHOCYTES # BLD AUTO: 4.2 K/UL (ref 1–4.8)
LYMPHOCYTES NFR BLD: 45.9 % (ref 18–48)
MCH RBC QN AUTO: 28 PG (ref 27–31)
MCHC RBC AUTO-ENTMCNC: 33.5 G/DL (ref 32–36)
MCV RBC AUTO: 84 FL (ref 82–98)
MONOCYTES # BLD AUTO: 0.7 K/UL (ref 0.3–1)
MONOCYTES NFR BLD: 7.6 % (ref 4–15)
NEUTROPHILS # BLD AUTO: 3.8 K/UL (ref 1.8–7.7)
NEUTROPHILS NFR BLD: 40.9 % (ref 38–73)
NRBC BLD-RTO: 0 /100 WBC
PLATELET # BLD AUTO: 293 K/UL (ref 150–450)
PMV BLD AUTO: 9.7 FL (ref 9.2–12.9)
POC MOLECULAR INFLUENZA A AGN: NEGATIVE
POC MOLECULAR INFLUENZA B AGN: NEGATIVE
POCT GLUCOSE: 112 MG/DL (ref 70–110)
POTASSIUM SERPL-SCNC: 3.5 MMOL/L (ref 3.5–5.1)
PROT SERPL-MCNC: 7.4 G/DL (ref 6–8.4)
RBC # BLD AUTO: 5.1 M/UL (ref 4.6–6.2)
SARS-COV-2 RDRP RESP QL NAA+PROBE: NEGATIVE
SODIUM SERPL-SCNC: 140 MMOL/L (ref 136–145)
TROPONIN I SERPL DL<=0.01 NG/ML-MCNC: <0.006 NG/ML (ref 0–0.03)
WBC # BLD AUTO: 9.24 K/UL (ref 3.9–12.7)

## 2022-12-04 PROCEDURE — 83880 ASSAY OF NATRIURETIC PEPTIDE: CPT | Performed by: REGISTERED NURSE

## 2022-12-04 PROCEDURE — 93005 ELECTROCARDIOGRAM TRACING: CPT

## 2022-12-04 PROCEDURE — 80053 COMPREHEN METABOLIC PANEL: CPT | Performed by: REGISTERED NURSE

## 2022-12-04 PROCEDURE — 84484 ASSAY OF TROPONIN QUANT: CPT | Performed by: REGISTERED NURSE

## 2022-12-04 PROCEDURE — 99284 EMERGENCY DEPT VISIT MOD MDM: CPT | Mod: 25

## 2022-12-04 PROCEDURE — 93010 ELECTROCARDIOGRAM REPORT: CPT | Mod: ,,, | Performed by: STUDENT IN AN ORGANIZED HEALTH CARE EDUCATION/TRAINING PROGRAM

## 2022-12-04 PROCEDURE — 85025 COMPLETE CBC W/AUTO DIFF WBC: CPT | Performed by: REGISTERED NURSE

## 2022-12-04 PROCEDURE — 82962 GLUCOSE BLOOD TEST: CPT

## 2022-12-04 PROCEDURE — 87635 SARS-COV-2 COVID-19 AMP PRB: CPT | Performed by: EMERGENCY MEDICINE

## 2022-12-04 PROCEDURE — 36000 PLACE NEEDLE IN VEIN: CPT

## 2022-12-04 PROCEDURE — 87502 INFLUENZA DNA AMP PROBE: CPT

## 2022-12-04 PROCEDURE — 93010 EKG 12-LEAD: ICD-10-PCS | Mod: ,,, | Performed by: STUDENT IN AN ORGANIZED HEALTH CARE EDUCATION/TRAINING PROGRAM

## 2022-12-04 NOTE — Clinical Note
"Monica "Monica" Alex was seen and treated in our emergency department on 12/4/2022.  He may return to work on 12/07/2022.       If you have any questions or concerns, please don't hesitate to call.      Haider Mcmahon RN    "

## 2022-12-05 NOTE — ED PROVIDER NOTES
SCRIBE #1 NOTE: I, Karlee Randall, am scribing for, and in the presence of, Pipo Elkins MD. I have scribed the entire note.       History     Chief Complaint   Patient presents with    Fatigue     Pt reports fatigue with an onset of 4 to 5 hours ago. Hx of CHF, HTN, and DM(hypoglycemia). CBG of 112 upon arrival.      Review of patient's allergies indicates:  No Known Allergies      History of Present Illness     HPI    12/4/2022, 9:12 PM  History obtained from the patient      History of Present Illness: Monica Johnson is a 47 y.o. male patient with a PMHx of CHF, HTN, and DM who presents to the Emergency Department for evaluation of fatigue which onset 4-5 hours ago. Symptoms are constant and moderate in severity. No mitigating or exacerbating factors reported. Associated sxs include weakness, rhinorrhea, and congestion. Patient denies any fever, chills, N/V, cough, CP, SOB, and all other sxs at this time.  No further complaints or concerns at this time.       Arrival mode: EMS    PCP: Kenya Escoto MD        Past Medical History:  Past Medical History:   Diagnosis Date    ADHD (attention deficit hyperactivity disorder)     Allergy     Anxiety 1/25/2016    Arthritis 2014    Osteoarthritis    CHF (congestive heart failure)     Degenerative disc disease at L5-S1 level     Depression 1/25/2016    Hypertension     Sciatica of left side 1/31/2017       Past Surgical History:  Past Surgical History:   Procedure Laterality Date    CALCANEAL OSTEOTOMY Left 4/16/2019    Procedure: OSTEOTOMY, CALCANEUS;  Surgeon: Rober Colon DPM;  Location: [facility] OR;  Service: Podiatry;  Laterality: Left;    CIRCUMCISION      FOOT SURGERY      INJECTION OF ANESTHETIC AGENT AROUND MEDIAL BRANCH NERVES INNERVATING CERVICAL FACET JOINT Bilateral 11/15/2021    Procedure: Block-nerve-medial branch-cervical bilateral C5, 6,7 RN IV sedation;  Surgeon: Ivan Davey MD;  Location[facility];  Service: Pain Management;  Laterality:  Bilateral;    INJECTION OF ANESTHETIC AGENT AROUND MEDIAL BRANCH NERVES INNERVATING LUMBAR FACET JOINT Bilateral 3/7/2022    Procedure: bilateral L3-L5 MBB;  Surgeon: Ivan Davey MD;  Location: [facility] PAIN MGT;  Service: Pain Management;  Laterality: Bilateral;    INJECTION OF ANESTHETIC AGENT INTO SACROILIAC JOINT Right 11/24/2020    Procedure: Right BLOCK, SACROILIAC JOINT and Right Knee Injection with RN IV sedation;  Surgeon: Ivan Davey MD;  Location: [facility] PAIN MGT;  Service: Pain Management;  Laterality: Right;    INJECTION OF ANESTHETIC AGENT INTO SACROILIAC JOINT Right 4/16/2021    Procedure: Right BLOCK, SACROILIAC JOINT RIght Hip Right GTB RN IV sedation;  Surgeon: Ivan Davey MD;  Location: [facility] PAIN MGT;  Service: Pain Management;  Laterality: Right;    LENGTHENING OF ACHILLES TENDON Left 4/16/2019    Procedure: LENGTHENING, TENDON, ACHILLES;  Surgeon: Rober Colon DPM;  Location: [facility] OR;  Service: Podiatry;  Laterality: Left;    PLACEMENT OF ACELLULAR HUMAN DERMAL ALLOGRAFT Left 2/17/2021    Procedure: APPLICATION, ACELLULAR HUMAN DERMAL ALLOGRAFT;  Surgeon: Rober Colon DPM;  Location: [facility] OR;  Service: Podiatry;  Laterality: Left;    REPAIR OF POSTERIOR TIBIALIS TENDON Left 4/16/2019    Procedure: REPAIR, TENDON, TIBIALIS POSTERIOR;  Surgeon: Rober Colon DPM;  Location: [facility] OR;  Service: Podiatry;  Laterality: Left;    REPAIR OF TENDON OF LOWER EXTREMITY Left 2/17/2021    Procedure: REPAIR, TENDON, LOWER EXTREMITY;  Surgeon: Rober Colon DPM;  Location: [facility] OR;  Service: Podiatry;  Laterality: Left;    TENDON TRANSFER Left 4/16/2019    Procedure: TRANSFER, TENDON;  Surgeon: Rober Colon DPM;  Location: [facility] OR;  Service: Podiatry;  Laterality: Left;         Family History:  Family History   Problem Relation Age of Onset    Cancer Mother     Hypertension Mother     Cataracts Mother     Hypertension Maternal Grandmother     Glaucoma Father        Social History:  Social History      Tobacco Use    Smoking status: Some Days     Packs/day: 0.25     Types: Cigarettes, Cigars    Smokeless tobacco: Never    Tobacco comments:     HOLD MIDNIGHT TO SURGERY   Substance and Sexual Activity    Alcohol use: Yes     Comment: socially: HOLD 72HRS PRIOR TO SURGERY    Drug use: Never     Types: Marijuana     Comment: HOLD TODAY PRIOR TO SURGERY    Sexual activity: Yes     Partners: Female        Review of Systems     Review of Systems   Constitutional:  Positive for fatigue. Negative for chills and fever.   HENT:  Positive for congestion and rhinorrhea. Negative for sore throat.    Respiratory:  Negative for cough and shortness of breath.    Cardiovascular:  Negative for chest pain.   Gastrointestinal:  Negative for nausea and vomiting.   Genitourinary:  Negative for dysuria.   Musculoskeletal:  Negative for back pain.   Skin:  Negative for rash.   Neurological:  Positive for weakness (Generalized, not focal). Negative for numbness.   Hematological:  Does not bruise/bleed easily.   All other systems reviewed and are negative.     Physical Exam     Initial Vitals [12/04/22 2043]   BP Pulse Resp Temp SpO2   (!) 146/84 70 12 98 °F (36.7 °C) 96 %      MAP       --          Physical Exam  Nursing Notes and Vital Signs Reviewed.  Constitutional: Patient is in no acute distress. Well-developed and well-nourished.  Head: Atraumatic. Normocephalic.  Eyes: PERRL. EOM intact. Conjunctivae are not pale. No scleral icterus.  ENT: Mucous membranes are moist. Oropharynx is clear and symmetric.    Neck: Supple. Full ROM.   Cardiovascular: Regular rate. Regular rhythm. No murmurs, rubs, or gallops. Distal pulses are 2+ and symmetric.  Pulmonary/Chest: No respiratory distress. Clear to auscultation bilaterally. No wheezing or rales.  Abdominal: Soft and non-distended.  There is no tenderness.  No rebound, guarding, or rigidity. Good bowel sounds.  Genitourinary: No CVA tenderness  Musculoskeletal: Moves all extremities. No  obvious deformities. No edema. No calf tenderness.  Skin: Warm and dry.  Neurological:  Alert, awake, and appropriate.  Normal speech.  No acute focal neurological deficits are appreciated.  Psychiatric: Normal affect. Good eye contact. Appropriate in content.     ED Course   Procedures  ED Vital Signs:  Vitals:    12/04/22 2043 12/04/22 2314   BP: (!) 146/84 119/68   Pulse: 70 63   Resp: 12 18   Temp: 98 °F (36.7 °C) 98.2 °F (36.8 °C)   TempSrc: Oral Oral   SpO2: 96% 98%       Abnormal Lab Results:  Labs Reviewed   COMPREHENSIVE METABOLIC PANEL - Abnormal; Notable for the following components:       Result Value    CO2 22 (*)     Glucose 113 (*)     ALT 53 (*)     All other components within normal limits   POCT GLUCOSE - Abnormal; Notable for the following components:    POCT Glucose 112 (*)     All other components within normal limits   CBC W/ AUTO DIFFERENTIAL   B-TYPE NATRIURETIC PEPTIDE   TROPONIN I   POCT INFLUENZA A/B MOLECULAR   SARS-COV-2 RDRP GENE    Narrative:     This test utilizes isothermal nucleic acid amplification technology to detect the SARS-CoV-2 RdRp nucleic acid segment. The analytical sensitivity (limit of detection) is 500 copies/swab.     A POSITIVE result is indicative of the presence of SARS-CoV-2 RNA; clinical correlation with patient history and other diagnostic information is necessary to determine patient infection status.    A NEGATIVE result means that SARS-CoV-2 nucleic acids are not present above the limit of detection. A NEGATIVE result should be treated as presumptive. It does not rule out the possibility of COVID-19 and should not be the sole basis for treatment decisions. If COVID-19 is strongly suspected based on clinical and exposure history, re-testing using an alternate molecular assay should be considered.     This test is only for use under the Food and Drug Administration s Emergency Use Authorization (EUA).     Commercial kits are provided by [employer].  Performance characteristics of the EUA have been independently verified by [facility] Department of Pathology and Laboratory Medicine.   _________________________________________________________________   The authorized Fact Sheet for Healthcare Providers and the authorized Fact Sheet for Patients of the ID NOW COVID-19 are available on the FDA website:    https://www.fda.gov/media/592162/download      https://www.fda.gov/media/180115/download            All Lab Results:  Results for orders placed or performed during the hospital encounter of 12/04/22   CBC auto differential   Result Value Ref Range    WBC 9.24 3.90 - 12.70 K/uL    RBC 5.10 4.60 - 6.20 M/uL    Hemoglobin 14.3 14.0 - 18.0 g/dL    Hematocrit 42.7 40.0 - 54.0 %    MCV 84 82 - 98 fL    MCH 28.0 27.0 - 31.0 pg    MCHC 33.5 32.0 - 36.0 g/dL    RDW 14.2 11.5 - 14.5 %    Platelets 293 150 - 450 K/uL    MPV 9.7 9.2 - 12.9 fL    Immature Granulocytes 0.3 0.0 - 0.5 %    Gran # (ANC) 3.8 1.8 - 7.7 K/uL    Immature Grans (Abs) 0.03 0.00 - 0.04 K/uL    Lymph # 4.2 1.0 - 4.8 K/uL    Mono # 0.7 0.3 - 1.0 K/uL    Eos # 0.4 0.0 - 0.5 K/uL    Baso # 0.12 0.00 - 0.20 K/uL    nRBC 0 0 /100 WBC    Gran % 40.9 38.0 - 73.0 %    Lymph % 45.9 18.0 - 48.0 %    Mono % 7.6 4.0 - 15.0 %    Eosinophil % 4.0 0.0 - 8.0 %    Basophil % 1.3 0.0 - 1.9 %    Differential Method Automated    Comprehensive metabolic panel   Result Value Ref Range    Sodium 140 136 - 145 mmol/L    Potassium 3.5 3.5 - 5.1 mmol/L    Chloride 106 95 - 110 mmol/L    CO2 22 (L) 23 - 29 mmol/L    Glucose 113 (H) 70 - 110 mg/dL    BUN 7 6 - 20 mg/dL    Creatinine 1.1 0.5 - 1.4 mg/dL    Calcium 9.3 8.7 - 10.5 mg/dL    Total Protein 7.4 6.0 - 8.4 g/dL    Albumin 4.0 3.5 - 5.2 g/dL    Total Bilirubin 0.3 0.1 - 1.0 mg/dL    Alkaline Phosphatase 63 55 - 135 U/L    AST 35 10 - 40 U/L    ALT 53 (H) 10 - 44 U/L    Anion Gap 12 8 - 16 mmol/L    eGFR >60 >60 mL/min/1.73 m^2   Brain natriuretic peptide   Result  Value Ref Range    BNP <10 0 - 99 pg/mL   Troponin I   Result Value Ref Range    Troponin I <0.006 0.000 - 0.026 ng/mL   POCT Influenza A/B Molecular   Result Value Ref Range    POC Molecular Influenza A Ag Negative Negative, Not Reported    POC Molecular Influenza B Ag Negative Negative, Not Reported     Acceptable Yes    POCT COVID-19 Rapid Screening   Result Value Ref Range    POC Rapid COVID Negative Negative     Acceptable Yes    POCT glucose   Result Value Ref Range    POCT Glucose 112 (H) 70 - 110 mg/dL         Imaging Results:  Imaging Results    None          The EKG was ordered, reviewed, and independently interpreted by the ED provider.  Interpretation time: 21:14  Rate: 70 BPM  Rhythm: normal sinus rhythm  Interpretation: No acute ST or T wave elevations. No STEMI.         The Emergency Provider reviewed the vital signs and test results, which are outlined above.     ED Discussion       10:59 PM: Reassessed pt at this time.   Discussed with pt all pertinent ED information and results. Discussed pt dx and plan of tx. Gave pt all f/u and return to the ED instructions. All questions and concerns were addressed at this time. Pt expresses understanding of information and instructions, and is comfortable with plan to discharge. Pt is stable for discharge.    I discussed with patient and/or family/caretaker that evaluation in the ED does not suggest any emergent or life threatening medical conditions requiring immediate intervention beyond what was provided in the ED, and I believe patient is safe for discharge.  Regardless, an unremarkable evaluation in the ED does not preclude the development or presence of a serious of life threatening condition. As such, patient was instructed to return immediately for any worsening or change in current symptoms.         Medical Decision Making:   Clinical Tests:   Lab Tests: Ordered and Reviewed  Medical Tests: Ordered and Reviewed         ED  Medication(s):  Medications - No data to display    Discharge Medication List as of 12/4/2022 10:59 PM           Follow-up Information       Kenya Escoto MD. Schedule an appointment as soon as possible for a visit in 2 days.    Specialty: Family Medicine  Why: For re-evaluation and further treatment  Contact information:  [id number] [facility] [id number] [phone]               [facility] - Emergency Dept.. Go today.    Specialty: Emergency Medicine  Why: If symptoms worsen, For re-evaluation and further treatment, As needed  Contact information:  [address] [facility] [id number] [phone]                               Scribe Attestation:   Scribe #1: I performed the above scribed service and the documentation accurately describes the services I performed. I attest to the accuracy of the note.     Attending:   Physician Attestation Statement for Scribe #1: I, Pipo Elkins MD, personally performed the services described in this documentation, as scribed by Karlee Randall, in my presence, and it is both accurate and complete.           Clinical Impression       ICD-10-CM ICD-9-CM   1. Fatigue  R53.83 780.79       Disposition:   Disposition: Discharged  Condition: Stable       Pipo Elkins MD  12/05/22 0328

## 2022-12-05 NOTE — FIRST PROVIDER EVALUATION
Medical screening examination initiated.  I have conducted a focused provider triage encounter, findings are as follows:    Brief history of present illness:  47-year-old male reports fatigue for approximately 4 or 5 hours.  History of CHF, hypertension and diabetes    Vitals:    12/04/22 2043   BP: (!) 146/84   Pulse: 70   Resp: 12   Temp: 98 °F (36.7 °C)   TempSrc: Oral   SpO2: 96%       Pertinent physical exam:  No acute distress, patient alert and oriented    Brief workup plan:  Workup and further evaluation    Preliminary workup initiated; this workup will be continued and followed by the physician or advanced practice provider that is assigned to the patient when roomed.

## 2022-12-07 ENCOUNTER — OFFICE VISIT (OUTPATIENT)
Dept: INTERNAL MEDICINE | Facility: CLINIC | Age: 47
End: 2022-12-07
Payer: MEDICAID

## 2022-12-07 VITALS
DIASTOLIC BLOOD PRESSURE: 76 MMHG | RESPIRATION RATE: 18 BRPM | OXYGEN SATURATION: 98 % | SYSTOLIC BLOOD PRESSURE: 112 MMHG | TEMPERATURE: 100 F | HEART RATE: 98 BPM | BODY MASS INDEX: 31.69 KG/M2 | WEIGHT: 233.69 LBS

## 2022-12-07 DIAGNOSIS — K76.0 FATTY LIVER: ICD-10-CM

## 2022-12-07 DIAGNOSIS — R53.83 FATIGUE, UNSPECIFIED TYPE: Primary | ICD-10-CM

## 2022-12-07 DIAGNOSIS — R79.89 ELEVATED LIVER FUNCTION TESTS: ICD-10-CM

## 2022-12-07 DIAGNOSIS — E66.9 OBESITY (BMI 30-39.9): ICD-10-CM

## 2022-12-07 DIAGNOSIS — I50.9 CONGESTIVE HEART FAILURE, UNSPECIFIED HF CHRONICITY, UNSPECIFIED HEART FAILURE TYPE: ICD-10-CM

## 2022-12-07 DIAGNOSIS — M47.812 OSTEOARTHRITIS OF CERVICAL SPINE, UNSPECIFIED SPINAL OSTEOARTHRITIS COMPLICATION STATUS: ICD-10-CM

## 2022-12-07 DIAGNOSIS — F43.9 STRESS: ICD-10-CM

## 2022-12-07 DIAGNOSIS — M47.816 LUMBAR SPONDYLOSIS: ICD-10-CM

## 2022-12-07 DIAGNOSIS — E16.2 HYPOGLYCEMIA: ICD-10-CM

## 2022-12-07 DIAGNOSIS — I10 ESSENTIAL HYPERTENSION: ICD-10-CM

## 2022-12-07 PROCEDURE — 3008F BODY MASS INDEX DOCD: CPT | Mod: CPTII,,,

## 2022-12-07 PROCEDURE — 4010F PR ACE/ARB THEARPY RXD/TAKEN: ICD-10-PCS | Mod: CPTII,,,

## 2022-12-07 PROCEDURE — 99214 PR OFFICE/OUTPT VISIT, EST, LEVL IV, 30-39 MIN: ICD-10-PCS | Mod: S$PBB,,,

## 2022-12-07 PROCEDURE — 4010F ACE/ARB THERAPY RXD/TAKEN: CPT | Mod: CPTII,,,

## 2022-12-07 PROCEDURE — 3074F PR MOST RECENT SYSTOLIC BLOOD PRESSURE < 130 MM HG: ICD-10-PCS | Mod: CPTII,,,

## 2022-12-07 PROCEDURE — 3008F PR BODY MASS INDEX (BMI) DOCUMENTED: ICD-10-PCS | Mod: CPTII,,,

## 2022-12-07 PROCEDURE — 99999 PR PBB SHADOW E&M-EST. PATIENT-LVL IV: ICD-10-PCS | Mod: PBBFAC,,,

## 2022-12-07 PROCEDURE — 3078F DIAST BP <80 MM HG: CPT | Mod: CPTII,,,

## 2022-12-07 PROCEDURE — 3074F SYST BP LT 130 MM HG: CPT | Mod: CPTII,,,

## 2022-12-07 PROCEDURE — 99999 PR PBB SHADOW E&M-EST. PATIENT-LVL IV: CPT | Mod: PBBFAC,,,

## 2022-12-07 PROCEDURE — 3078F PR MOST RECENT DIASTOLIC BLOOD PRESSURE < 80 MM HG: ICD-10-PCS | Mod: CPTII,,,

## 2022-12-07 PROCEDURE — 1159F PR MEDICATION LIST DOCUMENTED IN MEDICAL RECORD: ICD-10-PCS | Mod: CPTII,,,

## 2022-12-07 PROCEDURE — 99214 OFFICE O/P EST MOD 30 MIN: CPT | Mod: PBBFAC

## 2022-12-07 PROCEDURE — 99214 OFFICE O/P EST MOD 30 MIN: CPT | Mod: S$PBB,,,

## 2022-12-07 PROCEDURE — 1159F MED LIST DOCD IN RCRD: CPT | Mod: CPTII,,,

## 2022-12-07 RX ORDER — CYCLOBENZAPRINE HCL 10 MG
10 TABLET ORAL NIGHTLY
COMMUNITY
Start: 2022-11-28 | End: 2023-02-15 | Stop reason: SDUPTHER

## 2022-12-07 NOTE — PROGRESS NOTES
Monica Johnson  12/07/2022  [id number]    Kenya Escoto MD  Patient Care Team:  Kenya Escoto MD as PCP - General (Family Medicine)  Barbara Moore LPN as Care Coordinator (Internal Medicine)  Kenya Escoto MD (Family Medicine)  Elisa Chao RD (Inactive) as Dietitian (Nutrition)  Jian Foster as Digital Medicine Health   Tkeyah Bazin, PharmD as Hypertension Digital Medicine Clinician  Kenya Escoto MD as Hypertension Digital Medicine Responsible Provider (Family Medicine)  [employer] as Hypertension Digital Medicine Contract          Visit Type:an urgent visit for a new problem    Chief Complaint:  Chief Complaint   Patient presents with    Follow-up     Patient seen in the ER due to diabetes and HTN.          History of Present Illness:     History of Present Illness: Monica Johnson is a 47 y.o. male patient with a PMHx of CHF, HTN, and DM who presents to the Emergency Department for evaluation of fatigue which onset 4-5 hours ago. Symptoms are constant and moderate in severity. No mitigating or exacerbating factors reported. Associated sxs include weakness, rhinorrhea, and congestion. Patient denies any fever, chills, N/V, cough, CP, SOB, and all other sxs at this time.     Flu/COVID/troponin were negative  BNP  and CBC WNL  CMP. Liver enzyme slightly elevated   EKG: NSR    He does have episodes of hypoglycemia     At visit he is feeling better  Yesterday his BP was 152/94    Needs a new cardiologist  Was seeing someone at [facility] in [place]  His cardiologist is leaving   Denies CP or SOB     He has been under stress  Recently  with his wife and  having family stress     History:  Past Medical History:   Diagnosis Date    ADHD (attention deficit hyperactivity disorder)     Allergy     Anxiety 1/25/2016    Arthritis 2014    Osteoarthritis    CHF (congestive heart failure)     Degenerative disc disease at L5-S1 level     Depression 1/25/2016    Hypertension      Sciatica of left side 1/31/2017     Past Surgical History:   Procedure Laterality Date    CALCANEAL OSTEOTOMY Left 4/16/2019    Procedure: OSTEOTOMY, CALCANEUS;  Surgeon: Rober Colon DPM;  Location: [facility] OR;  Service: Podiatry;  Laterality: Left;    CIRCUMCISION      FOOT SURGERY      INJECTION OF ANESTHETIC AGENT AROUND MEDIAL BRANCH NERVES INNERVATING CERVICAL FACET JOINT Bilateral 11/15/2021    Procedure: Block-nerve-medial branch-cervical bilateral C5, 6,7 RN IV sedation;  Surgeon: Ivan Davey MD;  Location: [facility] PAIN MGT;  Service: Pain Management;  Laterality: Bilateral;    INJECTION OF ANESTHETIC AGENT AROUND MEDIAL BRANCH NERVES INNERVATING LUMBAR FACET JOINT Bilateral 3/7/2022    Procedure: bilateral L3-L5 MBB;  Surgeon: Ivan Davey MD;  Location: [facility]GT;  Service: Pain Management;  Laterality: Bilateral;    INJECTION OF ANESTHETIC AGENT INTO SACROILIAC JOINT Right 11/24/2020    Procedure: Right BLOCK, SACROILIAC JOINT and Right Knee Injection with RN IV sedation;  Surgeon: Ivan Davey MD;  Location: [facility] PAIN [facility]GT;  Service: Pain Management;  Laterality: Right;    INJECTION OF ANESTHETIC AGENT INTO SACROILIAC JOINT Right 4/16/2021    Procedure: Right BLOCK, SACROILIAC JOINT RIght Hip Right GTB RN IV sedation;  Surgeon: Ivan Davey MD;  Location: [facility] PAIN [facility]GT;  Service: Pain Management;  Laterality: Right;    LENGTHENING OF ACHILLES TENDON Left 4/16/2019    Procedure: LENGTHENING, TENDON, ACHILLES;  Surgeon: Rober Colon DPM;  Location: [facility] OR;  Service: Podiatry;  Laterality: Left;    PLACEMENT OF ACELLULAR HUMAN DERMAL ALLOGRAFT Left 2/17/2021    Procedure: APPLICATION, ACELLULAR HUMAN DERMAL ALLOGRAFT;  Surgeon: Rober Colon DPM;  Location: [facility] OR;  Service: Podiatry;  Laterality: Left;    REPAIR OF POSTERIOR TIBIALIS TENDON Left 4/16/2019    Procedure: REPAIR, TENDON, TIBIALIS POSTERIOR;  Surgeon: Rober Colon DPM;  Location: [facility] OR;  Service: Podiatry;  Laterality: Left;     REPAIR OF TENDON OF LOWER EXTREMITY Left 2/17/2021    Procedure: REPAIR, TENDON, LOWER EXTREMITY;  Surgeon: Rober Colon DPM;  Location: [facility] OR;  Service: Podiatry;  Laterality: Left;    TENDON TRANSFER Left 4/16/2019    Procedure: TRANSFER, TENDON;  Surgeon: Rober Colon DPM;  Location: [facility] OR;  Service: Podiatry;  Laterality: Left;     Family History   Problem Relation Age of Onset    Cancer Mother     Hypertension Mother     Cataracts Mother     Hypertension Maternal Grandmother     Glaucoma Father      Social History     Socioeconomic History    Marital status: Legally    Tobacco Use    Smoking status: Some Days     Packs/day: 0.25     Types: Cigarettes, Cigars    Smokeless tobacco: Never    Tobacco comments:     HOLD MIDNIGHT TO SURGERY   Substance and Sexual Activity    Alcohol use: Yes     Comment: socially: HOLD 72HRS PRIOR TO SURGERY    Drug use: Never     Types: Marijuana     Comment: HOLD TODAY PRIOR TO SURGERY    Sexual activity: Yes     Partners: Female   Social History Narrative    ** Merged History Encounter **          Social Determinants of Health     Financial Resource Strain: Low Risk     Difficulty of Paying Living Expenses: Not very hard   Food Insecurity: No Food Insecurity    Worried About Running Out of Food in the Last Year: Never true    Ran Out of Food in the Last Year: Never true   Transportation Needs: No Transportation Needs    Lack of Transportation (Medical): No    Lack of Transportation (Non-Medical): No   Physical Activity: Sufficiently Active    Days of Exercise per Week: 5 days    Minutes of Exercise per Session: 60 min   Stress: Stress Concern Present    Feeling of Stress : To some extent   Social Connections: Unknown    Frequency of Communication with Friends and Family: More than three times a week    Frequency of Social Gatherings with Friends and Family: More than three times a week    Active Member of Clubs or Organizations: No    Attends Club or  Organization Meetings: Never    Marital Status:    Housing Stability: Low Risk     Unable to Pay for Housing in the Last Year: No    Number of Places Lived in the Last Year: 2    Unstable Housing in the Last Year: No     Patient Active Problem List   Diagnosis    Depression    Intermittent explosive disorder    Sciatica of left side    Osteoarthritis    Essential hypertension    Elevated liver function tests    Chondromalacia of right knee    Fatty liver    Tobacco use disorder    Non-ischemic cardiomyopathy    Coronary artery disease involving native coronary artery of native heart without angina pectoris    Sacroiliac joint dysfunction    Atherosclerotic heart disease of native coronary artery with other forms of angina pectoris    Family history of prostate cancer in father     Review of patient's allergies indicates:  No Known Allergies    The following were reviewed at this visit: active problem list, medication list, allergies, family history, social history, and health maintenance.    Medications:  Current Outpatient Medications on File Prior to Visit   Medication Sig Dispense Refill    amLODIPine (NORVASC) 5 MG tablet Take 5 mg by mouth once daily.      aspirin (ECOTRIN) 81 MG EC tablet Take 81 mg by mouth once daily.      atorvastatin (LIPITOR) 20 MG tablet Take 20 mg by mouth every evening.       busPIRone (BUSPAR) 30 MG Tab Take 1 tablet (30 mg total) by mouth 2 (two) times daily. 60 tablet 2    cetirizine (ZYRTEC) 10 MG tablet Take 10 mg by mouth once daily.      cholecalciferol, vitamin D3, 125 mcg (5,000 unit) Tab Take 5,000 Units by mouth once daily.      gabapentin (NEURONTIN) 300 MG capsule Take 1 capsule (300 mg total) by mouth 2 (two) times daily AND 4 capsules (1,200 mg total) every evening. TAKE ONE CAPSULE BY MOUTH TWICE DAILY AND 4 CAPSULES EVERY EVENING. 180 capsule 5    ibuprofen (ADVIL,MOTRIN) 800 MG tablet TAKE 1 TABLET(800 MG) BY MOUTH THREE TIMES DAILY 90 tablet 1    lisinopriL  10 MG tablet Take 10 mg by mouth once daily.      methocarbamoL (ROBAXIN) 750 MG Tab TAKE ONE TABLET TWICE DAILY AS NEEDED FOR MUSCLE SPASMS 60 tablet 1    metoprolol succinate (TOPROL-XL) 25 MG 24 hr tablet Take 25 mg by mouth once daily.       multivitamin capsule Take 1 capsule by mouth once daily.      tamsulosin (FLOMAX) 0.4 mg Cap Take 1 capsule (0.4 mg total) by mouth once daily. 30 capsule 11    tiZANidine (ZANAFLEX) 4 MG tablet Take 1 tablet (4 mg total) by mouth 3 (three) times daily as needed. AS NEEDED FOR DAY TIME PAIN 90 tablet 5    traZODone (DESYREL) 100 MG tablet Take 1/2 to 1 tablet at bedtime as needed for sleep. 30 tablet 3     No current facility-administered medications on file prior to visit.       Medications have been reviewed and reconciled with patient at this visit.  Barriers to medications reviewed with patient.    Adverse reactions to current medications reviewed with patient..    Over the counter medications reviewed and reconciled with patient.    Exam:  Wt Readings from Last 3 Encounters:   08/17/22 104.1 kg (229 lb 8 oz)   03/15/22 107.2 kg (236 lb 5.3 oz)   03/09/22 105.4 kg (232 lb 7.6 oz)     Temp Readings from Last 3 Encounters:   12/04/22 98.2 °F (36.8 °C) (Oral)   03/15/22 98.1 °F (36.7 °C)   03/09/22 97.6 °F (36.4 °C) (Tympanic)     BP Readings from Last 3 Encounters:   12/04/22 119/68   08/17/22 116/73   03/15/22 129/73     Pulse Readings from Last 3 Encounters:   12/04/22 63   08/17/22 83   03/15/22 101     There is no height or weight on file to calculate BMI.      Review of Systems   Respiratory:  Negative for shortness of breath.    Cardiovascular:  Negative for chest pain, palpitations and leg swelling.   Musculoskeletal:  Positive for back pain and joint pain. Negative for falls.   Physical Exam  Vitals and nursing note reviewed.   Constitutional:       General: He is not in acute distress.     Appearance: He is well-developed. He is obese. He is not diaphoretic.    HENT:      Head: Normocephalic and atraumatic.      Right Ear: External ear normal.      Left Ear: External ear normal.   Eyes:      General:         Right eye: No discharge.         Left eye: No discharge.      Conjunctiva/sclera: Conjunctivae normal.      Pupils: Pupils are equal, round, and reactive to light.   Neck:      Thyroid: No thyromegaly.   Cardiovascular:      Rate and Rhythm: Normal rate and regular rhythm.      Heart sounds: Normal heart sounds. No murmur heard.  Pulmonary:      Effort: Pulmonary effort is normal. No respiratory distress.      Breath sounds: Normal breath sounds. No wheezing.   Musculoskeletal:         General: Tenderness present.      Cervical back: Tenderness present. Decreased range of motion.      Lumbar back: Tenderness present. Decreased range of motion.   Neurological:      Mental Status: He is alert and oriented to person, place, and time.   Psychiatric:         Behavior: Behavior normal.         Thought Content: Thought content normal.         Judgment: Judgment normal.       Laboratory Reviewed ({Yes)  Lab Results   Component Value Date    WBC 9.24 12/04/2022    HGB 14.3 12/04/2022    HCT 42.7 12/04/2022     12/04/2022    CHOL 117 (L) 11/29/2021    TRIG 144 11/29/2021    HDL 33 (L) 11/29/2021    ALT 53 (H) 12/04/2022    AST 35 12/04/2022     12/04/2022    K 3.5 12/04/2022     12/04/2022    CREATININE 1.1 12/04/2022    BUN 7 12/04/2022    CO2 22 (L) 12/04/2022    TSH 0.492 11/29/2021    PSA 1.1 03/15/2022    INR 0.9 08/28/2018    HGBA1C 5.5 12/03/2020       Monica was seen today for follow-up.    Diagnoses and all orders for this visit:    Fatigue, unspecified type    Elevated liver function tests  Will cont to monitor     Fatty liver  Will cont to monitor     Essential hypertension  At visit, Blood pressure is at goal. Continue current medications      Obesity (BMI 30-39.9)  Continue current treatment plan as previously prescribed with your       Hypoglycemia    Lumbar spondylosis    Osteoarthritis of cervical spine, unspecified spinal osteoarthritis complication status    Congestive heart failure, unspecified HF chronicity, unspecified heart failure type    Stress    Will need a new cardiologist  His insurance will change in January  DASH diet  Watch weight   Will call for referral    Discussed eating small frequent meals to help prevent hypoglycemic episodes    Being followed by pain management  Will follow up regarding pain    He has been under stress lately  Informed that could be causing some of his symptoms    Care Plan/Goals: Reviewed    Goals         Blood Pressure < 130/80 (pt-stated)       Exercise at least 150 minutes per week.       Take at least one BP reading per week at various times of the day             Follow up: No follow-ups on file.    After visit summary was printed and given to patient upon discharge today.  Patient goals and care plan are included in After Visit Summary.

## 2023-01-16 ENCOUNTER — PATIENT MESSAGE (OUTPATIENT)
Dept: ADMINISTRATIVE | Facility: OTHER | Age: 48
End: 2023-01-16
Payer: COMMERCIAL

## 2023-02-15 ENCOUNTER — OFFICE VISIT (OUTPATIENT)
Dept: PAIN MEDICINE | Facility: CLINIC | Age: 48
End: 2023-02-15
Payer: COMMERCIAL

## 2023-02-15 VITALS
HEIGHT: 72 IN | DIASTOLIC BLOOD PRESSURE: 67 MMHG | WEIGHT: 236.25 LBS | BODY MASS INDEX: 32 KG/M2 | SYSTOLIC BLOOD PRESSURE: 126 MMHG | HEART RATE: 97 BPM

## 2023-02-15 DIAGNOSIS — I25.118 ATHEROSCLEROTIC HEART DISEASE OF NATIVE CORONARY ARTERY WITH OTHER FORMS OF ANGINA PECTORIS: ICD-10-CM

## 2023-02-15 DIAGNOSIS — I25.10 CORONARY ARTERY DISEASE INVOLVING NATIVE CORONARY ARTERY OF NATIVE HEART WITHOUT ANGINA PECTORIS: ICD-10-CM

## 2023-02-15 DIAGNOSIS — G89.29 CHRONIC PAIN OF RIGHT KNEE: ICD-10-CM

## 2023-02-15 DIAGNOSIS — M79.10 MUSCLE PAIN: ICD-10-CM

## 2023-02-15 DIAGNOSIS — I42.8 NON-ISCHEMIC CARDIOMYOPATHY: Primary | ICD-10-CM

## 2023-02-15 DIAGNOSIS — M25.561 CHRONIC PAIN OF RIGHT KNEE: ICD-10-CM

## 2023-02-15 DIAGNOSIS — M47.816 LUMBAR FACET ARTHROPATHY: ICD-10-CM

## 2023-02-15 PROCEDURE — 99214 PR OFFICE/OUTPT VISIT, EST, LEVL IV, 30-39 MIN: ICD-10-PCS | Mod: S$GLB,,, | Performed by: PHYSICIAN ASSISTANT

## 2023-02-15 PROCEDURE — 99214 OFFICE O/P EST MOD 30 MIN: CPT | Mod: S$GLB,,, | Performed by: PHYSICIAN ASSISTANT

## 2023-02-15 PROCEDURE — 99214 OFFICE O/P EST MOD 30 MIN: CPT | Mod: PBBFAC | Performed by: PHYSICIAN ASSISTANT

## 2023-02-15 PROCEDURE — 99999 PR PBB SHADOW E&M-EST. PATIENT-LVL IV: ICD-10-PCS | Mod: PBBFAC,,, | Performed by: PHYSICIAN ASSISTANT

## 2023-02-15 PROCEDURE — 99999 PR PBB SHADOW E&M-EST. PATIENT-LVL IV: CPT | Mod: PBBFAC,,, | Performed by: PHYSICIAN ASSISTANT

## 2023-02-15 RX ORDER — CYCLOBENZAPRINE HCL 10 MG
10 TABLET ORAL NIGHTLY
Qty: 30 TABLET | Refills: 2 | Status: SHIPPED | OUTPATIENT
Start: 2023-02-15 | End: 2023-06-15 | Stop reason: SDUPTHER

## 2023-02-15 RX ORDER — GABAPENTIN 300 MG/1
CAPSULE ORAL
Qty: 180 CAPSULE | Refills: 5 | Status: SHIPPED | OUTPATIENT
Start: 2023-02-15 | End: 2023-06-15 | Stop reason: SDUPTHER

## 2023-02-15 RX ORDER — TIZANIDINE 4 MG/1
4 TABLET ORAL 3 TIMES DAILY PRN
Qty: 90 TABLET | Refills: 5 | Status: SHIPPED | OUTPATIENT
Start: 2023-02-15 | End: 2023-06-15 | Stop reason: SDUPTHER

## 2023-02-15 NOTE — PROGRESS NOTES
Chief Pain Complaint:  Cervical Neck Pain   Lumbar Back Pain  Right Knee Pain    History of Present Illness:     Interval History (2/15/2023):  Monica Johnson presents today for follow-up visit.  Patient was last seen on 08/17/2022.  He reports he returned to work last May and his job requires a lot of standing, stooping, and walking. He reports over the last several weeks/months he has noticed worsening lower back pain, axial in nature and described as persistent, aching, throbbing.Last injection was L3-5 MBB on 03/07/2022 which offered significant relief. He would like to repeat this injection. He also reports worsening right knee pain. He reports at time the right knee nichole. He states his worst symptoms occur when going up and down stairs. He struggles going up the stairs and finds himself dragging himself upwards, while going down the stairs causes more pain and instability in the knee. He has previously undergone knee injection, which offered significant relief. He would like  to repeat this injection as well. He is also requesting refills of his Gabapentin, Tizanizine, and Flexeril, as these offer relief. Patient reports pain as 4/10 today.      Interval History (8/17/2022):  Monica Johnson presents today for follow-up visit.  Patient was last seen 12/15/2021. Reports persistent throbbing low back pain in a band-like distribution across his lower back with intermittent radiation into his buttocks and groin. Reports his pain is primarily axial. Last injection was L3-5 MBB on 03/07/2022 which offered significant relief. Patient reports he would like to hold off on a repeat injection at this time as his pain has not reached the severity that he had prior to the injection. He also reports that he experiences excellent pain control with Gabapentin and tizanidine during the day and flexeril at night. Denies sedation with tizanidine. Patient reports pain as 6/10 today.    Interval HPI  Monica Johnson  is a 47 y.o. male  who is presenting with a chief complaint of Lumbar Back Pain. The patient began experiencing this problem insidiously, and the pain has been gradually worsening over the past 3 year(s). The pain is described as throbbing, cramping, aching and heavy and is located in the bilateral lumbar spine . Pain is intermittent and lasts hours. The  pain is nonradiating. The patient rates his pain a 3 out of ten and interferes with activities of daily living a 5 out of ten. Pain is exacerbated by extension of the lumbar spine, and is improved by rest. Patient reports no prior trauma, no prior spinal surgery     Monica Johnson is a 47 y.o. male  who is presenting with a chief complaint of right buttock pain. The patient began experiencing this problem insidiously, and the pain has been gradually worsening over the past 3 month(s). The pain is described as throbbing, cramping, aching and heavy and is located in the right buttock  . Pain is intermittent and lasts hours. The  pain is nonradiating. The patient rates his pain a 8 out of ten and interferes with activities of daily living a 7 out of ten. Pain is exacerbated by getting up from a seated position, and is improved by rest. Patient reports no prior trauma, no prior spinal surgery     Monica Johnson is a 47 y.o. male  who is presenting with a chief complaint of neck pain. The patient began experiencing this problem insidiously, and the pain has been gradually worsening over the past 6 month(s). The pain is described as throbbing, cramping, aching and heavy and is located in the bilateral cervical spine . Pain is intermittent and lasts hours. The  pain is nonradiating. The patient rates his pain a 7 out of ten and interferes with activities of daily living a 7 out of ten. Pain is exacerbated by extension/ rotation of the cervical spine, and is improved by rest. Patient reports no prior trauma, no prior spinal surgery       - pertinent negatives: No  fever, No chills, No weight loss, No bladder dysfunction, No bowel dysfunction, No saddle anesthesia  - pertinent positives: none    - medications, other therapies tried (physical therapy, injections):     >> NSAIDs, Tylenol, gabapentin and flexeril    >> Has previously undergone Physical Therapy    >> Has previously undergone spinal injection/s   - Right SI joint and Right Knee injection on 11/25/2020 with 50% relief.    - Right SIJ + right GTB + Right Hip injection on 4/16/21 with 60% pain relief    - Bilateral C5, 6,7 MBB on 11/15/2021 with minimal relief. As requested by Dr Washington Li       Imaging / Labs / Studies (reviewed on 2/15/2023):    3/29/21 MRI Lumbar Spine Without Contrast  TECHNIQUE:  Multiplanar, multisequence MR images were acquired from the thoracolumbar junction to the sacrum without the administration of contrast.  COMPARISON:  08/07/2018 radiographs  FINDINGS:  Vertebral body heights and alignment maintained without spondylolisthesis.  No concerning marrow signal abnormality.  Intervertebral discs maintain normal signal without height loss.  Conus terminates normally.  Visualized intra-abdominal/pelvic structures are unremarkable.  L1-L2 through L5-S1: No significant posterior disc bulge, spinal canal stenosis or neural foraminal narrowing.  Facet degenerative hypertrophy findings noted, greater at the lower lumbar spine.  Impression  Mild degenerative findings without high-grade spinal canal stenosis or neural foraminal narrowing.        8/07/18 X-Ray Lumbar Complete With Flex And Ext  COMPARISON:  None.  FINDINGS:  There is mild leftward convexity of the lower thoracic and upper lumbar spine which may be in part positional in nature.  Vertebral body heights are well maintained.  No spondylolisthesis demonstrated.  No change in spinal alignment with flexion or extension to suggest instability.  Intervertebral disk spaces are well preserved.  No pars defects visualized.  Posterior elements  appear grossly intact. No acute fractures or subluxations are demonstrated.  The remaining visualized osseous and soft tissue structures demonstrate no appreciable abnormality.      1/05/21 X-ray Knee Ortho Bilateral with Flexion  TECHNIQUE:  AP standing of both knees, PA flexion standing views of both knees, and Merchant views of both knees were performed.  Lateral views of both knees were also performed.    COMPARISON  05/22/2018    FINDINGS:  The joint spaces of all 3 compartments of both knees appear to be well maintained.  No joint effusions are suggested.  No acute fracture or dislocation.    Impression  1.  As above      5/22/18 X-ray Knee Ortho Bilateral    Narrative  EXAMINATION:  XR KNEE ORTHO BILAT    CLINICAL HISTORY:  Pain in right knee    TECHNIQUE:  AP standing, lateral and Merchant views of both knees were performed.    COMPARISON:  None    FINDINGS:  Mild bilateral patellar tilt is seen.  No significant joint effusion on either side.  The joint spaces are maintained.  No marginal spurring.  No acute osseous abnormality.         Review of Systems:  CONSTITUTIONAL: patient denies any fever, chills, or weight loss  SKIN: patient denies any rash or itching  RESPIRATORY: patient denies having any shortness of breath  GASTROINTESTINAL: patient denies having any diarrhea, constipation, or bowel incontinence  GENITOURINARY: patient denies having any abnormal bladder function    MUSCULOSKELETAL:  - patient complains of the above noted pain/s (see chief pain complaint)    NEUROLOGICAL:   - pain as above  - strength in Lower extremities is intact, BILATERALLY  - sensation in Lower extremities is intact, BILATERALLY  - patient denies any loss of bowel or bladder control      PSYCHIATRIC: patient denies any change in mood    Other:  All other systems reviewed and are negative      Physical Exam:  Telemedicine Exam  Vitals:    02/15/23 1040   BP: 126/67   Pulse: 97   Weight: 107.2 kg (236 lb 3.6 oz)   Height: 6'  (1.829 m)     Body mass index is 32.04 kg/m².   (reviewed on 2/15/2023)     General: Alert and oriented, in no apparent distress.  Gait: normal gait.  Skin: No rashes, No discoloration, No obvious lesions  HEENT: Normocephalic, atraumatic. Pupils equal and round.  Cardiovascular:  no significant peripheral edema present  Respiratory: Without audible wheezing, without use of accessory muscles of respiration.    Musculoskeletal:    Lumbar Spine  - Pain on flexion of lumbar spine Absent  - Straight Leg Raise:  Absent  - Pain on extension of lumbar spine Present  - TTP over the lumbar facet joints Present Bilateral L5-S1  - Lumbar facet loading Present  -Pain on palpation over the SI joint Absent  - TTP over GTB: Absent   - VENICE: Absent    Right knee:  No redness, warmth, or swelling  TTP along medial and lateral joint lines  Mild limitation to ROM secondary to pain and stiffness  No crepitus    Neuro:  Strength:  LE R/L: HF: 5/5, HE: 5/5, KF: 5/5; KE: 5/5; FE: 5/5; FF: 5/5  Extremity Reflexes: Brisk and symmetric throughout.  Extremity Sensory: Sensation to pinprick and temperature symmetric. Proprioception intact.      Psych:  Mood and affect is appropriate      Assessment:  Monica Johnson is a 47 y.o. year old male who is presenting with   No diagnosis found.      Plan:    1. Interventional: Schedule for right knee IA injection followed 2 weeks later by bilateral L3-5 MBB as last injection offered significant relief for > 6 months    - S/p Bilateral C5, 6,7 MBB on 11/15/2021 with minimal relief. As requested by Dr Washington Li.    - S/p Right SIJ + right GTB + Right Hip injection on 4/16/21 with 60% pain relief   - S/p Right SI joint and Right Knee injection on 11/25/2020 with 50% relief.     2. Pharmacologic:   - Continue Gabapentin 300/300/1200 mg PO TID   - Continue Tizanidine 4 mg PO TID PRN.   -Flexeril 10 mg at night  - NO tylenol due to h/o fatty liver.    3. Rehabilitative: Continue at home exercises  learned in PT.     4. Diagnostic: Lumbar MRI reviewed.     5. Consult: [facility].     6. Follow up: 4 weeks after injection    Dorothy Rocha PA-C

## 2023-02-22 ENCOUNTER — OFFICE VISIT (OUTPATIENT)
Dept: INTERNAL MEDICINE | Facility: CLINIC | Age: 48
End: 2023-02-22
Payer: COMMERCIAL

## 2023-02-22 VITALS
SYSTOLIC BLOOD PRESSURE: 132 MMHG | RESPIRATION RATE: 20 BRPM | BODY MASS INDEX: 31.56 KG/M2 | OXYGEN SATURATION: 97 % | HEIGHT: 72 IN | TEMPERATURE: 99 F | DIASTOLIC BLOOD PRESSURE: 78 MMHG | HEART RATE: 93 BPM | WEIGHT: 233 LBS

## 2023-02-22 DIAGNOSIS — Z00.00 ANNUAL PHYSICAL EXAM: Primary | ICD-10-CM

## 2023-02-22 DIAGNOSIS — K21.9 GASTROESOPHAGEAL REFLUX DISEASE, UNSPECIFIED WHETHER ESOPHAGITIS PRESENT: ICD-10-CM

## 2023-02-22 DIAGNOSIS — I42.8 NON-ISCHEMIC CARDIOMYOPATHY: ICD-10-CM

## 2023-02-22 DIAGNOSIS — I10 ESSENTIAL HYPERTENSION: ICD-10-CM

## 2023-02-22 DIAGNOSIS — K76.0 FATTY LIVER: ICD-10-CM

## 2023-02-22 PROCEDURE — 99214 PR OFFICE/OUTPT VISIT, EST, LEVL IV, 30-39 MIN: ICD-10-PCS | Mod: S$GLB,,, | Performed by: FAMILY MEDICINE

## 2023-02-22 PROCEDURE — 99999 PR PBB SHADOW E&M-EST. PATIENT-LVL V: ICD-10-PCS | Mod: PBBFAC,,, | Performed by: FAMILY MEDICINE

## 2023-02-22 PROCEDURE — 99214 OFFICE O/P EST MOD 30 MIN: CPT | Mod: S$GLB,,, | Performed by: FAMILY MEDICINE

## 2023-02-22 PROCEDURE — 99999 PR PBB SHADOW E&M-EST. PATIENT-LVL V: CPT | Mod: PBBFAC,,, | Performed by: FAMILY MEDICINE

## 2023-02-22 RX ORDER — METOPROLOL SUCCINATE 25 MG/1
25 TABLET, EXTENDED RELEASE ORAL DAILY
Qty: 90 TABLET | Refills: 1 | Status: SHIPPED | OUTPATIENT
Start: 2023-02-22 | End: 2023-08-15 | Stop reason: SDUPTHER

## 2023-02-22 RX ORDER — VITAMIN B COMPLEX
1 CAPSULE ORAL DAILY
COMMUNITY

## 2023-02-22 RX ORDER — ATORVASTATIN CALCIUM 20 MG/1
20 TABLET, FILM COATED ORAL NIGHTLY
Qty: 90 TABLET | Refills: 1 | Status: SHIPPED | OUTPATIENT
Start: 2023-02-22 | End: 2023-08-22 | Stop reason: SDUPTHER

## 2023-02-22 RX ORDER — AMLODIPINE BESYLATE 5 MG/1
5 TABLET ORAL DAILY
Qty: 90 TABLET | Refills: 1 | Status: SHIPPED | OUTPATIENT
Start: 2023-02-22 | End: 2023-05-31

## 2023-02-22 RX ORDER — PANTOPRAZOLE SODIUM 40 MG/1
40 TABLET, DELAYED RELEASE ORAL DAILY
Qty: 30 TABLET | Refills: 11 | Status: SHIPPED | OUTPATIENT
Start: 2023-02-22 | End: 2023-08-22 | Stop reason: SDUPTHER

## 2023-02-22 RX ORDER — LISINOPRIL 10 MG/1
10 TABLET ORAL DAILY
Qty: 90 TABLET | Refills: 1 | Status: SHIPPED | OUTPATIENT
Start: 2023-02-22 | End: 2023-03-15

## 2023-02-22 RX ORDER — AMOXICILLIN 500 MG
CAPSULE ORAL DAILY
COMMUNITY

## 2023-02-22 NOTE — PROGRESS NOTES
Monica Johnson Alex  02/23/2023  [id number]    Kenya Escoto MD  Patient Care Team:  Kenya Escoto MD as PCP - General (Family Medicine)  Barbara Moore LPN as Care Coordinator (Internal Medicine)  Kenya Escoto MD (Family Medicine)  Elisa Chao RD (Inactive) as Dietitian (Nutrition)  Jian Foster as Digital Medicine Health   Tkeyah Bazin, PharmD as Hypertension Digital Medicine Clinician  Kenya Escoto MD as Hypertension Digital Medicine Responsible Provider (Family Medicine)  [employer] as Hypertension Digital Medicine Contract          Visit Type:a scheduled routine follow-up visit    Chief Complaint:  Chief Complaint   Patient presents with    Annual Exam       History of Present Illness:  Check up    Needs new Cardiologist.  Still with foot pain.    He left his wife.  Now living in house with daughter.  Mother has helped out.    Taking his meds  Needs refills.        History:  Past Medical History:   Diagnosis Date    ADHD (attention deficit hyperactivity disorder)     Allergy     Anxiety 1/25/2016    Arthritis 2014    Osteoarthritis    CHF (congestive heart failure)     Degenerative disc disease at L5-S1 level     Depression 1/25/2016    Hypertension     Sciatica of left side 1/31/2017     Past Surgical History:   Procedure Laterality Date    CALCANEAL OSTEOTOMY Left 04/16/2019    Procedure: OSTEOTOMY, CALCANEUS;  Surgeon: Rober Colon DPM;  Location: [facility] OR;  Service: Podiatry;  Laterality: Left;    CIRCUMCISION      FOOT SURGERY      INJECTION OF ANESTHETIC AGENT AROUND MEDIAL BRANCH NERVES INNERVATING CERVICAL FACET JOINT Bilateral 11/15/2021    Procedure: Block-nerve-medial branch-cervical bilateral C5, 6,7 RN IV sedation;  Surgeon: Ivan Davey MD;  Location: [facility] PAIN T;  Service: Pain Management;  Laterality: Bilateral;    INJECTION OF ANESTHETIC AGENT AROUND MEDIAL BRANCH NERVES INNERVATING LUMBAR FACET JOINT Bilateral 03/07/2022    Procedure: bilateral L3-L5 MBB;  Surgeon:  Ivan Davey MD;  Location: [facility] PAIN MGT;  Service: Pain Management;  Laterality: Bilateral;    INJECTION OF ANESTHETIC AGENT INTO SACROILIAC JOINT Right 11/24/2020    Procedure: Right BLOCK, SACROILIAC JOINT and Right Knee Injection with RN IV sedation;  Surgeon: Ivan Davey MD;  Location: [facility] PAIN MGT;  Service: Pain Management;  Laterality: Right;    INJECTION OF ANESTHETIC AGENT INTO SACROILIAC JOINT Right 04/16/2021    Procedure: Right BLOCK, SACROILIAC JOINT RIght Hip Right GTB RN IV sedation;  Surgeon: Ivan Davey MD;  Location: [facility] PAIN MGT;  Service: Pain Management;  Laterality: Right;    LENGTHENING OF ACHILLES TENDON Left 04/16/2019    Procedure: LENGTHENING, TENDON, ACHILLES;  Surgeon: Rober Colon DPM;  Location: [facility] OR;  Service: Podiatry;  Laterality: Left;    PLACEMENT OF ACELLULAR HUMAN DERMAL ALLOGRAFT Left 02/17/2021    Procedure: APPLICATION, ACELLULAR HUMAN DERMAL ALLOGRAFT;  Surgeon: Rober Colon DPM;  Location: [facility] OR;  Service: Podiatry;  Laterality: Left;    REPAIR OF POSTERIOR TIBIALIS TENDON Left 04/16/2019    Procedure: REPAIR, TENDON, TIBIALIS POSTERIOR;  Surgeon: Rober Colon DPM;  Location: [facility] OR;  Service: Podiatry;  Laterality: Left;    REPAIR OF TENDON OF LOWER EXTREMITY Left 02/17/2021    Procedure: REPAIR, TENDON, LOWER EXTREMITY;  Surgeon: Rober Colon DPM;  Location: [facility] OR;  Service: Podiatry;  Laterality: Left;    TENDON TRANSFER Left 04/16/2019    Procedure: TRANSFER, TENDON;  Surgeon: Rober Colon DPM;  Location: [facility] OR;  Service: Podiatry;  Laterality: Left;     Family History   Problem Relation Age of Onset    Cancer Mother         breast    Hypertension Mother     Cataracts Mother     Arthritis Mother     Depression Mother     Diabetes Mother     Hearing loss Mother     Hyperlipidemia Mother     Miscarriages / Stillbirths Mother         stillbirth    Hypertension Maternal Grandmother     Cancer Maternal Grandmother         ovarian    Mental  illness Maternal Grandmother     Stroke Maternal Grandmother     Glaucoma Father     Cancer Father         prostrate    Vision loss Father         glaucoma    Asthma Maternal Grandfather     Cancer Maternal Aunt         breast    Heart disease Maternal Aunt         tripple bypass    Stroke Maternal Aunt     Diabetes Maternal Aunt     Hyperlipidemia Maternal Aunt     Hypertension Maternal Aunt     Stroke Maternal Aunt     Diabetes Maternal Aunt     Hyperlipidemia Maternal Aunt     Hypertension Maternal Aunt      Social History     Socioeconomic History    Marital status: Legally    Tobacco Use    Smoking status: Every Day     Packs/day: 0.50     Years: 15.00     Pack years: 7.50     Types: Cigarettes, Cigars     Start date: 1990     Passive exposure: Never    Smokeless tobacco: Never    Tobacco comments:     HOLD MIDNIGHT TO SURGERY   Substance and Sexual Activity    Alcohol use: Not Currently     Alcohol/week: 1.0 standard drink     Types: 1 Drinks containing 0.5 oz of alcohol per week     Comment: socially: HOLD 72HRS PRIOR TO SURGERY    Drug use: Never     Types: Marijuana     Comment: HOLD TODAY PRIOR TO SURGERY    Sexual activity: Yes     Partners: Female   Social History Narrative    ** Merged History Encounter **          Social Determinants of Health     Financial Resource Strain: Low Risk     Difficulty of Paying Living Expenses: Not very hard   Food Insecurity: No Food Insecurity    Worried About Running Out of Food in the Last Year: Never true    Ran Out of Food in the Last Year: Never true   Transportation Needs: No Transportation Needs    Lack of Transportation (Medical): No    Lack of Transportation (Non-Medical): No   Physical Activity: Sufficiently Active    Days of Exercise per Week: 5 days    Minutes of Exercise per Session: 60 min   Stress: Stress Concern Present    Feeling of Stress : To some extent   Social Connections: Unknown    Frequency of Communication with Friends and Family: More  than three times a week    Frequency of Social Gatherings with Friends and Family: More than three times a week    Active Member of Clubs or Organizations: No    Attends Club or Organization Meetings: Never    Marital Status:    Housing Stability: Low Risk     Unable to Pay for Housing in the Last Year: No    Number of Places Lived in the Last Year: 2    Unstable Housing in the Last Year: No     Patient Active Problem List   Diagnosis    Depression    Intermittent explosive disorder    Sciatica of left side    Osteoarthritis    Essential hypertension    Elevated liver function tests    Chondromalacia of right knee    Fatty liver    Tobacco use disorder    Non-ischemic cardiomyopathy    Coronary artery disease involving native coronary artery of native heart without angina pectoris    Sacroiliac joint dysfunction    Atherosclerotic heart disease of native coronary artery with other forms of angina pectoris    Family history of prostate cancer in father     Review of patient's allergies indicates:  No Known Allergies    The following were reviewed at this visit: active problem list, medication list, allergies, family history, social history, and health maintenance.    Medications:  Current Outpatient Medications on File Prior to Visit   Medication Sig Dispense Refill    aspirin (ECOTRIN) 81 MG EC tablet Take 81 mg by mouth once daily.      b complex vitamins capsule Take 1 capsule by mouth once daily.      busPIRone (BUSPAR) 30 MG Tab Take 1 tablet (30 mg total) by mouth 2 (two) times daily. 60 tablet 2    cetirizine (ZYRTEC) 10 MG tablet Take 10 mg by mouth once daily.      cholecalciferol, vitamin D3, 125 mcg (5,000 unit) Tab Take 5,000 Units by mouth once daily.      cyclobenzaprine (FLEXERIL) 10 MG tablet Take 1 tablet (10 mg total) by mouth every evening. 30 tablet 2    gabapentin (NEURONTIN) 300 MG capsule Take 1 capsule (300 mg total) by mouth 2 (two) times daily AND 4 capsules (1,200 mg total) every  evening. TAKE ONE CAPSULE BY MOUTH TWICE DAILY AND 4 CAPSULES EVERY EVENING. 180 capsule 5    ibuprofen (ADVIL,MOTRIN) 800 MG tablet TAKE 1 TABLET(800 MG) BY MOUTH THREE TIMES DAILY (Patient taking differently: Take 800 mg by mouth after meals as needed.) 90 tablet 1    methocarbamoL (ROBAXIN) 750 MG Tab TAKE ONE TABLET TWICE DAILY AS NEEDED FOR MUSCLE SPASMS 60 tablet 1    multivitamin capsule Take 1 capsule by mouth once daily.      omega-3 fatty acids/fish oil (FISH OIL-OMEGA-3 FATTY ACIDS) 300-1,000 mg capsule Take by mouth once daily.      tamsulosin (FLOMAX) 0.4 mg Cap Take 1 capsule (0.4 mg total) by mouth once daily. 30 capsule 11    tiZANidine (ZANAFLEX) 4 MG tablet Take 1 tablet (4 mg total) by mouth 3 (three) times daily as needed. AS NEEDED FOR DAY TIME PAIN 90 tablet 5    traZODone (DESYREL) 100 MG tablet Take 1/2 to 1 tablet at bedtime as needed for sleep. 30 tablet 3    TUMERIC-GING-OLIVE-OREG-CAPRYL ORAL Take by mouth.       No current facility-administered medications on file prior to visit.       Medications have been reviewed and reconciled with patient at this visit.  Barriers to medications reviewed with patient.    Adverse reactions to current medications reviewed with patient..    Over the counter medications reviewed and reconciled with patient.    Exam:  Wt Readings from Last 3 Encounters:   02/22/23 105.7 kg (233 lb 0.4 oz)   02/15/23 107.2 kg (236 lb 3.6 oz)   12/07/22 106 kg (233 lb 11 oz)     Temp Readings from Last 3 Encounters:   02/22/23 98.8 °F (37.1 °C) (Temporal)   12/07/22 99.8 °F (37.7 °C)   12/04/22 98.2 °F (36.8 °C) (Oral)     BP Readings from Last 3 Encounters:   02/22/23 132/78   02/15/23 126/67   12/07/22 112/76     Pulse Readings from Last 3 Encounters:   02/22/23 93   02/15/23 97   12/07/22 98     Body mass index is 31.6 kg/m².      Review of Systems   Musculoskeletal:  Positive for joint pain.   Physical Exam  Nursing note reviewed.   Cardiovascular:      Rate and Rhythm:  Normal rate and regular rhythm.   Pulmonary:      Effort: Pulmonary effort is normal. No respiratory distress.   Neurological:      Mental Status: He is alert and oriented to person, place, and time.   Psychiatric:         Mood and Affect: Mood normal.         Behavior: Behavior normal.         Thought Content: Thought content normal.         Judgment: Judgment normal.       Laboratory Reviewed ({Yes)  Lab Results   Component Value Date    WBC 11.14 02/23/2023    HGB 14.5 02/23/2023    HCT 45.3 02/23/2023     02/23/2023    CHOL 117 (L) 11/29/2021    TRIG 144 11/29/2021    HDL 33 (L) 11/29/2021    ALT 53 (H) 12/04/2022    AST 35 12/04/2022     12/04/2022    K 3.5 12/04/2022     12/04/2022    CREATININE 1.1 12/04/2022    BUN 7 12/04/2022    CO2 22 (L) 12/04/2022    TSH 0.492 11/29/2021    PSA 1.1 03/15/2022    INR 0.9 08/28/2018    HGBA1C 5.5 12/03/2020       Monica was seen today for annual exam.    Diagnoses and all orders for this visit:    Annual physical exam  -     CBC Auto Differential; Future  -     Comprehensive Metabolic Panel; Future  -     Lipid Panel; Future    Essential hypertension  -     CBC Auto Differential; Future  -     Comprehensive Metabolic Panel; Future  -     Lipid Panel; Future    Fatty liver  -     Comprehensive Metabolic Panel; Future  -     US Abdomen Limited; Future    Non-ischemic cardiomyopathy  -     Ambulatory referral/consult to Cardiology; Future    Gastroesophageal reflux disease, unspecified whether esophagitis present  -     pantoprazole (PROTONIX) 40 MG tablet; Take 1 tablet (40 mg total) by mouth once daily.    Other orders  -     amLODIPine (NORVASC) 5 MG tablet; Take 1 tablet (5 mg total) by mouth once daily.  -     atorvastatin (LIPITOR) 20 MG tablet; Take 1 tablet (20 mg total) by mouth every evening.  -     lisinopriL 10 MG tablet; Take 1 tablet (10 mg total) by mouth once daily.  -     metoprolol succinate (TOPROL-XL) 25 MG 24 hr tablet; Take 1 tablet (25  mg total) by mouth once daily.      Pain management for his knee and back.    He has left his wife.  New household  Diet is poor  GERD- protonix  Fatty liver- needs follow up  US liver and LFT ordered    Follow up with Psych       Care Plan/Goals: Reviewed    Goals          Patient Stated      Blood Pressure < 130/80 (pt-stated)        Other      Exercise at least 150 minutes per week.       Take at least one BP reading per week at various times of the day             Follow up: Follow up in about 6 months (around 8/22/2023).    After visit summary was printed and given to patient upon discharge today.  Patient goals and care plan are included in After Visit Summary.

## 2023-02-23 ENCOUNTER — LAB VISIT (OUTPATIENT)
Dept: LAB | Facility: HOSPITAL | Age: 48
End: 2023-02-23
Attending: FAMILY MEDICINE
Payer: COMMERCIAL

## 2023-02-23 DIAGNOSIS — K76.0 FATTY LIVER: ICD-10-CM

## 2023-02-23 DIAGNOSIS — I10 ESSENTIAL HYPERTENSION: ICD-10-CM

## 2023-02-23 DIAGNOSIS — Z00.00 ANNUAL PHYSICAL EXAM: ICD-10-CM

## 2023-02-23 LAB
ALBUMIN SERPL BCP-MCNC: 4.2 G/DL (ref 3.5–5.2)
ALP SERPL-CCNC: 62 U/L (ref 55–135)
ALT SERPL W/O P-5'-P-CCNC: 64 U/L (ref 10–44)
ANION GAP SERPL CALC-SCNC: 9 MMOL/L (ref 8–16)
AST SERPL-CCNC: 54 U/L (ref 10–40)
BASOPHILS # BLD AUTO: 0.07 K/UL (ref 0–0.2)
BASOPHILS NFR BLD: 0.6 % (ref 0–1.9)
BILIRUB SERPL-MCNC: 0.2 MG/DL (ref 0.1–1)
BUN SERPL-MCNC: 9 MG/DL (ref 6–20)
CALCIUM SERPL-MCNC: 9.5 MG/DL (ref 8.7–10.5)
CHLORIDE SERPL-SCNC: 106 MMOL/L (ref 95–110)
CHOLEST SERPL-MCNC: 156 MG/DL (ref 120–199)
CHOLEST/HDLC SERPL: 5.4 {RATIO} (ref 2–5)
CO2 SERPL-SCNC: 25 MMOL/L (ref 23–29)
CREAT SERPL-MCNC: 1.1 MG/DL (ref 0.5–1.4)
DIFFERENTIAL METHOD: ABNORMAL
EOSINOPHIL # BLD AUTO: 0.5 K/UL (ref 0–0.5)
EOSINOPHIL NFR BLD: 4.1 % (ref 0–8)
ERYTHROCYTE [DISTWIDTH] IN BLOOD BY AUTOMATED COUNT: 14.6 % (ref 11.5–14.5)
EST. GFR  (NO RACE VARIABLE): >60 ML/MIN/1.73 M^2
GLUCOSE SERPL-MCNC: 112 MG/DL (ref 70–110)
HCT VFR BLD AUTO: 45.3 % (ref 40–54)
HDLC SERPL-MCNC: 29 MG/DL (ref 40–75)
HDLC SERPL: 18.6 % (ref 20–50)
HGB BLD-MCNC: 14.5 G/DL (ref 14–18)
IMM GRANULOCYTES # BLD AUTO: 0.03 K/UL (ref 0–0.04)
IMM GRANULOCYTES NFR BLD AUTO: 0.3 % (ref 0–0.5)
LDLC SERPL CALC-MCNC: 77.2 MG/DL (ref 63–159)
LYMPHOCYTES # BLD AUTO: 4.6 K/UL (ref 1–4.8)
LYMPHOCYTES NFR BLD: 41.6 % (ref 18–48)
MCH RBC QN AUTO: 27.8 PG (ref 27–31)
MCHC RBC AUTO-ENTMCNC: 32 G/DL (ref 32–36)
MCV RBC AUTO: 87 FL (ref 82–98)
MONOCYTES # BLD AUTO: 0.9 K/UL (ref 0.3–1)
MONOCYTES NFR BLD: 7.8 % (ref 4–15)
NEUTROPHILS # BLD AUTO: 5.1 K/UL (ref 1.8–7.7)
NEUTROPHILS NFR BLD: 45.6 % (ref 38–73)
NONHDLC SERPL-MCNC: 127 MG/DL
NRBC BLD-RTO: 0 /100 WBC
PLATELET # BLD AUTO: 329 K/UL (ref 150–450)
PMV BLD AUTO: 10.9 FL (ref 9.2–12.9)
POTASSIUM SERPL-SCNC: 3.8 MMOL/L (ref 3.5–5.1)
PROT SERPL-MCNC: 7 G/DL (ref 6–8.4)
RBC # BLD AUTO: 5.22 M/UL (ref 4.6–6.2)
SODIUM SERPL-SCNC: 140 MMOL/L (ref 136–145)
TRIGL SERPL-MCNC: 249 MG/DL (ref 30–150)
WBC # BLD AUTO: 11.14 K/UL (ref 3.9–12.7)

## 2023-02-23 PROCEDURE — 36415 COLL VENOUS BLD VENIPUNCTURE: CPT | Performed by: FAMILY MEDICINE

## 2023-02-23 PROCEDURE — 80061 LIPID PANEL: CPT | Performed by: FAMILY MEDICINE

## 2023-02-23 PROCEDURE — 80053 COMPREHEN METABOLIC PANEL: CPT | Performed by: FAMILY MEDICINE

## 2023-02-23 PROCEDURE — 85025 COMPLETE CBC W/AUTO DIFF WBC: CPT | Performed by: FAMILY MEDICINE

## 2023-02-27 DIAGNOSIS — K76.0 FATTY LIVER: Primary | ICD-10-CM

## 2023-02-28 NOTE — PRE-PROCEDURE INSTRUCTIONS
Spoke with patient regarding procedure scheduled on 3.8    Arrival time 1015    Has patient been sick with fever or on antibiotics within the last 7 days? No    Does the patient have any open wounds, sores or rashes? No    Does the patient have any recent fractures? no    Has patient received a vaccination within the last 7 days? No    Received the COVID vaccination? yes    Has the patient stopped all medications as directed? NA    Does patient have a pacemaker and or defibrillator? no    Does the patient have a ride to and from procedure and someone reliable to remain with patient? Coordinating transportation. Aware of policy.     Is the patient diabetic? no    Does the patient have sleep apnea? Or use O2 at home? No and no     Is the patient receiving sedation? yes    Is the patient instructed to remain NPO beginning at midnight the night before their procedure? yes    Procedure location confirmed with patient? Yes    Covid- Denies signs/symptoms. Instructed to notify PAT/MD if any changes.

## 2023-03-01 ENCOUNTER — HOSPITAL ENCOUNTER (OUTPATIENT)
Dept: RADIOLOGY | Facility: HOSPITAL | Age: 48
Discharge: HOME | End: 2023-03-01
Attending: FAMILY MEDICINE
Payer: COMMERCIAL

## 2023-03-01 DIAGNOSIS — K76.0 FATTY LIVER: ICD-10-CM

## 2023-03-01 PROCEDURE — 76705 ECHO EXAM OF ABDOMEN: CPT | Mod: TC

## 2023-03-01 PROCEDURE — 76705 US ABDOMEN LIMITED: ICD-10-PCS | Mod: 26,,, | Performed by: STUDENT IN AN ORGANIZED HEALTH CARE EDUCATION/TRAINING PROGRAM

## 2023-03-01 PROCEDURE — 76705 ECHO EXAM OF ABDOMEN: CPT | Mod: 26,,, | Performed by: STUDENT IN AN ORGANIZED HEALTH CARE EDUCATION/TRAINING PROGRAM

## 2023-03-02 ENCOUNTER — TELEPHONE (OUTPATIENT)
Dept: INTERNAL MEDICINE | Facility: CLINIC | Age: 48
End: 2023-03-02
Payer: COMMERCIAL

## 2023-03-02 DIAGNOSIS — R79.89 ELEVATED LIVER FUNCTION TESTS: Primary | ICD-10-CM

## 2023-03-02 NOTE — TELEPHONE ENCOUNTER
----- Message from Kenya Escoto MD sent at 3/2/2023  2:15 PM CST -----  He has fatty liver.  US shows this.    Liver function elevated due to that.    Need to see Hepatology for surveillance

## 2023-03-02 NOTE — TELEPHONE ENCOUNTER
Contacted patient in regards to lab results, two patient identifiers used, patient informed of lab results, patient verbalized understanding. Appt scheduled

## 2023-03-08 ENCOUNTER — HOSPITAL ENCOUNTER (OUTPATIENT)
Facility: HOSPITAL | Age: 48
Discharge: HOME | End: 2023-03-08
Attending: ANESTHESIOLOGY | Admitting: ANESTHESIOLOGY
Payer: COMMERCIAL

## 2023-03-08 VITALS
SYSTOLIC BLOOD PRESSURE: 122 MMHG | DIASTOLIC BLOOD PRESSURE: 82 MMHG | HEART RATE: 86 BPM | RESPIRATION RATE: 16 BRPM | WEIGHT: 232.69 LBS | TEMPERATURE: 98 F | OXYGEN SATURATION: 100 % | HEIGHT: 72 IN | BODY MASS INDEX: 31.52 KG/M2

## 2023-03-08 DIAGNOSIS — M17.11 OSTEOARTHRITIS OF RIGHT KNEE: ICD-10-CM

## 2023-03-08 PROBLEM — G89.29 CHRONIC PAIN OF RIGHT KNEE: Status: ACTIVE | Noted: 2023-03-08

## 2023-03-08 PROBLEM — M25.561 CHRONIC PAIN OF RIGHT KNEE: Status: ACTIVE | Noted: 2023-03-08

## 2023-03-08 PROCEDURE — 25000003 PHARM REV CODE 250: Performed by: ANESTHESIOLOGY

## 2023-03-08 PROCEDURE — 20610 DRAIN/INJ JOINT/BURSA W/O US: CPT | Mod: RT | Performed by: ANESTHESIOLOGY

## 2023-03-08 PROCEDURE — 77002 NEEDLE LOCALIZATION BY XRAY: CPT | Mod: 26,,, | Performed by: ANESTHESIOLOGY

## 2023-03-08 PROCEDURE — 77002 NEEDLE LOCALIZATION BY XRAY: CPT | Performed by: ANESTHESIOLOGY

## 2023-03-08 PROCEDURE — 25500020 PHARM REV CODE 255: Performed by: ANESTHESIOLOGY

## 2023-03-08 PROCEDURE — 20610 PR DRAIN/INJECT LARGE JOINT/BURSA: ICD-10-PCS | Mod: RT,,, | Performed by: ANESTHESIOLOGY

## 2023-03-08 PROCEDURE — 77002 PR FLUOROSCOPIC GUIDANCE NEEDLE PLACEMENT: ICD-10-PCS | Mod: 26,,, | Performed by: ANESTHESIOLOGY

## 2023-03-08 PROCEDURE — 63600175 PHARM REV CODE 636 W HCPCS: Performed by: ANESTHESIOLOGY

## 2023-03-08 PROCEDURE — 20610 DRAIN/INJ JOINT/BURSA W/O US: CPT | Mod: RT,,, | Performed by: ANESTHESIOLOGY

## 2023-03-08 RX ORDER — INDOMETHACIN 25 MG/1
CAPSULE ORAL
Status: DISCONTINUED | OUTPATIENT
Start: 2023-03-08 | End: 2023-03-08 | Stop reason: HOSPADM

## 2023-03-08 RX ORDER — TRIAMCINOLONE ACETONIDE 40 MG/ML
INJECTION, SUSPENSION INTRA-ARTICULAR; INTRAMUSCULAR
Status: DISCONTINUED | OUTPATIENT
Start: 2023-03-08 | End: 2023-03-08 | Stop reason: HOSPADM

## 2023-03-08 RX ORDER — BUPIVACAINE HYDROCHLORIDE 2.5 MG/ML
INJECTION, SOLUTION EPIDURAL; INFILTRATION; INTRACAUDAL
Status: DISCONTINUED | OUTPATIENT
Start: 2023-03-08 | End: 2023-03-08 | Stop reason: HOSPADM

## 2023-03-08 NOTE — DISCHARGE INSTRUCTIONS

## 2023-03-08 NOTE — DISCHARGE SUMMARY
Discharge Note  Short Stay      SUMMARY     Admit Date: 3/8/2023    Attending Physician: Lyudmila Leung MD        Discharge Physician: Lyudmila Leung MD        Discharge Date: 3/8/2023 11:27 AM    Procedure(s) (LRB):  Right Knee injection with steroid RN IV Sedation (Right)    Final Diagnosis: Chronic pain of right knee [M25.561, G89.29]    Disposition: Home or self care    Patient Instructions:   Current Discharge Medication List        CONTINUE these medications which have NOT CHANGED    Details   amLODIPine (NORVASC) 5 MG tablet Take 1 tablet (5 mg total) by mouth once daily.  Qty: 90 tablet, Refills: 1    Comments: .      aspirin (ECOTRIN) 81 MG EC tablet Take 81 mg by mouth once daily.      atorvastatin (LIPITOR) 20 MG tablet Take 1 tablet (20 mg total) by mouth every evening.  Qty: 90 tablet, Refills: 1      b complex vitamins capsule Take 1 capsule by mouth once daily.      busPIRone (BUSPAR) 30 MG Tab Take 1 tablet by mouth twice daily  Qty: 60 tablet, Refills: 2    Comments: Please ask pt to call [phone] to schedule follow up as no further refills can be issued after this.      cetirizine (ZYRTEC) 10 MG tablet Take 10 mg by mouth once daily.      cholecalciferol, vitamin D3, 125 mcg (5,000 unit) Tab Take 5,000 Units by mouth once daily.      cyclobenzaprine (FLEXERIL) 10 MG tablet Take 1 tablet (10 mg total) by mouth every evening.  Qty: 30 tablet, Refills: 2    Associated Diagnoses: Muscle pain; Lumbar facet arthropathy      gabapentin (NEURONTIN) 300 MG capsule Take 1 capsule (300 mg total) by mouth 2 (two) times daily AND 4 capsules (1,200 mg total) every evening. TAKE ONE CAPSULE BY MOUTH TWICE DAILY AND 4 CAPSULES EVERY EVENING.  Qty: 180 capsule, Refills: 5    Comments: Dr. Ivan Davey - [facility]# [id number]  Associated Diagnoses: Muscle pain; Lumbar facet arthropathy      ibuprofen (ADVIL,MOTRIN) 800 MG tablet TAKE 1 TABLET(800 MG) BY MOUTH THREE TIMES DAILY  Qty: 90 tablet, Refills: 1    Associated  Diagnoses: Posterior tibial tendinitis, left leg      lisinopriL 10 MG tablet Take 1 tablet (10 mg total) by mouth once daily.  Qty: 90 tablet, Refills: 1    Comments: .      methocarbamoL (ROBAXIN) 750 MG Tab TAKE ONE TABLET TWICE DAILY AS NEEDED FOR MUSCLE SPASMS  Qty: 60 tablet, Refills: 1    Comments: This prescription was filled on 3/10/2022. Any refills authorized will be placed on file.      metoprolol succinate (TOPROL-XL) 25 MG 24 hr tablet Take 1 tablet (25 mg total) by mouth once daily.  Qty: 90 tablet, Refills: 1    Comments: .      multivitamin capsule Take 1 capsule by mouth once daily.      omega-3 fatty acids/fish oil (FISH OIL-OMEGA-3 FATTY ACIDS) 300-1,000 mg capsule Take by mouth once daily.      pantoprazole (PROTONIX) 40 MG tablet Take 1 tablet (40 mg total) by mouth once daily.  Qty: 30 tablet, Refills: 11    Associated Diagnoses: Gastroesophageal reflux disease, unspecified whether esophagitis present      tamsulosin (FLOMAX) 0.4 mg Cap Take 1 capsule (0.4 mg total) by mouth once daily.  Qty: 30 capsule, Refills: 11      tiZANidine (ZANAFLEX) 4 MG tablet Take 1 tablet (4 mg total) by mouth 3 (three) times daily as needed. AS NEEDED FOR DAY TIME PAIN  Qty: 90 tablet, Refills: 5    Associated Diagnoses: Muscle pain; Lumbar facet arthropathy      traZODone (DESYREL) 100 MG tablet Take 1/2 to 1 tablet at bedtime as needed for sleep.  Qty: 30 tablet, Refills: 3      TUMERIC-GING-OLIVE-OREG-CAPRYL ORAL Take by mouth.                 Discharge Diagnosis: Chronic pain of right knee [M25.561, G89.29]  Condition on Discharge: Stable with no complications to procedure   Diet on Discharge: Same as before.  Activity: as per instruction sheet.  Discharge to: Home with a responsible adult.  Follow up: 2-4 weeks       Please call the office at [phone] if you experience any weakness or loss of sensation, fever > 101.5, pain uncontrolled with oral medications, persistent nausea/vomiting/or diarrhea,  redness or drainage from the incisions, or any other worrisome concerns. If physician on call was not reached or could not communicate with our office for any reason please go to the nearest emergency department

## 2023-03-08 NOTE — OP NOTE
Procedure Note: Right Knee steroid injection under fluoroscopy    03/08/2023                                 Surgeon: Lyudmila Leung MD       Assistant: None     Pre-Op Diagnosis:  right Chronic pain of right knee [M25.561, G89.29]    Post-Op Diagnosis: Chronic pain of right knee [M25.561, G89.29]     EBL: None     Complications: None     Specimens: None     Description of procedure:     After written consent was obtained, patient placed in supine position.  The area over the  lateral aspect of the right  knee(s) joint prepped with chlorhexidine.  The area was draped in the usual sterile fashion.  Approximately 5 mL 1% lidocaine was infiltrated into the skin overlying the predetermined entry point. A 22 gauge spinal needle was then advanced under fluoroscopy in the AP views into the knee joint. After negative aspiration there was injection of 1 mL radio opaque contrast dye to confirm needle location within the joint, and no evidence of intravascular spread. This was followed by injection of 6mL of 0.25% bupivacaine +40mg of triamcinolone into the joint space. Displacement of the contrast indicated that the medication went into the area of the joint space. Needle was withdrawn and a sterile band-aid applied to the skin.     Patient tolerated the procedure well, and was reporting improvement of pain symptoms after the injection.  She was discharged from the clinic in stable condition.

## 2023-03-14 NOTE — PRE-PROCEDURE INSTRUCTIONS
Spoke with patient regarding procedure scheduled on 3.22    Arrival time 1115    Has patient been sick with fever or on antibiotics within the last 7 days? No    Does the patient have any open wounds, sores or rashes? No    Does the patient have any recent fractures? no    Has patient received a vaccination within the last 7 days? No    Received the COVID vaccination? yes    Has the patient stopped all medications as directed? NA    Does patient have a pacemaker and or defibrillator? no    Does the patient have a ride to and from procedure and someone reliable to remain with patient? Coordinating transportation. Aware of policy    Is the patient diabetic? no    Does the patient have sleep apnea? Or use O2 at home? No and no     Is the patient receiving sedation? yes    Is the patient instructed to remain NPO beginning at midnight the night before their procedure? yes    Procedure location confirmed with patient? Yes    Covid- Denies signs/symptoms. Instructed to notify PAT/MD if any changes.

## 2023-03-15 ENCOUNTER — OFFICE VISIT (OUTPATIENT)
Dept: HEPATOLOGY | Facility: CLINIC | Age: 48
End: 2023-03-15
Payer: COMMERCIAL

## 2023-03-15 ENCOUNTER — OFFICE VISIT (OUTPATIENT)
Dept: CARDIOLOGY | Facility: CLINIC | Age: 48
End: 2023-03-15
Payer: COMMERCIAL

## 2023-03-15 ENCOUNTER — LAB VISIT (OUTPATIENT)
Dept: LAB | Facility: HOSPITAL | Age: 48
End: 2023-03-15
Attending: NURSE PRACTITIONER
Payer: COMMERCIAL

## 2023-03-15 VITALS
SYSTOLIC BLOOD PRESSURE: 122 MMHG | BODY MASS INDEX: 32.01 KG/M2 | HEART RATE: 91 BPM | DIASTOLIC BLOOD PRESSURE: 70 MMHG | WEIGHT: 236.31 LBS | HEIGHT: 72 IN

## 2023-03-15 VITALS
SYSTOLIC BLOOD PRESSURE: 140 MMHG | HEART RATE: 77 BPM | WEIGHT: 236.13 LBS | OXYGEN SATURATION: 96 % | BODY MASS INDEX: 31.98 KG/M2 | DIASTOLIC BLOOD PRESSURE: 70 MMHG | HEIGHT: 72 IN

## 2023-03-15 DIAGNOSIS — M19.90 OSTEOARTHRITIS, UNSPECIFIED OSTEOARTHRITIS TYPE, UNSPECIFIED SITE: ICD-10-CM

## 2023-03-15 DIAGNOSIS — I10 ESSENTIAL HYPERTENSION: Primary | ICD-10-CM

## 2023-03-15 DIAGNOSIS — K76.0 FATTY LIVER: ICD-10-CM

## 2023-03-15 DIAGNOSIS — I25.10 CORONARY ARTERY DISEASE INVOLVING NATIVE CORONARY ARTERY OF NATIVE HEART WITHOUT ANGINA PECTORIS: ICD-10-CM

## 2023-03-15 DIAGNOSIS — R79.89 ELEVATED LIVER FUNCTION TESTS: ICD-10-CM

## 2023-03-15 DIAGNOSIS — E78.1 HIGH TRIGLYCERIDES: ICD-10-CM

## 2023-03-15 DIAGNOSIS — I42.8 NON-ISCHEMIC CARDIOMYOPATHY: ICD-10-CM

## 2023-03-15 DIAGNOSIS — F17.200 TOBACCO USE DISORDER: ICD-10-CM

## 2023-03-15 DIAGNOSIS — I25.118 ATHEROSCLEROTIC HEART DISEASE OF NATIVE CORONARY ARTERY WITH OTHER FORMS OF ANGINA PECTORIS: ICD-10-CM

## 2023-03-15 DIAGNOSIS — E66.9 OBESITY (BMI 30.0-34.9): ICD-10-CM

## 2023-03-15 LAB
BASOPHILS # BLD AUTO: 0.08 K/UL (ref 0–0.2)
BASOPHILS NFR BLD: 0.8 % (ref 0–1.9)
DIFFERENTIAL METHOD: ABNORMAL
EOSINOPHIL # BLD AUTO: 0.4 K/UL (ref 0–0.5)
EOSINOPHIL NFR BLD: 3.4 % (ref 0–8)
ERYTHROCYTE [DISTWIDTH] IN BLOOD BY AUTOMATED COUNT: 14.5 % (ref 11.5–14.5)
HCT VFR BLD AUTO: 45.7 % (ref 40–54)
HGB BLD-MCNC: 14.6 G/DL (ref 14–18)
IMM GRANULOCYTES # BLD AUTO: 0.03 K/UL (ref 0–0.04)
IMM GRANULOCYTES NFR BLD AUTO: 0.3 % (ref 0–0.5)
INR PPP: 0.9 (ref 0.8–1.2)
LYMPHOCYTES # BLD AUTO: 4.3 K/UL (ref 1–4.8)
LYMPHOCYTES NFR BLD: 40.5 % (ref 18–48)
MCH RBC QN AUTO: 28 PG (ref 27–31)
MCHC RBC AUTO-ENTMCNC: 31.9 G/DL (ref 32–36)
MCV RBC AUTO: 88 FL (ref 82–98)
MONOCYTES # BLD AUTO: 0.8 K/UL (ref 0.3–1)
MONOCYTES NFR BLD: 7.6 % (ref 4–15)
NEUTROPHILS # BLD AUTO: 5.1 K/UL (ref 1.8–7.7)
NEUTROPHILS NFR BLD: 47.4 % (ref 38–73)
NRBC BLD-RTO: 0 /100 WBC
PLATELET # BLD AUTO: 307 K/UL (ref 150–450)
PMV BLD AUTO: 10.7 FL (ref 9.2–12.9)
PROTHROMBIN TIME: 9.8 SEC (ref 9–12.5)
RBC # BLD AUTO: 5.21 M/UL (ref 4.6–6.2)
WBC # BLD AUTO: 10.64 K/UL (ref 3.9–12.7)

## 2023-03-15 PROCEDURE — 99999 PR PBB SHADOW E&M-EST. PATIENT-LVL V: ICD-10-PCS | Mod: PBBFAC,,, | Performed by: NURSE PRACTITIONER

## 2023-03-15 PROCEDURE — 85025 COMPLETE CBC W/AUTO DIFF WBC: CPT | Performed by: NURSE PRACTITIONER

## 2023-03-15 PROCEDURE — 99999 PR PBB SHADOW E&M-EST. PATIENT-LVL V: CPT | Mod: PBBFAC,,, | Performed by: NURSE PRACTITIONER

## 2023-03-15 PROCEDURE — 99999 PR PBB SHADOW E&M-EST. PATIENT-LVL V: ICD-10-PCS | Mod: PBBFAC,,, | Performed by: STUDENT IN AN ORGANIZED HEALTH CARE EDUCATION/TRAINING PROGRAM

## 2023-03-15 PROCEDURE — 85610 PROTHROMBIN TIME: CPT | Performed by: NURSE PRACTITIONER

## 2023-03-15 PROCEDURE — 84466 ASSAY OF TRANSFERRIN: CPT | Performed by: NURSE PRACTITIONER

## 2023-03-15 PROCEDURE — 86803 HEPATITIS C AB TEST: CPT | Performed by: NURSE PRACTITIONER

## 2023-03-15 PROCEDURE — 86706 HEP B SURFACE ANTIBODY: CPT | Performed by: NURSE PRACTITIONER

## 2023-03-15 PROCEDURE — 99204 OFFICE O/P NEW MOD 45 MIN: CPT | Mod: S$GLB,,, | Performed by: NURSE PRACTITIONER

## 2023-03-15 PROCEDURE — 99999 PR PBB SHADOW E&M-EST. PATIENT-LVL V: CPT | Mod: PBBFAC,,, | Performed by: STUDENT IN AN ORGANIZED HEALTH CARE EDUCATION/TRAINING PROGRAM

## 2023-03-15 PROCEDURE — 80048 BASIC METABOLIC PNL TOTAL CA: CPT | Performed by: NURSE PRACTITIONER

## 2023-03-15 PROCEDURE — 82728 ASSAY OF FERRITIN: CPT | Performed by: NURSE PRACTITIONER

## 2023-03-15 PROCEDURE — 36415 COLL VENOUS BLD VENIPUNCTURE: CPT | Performed by: NURSE PRACTITIONER

## 2023-03-15 PROCEDURE — 99204 OFFICE O/P NEW MOD 45 MIN: CPT | Mod: S$GLB,,, | Performed by: STUDENT IN AN ORGANIZED HEALTH CARE EDUCATION/TRAINING PROGRAM

## 2023-03-15 PROCEDURE — 87340 HEPATITIS B SURFACE AG IA: CPT | Performed by: NURSE PRACTITIONER

## 2023-03-15 PROCEDURE — 86790 VIRUS ANTIBODY NOS: CPT | Performed by: NURSE PRACTITIONER

## 2023-03-15 PROCEDURE — 86704 HEP B CORE ANTIBODY TOTAL: CPT | Performed by: NURSE PRACTITIONER

## 2023-03-15 PROCEDURE — 82103 ALPHA-1-ANTITRYPSIN TOTAL: CPT | Performed by: NURSE PRACTITIONER

## 2023-03-15 PROCEDURE — 86038 ANTINUCLEAR ANTIBODIES: CPT | Performed by: NURSE PRACTITIONER

## 2023-03-15 PROCEDURE — 86015 ACTIN ANTIBODY EACH: CPT | Performed by: NURSE PRACTITIONER

## 2023-03-15 PROCEDURE — 86381 MITOCHONDRIAL ANTIBODY EACH: CPT | Performed by: NURSE PRACTITIONER

## 2023-03-15 PROCEDURE — 99204 PR OFFICE/OUTPT VISIT, NEW, LEVL IV, 45-59 MIN: ICD-10-PCS | Mod: S$GLB,,, | Performed by: STUDENT IN AN ORGANIZED HEALTH CARE EDUCATION/TRAINING PROGRAM

## 2023-03-15 PROCEDURE — 80076 HEPATIC FUNCTION PANEL: CPT | Performed by: NURSE PRACTITIONER

## 2023-03-15 PROCEDURE — 99204 PR OFFICE/OUTPT VISIT, NEW, LEVL IV, 45-59 MIN: ICD-10-PCS | Mod: S$GLB,,, | Performed by: NURSE PRACTITIONER

## 2023-03-15 PROCEDURE — 82390 ASSAY OF CERULOPLASMIN: CPT | Performed by: NURSE PRACTITIONER

## 2023-03-15 RX ORDER — TELMISARTAN 40 MG/1
40 TABLET ORAL DAILY
Qty: 30 TABLET | Refills: 11 | Status: SHIPPED | OUTPATIENT
Start: 2023-03-15 | End: 2024-03-15

## 2023-03-15 NOTE — PROGRESS NOTES
Clinic Consult:  [facility] Consultation Note    Reason for Consult:  Diagnoses of Fatty liver and Elevated liver function tests were pertinent to this visit.    PCP: Kenya Escoto   [id number] / [facility] [id number]    HPI:  This is a 47 y.o. male here for evaluation of the above  Pt referred by PCP for further evaluation of elevated LFTs.   Pt was seen for a wellness visit and labs were completed and noted elevated AST and ALT.  US was completed which noted hepatic steatosis.   Pt denies any previously known liver disease.   He denies any significant ETOH.    No new or change in medications  Per chart review, LFTs have been elevated since 2015  PMH includes HTN, CAD, obesity and elevated triglycerides.   No upper GI bleeding.  No ascites or BLE.  No overt confusion.      Review of Systems   Constitutional:  Negative for chills, fever, malaise/fatigue and weight loss.   Respiratory:  Negative for cough.    Cardiovascular:  Negative for chest pain.   Gastrointestinal:         Per HPI   Musculoskeletal:  Negative for myalgias.   Skin:  Negative for itching and rash.   Neurological:  Negative for headaches.   Psychiatric/Behavioral:  The patient is not nervous/anxious.      Medical History:   Past Medical History:   Diagnosis Date    ADHD (attention deficit hyperactivity disorder)     Allergy     Anxiety 1/25/2016    Arthritis 2014    Osteoarthritis    CHF (congestive heart failure)     Degenerative disc disease at L5-S1 level     Depression 1/25/2016    Hypertension     Sciatica of left side 1/31/2017       Surgical History:  Past Surgical History:   Procedure Laterality Date    CALCANEAL OSTEOTOMY Left 04/16/2019    Procedure: OSTEOTOMY, CALCANEUS;  Surgeon: Rober Colon DPM;  Location: [facility];  Service: Podiatry;  Laterality: Left;    CIRCUMCISION      FOOT SURGERY      INJECTION OF ANESTHETIC AGENT AROUND MEDIAL BRANCH NERVES INNERVATING CERVICAL FACET JOINT Bilateral  11/15/2021    Procedure: Block-nerve-medial branch-cervical bilateral C5, 6,7 RN IV sedation;  Surgeon: Ivan Davey MD;  Location: [facility];  Service: Pain Management;  Laterality: Bilateral;    INJECTION OF ANESTHETIC AGENT AROUND MEDIAL BRANCH NERVES INNERVATING LUMBAR FACET JOINT Bilateral 03/07/2022    Procedure: bilateral L3-L5 MBB;  Surgeon: Ivan Davey MD;  Location: [facility];  Service: Pain Management;  Laterality: Bilateral;    INJECTION OF ANESTHETIC AGENT INTO SACROILIAC JOINT Right 11/24/2020    Procedure: Right BLOCK, SACROILIAC JOINT and Right Knee Injection with RN IV sedation;  Surgeon: Ivan Davey MD;  Location: [facility];  Service: Pain Management;  Laterality: Right;    INJECTION OF ANESTHETIC AGENT INTO SACROILIAC JOINT Right 04/16/2021    Procedure: Right BLOCK, SACROILIAC JOINT RIght Hip Right GTB RN IV sedation;  Surgeon: Ivan Davey MD;  Location: [facility];  Service: Pain Management;  Laterality: Right;    INJECTION OF JOINT Right 3/8/2023    Procedure: Right Knee injection with steroid RN IV Sedation;  Surgeon: Lyudmila Leung MD;  Location: [facility];  Service: Pain Management;  Laterality: Right;    LENGTHENING OF ACHILLES TENDON Left 04/16/2019    Procedure: LENGTHENING, TENDON, ACHILLES;  Surgeon: Rober Colon DPM;  Location: [facility] OR;  Service: Podiatry;  Laterality: Left;    PLACEMENT OF ACELLULAR HUMAN DERMAL ALLOGRAFT Left 02/17/2021    Procedure: APPLICATION, ACELLULAR HUMAN DERMAL ALLOGRAFT;  Surgeon: Rober Colon DPM;  Location: [facility] OR;  Service: Podiatry;  Laterality: Left;    REPAIR OF POSTERIOR TIBIALIS TENDON Left 04/16/2019    Procedure: REPAIR, TENDON, TIBIALIS POSTERIOR;  Surgeon: Rober Colon DPM;  Location: [facility] OR;  Service: Podiatry;  Laterality: Left;    REPAIR OF TENDON OF LOWER EXTREMITY Left 02/17/2021    Procedure: REPAIR, TENDON, LOWER EXTREMITY;  Surgeon: Rober Colon DPM;  Location: [facility] OR;  Service: Podiatry;   Laterality: Left;    TENDON TRANSFER Left 04/16/2019    Procedure: TRANSFER, TENDON;  Surgeon: Rober Colon DPM;  Location: [facility] OR;  Service: Podiatry;  Laterality: Left;       Family History:   Family History   Problem Relation Age of Onset    Cancer Mother         breast    Hypertension Mother     Cataracts Mother     Arthritis Mother     Depression Mother     Diabetes Mother     Hearing loss Mother     Hyperlipidemia Mother     Miscarriages / Stillbirths Mother         stillbirth    Hypertension Maternal Grandmother     Cancer Maternal Grandmother         ovarian    Mental illness Maternal Grandmother     Stroke Maternal Grandmother     Glaucoma Father     Cancer Father         prostrate    Vision loss Father         glaucoma    Asthma Maternal Grandfather     Cancer Maternal Aunt         breast    Heart disease Maternal Aunt         tripple bypass    Stroke Maternal Aunt     Diabetes Maternal Aunt     Hyperlipidemia Maternal Aunt     Hypertension Maternal Aunt     Stroke Maternal Aunt     Diabetes Maternal Aunt     Hyperlipidemia Maternal Aunt     Hypertension Maternal Aunt        Social History:   Social History     Tobacco Use    Smoking status: Every Day     Packs/day: 0.50     Years: 15.00     Pack years: 7.50     Types: Cigarettes     Start date: 1990     Passive exposure: Never    Smokeless tobacco: Never    Tobacco comments:     HOLD MIDNIGHT TO SURGERY   Substance Use Topics    Alcohol use: Not Currently     Alcohol/week: 1.0 standard drink     Types: 1 Drinks containing 0.5 oz of alcohol per week     Comment: socially: HOLD 72HRS PRIOR TO SURGERY    Drug use: Yes     Types: Marijuana     Comment: HOLD TODAY PRIOR TO SURGERY       Allergies: Reviewed    Home Medications:   Current Outpatient Medications on File Prior to Visit   Medication Sig Dispense Refill    amLODIPine (NORVASC) 5 MG tablet Take 1 tablet (5 mg total) by mouth once daily. 90 tablet 1    aspirin (ECOTRIN) 81 MG EC tablet Take 81 mg  by mouth once daily.      atorvastatin (LIPITOR) 20 MG tablet Take 1 tablet (20 mg total) by mouth every evening. 90 tablet 1    b complex vitamins capsule Take 1 capsule by mouth once daily.      busPIRone (BUSPAR) 30 MG Tab Take 1 tablet by mouth twice daily 60 tablet 2    cetirizine (ZYRTEC) 10 MG tablet Take 10 mg by mouth once daily.      cholecalciferol, vitamin D3, 125 mcg (5,000 unit) Tab Take 5,000 Units by mouth once daily.      cyclobenzaprine (FLEXERIL) 10 MG tablet Take 1 tablet (10 mg total) by mouth every evening. 30 tablet 2    gabapentin (NEURONTIN) 300 MG capsule Take 1 capsule (300 mg total) by mouth 2 (two) times daily AND 4 capsules (1,200 mg total) every evening. TAKE ONE CAPSULE BY MOUTH TWICE DAILY AND 4 CAPSULES EVERY EVENING. 180 capsule 5    methocarbamoL (ROBAXIN) 750 MG Tab TAKE ONE TABLET TWICE DAILY AS NEEDED FOR MUSCLE SPASMS 60 tablet 1    metoprolol succinate (TOPROL-XL) 25 MG 24 hr tablet Take 1 tablet (25 mg total) by mouth once daily. 90 tablet 1    multivitamin capsule Take 1 capsule by mouth once daily.      omega-3 fatty acids/fish oil (FISH OIL-OMEGA-3 FATTY ACIDS) 300-1,000 mg capsule Take by mouth once daily.      pantoprazole (PROTONIX) 40 MG tablet Take 1 tablet (40 mg total) by mouth once daily. 30 tablet 11    tamsulosin (FLOMAX) 0.4 mg Cap Take 1 capsule (0.4 mg total) by mouth once daily. 30 capsule 11    traZODone (DESYREL) 100 MG tablet Take 1/2 to 1 tablet at bedtime as needed for sleep. 30 tablet 3    TUMERIC-GING-OLIVE-OREG-CAPRYL ORAL Take by mouth.      [DISCONTINUED] lisinopriL 10 MG tablet Take 1 tablet (10 mg total) by mouth once daily. 90 tablet 1    ibuprofen (ADVIL,MOTRIN) 800 MG tablet TAKE 1 TABLET(800 MG) BY MOUTH THREE TIMES DAILY 90 tablet 1    tiZANidine (ZANAFLEX) 4 MG tablet Take 1 tablet (4 mg total) by mouth 3 (three) times daily as needed. AS NEEDED FOR DAY TIME PAIN 90 tablet 5     No current facility-administered medications on file prior to  visit.       Physical Exam:  Vital Signs:  /70 (BP Location: Right arm, Patient Position: Sitting, BP Method: Large (Manual))   Pulse 91   Ht 6' (1.829 m)   Wt 107.2 kg (236 lb 5.3 oz)   BMI 32.05 kg/m²   Body mass index is 32.05 kg/m².  Physical Exam  Vitals reviewed.   Constitutional:       Appearance: He is well-developed. He is obese.   HENT:      Head: Normocephalic.   Eyes:      General: No scleral icterus.  Cardiovascular:      Rate and Rhythm: Normal rate.   Pulmonary:      Effort: Pulmonary effort is normal.   Abdominal:      General: There is no distension.      Palpations: Abdomen is soft.   Musculoskeletal:         General: Normal range of motion.      Cervical back: Normal range of motion.   Skin:     General: Skin is dry.   Neurological:      Mental Status: He is alert and oriented to person, place, and time.       Labs: Pertinent labs reviewed.      Assessment:  1. Fatty liver    2. Elevated liver function tests         Recommendations:  - Educated patient on spectrum of fatty liver disease and potential for cirrhosis if AGUILAR present  - Advised weight loss (10%), strict glycemic control and lipid control (statins are ok if needed, prescribing doctor will need to monitor LFTs per routine)  - Mediterranean diet discussed  - will plan for Fibroscan for staging.   - will obtain additional labs to R/O other etiologies of liver disease.     Follow up to be determined by results of above.          Thank you so much for allowing me to participate in the care of Monica Johnson PERRY Rodriguez
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
## 2023-03-16 LAB
A1AT SERPL-MCNC: 138 MG/DL (ref 100–190)
ALBUMIN SERPL BCP-MCNC: 4 G/DL (ref 3.5–5.2)
ALP SERPL-CCNC: 61 U/L (ref 55–135)
ALT SERPL W/O P-5'-P-CCNC: 68 U/L (ref 10–44)
ANA SER QL IF: NORMAL
ANION GAP SERPL CALC-SCNC: 9 MMOL/L (ref 8–16)
AST SERPL-CCNC: 38 U/L (ref 10–40)
BILIRUB DIRECT SERPL-MCNC: 0.1 MG/DL (ref 0.1–0.3)
BILIRUB SERPL-MCNC: 0.2 MG/DL (ref 0.1–1)
BUN SERPL-MCNC: 5 MG/DL (ref 6–20)
CALCIUM SERPL-MCNC: 9.5 MG/DL (ref 8.7–10.5)
CERULOPLASMIN SERPL-MCNC: 19 MG/DL (ref 15–45)
CHLORIDE SERPL-SCNC: 107 MMOL/L (ref 95–110)
CO2 SERPL-SCNC: 26 MMOL/L (ref 23–29)
CREAT SERPL-MCNC: 1 MG/DL (ref 0.5–1.4)
EST. GFR  (NO RACE VARIABLE): >60 ML/MIN/1.73 M^2
FERRITIN SERPL-MCNC: 94 NG/ML (ref 20–300)
GLUCOSE SERPL-MCNC: 101 MG/DL (ref 70–110)
HAV IGG SER QL IA: NORMAL
HBV CORE AB SERPL QL IA: REACTIVE
HBV SURFACE AB SER-ACNC: >1000 MIU/ML
HBV SURFACE AB SER-ACNC: REACTIVE M[IU]/ML
HBV SURFACE AG SERPL QL IA: NORMAL
HCV AB SERPL QL IA: NORMAL
IRON SERPL-MCNC: 65 UG/DL (ref 45–160)
POTASSIUM SERPL-SCNC: 4.4 MMOL/L (ref 3.5–5.1)
PROT SERPL-MCNC: 6.4 G/DL (ref 6–8.4)
SATURATED IRON: 21 % (ref 20–50)
SODIUM SERPL-SCNC: 142 MMOL/L (ref 136–145)
TOTAL IRON BINDING CAPACITY: 314 UG/DL (ref 250–450)
TRANSFERRIN SERPL-MCNC: 212 MG/DL (ref 200–375)

## 2023-03-17 LAB
MITOCHONDRIA AB TITR SER IF: NORMAL {TITER}
SMOOTH MUSCLE AB TITR SER IF: NORMAL {TITER}

## 2023-03-22 ENCOUNTER — HOSPITAL ENCOUNTER (OUTPATIENT)
Facility: HOSPITAL | Age: 48
Discharge: HOME | End: 2023-03-22
Attending: ANESTHESIOLOGY | Admitting: ANESTHESIOLOGY
Payer: COMMERCIAL

## 2023-03-22 VITALS
DIASTOLIC BLOOD PRESSURE: 56 MMHG | HEART RATE: 80 BPM | SYSTOLIC BLOOD PRESSURE: 112 MMHG | OXYGEN SATURATION: 98 % | RESPIRATION RATE: 16 BRPM

## 2023-03-22 DIAGNOSIS — M47.816 LUMBAR SPONDYLOSIS: ICD-10-CM

## 2023-03-22 PROCEDURE — 64493 PR INJ DX/THER AGNT PARAVERT FACET JOINT,IMG GUIDE,LUMBAR/SAC,1ST LVL: ICD-10-PCS | Mod: 50,,, | Performed by: ANESTHESIOLOGY

## 2023-03-22 PROCEDURE — 64494 INJ PARAVERT F JNT L/S 2 LEV: CPT | Mod: 50,,, | Performed by: ANESTHESIOLOGY

## 2023-03-22 PROCEDURE — 64493 INJ PARAVERT F JNT L/S 1 LEV: CPT | Mod: 50,,, | Performed by: ANESTHESIOLOGY

## 2023-03-22 PROCEDURE — 25000003 PHARM REV CODE 250: Performed by: ANESTHESIOLOGY

## 2023-03-22 PROCEDURE — 64493 INJ PARAVERT F JNT L/S 1 LEV: CPT | Mod: RT | Performed by: ANESTHESIOLOGY

## 2023-03-22 PROCEDURE — 64494 PR INJ DX/THER AGNT PARAVERT FACET JOINT,IMG GUIDE,LUMBAR/SAC, 2ND LEVEL: ICD-10-PCS | Mod: 50,,, | Performed by: ANESTHESIOLOGY

## 2023-03-22 PROCEDURE — 63600175 PHARM REV CODE 636 W HCPCS: Performed by: ANESTHESIOLOGY

## 2023-03-22 PROCEDURE — 64494 INJ PARAVERT F JNT L/S 2 LEV: CPT | Mod: RT | Performed by: ANESTHESIOLOGY

## 2023-03-22 RX ORDER — BUPIVACAINE HYDROCHLORIDE 5 MG/ML
INJECTION, SOLUTION EPIDURAL; INTRACAUDAL
Status: DISCONTINUED | OUTPATIENT
Start: 2023-03-22 | End: 2023-03-22 | Stop reason: HOSPADM

## 2023-03-22 RX ORDER — MIDAZOLAM HYDROCHLORIDE 1 MG/ML
INJECTION, SOLUTION INTRAMUSCULAR; INTRAVENOUS
Status: DISCONTINUED | OUTPATIENT
Start: 2023-03-22 | End: 2023-03-22 | Stop reason: HOSPADM

## 2023-03-22 RX ORDER — INDOMETHACIN 25 MG/1
CAPSULE ORAL
Status: DISCONTINUED | OUTPATIENT
Start: 2023-03-22 | End: 2023-03-22 | Stop reason: HOSPADM

## 2023-03-22 RX ORDER — METHYLPREDNISOLONE ACETATE 40 MG/ML
INJECTION, SUSPENSION INTRA-ARTICULAR; INTRALESIONAL; INTRAMUSCULAR; SOFT TISSUE
Status: DISCONTINUED | OUTPATIENT
Start: 2023-03-22 | End: 2023-03-22 | Stop reason: HOSPADM

## 2023-03-22 RX ORDER — FENTANYL CITRATE 50 UG/ML
INJECTION, SOLUTION INTRAMUSCULAR; INTRAVENOUS
Status: DISCONTINUED | OUTPATIENT
Start: 2023-03-22 | End: 2023-03-22 | Stop reason: HOSPADM

## 2023-03-22 NOTE — DISCHARGE SUMMARY
Discharge Note  Short Stay      SUMMARY     Admit Date: 3/22/2023    Attending Physician: Lyudmila Leung MD        Discharge Physician: Lyudmila Leung MD        Discharge Date: 3/22/2023 2:24 PM    Procedure(s) (LRB):  Bilateral L3-5 MBB RN IV Sedation (Bilateral)    Final Diagnosis: Lumbar facet arthropathy [M47.816]    Disposition: Home or self care    Patient Instructions:   Discharge Medication List as of 3/22/2023 11:33 AM        CONTINUE these medications which have NOT CHANGED    Details   amLODIPine (NORVASC) 5 MG tablet Take 1 tablet (5 mg total) by mouth once daily., Starting Wed 2/22/2023, Normal      aspirin (ECOTRIN) 81 MG EC tablet Take 81 mg by mouth once daily., Starting Fri 7/30/2021, Historical Med      atorvastatin (LIPITOR) 20 MG tablet Take 1 tablet (20 mg total) by mouth every evening., Starting Wed 2/22/2023, Normal      b complex vitamins capsule Take 1 capsule by mouth once daily., Historical Med      busPIRone (BUSPAR) 30 MG Tab Take 1 tablet by mouth twice daily, Normal      cetirizine (ZYRTEC) 10 MG tablet Take 10 mg by mouth once daily., Historical Med      cholecalciferol, vitamin D3, 125 mcg (5,000 unit) Tab Take 5,000 Units by mouth once daily., Historical Med      cyclobenzaprine (FLEXERIL) 10 MG tablet Take 1 tablet (10 mg total) by mouth every evening., Starting Wed 2/15/2023, Normal      gabapentin (NEURONTIN) 300 MG capsule Multiple Dosages:Starting Wed 2/15/2023Take 1 capsule (300 mg total) by mouth 2 (two) times daily AND 4 capsules (1,200 mg total) every evening. TAKE ONE CAPSULE BY MOUTH TWICE DAILY AND 4 CAPSULES EVERY EVENING., Normal      ibuprofen (ADVIL,MOTRIN) 800 MG tablet TAKE 1 TABLET(800 MG) BY MOUTH THREE TIMES DAILY, Normal      methocarbamoL (ROBAXIN) 750 MG Tab TAKE ONE TABLET TWICE DAILY AS NEEDED FOR MUSCLE SPASMS, Normal      metoprolol succinate (TOPROL-XL) 25 MG 24 hr tablet Take 1 tablet (25 mg total) by mouth once daily., Starting Wed 2/22/2023, Normal       multivitamin capsule Take 1 capsule by mouth once daily., Historical Med      omega-3 fatty acids/fish oil (FISH OIL-OMEGA-3 FATTY ACIDS) 300-1,000 mg capsule Take by mouth once daily., Historical Med      pantoprazole (PROTONIX) 40 MG tablet Take 1 tablet (40 mg total) by mouth once daily., Starting Wed 2/22/2023, Until Thu 2/22/2024, Normal      tamsulosin (FLOMAX) 0.4 mg Cap Take 1 capsule (0.4 mg total) by mouth once daily., Starting Mon 5/2/2022, Until Tue 5/2/2023, Normal      telmisartan (MICARDIS) 40 MG Tab Take 1 tablet (40 mg total) by mouth once daily., Starting Wed 3/15/2023, Until Thu 3/14/2024, Normal      tiZANidine (ZANAFLEX) 4 MG tablet Take 1 tablet (4 mg total) by mouth 3 (three) times daily as needed. AS NEEDED FOR DAY TIME PAIN, Starting Wed 2/15/2023, Normal      traZODone (DESYREL) 100 MG tablet Take 1/2 to 1 tablet at bedtime as needed for sleep., Normal      TUMERIC-GING-OLIVE-OREG-CAPRYL ORAL Take by mouth., Historical Med                 Discharge Diagnosis: Lumbar facet arthropathy [M47.816]  Condition on Discharge: Stable with no complications to procedure   Diet on Discharge: Same as before.  Activity: as per instruction sheet.  Discharge to: Home with a responsible adult.  Follow up: 2-4 weeks       Please call the office at [phone] if you experience any weakness or loss of sensation, fever > 101.5, pain uncontrolled with oral medications, persistent nausea/vomiting/or diarrhea, redness or drainage from the incisions, or any other worrisome concerns. If physician on call was not reached or could not communicate with our office for any reason please go to the nearest emergency department

## 2023-03-22 NOTE — OP NOTE
Monica Johnson  47 y.o. male      Vitals:    03/22/23 1235   BP: (!) 112/56   Pulse: 80   Resp: 16       Procedure Date: 3/22/23      INFORMED CONSENT: The procedure, risks, benefits and options were discussed with patient. There are no contraindications to the procedure. The patient expressed understanding and agreed to proceed. The personnel performing the procedure was discussed. I verify that I personally obtained consent prior to the start of the procedure and the signed consent can be found on the patient's chart.       Anesthesia:   Conscious sedation provided by M.D    The patient was monitored with continuous pulse oximetry, EKG, and intermittent blood pressure monitors.  The patient was hemodynamically stable throughout the entire process was responsive to voice, and breathing spontaneously.  Supplemental O2 was provided at 2L/min via nasal cannula.  Patient was comfortable for the duration of the procedure. (See nurse documentation and case log for sedation time)    There was a total of 1mg IV Midazolam and 50mcg Fentanyl titrated for the procedure     Pre Procedure diagnosis: Lumbar facet arthropathy [M47.816]  Post-Procedure diagnosis: SAME     PROCEDURE: bilateral L3,4,5 LUMBAR FACET MEDIAL BRANCH NERVE BLOCK        DESCRIPTION OF PROCEDURE:The patient was brought to the procedure room. After performing time out. IV access was obtained prior to the procedure. The patient was positioned prone on the fluoroscopy table. Continuous hemodynamic monitoring was initiated including blood pressure, EKG, and pulse oximetry. The area of the lumbar spine was prepped chlorhexidine and draped into a sterile field. Fluoroscopy was used to identify the location of the bilateral side L3, L4, and L5 medial branch nerves at the junctions of the superior articular process and the transverse processes of  L4, L5, and the sacral ala respectively. Skin anesthesia was achieved using 5 cc of Lidocaine 1% over the injection  "sites. A 22 gauge, 3 1/2" spinal needle was slowly inserted at each level using AP, lateral and oblique fluoroscopic imaging. Negative aspiration for blood or CSF was confirmed.  8 ml bupivacaine 0.25% with 1 mL Decadron was injected at all sites in divided doses. The needles were removed and bleeding was nil. A sterile dressing was applied. No specimens collected. Patient was taken back to the PACU for observation .       Blood Loss: Nill  Specimen: None    Lyudmila Leung    "

## 2023-03-22 NOTE — DISCHARGE INSTRUCTIONS

## 2023-03-22 NOTE — H&P
HPI  Patient presenting for Procedure(s) (LRB):  Bilateral L3-5 MBB RN IV Sedation (Bilateral)     Patient on Anti-coagulation No    No health changes since previous encounter    Past Medical History:   Diagnosis Date    ADHD (attention deficit hyperactivity disorder)     Allergy     Anxiety 1/25/2016    Arthritis 2014    Osteoarthritis    CHF (congestive heart failure)     Degenerative disc disease at L5-S1 level     Depression 1/25/2016    Hypertension     Sciatica of left side 1/31/2017     Past Surgical History:   Procedure Laterality Date    CALCANEAL OSTEOTOMY Left 04/16/2019    Procedure: OSTEOTOMY, CALCANEUS;  Surgeon: Rober Colon DPM;  Location: [facility] OR;  Service: Podiatry;  Laterality: Left;    CIRCUMCISION      FOOT SURGERY      INJECTION OF ANESTHETIC AGENT AROUND MEDIAL BRANCH NERVES INNERVATING CERVICAL FACET JOINT Bilateral 11/15/2021    Procedure: Block-nerve-medial branch-cervical bilateral C5, 6,7 RN IV sedation;  Surgeon: Ivan Davey MD;  Location: [facility];  Service: Pain Management;  Laterality: Bilateral;    INJECTION OF ANESTHETIC AGENT AROUND MEDIAL BRANCH NERVES INNERVATING LUMBAR FACET JOINT Bilateral 03/07/2022    Procedure: bilateral L3-L5 MBB;  Surgeon: Ivan Davey MD;  Location: [facility];  Service: Pain Management;  Laterality: Bilateral;    INJECTION OF ANESTHETIC AGENT INTO SACROILIAC JOINT Right 11/24/2020    Procedure: Right BLOCK, SACROILIAC JOINT and Right Knee Injection with RN IV sedation;  Surgeon: Ivan Davey MD;  [facility];  Service: Pain Management;  Laterality: Right;    INJECTION OF ANESTHETIC AGENT INTO SACROILIAC JOINT Right 04/16/2021    Procedure: Right BLOCK, SACROILIAC JOINT RIght Hip Right GTB RN IV sedation;  Surgeon: Ivan Davey MD;  Location: [facility];  Service: Pain Management;  Laterality: Right;    INJECTION OF JOINT Right 3/8/2023    Procedure: Right Knee injection with steroid RN IV Sedation;  Surgeon: Lyudmila Leung MD;   Location: [facility];  Service: Pain Management;  Laterality: Right;    LENGTHENING OF ACHILLES TENDON Left 04/16/2019    Procedure: LENGTHENING, TENDON, ACHILLES;  Surgeon: Rober Colon DPM;  Location: [facility] OR;  Service: Podiatry;  Laterality: Left;    PLACEMENT OF ACELLULAR HUMAN DERMAL ALLOGRAFT Left 02/17/2021    Procedure: APPLICATION, ACELLULAR HUMAN DERMAL ALLOGRAFT;  Surgeon: Rober Colon DPM;  Location: [facility] OR;  Service: Podiatry;  Laterality: Left;    REPAIR OF POSTERIOR TIBIALIS TENDON Left 04/16/2019    Procedure: REPAIR, TENDON, TIBIALIS POSTERIOR;  Surgeon: Rober Colon DPM;  Location: [facility] OR;  Service: Podiatry;  Laterality: Left;    REPAIR OF TENDON OF LOWER EXTREMITY Left 02/17/2021    Procedure: REPAIR, TENDON, LOWER EXTREMITY;  Surgeon: Rober Colon DPM;  Location: [facility] OR;  Service: Podiatry;  Laterality: Left;    TENDON TRANSFER Left 04/16/2019    Procedure: TRANSFER, TENDON;  Surgeon: Rober Colon DPM;  Location: [facility] OR;  Service: Podiatry;  Laterality: Left;     Review of patient's allergies indicates:  No Known Allergies     No current facility-administered medications on file prior to encounter.     Current Outpatient Medications on File Prior to Encounter   Medication Sig Dispense Refill    aspirin (ECOTRIN) 81 MG EC tablet Take 81 mg by mouth once daily.      cetirizine (ZYRTEC) 10 MG tablet Take 10 mg by mouth once daily.      cholecalciferol, vitamin D3, 125 mcg (5,000 unit) Tab Take 5,000 Units by mouth once daily.      cyclobenzaprine (FLEXERIL) 10 MG tablet Take 1 tablet (10 mg total) by mouth every evening. 30 tablet 2    gabapentin (NEURONTIN) 300 MG capsule Take 1 capsule (300 mg total) by mouth 2 (two) times daily AND 4 capsules (1,200 mg total) every evening. TAKE ONE CAPSULE BY MOUTH TWICE DAILY AND 4 CAPSULES EVERY EVENING. 180 capsule 5    ibuprofen (ADVIL,MOTRIN) 800 MG tablet TAKE 1 TABLET(800 MG) BY MOUTH THREE TIMES DAILY 90 tablet 1     methocarbamoL (ROBAXIN) 750 MG Tab TAKE ONE TABLET TWICE DAILY AS NEEDED FOR MUSCLE SPASMS 60 tablet 1    multivitamin capsule Take 1 capsule by mouth once daily.      tamsulosin (FLOMAX) 0.4 mg Cap Take 1 capsule (0.4 mg total) by mouth once daily. 30 capsule 11    tiZANidine (ZANAFLEX) 4 MG tablet Take 1 tablet (4 mg total) by mouth 3 (three) times daily as needed. AS NEEDED FOR DAY TIME PAIN 90 tablet 5    traZODone (DESYREL) 100 MG tablet Take 1/2 to 1 tablet at bedtime as needed for sleep. 30 tablet 3        PMHx, PSHx, Allergies, Medications reviewed in epic    ROS negative except pain complaints in HPI    OBJECTIVE:    /69 (BP Location: Right arm, Patient Position: Lying)   Pulse 83   Resp 12   SpO2 100%     PHYSICAL EXAMINATION:    GENERAL: Well appearing, in no acute distress, alert and oriented x3.  PSYCH:  Mood and affect appropriate.  SKIN: Skin color, texture, turgor normal, no rashes or lesions which will impact the procedure.  CV: RRR with palpation of the radial artery.  PULM: No evidence of respiratory difficulty, symmetric chest rise. Clear to auscultation.  NEURO: Cranial nerves grossly intact.    Plan:    Proceed with procedure as planned Procedure(s) (LRB):  Bilateral L3-5 MBB RN IV Sedation (Bilateral)    Lyudmila Leung MD  03/22/2023

## 2023-03-29 ENCOUNTER — PROCEDURE VISIT (OUTPATIENT)
Dept: HEPATOLOGY | Facility: CLINIC | Age: 48
End: 2023-03-29
Attending: NURSE PRACTITIONER
Payer: COMMERCIAL

## 2023-03-29 ENCOUNTER — TELEPHONE (OUTPATIENT)
Dept: HEPATOLOGY | Facility: CLINIC | Age: 48
End: 2023-03-29

## 2023-03-29 VITALS — HEIGHT: 72 IN | BODY MASS INDEX: 30.91 KG/M2 | WEIGHT: 228.19 LBS

## 2023-03-29 DIAGNOSIS — K76.0 FATTY LIVER: ICD-10-CM

## 2023-03-29 DIAGNOSIS — R79.89 ELEVATED LIVER FUNCTION TESTS: ICD-10-CM

## 2023-03-29 PROCEDURE — 76981 USE PARENCHYMA: CPT | Mod: S$GLB,,, | Performed by: NURSE PRACTITIONER

## 2023-03-29 PROCEDURE — 76981 PR US, ELASTOGRAPHY, PARENCHYMA: ICD-10-PCS | Mod: S$GLB,,, | Performed by: NURSE PRACTITIONER

## 2023-03-29 NOTE — TELEPHONE ENCOUNTER
----- Message from SOURAV Rascon sent at 6/30/2022  9:28 AM CDT -----  XR reviewed. No evidence of acute issue. There is mild prominence over the radial styloid process, this is the palpable lump patient has been noticing.     here is a likelihood of De Quervain's tenosynovitis given her now pain into and locking of the thumb, inflammation of the tendon in relation to the thumb that can cause pain up the arm and into the thumb from the wrist. It is common in women, especially with recent child bearing.     Can be self limiting(improve on its own over time) but can take up to 1 year at times. Would take Ibuprofen or Aleve per bottle instruction for pain. Can wear brace to comfort but is not needed. There is a possibility of carpal tunnel as well. Will refer patient to orthopedics as discussed in OV, Dr. Uribe or Roc.    Spoke with patient on [phone], in reference to test results. Patient voices understanding.      ----- Message from LIDIA Johnson sent at 3/29/2023 11:50 AM CDT -----  Mild steatosis without fibrosis

## 2023-03-29 NOTE — PROCEDURES
Fibroscan Procedure     Name: Monica Johnson  Date of Procedure : 2023   :: LIDIA Potter  Diagnosis: NAFLD    Probe: XL    Fibroscan readin.8 KPa    Fibrosis:F 0-1     CAP readin dB/m    Steatosis: :S1       Interpretation:   Mild steatosis without fibrosis
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
## 2023-04-26 ENCOUNTER — OFFICE VISIT (OUTPATIENT)
Dept: PAIN MEDICINE | Facility: CLINIC | Age: 48
End: 2023-04-26
Payer: COMMERCIAL

## 2023-04-26 ENCOUNTER — HOSPITAL ENCOUNTER (OUTPATIENT)
Dept: RADIOLOGY | Facility: HOSPITAL | Age: 48
Discharge: HOME | End: 2023-04-26
Attending: PHYSICIAN ASSISTANT
Payer: COMMERCIAL

## 2023-04-26 VITALS
SYSTOLIC BLOOD PRESSURE: 119 MMHG | HEART RATE: 92 BPM | RESPIRATION RATE: 16 BRPM | WEIGHT: 225.31 LBS | BODY MASS INDEX: 30.52 KG/M2 | HEIGHT: 72 IN | DIASTOLIC BLOOD PRESSURE: 81 MMHG

## 2023-04-26 DIAGNOSIS — M53.3 SACROILIAC JOINT DYSFUNCTION: Primary | ICD-10-CM

## 2023-04-26 DIAGNOSIS — M53.3 SACROILIAC JOINT DYSFUNCTION: ICD-10-CM

## 2023-04-26 PROCEDURE — 99214 OFFICE O/P EST MOD 30 MIN: CPT | Mod: 25,S$GLB,, | Performed by: PHYSICIAN ASSISTANT

## 2023-04-26 PROCEDURE — 99214 PR OFFICE/OUTPT VISIT, EST, LEVL IV, 30-39 MIN: ICD-10-PCS | Mod: 25,S$GLB,, | Performed by: PHYSICIAN ASSISTANT

## 2023-04-26 PROCEDURE — 73521 X-RAY EXAM HIPS BI 2 VIEWS: CPT | Mod: TC

## 2023-04-26 PROCEDURE — 73521 XR HIPS BILATERAL 2 VIEW INCL AP PELVIS: ICD-10-PCS | Mod: 26,,, | Performed by: RADIOLOGY

## 2023-04-26 PROCEDURE — 99999 PR PBB SHADOW E&M-EST. PATIENT-LVL V: CPT | Mod: PBBFAC,,, | Performed by: PHYSICIAN ASSISTANT

## 2023-04-26 PROCEDURE — 96372 PR INJECTION,THERAP/PROPH/DIAG2ST, IM OR SUBCUT: ICD-10-PCS | Mod: S$GLB,,, | Performed by: PHYSICIAN ASSISTANT

## 2023-04-26 PROCEDURE — 73521 X-RAY EXAM HIPS BI 2 VIEWS: CPT | Mod: 26,,, | Performed by: RADIOLOGY

## 2023-04-26 PROCEDURE — 96372 THER/PROPH/DIAG INJ SC/IM: CPT | Mod: S$GLB,,, | Performed by: PHYSICIAN ASSISTANT

## 2023-04-26 PROCEDURE — 99999 PR PBB SHADOW E&M-EST. PATIENT-LVL V: ICD-10-PCS | Mod: PBBFAC,,, | Performed by: PHYSICIAN ASSISTANT

## 2023-04-26 RX ORDER — KETOROLAC TROMETHAMINE 30 MG/ML
30 INJECTION, SOLUTION INTRAMUSCULAR; INTRAVENOUS ONCE
Status: COMPLETED | OUTPATIENT
Start: 2023-04-26 | End: 2023-04-26

## 2023-04-26 RX ORDER — KETOROLAC TROMETHAMINE 10 MG/1
10 TABLET, FILM COATED ORAL EVERY 6 HOURS
Qty: 20 TABLET | Refills: 0 | Status: SHIPPED | OUTPATIENT
Start: 2023-04-26 | End: 2023-05-01

## 2023-04-26 RX ADMIN — KETOROLAC TROMETHAMINE 30 MG: 30 INJECTION, SOLUTION INTRAMUSCULAR; INTRAVENOUS at 11:04

## 2023-05-05 NOTE — PRE-PROCEDURE INSTRUCTIONS
Spoke with patient regarding procedure scheduled on 5.10     Arrival time 1045     Has patient been sick with fever or on antibiotics within the last 7 days? No     Does the patient have any open wounds, sores or rashes? No     Does the patient have any recent fractures? no     Has patient received a vaccination within the last 7 days? No     Received the COVID vaccination? yes     Has the patient stopped all medications as directed? na     Does patient have a pacemaker and or defibrillator? no     Does the patient have a ride to and from procedure and someone reliable to remain with patient? coordinating transportation. Aware of policy      Is the patient diabetic? no     Does the patient have sleep apnea? Or use O2 at home? no     Is the patient receiving sedation? Yes     Is the patient instructed to remain NPO beginning at midnight the night before their procedure? yes     Procedure location confirmed with patient? Yes     Covid- Denies signs/symptoms. Instructed to notify PAT/MD if any changes.

## 2023-05-10 ENCOUNTER — HOSPITAL ENCOUNTER (OUTPATIENT)
Facility: HOSPITAL | Age: 48
Discharge: HOME | End: 2023-05-10
Attending: ANESTHESIOLOGY | Admitting: ANESTHESIOLOGY
Payer: COMMERCIAL

## 2023-05-10 VITALS
RESPIRATION RATE: 12 BRPM | SYSTOLIC BLOOD PRESSURE: 119 MMHG | WEIGHT: 224 LBS | BODY MASS INDEX: 30.34 KG/M2 | TEMPERATURE: 98 F | HEIGHT: 72 IN | OXYGEN SATURATION: 95 % | DIASTOLIC BLOOD PRESSURE: 80 MMHG | HEART RATE: 67 BPM

## 2023-05-10 DIAGNOSIS — M53.3 SACROILIAC JOINT DYSFUNCTION: ICD-10-CM

## 2023-05-10 DIAGNOSIS — M46.1 SACROILIITIS: ICD-10-CM

## 2023-05-10 PROCEDURE — 27096 INJECT SACROILIAC JOINT: CPT | Mod: 50,,, | Performed by: ANESTHESIOLOGY

## 2023-05-10 PROCEDURE — 25000003 PHARM REV CODE 250: Performed by: ANESTHESIOLOGY

## 2023-05-10 PROCEDURE — 25500020 PHARM REV CODE 255: Performed by: ANESTHESIOLOGY

## 2023-05-10 PROCEDURE — 27096 PR INJECTION,SACROILIAC JOINT: ICD-10-PCS | Mod: 50,,, | Performed by: ANESTHESIOLOGY

## 2023-05-10 PROCEDURE — 63600175 PHARM REV CODE 636 W HCPCS: Performed by: ANESTHESIOLOGY

## 2023-05-10 PROCEDURE — 27096 INJECT SACROILIAC JOINT: CPT | Mod: 50 | Performed by: ANESTHESIOLOGY

## 2023-05-10 RX ORDER — TRIAMCINOLONE ACETONIDE 40 MG/ML
INJECTION, SUSPENSION INTRA-ARTICULAR; INTRAMUSCULAR
Status: DISCONTINUED | OUTPATIENT
Start: 2023-05-10 | End: 2023-05-10 | Stop reason: HOSPADM

## 2023-05-10 RX ORDER — FENTANYL CITRATE 50 UG/ML
INJECTION, SOLUTION INTRAMUSCULAR; INTRAVENOUS
Status: DISCONTINUED | OUTPATIENT
Start: 2023-05-10 | End: 2023-05-10 | Stop reason: HOSPADM

## 2023-05-10 RX ORDER — MIDAZOLAM HYDROCHLORIDE 1 MG/ML
INJECTION, SOLUTION INTRAMUSCULAR; INTRAVENOUS
Status: DISCONTINUED | OUTPATIENT
Start: 2023-05-10 | End: 2023-05-10 | Stop reason: HOSPADM

## 2023-05-10 RX ORDER — BUPIVACAINE HYDROCHLORIDE 2.5 MG/ML
INJECTION, SOLUTION EPIDURAL; INFILTRATION; INTRACAUDAL
Status: DISCONTINUED | OUTPATIENT
Start: 2023-05-10 | End: 2023-05-10 | Stop reason: HOSPADM

## 2023-05-10 RX ORDER — SODIUM BICARBONATE 1 MEQ/ML
SYRINGE (ML) INTRAVENOUS
Status: DISCONTINUED | OUTPATIENT
Start: 2023-05-10 | End: 2023-05-10 | Stop reason: HOSPADM

## 2023-05-10 NOTE — DISCHARGE SUMMARY
Discharge Note  Short Stay      SUMMARY     Admit Date: 5/10/2023    Attending Physician: Lyudmila Leung MD        Discharge Physician: Lyudmila Leung MD        Discharge Date: 5/10/2023 12:00 PM    Procedure(s) (LRB):  Bilateral SIJ + right IA hip Injection RN IV Sedation (Right)  Bilateral SIJ + right IA hip Injection (Right)    Final Diagnosis: Sacroiliac joint dysfunction [M53.3]    Disposition: Home or self care    Patient Instructions:   Current Discharge Medication List        CONTINUE these medications which have NOT CHANGED    Details   amLODIPine (NORVASC) 5 MG tablet Take 1 tablet (5 mg total) by mouth once daily.  Qty: 90 tablet, Refills: 1    Comments: .      aspirin (ECOTRIN) 81 MG EC tablet Take 81 mg by mouth once daily.      atorvastatin (LIPITOR) 20 MG tablet Take 1 tablet (20 mg total) by mouth every evening.  Qty: 90 tablet, Refills: 1      b complex vitamins capsule Take 1 capsule by mouth once daily.      busPIRone (BUSPAR) 30 MG Tab Take 1 tablet by mouth twice daily  Qty: 60 tablet, Refills: 2    Comments: Please ask pt to call [phone] to schedule follow up as no further refills can be issued after this.      cetirizine (ZYRTEC) 10 MG tablet Take 10 mg by mouth once daily.      cholecalciferol, vitamin D3, 125 mcg (5,000 unit) Tab Take 5,000 Units by mouth once daily.      cyclobenzaprine (FLEXERIL) 10 MG tablet Take 1 tablet (10 mg total) by mouth every evening.  Qty: 30 tablet, Refills: 2    Associated Diagnoses: Muscle pain; Lumbar facet arthropathy      gabapentin (NEURONTIN) 300 MG capsule Take 1 capsule (300 mg total) by mouth 2 (two) times daily AND 4 capsules (1,200 mg total) every evening. TAKE ONE CAPSULE BY MOUTH TWICE DAILY AND 4 CAPSULES EVERY EVENING.  Qty: 180 capsule, Refills: 5    Comments: Dr. Ivan Davey - RUBA# [id number]  Associated Diagnoses: Muscle pain; Lumbar facet arthropathy      ibuprofen (ADVIL,MOTRIN) 800 MG tablet TAKE 1 TABLET(800 MG) BY MOUTH THREE TIMES  DAILY  Qty: 90 tablet, Refills: 1    Associated Diagnoses: Posterior tibial tendinitis, left leg      methocarbamoL (ROBAXIN) 750 MG Tab TAKE ONE TABLET TWICE DAILY AS NEEDED FOR MUSCLE SPASMS  Qty: 60 tablet, Refills: 1    Comments: This prescription was filled on 3/10/2022. Any refills authorized will be placed on file.      metoprolol succinate (TOPROL-XL) 25 MG 24 hr tablet Take 1 tablet (25 mg total) by mouth once daily.  Qty: 90 tablet, Refills: 1    Comments: .      multivitamin capsule Take 1 capsule by mouth once daily.      omega-3 fatty acids/fish oil (FISH OIL-OMEGA-3 FATTY ACIDS) 300-1,000 mg capsule Take by mouth once daily.      pantoprazole (PROTONIX) 40 MG tablet Take 1 tablet (40 mg total) by mouth once daily.  Qty: 30 tablet, Refills: 11    Associated Diagnoses: Gastroesophageal reflux disease, unspecified whether esophagitis present      telmisartan (MICARDIS) 40 MG Tab Take 1 tablet (40 mg total) by mouth once daily.  Qty: 30 tablet, Refills: 11    Comments: .      tiZANidine (ZANAFLEX) 4 MG tablet Take 1 tablet (4 mg total) by mouth 3 (three) times daily as needed. AS NEEDED FOR DAY TIME PAIN  Qty: 90 tablet, Refills: 5    Associated Diagnoses: Muscle pain; Lumbar facet arthropathy      traZODone (DESYREL) 100 MG tablet Take 1/2 to 1 tablet at bedtime as needed for sleep.  Qty: 30 tablet, Refills: 3      TUMERIC-GING-OLIVE-OREG-CAPRYL ORAL Take by mouth.      tamsulosin (FLOMAX) 0.4 mg Cap Take 1 capsule (0.4 mg total) by mouth once daily.  Qty: 30 capsule, Refills: 0                 Discharge Diagnosis: Sacroiliac joint dysfunction [M53.3]  Condition on Discharge: Stable with no complications to procedure   Diet on Discharge: Same as before.  Activity: as per instruction sheet.  Discharge to: Home with a responsible adult.  Follow up: 2-4 weeks       Please call the office at [phone] if you experience any weakness or loss of sensation, fever > 101.5, pain uncontrolled with oral  medications, persistent nausea/vomiting/or diarrhea, redness or drainage from the incisions, or any other worrisome concerns. If physician on call was not reached or could not communicate with our office for any reason please go to the nearest emergency department

## 2023-05-10 NOTE — DISCHARGE INSTRUCTIONS

## 2023-05-10 NOTE — OP NOTE
Vitals:    02/23/22 0805   BP: (!) 168/93   Pulse: (!) 111   Resp: (!) 22   Temp:        Procedure Date:05/10/2023      INFORMED CONSENT: The procedure, risks, benefits and options were discussed with patient. There are no contraindications to the procedure. The patient expressed understanding and agreed to proceed. The personnel performing the procedure was discussed. I verify that I personally obtained consent prior to the start of the procedure and the signed consent can be found on the patient's chart.       Anesthesia:   Conscious sedation provided by M.D    The patient was monitored with continuous pulse oximetry, EKG, and intermittent blood pressure monitors.  The patient was hemodynamically stable throughout the entire process was responsive to voice, and breathing spontaneously.  Supplemental O2 was provided at 2L/min via nasal cannula.  Patient was comfortable for the duration of the procedure. (See nurse documentation and case log for sedation time)    There was a total of 2mg IV Midazolam and 100mcg Fentanyl titrated for the procedure    Pre Procedure diagnosis: Sacroiliitis [M46.1]  Post-Procedure diagnosis: SAME      PROCEDURE:  Bilateral sacroiliac joint injection                          Right Hip (acetabulofemoral) joint injection under fluoroscopic guidance    REASON FOR PROCEDURE:   Sacroiliitis [M46.1]  osteoarthritis of the hip (of the above noted laterality)    MEDICATIONS INJECTED: 1mL 40mg/ml Kenalog and 4mL Bupivacaine 0.25% into each site    LOCAL ANESTHETIC USED: Xylocaine 1% 6ml     ESTIMATED BLOOD LOSS: None.   COMPLICATIONS: None.     TECHNIQUE:   Sacroiliac joint injection:   Laying in the prone position, the patient was prepped and draped in the usual sterile fashion using ChloraPrep and fenestrated drape.  The area was determined under fluoroscopy.  Local Xylocaine was injected by raising a wheel and going down to the periosteum using a 27-gauge hypodermic needle.  The 3.5 inch  22-gauge spinal needle was introduce into the Bilateral sacroiliac joint.  Negative pressure applied to confirm no intravascular placement.  Omnipaque was injected to confirm placement and to confirm that there was no vascular runoff.  The medication was then injected slowly.  The patient tolerated the procedure well.                       Hip Intraarticular Injection:   The patient was then repositioned on the table in supine orientation.. The area over the above noted joint/s was widely prepped with ChloraPrep and drapped in usual sterile fashion.    The above noted joint/s was identified on fluoroscopy. A 27-gauge 1.5 inch needle was used to localize the site of entry with 3 mL of 1% PF Lidocaine. A 25 gauge 3.5 inch spinal needle was then introduced and advanced towards the neck of the femur. The target site was approximately 0.5 to 1.0 cm distal to the lateral border of the femoral head and 0.5 to 1.0 cm below the superior aspect of the femoral neck. The needle was advanced until osseus interface was met. Following negative aspiration, a solution containing 5 mL of 0.25% Bupivacaine and 1 mL of Triamcinolone (40 mg/mL) was then injected. There was minimal resistance encountered throughout injection. No paresthesias were noted on injection. The needle stylet was replaced and the needle was removed intact. The patient tolerated the procedure well. A bandage was applied to the site of injection.    The patient was monitored for approximately 30 minutes after the procedure. Patient was given post procedure and discharge instructions to follow at home. We will see the patient back in two weeks or the patient may call to inform of status. The patient was discharged in a stable condition

## 2023-05-31 ENCOUNTER — OFFICE VISIT (OUTPATIENT)
Dept: CARDIOLOGY | Facility: CLINIC | Age: 48
End: 2023-05-31
Payer: COMMERCIAL

## 2023-05-31 VITALS
WEIGHT: 224 LBS | DIASTOLIC BLOOD PRESSURE: 62 MMHG | HEIGHT: 72 IN | BODY MASS INDEX: 30.34 KG/M2 | OXYGEN SATURATION: 97 % | SYSTOLIC BLOOD PRESSURE: 100 MMHG | HEART RATE: 90 BPM

## 2023-05-31 DIAGNOSIS — F17.200 TOBACCO USE DISORDER: ICD-10-CM

## 2023-05-31 DIAGNOSIS — I10 ESSENTIAL HYPERTENSION: ICD-10-CM

## 2023-05-31 DIAGNOSIS — E66.9 OBESITY (BMI 30.0-34.9): ICD-10-CM

## 2023-05-31 DIAGNOSIS — M19.90 OSTEOARTHRITIS, UNSPECIFIED OSTEOARTHRITIS TYPE, UNSPECIFIED SITE: ICD-10-CM

## 2023-05-31 DIAGNOSIS — I42.8 NON-ISCHEMIC CARDIOMYOPATHY: ICD-10-CM

## 2023-05-31 DIAGNOSIS — I25.10 CORONARY ARTERY DISEASE INVOLVING NATIVE CORONARY ARTERY OF NATIVE HEART WITHOUT ANGINA PECTORIS: Primary | ICD-10-CM

## 2023-05-31 DIAGNOSIS — I25.118 ATHEROSCLEROTIC HEART DISEASE OF NATIVE CORONARY ARTERY WITH OTHER FORMS OF ANGINA PECTORIS: ICD-10-CM

## 2023-05-31 DIAGNOSIS — E78.1 HIGH TRIGLYCERIDES: ICD-10-CM

## 2023-05-31 PROCEDURE — 99999 PR PBB SHADOW E&M-EST. PATIENT-LVL IV: CPT | Mod: PBBFAC,,, | Performed by: STUDENT IN AN ORGANIZED HEALTH CARE EDUCATION/TRAINING PROGRAM

## 2023-05-31 PROCEDURE — 99214 OFFICE O/P EST MOD 30 MIN: CPT | Mod: S$GLB,,, | Performed by: STUDENT IN AN ORGANIZED HEALTH CARE EDUCATION/TRAINING PROGRAM

## 2023-05-31 PROCEDURE — 99214 PR OFFICE/OUTPT VISIT, EST, LEVL IV, 30-39 MIN: ICD-10-PCS | Mod: S$GLB,,, | Performed by: STUDENT IN AN ORGANIZED HEALTH CARE EDUCATION/TRAINING PROGRAM

## 2023-05-31 PROCEDURE — 99999 PR PBB SHADOW E&M-EST. PATIENT-LVL IV: ICD-10-PCS | Mod: PBBFAC,,, | Performed by: STUDENT IN AN ORGANIZED HEALTH CARE EDUCATION/TRAINING PROGRAM
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
## 2023-06-15 ENCOUNTER — OFFICE VISIT (OUTPATIENT)
Dept: PAIN MEDICINE | Facility: CLINIC | Age: 48
End: 2023-06-15
Payer: COMMERCIAL

## 2023-06-15 DIAGNOSIS — M79.10 MUSCLE PAIN: ICD-10-CM

## 2023-06-15 DIAGNOSIS — M47.816 LUMBAR FACET ARTHROPATHY: ICD-10-CM

## 2023-06-15 DIAGNOSIS — M47.812 CERVICAL SPONDYLOSIS: Primary | ICD-10-CM

## 2023-06-15 PROCEDURE — 99214 PR OFFICE/OUTPT VISIT, EST, LEVL IV, 30-39 MIN: ICD-10-PCS | Mod: 95,,, | Performed by: PHYSICIAN ASSISTANT

## 2023-06-15 PROCEDURE — 99214 OFFICE O/P EST MOD 30 MIN: CPT | Mod: 95,,, | Performed by: PHYSICIAN ASSISTANT

## 2023-06-15 RX ORDER — GABAPENTIN 300 MG/1
CAPSULE ORAL
Qty: 180 CAPSULE | Refills: 5 | Status: SHIPPED | OUTPATIENT
Start: 2023-06-15 | End: 2023-08-28 | Stop reason: SDUPTHER

## 2023-06-15 RX ORDER — CYCLOBENZAPRINE HCL 10 MG
10 TABLET ORAL NIGHTLY
Qty: 30 TABLET | Refills: 2 | Status: SHIPPED | OUTPATIENT
Start: 2023-06-15 | End: 2023-12-26 | Stop reason: ALTCHOICE

## 2023-06-15 RX ORDER — TIZANIDINE 4 MG/1
4 TABLET ORAL 3 TIMES DAILY PRN
Qty: 90 TABLET | Refills: 5 | Status: SHIPPED | OUTPATIENT
Start: 2023-06-15 | End: 2023-08-28 | Stop reason: SDUPTHER
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
## 2023-06-15 NOTE — Clinical Note
Pain Provider: Madhu Patient Name: Monica Johnson MRN: [id number] Case: CERVICAL MEDIAL BRANCH BLOCKS Level: C4, C5, and C6 Laterality: bilateral Anticoagulation: n/

## 2023-06-16 RX ORDER — BUSPIRONE HYDROCHLORIDE 30 MG/1
TABLET ORAL
Qty: 60 TABLET | Refills: 1 | Status: SHIPPED | OUTPATIENT
Start: 2023-06-16

## 2023-06-23 ENCOUNTER — PATIENT MESSAGE (OUTPATIENT)
Dept: PAIN MEDICINE | Facility: CLINIC | Age: 48
End: 2023-06-23
Payer: COMMERCIAL

## 2023-06-30 ENCOUNTER — PATIENT MESSAGE (OUTPATIENT)
Dept: PAIN MEDICINE | Facility: CLINIC | Age: 48
End: 2023-06-30
Payer: COMMERCIAL

## 2023-08-15 RX ORDER — METOPROLOL SUCCINATE 25 MG/1
25 TABLET, EXTENDED RELEASE ORAL DAILY
Qty: 90 TABLET | Refills: 1 | Status: SHIPPED | OUTPATIENT
Start: 2023-08-15 | End: 2024-04-01

## 2023-08-15 RX ORDER — LISINOPRIL 10 MG/1
10 TABLET ORAL
COMMUNITY
Start: 2023-03-23 | End: 2023-08-15

## 2023-08-15 NOTE — TELEPHONE ENCOUNTER
Refill Authorization Note     Refill Decision Note   Monica Johnson  is requesting a refill authorization.  Brief Assessment and Rationale for Refill:  Approve     Medication Therapy Plan:       Medication Reconciliation Completed: No   Comments:     No Care Gaps recommended.     Note composed:9:17 AM 08/15/2023

## 2023-08-15 NOTE — TELEPHONE ENCOUNTER
No care due was identified.  [facility] Embedded Care Due Messages. Reference number: [id number].   8/15/2023 7:57:10 AM CDT

## 2023-08-22 ENCOUNTER — LAB VISIT (OUTPATIENT)
Dept: LAB | Facility: HOSPITAL | Age: 48
End: 2023-08-22
Payer: COMMERCIAL

## 2023-08-22 ENCOUNTER — OFFICE VISIT (OUTPATIENT)
Dept: INTERNAL MEDICINE | Facility: CLINIC | Age: 48
End: 2023-08-22
Payer: COMMERCIAL

## 2023-08-22 VITALS
WEIGHT: 223.75 LBS | SYSTOLIC BLOOD PRESSURE: 132 MMHG | BODY MASS INDEX: 30.31 KG/M2 | DIASTOLIC BLOOD PRESSURE: 88 MMHG | HEIGHT: 72 IN | TEMPERATURE: 98 F | OXYGEN SATURATION: 97 % | HEART RATE: 87 BPM

## 2023-08-22 DIAGNOSIS — Z12.5 SCREENING FOR MALIGNANT NEOPLASM OF PROSTATE: ICD-10-CM

## 2023-08-22 DIAGNOSIS — I25.118 ATHEROSCLEROTIC HEART DISEASE OF NATIVE CORONARY ARTERY WITH OTHER FORMS OF ANGINA PECTORIS: ICD-10-CM

## 2023-08-22 DIAGNOSIS — E78.1 HIGH TRIGLYCERIDES: ICD-10-CM

## 2023-08-22 DIAGNOSIS — M17.11 OSTEOARTHRITIS OF RIGHT KNEE, UNSPECIFIED OSTEOARTHRITIS TYPE: ICD-10-CM

## 2023-08-22 DIAGNOSIS — I10 ESSENTIAL HYPERTENSION: ICD-10-CM

## 2023-08-22 DIAGNOSIS — K21.9 GASTROESOPHAGEAL REFLUX DISEASE, UNSPECIFIED WHETHER ESOPHAGITIS PRESENT: ICD-10-CM

## 2023-08-22 DIAGNOSIS — M53.3 SACROILIAC JOINT DYSFUNCTION: ICD-10-CM

## 2023-08-22 DIAGNOSIS — M47.812 CERVICAL SPONDYLOSIS: ICD-10-CM

## 2023-08-22 DIAGNOSIS — I42.8 NON-ISCHEMIC CARDIOMYOPATHY: ICD-10-CM

## 2023-08-22 DIAGNOSIS — I10 ESSENTIAL HYPERTENSION: Primary | ICD-10-CM

## 2023-08-22 DIAGNOSIS — N40.0 BENIGN PROSTATIC HYPERPLASIA, UNSPECIFIED WHETHER LOWER URINARY TRACT SYMPTOMS PRESENT: ICD-10-CM

## 2023-08-22 PROCEDURE — 99999 PR PBB SHADOW E&M-EST. PATIENT-LVL IV: ICD-10-PCS | Mod: PBBFAC,,,

## 2023-08-22 PROCEDURE — 99214 PR OFFICE/OUTPT VISIT, EST, LEVL IV, 30-39 MIN: ICD-10-PCS | Mod: S$GLB,,,

## 2023-08-22 PROCEDURE — 3008F BODY MASS INDEX DOCD: CPT | Mod: CPTII,S$GLB,,

## 2023-08-22 PROCEDURE — 4010F PR ACE/ARB THEARPY RXD/TAKEN: ICD-10-PCS | Mod: CPTII,S$GLB,,

## 2023-08-22 PROCEDURE — 1159F PR MEDICATION LIST DOCUMENTED IN MEDICAL RECORD: ICD-10-PCS | Mod: CPTII,S$GLB,,

## 2023-08-22 PROCEDURE — 3079F DIAST BP 80-89 MM HG: CPT | Mod: CPTII,S$GLB,,

## 2023-08-22 PROCEDURE — 99214 OFFICE O/P EST MOD 30 MIN: CPT | Mod: PBBFAC

## 2023-08-22 PROCEDURE — 3008F PR BODY MASS INDEX (BMI) DOCUMENTED: ICD-10-PCS | Mod: CPTII,S$GLB,,

## 2023-08-22 PROCEDURE — 80053 COMPREHEN METABOLIC PANEL: CPT

## 2023-08-22 PROCEDURE — 1159F MED LIST DOCD IN RCRD: CPT | Mod: CPTII,S$GLB,,

## 2023-08-22 PROCEDURE — 84153 ASSAY OF PSA TOTAL: CPT

## 2023-08-22 PROCEDURE — 99214 OFFICE O/P EST MOD 30 MIN: CPT | Mod: S$GLB,,,

## 2023-08-22 PROCEDURE — 99999 PR PBB SHADOW E&M-EST. PATIENT-LVL IV: CPT | Mod: PBBFAC,,,

## 2023-08-22 PROCEDURE — 3079F PR MOST RECENT DIASTOLIC BLOOD PRESSURE 80-89 MM HG: ICD-10-PCS | Mod: CPTII,S$GLB,,

## 2023-08-22 PROCEDURE — 4010F ACE/ARB THERAPY RXD/TAKEN: CPT | Mod: CPTII,S$GLB,,

## 2023-08-22 PROCEDURE — 3075F SYST BP GE 130 - 139MM HG: CPT | Mod: CPTII,S$GLB,,

## 2023-08-22 PROCEDURE — 36415 COLL VENOUS BLD VENIPUNCTURE: CPT

## 2023-08-22 PROCEDURE — 3075F PR MOST RECENT SYSTOLIC BLOOD PRESS GE 130-139MM HG: ICD-10-PCS | Mod: CPTII,S$GLB,,

## 2023-08-22 RX ORDER — ATORVASTATIN CALCIUM 20 MG/1
20 TABLET, FILM COATED ORAL NIGHTLY
Qty: 90 TABLET | Refills: 3 | Status: SHIPPED | OUTPATIENT
Start: 2023-08-22

## 2023-08-22 RX ORDER — PANTOPRAZOLE SODIUM 40 MG/1
40 TABLET, DELAYED RELEASE ORAL DAILY
Qty: 90 TABLET | Refills: 3 | Status: SHIPPED | OUTPATIENT
Start: 2023-08-22 | End: 2024-08-21

## 2023-08-22 RX ORDER — TAMSULOSIN HYDROCHLORIDE 0.4 MG/1
0.4 CAPSULE ORAL DAILY
Qty: 90 CAPSULE | Refills: 3 | Status: SHIPPED | OUTPATIENT
Start: 2023-08-22

## 2023-08-22 NOTE — PROGRESS NOTES
Monica Johnson Alex  08/22/2023  [id number]    Kenya Escoto MD  Patient Care Team:  Kenya Escoto MD as PCP - General (Family Medicine)  Barbara Moore LPN as Care Coordinator (Internal Medicine)  Kenya Escoto MD (Family Medicine)  Elisa Chao RD (Inactive) as Dietitian (Nutrition)  Jian Foster as Digital Medicine Health   Bazin, Tkeyah, PharmD as Hypertension Digital Medicine Clinician  Kenya Escoto MD as Hypertension Digital Medicine Responsible Provider (Family Medicine)  McIp as Hypertension Digital Medicine Contract          Visit Type:a scheduled routine follow-up visit    Chief Complaint:  Chief Complaint   Patient presents with    Follow-up        History of Present Illness:    Cervical and lumbar  spondylosis  Followed by pain management  In July had bilateral C4-C6 MBB  In march had bilateral L3-5 MBB     CAD  Last appt with cards May 2023 with dr. Coronado     Fatty liver  Followed by hepatology  Working on diet    History:  Past Medical History:   Diagnosis Date    ADHD (attention deficit hyperactivity disorder)     Allergy     Anxiety 1/25/2016    Arthritis 2014    Osteoarthritis    CHF (congestive heart failure)     Degenerative disc disease at L5-S1 level     Depression 1/25/2016    Hypertension     Sciatica of left side 1/31/2017     Past Surgical History:   Procedure Laterality Date    CALCANEAL OSTEOTOMY Left 04/16/2019    Procedure: OSTEOTOMY, CALCANEUS;  Surgeon: Rober Colon DPM;  Location: [facility] OR;  Service: Podiatry;  Laterality: Left;    CIRCUMCISION      FOOT SURGERY      INJECTION OF ANESTHETIC AGENT AROUND MEDIAL BRANCH NERVES INNERVATING CERVICAL FACET JOINT Bilateral 11/15/2021    Procedure: Block-nerve-medial branch-cervical bilateral C5, 6,7 RN IV sedation;  Surgeon: Ivan Davey MD;  Location: [facility];  Service: Pain Management;  Laterality: Bilateral;    INJECTION OF ANESTHETIC AGENT AROUND MEDIAL BRANCH NERVES INNERVATING LUMBAR FACET  JOINT Bilateral 03/07/2022    Procedure: bilateral L3-L5 MBB;  Surgeon: Ivan Davey MD;  Location: [facility];  Service: Pain Management;  Laterality: Bilateral;    INJECTION OF ANESTHETIC AGENT AROUND MEDIAL BRANCH NERVES INNERVATING LUMBAR FACET JOINT Bilateral 3/22/2023    Procedure: Bilateral L3-5 MBB RN IV Sedation;  Surgeon: Lyudmila Leung MD;  Location: [facility];  Service: Pain Management;  Laterality: Bilateral;    INJECTION OF ANESTHETIC AGENT INTO SACROILIAC JOINT Right 11/24/2020    Procedure: Right BLOCK, SACROILIAC JOINT and Right Knee Injection with RN IV sedation;  Surgeon: Ivan Davey MD;  Location: [facility];  Service: Pain Management;  Laterality: Right;    INJECTION OF ANESTHETIC AGENT INTO SACROILIAC JOINT Right 04/16/2021    Procedure: Right BLOCK, SACROILIAC JOINT RIght Hip Right GTB RN IV sedation;  Surgeon: Ivan Davey MD;  Location: [facility];  Service: Pain Management;  Laterality: Right;    INJECTION OF ANESTHETIC AGENT INTO SACROILIAC JOINT Right 5/10/2023    Procedure: Bilateral SIJ + right IA hip Injection RN IV Sedation;  Surgeon: Lyudmila Leung MD;  [facility];  Service: Pain Management;  Laterality: Right;    INJECTION OF JOINT Right 3/8/2023    Procedure: Right Knee injection with steroid RN IV Sedation;  Surgeon: Lyudmila Leung MD;  Location: [facility];  Service: Pain Management;  Laterality: Right;    INJECTION OF JOINT Right 5/10/2023    Procedure: Bilateral SIJ + right IA hip Injection;  Surgeon: Lyudmila Leung MD;  Location: [facility];  Service: Pain Management;  Laterality: Right;    LENGTHENING OF ACHILLES TENDON Left 04/16/2019    Procedure: LENGTHENING, TENDON, ACHILLES;  Surgeon: Rober Colon DPM;  Location: [facility] OR;  Service: Podiatry;  Laterality: Left;    PLACEMENT OF ACELLULAR HUMAN DERMAL ALLOGRAFT Left 02/17/2021    Procedure: APPLICATION, ACELLULAR HUMAN DERMAL ALLOGRAFT;  Surgeon: Rober Colon DPM;  Location: [facility] OR;   Service: Podiatry;  Laterality: Left;    REPAIR OF POSTERIOR TIBIALIS TENDON Left 04/16/2019    Procedure: REPAIR, TENDON, TIBIALIS POSTERIOR;  Surgeon: Rober Colon DPM;  Location: [facility] OR;  Service: Podiatry;  Laterality: Left;    REPAIR OF TENDON OF LOWER EXTREMITY Left 02/17/2021    Procedure: REPAIR, TENDON, LOWER EXTREMITY;  Surgeon: Rober Colon DPM;  Location: [facility] OR;  Service: Podiatry;  Laterality: Left;    TENDON TRANSFER Left 04/16/2019    Procedure: TRANSFER, TENDON;  Surgeon: Rober Colon DPM;  Location: [facility] OR;  Service: Podiatry;  Laterality: Left;     Family History   Problem Relation Age of Onset    Cancer Mother         breast    Hypertension Mother     Cataracts Mother     Arthritis Mother     Depression Mother     Diabetes Mother     Hearing loss Mother     Hyperlipidemia Mother     Miscarriages / Stillbirths Mother         stillbirth    Hypertension Maternal Grandmother     Cancer Maternal Grandmother         ovarian    Mental illness Maternal Grandmother     Stroke Maternal Grandmother     Glaucoma Father     Cancer Father         prostrate    Vision loss Father         glaucoma    Asthma Maternal Grandfather     Cancer Maternal Aunt         breast    Heart disease Maternal Aunt         tripple bypass    Stroke Maternal Aunt     Diabetes Maternal Aunt     Hyperlipidemia Maternal Aunt     Hypertension Maternal Aunt     Stroke Maternal Aunt     Diabetes Maternal Aunt     Hyperlipidemia Maternal Aunt     Hypertension Maternal Aunt      Social History     Socioeconomic History    Marital status: Legally    Tobacco Use    Smoking status: Every Day     Current packs/day: 0.50     Average packs/day: 0.5 packs/day for 33.6 years (16.8 ttl pk-yrs)     Types: Cigarettes     Start date: 1990     Passive exposure: Never    Smokeless tobacco: Never    Tobacco comments:     HOLD MIDNIGHT TO SURGERY   Substance and Sexual Activity    Alcohol use: Yes     Alcohol/week: 1.0 standard  drink of alcohol     Types: 1 Drinks containing 0.5 oz of alcohol per week     Comment: socially: HOLD 72HRS PRIOR TO SURGERY    Drug use: Yes     Types: Marijuana     Comment: HOLD TODAY PRIOR TO SURGERY    Sexual activity: Yes     Partners: Female   Social History Narrative    ** Merged History Encounter **          Social Determinants of Health     Financial Resource Strain: High Risk (8/19/2023)    Overall Financial Resource Strain (CARDIA)     Difficulty of Paying Living Expenses: Hard   Food Insecurity: Food Insecurity Present (8/19/2023)    Hunger Vital Sign     Worried About Running Out of Food in the Last Year: Sometimes true     Ran Out of Food in the Last Year: Sometimes true   Transportation Needs: Unmet Transportation Needs (8/19/2023)    PRAPARE - Transportation     Lack of Transportation (Medical): Yes     Lack of Transportation (Non-Medical): Yes   Physical Activity: Sufficiently Active (8/19/2023)    Exercise Vital Sign     Days of Exercise per Week: 2 days     Minutes of Exercise per Session: 0 min   Stress: Stress Concern Present (8/19/2023)    [employer] - Occupational Stress Questionnaire     Feeling of Stress : Rather much   Social Connections: Unknown (8/19/2023)    Social Connection and Isolation Panel [NHANES]     Frequency of Communication with Friends and Family: Three times a week     Frequency of Social Gatherings with Friends and Family: Once a week     Active Member of Clubs or Organizations: No     Attends Club or Organization Meetings: Never     Marital Status: Living with partner   Housing Stability: High Risk (8/19/2023)    Housing Stability Vital Sign     Unable to Pay for Housing in the Last Year: Yes     Number of Places Lived in the Last Year: 2     Unstable Housing in the Last Year: No     Patient Active Problem List   Diagnosis    Depression    Intermittent explosive disorder    Sciatica of left side    Osteoarthritis    Essential hypertension     Elevated liver function tests    Chondromalacia of right knee    Fatty liver    Tobacco use disorder    Non-ischemic cardiomyopathy    Coronary artery disease involving native coronary artery of native heart without angina pectoris    Sacroiliac joint dysfunction    Atherosclerotic heart disease of native coronary artery with other forms of angina pectoris    Family history of prostate cancer in father    Chronic pain of right knee    Osteoarthritis of right knee    High triglycerides    Lumbar spondylosis     Review of patient's allergies indicates:  No Known Allergies    The following were reviewed at this visit: active problem list, medication list, allergies, family history, social history, and health maintenance.    Medications:  Current Outpatient Medications on File Prior to Visit   Medication Sig Dispense Refill    aspirin (ECOTRIN) 81 MG EC tablet Take 81 mg by mouth once daily.      atorvastatin (LIPITOR) 20 MG tablet Take 1 tablet (20 mg total) by mouth every evening. 90 tablet 1    b complex vitamins capsule Take 1 capsule by mouth once daily.      busPIRone (BUSPAR) 30 MG Tab Take 1 tablet by mouth twice daily 60 tablet 1    cetirizine (ZYRTEC) 10 MG tablet Take 10 mg by mouth once daily.      cholecalciferol, vitamin D3, 125 mcg (5,000 unit) Tab Take 5,000 Units by mouth once daily.      cyclobenzaprine (FLEXERIL) 10 MG tablet Take 1 tablet (10 mg total) by mouth every evening. 30 tablet 2    gabapentin (NEURONTIN) 300 MG capsule Take 1 capsule (300 mg total) by mouth 2 (two) times daily AND 4 capsules (1,200 mg total) every evening. TAKE ONE CAPSULE BY MOUTH TWICE DAILY AND 4 CAPSULES EVERY EVENING. 180 capsule 5    ibuprofen (ADVIL,MOTRIN) 800 MG tablet TAKE 1 TABLET(800 MG) BY MOUTH THREE TIMES DAILY 90 tablet 1    metoprolol succinate (TOPROL-XL) 25 MG 24 hr tablet Take 1 tablet (25 mg total) by mouth once daily. 90 tablet 1    multivitamin capsule Take 1 capsule by mouth once daily.      omega-3  fatty acids/fish oil (FISH OIL-OMEGA-3 FATTY ACIDS) 300-1,000 mg capsule Take by mouth once daily.      pantoprazole (PROTONIX) 40 MG tablet Take 1 tablet (40 mg total) by mouth once daily. 30 tablet 11    tamsulosin (FLOMAX) 0.4 mg Cap Take 1 capsule (0.4 mg total) by mouth once daily. 30 capsule 0    telmisartan (MICARDIS) 40 MG Tab Take 1 tablet (40 mg total) by mouth once daily. 30 tablet 11    tiZANidine (ZANAFLEX) 4 MG tablet Take 1 tablet (4 mg total) by mouth 3 (three) times daily as needed. AS NEEDED FOR DAY TIME PAIN 90 tablet 5    traZODone (DESYREL) 100 MG tablet Take 1/2 to 1 tablet at bedtime as needed for sleep. 30 tablet 3    TUMERIC-GING-OLIVE-OREG-CAPRYL ORAL Take by mouth.       No current facility-administered medications on file prior to visit.       Medications have been reviewed and reconciled with patient at this visit.  Barriers to medications reviewed with patient.    Adverse reactions to current medications reviewed with patient..    Over the counter medications reviewed and reconciled with patient.    Exam:  Wt Readings from Last 3 Encounters:   05/31/23 101.6 kg (223 lb 15.8 oz)   05/10/23 101.6 kg (223 lb 15.8 oz)   04/26/23 102.2 kg (225 lb 5 oz)     Temp Readings from Last 3 Encounters:   05/10/23 98.4 °F (36.9 °C) (Temporal)   03/08/23 97.6 °F (36.4 °C) (Temporal)   02/22/23 98.8 °F (37.1 °C) (Temporal)     BP Readings from Last 3 Encounters:   05/31/23 100/62   05/10/23 119/80   04/26/23 119/81     Pulse Readings from Last 3 Encounters:   05/31/23 90   05/10/23 67   04/26/23 92     There is no height or weight on file to calculate BMI.      Review of Systems   Cardiovascular:  Negative for chest pain and palpitations.   Musculoskeletal:  Positive for back pain and joint pain.     Physical Exam  Vitals and nursing note reviewed.   Constitutional:       General: He is not in acute distress.     Appearance: He is well-developed. He is not diaphoretic.   HENT:      Head: Normocephalic  and atraumatic.      Right Ear: External ear normal.      Left Ear: External ear normal.   Eyes:      General:         Right eye: No discharge.         Left eye: No discharge.      Conjunctiva/sclera: Conjunctivae normal.      Pupils: Pupils are equal, round, and reactive to light.   Cardiovascular:      Rate and Rhythm: Normal rate and regular rhythm.      Heart sounds: Normal heart sounds. No murmur heard.  Pulmonary:      Effort: Pulmonary effort is normal. No respiratory distress.      Breath sounds: Normal breath sounds. No wheezing.   Musculoskeletal:         General: Tenderness present.   Neurological:      Mental Status: He is alert and oriented to person, place, and time.   Psychiatric:         Behavior: Behavior normal.         Thought Content: Thought content normal.         Judgment: Judgment normal.         Laboratory Reviewed ({Yes)  Lab Results   Component Value Date    WBC 10.64 03/15/2023    HGB 14.6 03/15/2023    HCT 45.7 03/15/2023     03/15/2023    CHOL 156 02/23/2023    TRIG 249 (H) 02/23/2023    HDL 29 (L) 02/23/2023    ALT 68 (H) 03/15/2023    AST 38 03/15/2023     03/15/2023    K 4.4 03/15/2023     03/15/2023    CREATININE 1.0 03/15/2023    BUN 5 (L) 03/15/2023    CO2 26 03/15/2023    TSH 0.492 11/29/2021    PSA 1.1 03/15/2022    INR 0.9 03/15/2023    HGBA1C 5.5 12/03/2020       Monica was seen today for follow-up.    Diagnoses and all orders for this visit:    Essential hypertension  -     COMPREHENSIVE METABOLIC PANEL; Future    Cervical spondylosis  -     COMPREHENSIVE METABOLIC PANEL; Future    Sacroiliac joint dysfunction  -     COMPREHENSIVE METABOLIC PANEL; Future    Osteoarthritis of right knee, unspecified osteoarthritis type  -     COMPREHENSIVE METABOLIC PANEL; Future    Gastroesophageal reflux disease, unspecified whether esophagitis present  -     pantoprazole (PROTONIX) 40 MG tablet; Take 1 tablet (40 mg total) by mouth once daily.    High triglycerides  -      atorvastatin (LIPITOR) 20 MG tablet; Take 1 tablet (20 mg total) by mouth every evening.    Atherosclerotic heart disease of native coronary artery with other forms of angina pectoris  -     atorvastatin (LIPITOR) 20 MG tablet; Take 1 tablet (20 mg total) by mouth every evening.    Non-ischemic cardiomyopathy  -     atorvastatin (LIPITOR) 20 MG tablet; Take 1 tablet (20 mg total) by mouth every evening.    Benign prostatic hyperplasia, unspecified whether lower urinary tract symptoms present  -     tamsulosin (FLOMAX) 0.4 mg Cap; Take 1 capsule (0.4 mg total) by mouth once daily.    Screening for malignant neoplasm of prostate  -     PSA, SCREENING; Future      Labs today  Will schedule a follow up exam with Dr. Escoto in 6 months       Care Plan/Goals: Reviewed    Goals         Blood Pressure < 130/80 (pt-stated)       Exercise at least 150 minutes per week.       Take at least one BP reading per week at various times of the day             Follow up: No follow-ups on file.    After visit summary was printed and given to patient upon discharge today.  Patient goals and care plan are included in After Visit Summary.

## 2023-08-23 LAB
ALBUMIN SERPL BCP-MCNC: 3.9 G/DL (ref 3.5–5.2)
ALP SERPL-CCNC: 58 U/L (ref 55–135)
ALT SERPL W/O P-5'-P-CCNC: 73 U/L (ref 10–44)
ANION GAP SERPL CALC-SCNC: 11 MMOL/L (ref 8–16)
AST SERPL-CCNC: 52 U/L (ref 10–40)
BILIRUB SERPL-MCNC: 0.4 MG/DL (ref 0.1–1)
BUN SERPL-MCNC: 7 MG/DL (ref 6–20)
CALCIUM SERPL-MCNC: 9.7 MG/DL (ref 8.7–10.5)
CHLORIDE SERPL-SCNC: 108 MMOL/L (ref 95–110)
CO2 SERPL-SCNC: 24 MMOL/L (ref 23–29)
COMPLEXED PSA SERPL-MCNC: 1.6 NG/ML (ref 0–4)
CREAT SERPL-MCNC: 1 MG/DL (ref 0.5–1.4)
EST. GFR  (NO RACE VARIABLE): >60 ML/MIN/1.73 M^2
GLUCOSE SERPL-MCNC: 82 MG/DL (ref 70–110)
POTASSIUM SERPL-SCNC: 4.5 MMOL/L (ref 3.5–5.1)
PROT SERPL-MCNC: 6.5 G/DL (ref 6–8.4)
SODIUM SERPL-SCNC: 143 MMOL/L (ref 136–145)

## 2023-08-28 ENCOUNTER — OFFICE VISIT (OUTPATIENT)
Dept: PAIN MEDICINE | Facility: CLINIC | Age: 48
End: 2023-08-28
Payer: COMMERCIAL

## 2023-08-28 VITALS
RESPIRATION RATE: 17 BRPM | HEART RATE: 77 BPM | SYSTOLIC BLOOD PRESSURE: 113 MMHG | BODY MASS INDEX: 30.75 KG/M2 | DIASTOLIC BLOOD PRESSURE: 77 MMHG | HEIGHT: 72 IN | WEIGHT: 227.06 LBS

## 2023-08-28 DIAGNOSIS — M47.816 LUMBAR FACET ARTHROPATHY: ICD-10-CM

## 2023-08-28 DIAGNOSIS — M47.812 CERVICAL SPONDYLOSIS: ICD-10-CM

## 2023-08-28 DIAGNOSIS — M79.10 MUSCLE PAIN: ICD-10-CM

## 2023-08-28 PROCEDURE — 99214 OFFICE O/P EST MOD 30 MIN: CPT | Mod: S$GLB,,, | Performed by: PHYSICIAN ASSISTANT

## 2023-08-28 PROCEDURE — 99999 PR PBB SHADOW E&M-EST. PATIENT-LVL III: CPT | Mod: PBBFAC,,, | Performed by: PHYSICIAN ASSISTANT

## 2023-08-28 PROCEDURE — 99999 PR PBB SHADOW E&M-EST. PATIENT-LVL III: ICD-10-PCS | Mod: PBBFAC,,, | Performed by: PHYSICIAN ASSISTANT

## 2023-08-28 PROCEDURE — 99214 PR OFFICE/OUTPT VISIT, EST, LEVL IV, 30-39 MIN: ICD-10-PCS | Mod: S$GLB,,, | Performed by: PHYSICIAN ASSISTANT

## 2023-08-28 PROCEDURE — 99213 OFFICE O/P EST LOW 20 MIN: CPT | Mod: PBBFAC | Performed by: PHYSICIAN ASSISTANT

## 2023-08-28 RX ORDER — TIZANIDINE 4 MG/1
4 TABLET ORAL 3 TIMES DAILY PRN
Qty: 90 TABLET | Refills: 5 | Status: SHIPPED | OUTPATIENT
Start: 2023-08-28 | End: 2023-12-26 | Stop reason: SDUPTHER

## 2023-08-28 RX ORDER — MELOXICAM 15 MG/1
15 TABLET ORAL DAILY
Qty: 30 TABLET | Refills: 3 | Status: SHIPPED | OUTPATIENT
Start: 2023-08-28

## 2023-08-28 RX ORDER — GABAPENTIN 300 MG/1
CAPSULE ORAL
Qty: 180 CAPSULE | Refills: 5 | Status: SHIPPED | OUTPATIENT
Start: 2023-08-28 | End: 2023-12-26 | Stop reason: SDUPTHER
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
## 2023-09-11 NOTE — PROGRESS NOTES
RHEUMATOLOGY OUTPATIENT CLINIC NOTE    12/3/2020    Attending Rheumatologist: Ismael Ramirez  Primary Care Provider: Neto Kearns MD   Physician Requesting Consultation: Aaareferral Self  No address on file  Chief Complaint/Reason For Consultation:  Disease Management (OA.   R. Hip and knee)    Subjective:       HPI  Monica Johnson is a 45 y.o. Black or  male with chronic pain comes for rheumatic evaluation.  Main complaint is chronic lower back and trochanteric bursa area pain.  Worst in the morning, aggravated by range of motion/weight bearing, relieved somewhat by rest and OTC pain medication.  Refers association with prolonged morning stiffness.  Denies personal history or psoriasis or dactylitis.  Does not have history IBD.  Does not have fever, /GI complaints, or persistent paresthesias/other focal neurological symptoms.    Review of Systems   Constitutional: Negative for chills, fever and malaise/fatigue.   Eyes: Negative for pain and redness.        No history uveitis   Respiratory: Negative for cough, hemoptysis and shortness of breath.    Cardiovascular: Negative for chest pain and leg swelling.   Gastrointestinal: Negative for abdominal pain, blood in stool and melena.        No history of IBD   Genitourinary: Negative for dysuria and hematuria.   Musculoskeletal: Positive for back pain (Chronic, mixed pattern.) and joint pain (Trochanteric bursa area right more than left.  Mechanical pattern.). Negative for falls.   Skin: Negative for rash.        Family history of psoriasis (grandmother)   Neurological: Negative for tingling, focal weakness and weakness.   Psychiatric/Behavioral: Negative for memory loss. The patient does not have insomnia.      Chronic comorbid conditions affecting medical decision making today:  Past Medical History:   Diagnosis Date    ADHD (attention deficit hyperactivity disorder)     Allergy     Anxiety 1/25/2016    Arthritis 2014    Osteoarthritis     Depression 1/25/2016    Hypertension     Sciatica of left side 1/31/2017     Past Surgical History:   Procedure Laterality Date    CALCANEAL OSTEOTOMY Left 4/16/2019    Procedure: OSTEOTOMY, CALCANEUS;  Surgeon: Rober Colon DPM;  Location: [facility] [place];  Service: Podiatry;  Laterality: Left;    CIRCUMCISION      FOOT SURGERY      INJECTION OF ANESTHETIC AGENT INTO SACROILIAC JOINT Right 11/24/2020    Procedure: Right BLOCK, SACROILIAC JOINT and Right Knee Injection with RN IV sedation;  Surgeon: Ivan Davey MD;  Location: [facility];  Service: Pain Management;  Laterality: Right;    LENGTHENING OF ACHILLES TENDON Left 4/16/2019    Procedure: LENGTHENING, TENDON, ACHILLES;  Surgeon: Rober Colon DPM;  Location: [facility] [place] [facility];  Service: Podiatry;  Laterality: Left;    REPAIR OF POSTERIOR TIBIALIS TENDON Left 4/16/2019    Procedure: REPAIR, TENDON, TIBIALIS POSTERIOR;  Surgeon: Rober Colon DPM;  Location: [facility] [place] [facility];  Service: Podiatry;  Laterality: Left;    TENDON TRANSFER Left 4/16/2019    Procedure: TRANSFER, TENDON;  Surgeon: Rober Colon DPM;  Location: [facility];  Service: Podiatry;  Laterality: Left;     Family History   Problem Relation Age of Onset    Cancer Mother     Hypertension Mother     Hypertension Maternal Grandmother      Social History     Substance and Sexual Activity   Alcohol Use Yes    Frequency: Monthly or less    Drinks per session: 1 or 2    Binge frequency: Never    Comment: socially     Social History     Tobacco Use   Smoking Status Current Some Day Smoker    Packs/day: 0.25    Types: Cigarettes, Cigars     Social History     Substance and Sexual Activity   Drug Use Never    Types: Marijuana       Current Outpatient Medications:     atorvastatin (LIPITOR) 20 MG tablet, , Disp: , Rfl:     isosorbide mononitrate (IMDUR) 30 MG 24 hr tablet, , Disp: , Rfl:     lisinopriL (PRINIVIL,ZESTRIL) 5 MG tablet, , Disp: , Rfl:     methocarbamoL (ROBAXIN) 750 MG Tab, Take 1  tablet (750 mg total) by mouth 3 (three) times daily as needed (muscle spasm (may cause drowsiness))., Disp: 90 tablet, Rfl: 0    metoprolol succinate (TOPROL XL) 25 MG 24 hr tablet, , Disp: , Rfl:     multivitamin capsule, Take 1 capsule by mouth once daily., Disp: , Rfl:     nicotine, polacrilex, (NICORETTE) 2 mg Gum, , Disp: , Rfl:     sertraline (ZOLOFT) 50 MG tablet, Take 1 tablet (50 mg total) by mouth once daily., Disp: 30 tablet, Rfl: 11     Objective:         Vitals:    12/03/20 1305   BP: 121/76   Pulse: 70     Physical Exam   Constitutional: No distress.   Estimated body mass index is 29.29 kg/m² as calculated from the following:    Height as of this encounter: 6' (1.829 m).    Weight as of this encounter: 98 kg (215 lb 15.1 oz).    Wt Readings from Last 1 Encounters:  12/03/20 1305 : 98 kg (215 lb 15.1 oz)     HENT:   Head: Normocephalic and atraumatic.   Eyes: Conjunctivae are normal. Pupils are equal, round, and reactive to light.   Neck: Normal range of motion.   Cardiovascular: Normal rate and intact distal pulses.    Pulmonary/Chest: Effort normal. No respiratory distress.   Abdominal: Soft. He exhibits no distension.   Neurological: He is alert.   Antalgic gait.  Favoring left leg.   Skin: No rash noted. No erythema.     Musculoskeletal: Normal range of motion. Tenderness (Trochanteric bursa area right more than left.  Sacroiliac area.) present.      Comments: : strong  No synovitis or significant squeeze tenderness    AROM: intact  PROM: intact    Devices used by patient: none       Reviewed old and all outside pertinent medical records available.    All lab results personally reviewed and interpreted by me.  Lab Results   Component Value Date    WBC 10.54 01/08/2020    HGB 14.8 01/08/2020    HCT 45.8 01/08/2020    MCV 86 01/08/2020    MCH 27.9 01/08/2020    MCHC 32.3 01/08/2020    RDW 14.4 01/08/2020     01/08/2020    MPV 10.3 01/08/2020       Lab Results   Component Value Date      01/08/2020    K 3.4 (L) 01/08/2020     01/08/2020    CO2 23 01/08/2020     (H) 01/08/2020    BUN 10 01/08/2020    CALCIUM 9.9 01/08/2020    PROT 7.5 01/08/2020    ALBUMIN 4.1 01/08/2020    BILITOT 0.3 01/08/2020    AST 67 (H) 01/08/2020    ALKPHOS 63 01/08/2020    ALT 75 (H) 01/08/2020       Lab Results   Component Value Date    COLORU yellow 04/08/2019    SPECGRAV 1.030 04/08/2019    PHUR 7 04/08/2019    KETONESU neg 04/08/2019    NITRITE neg 04/08/2019    UROBILINOGEN neg 04/08/2019       No results found for: CRP    No results found for: SEDRATE, ERYTHROCYTES    No results found for: SUBHASH, RF, SEDRATE    No components found for: 25OHVITDTOT, 90DMOZGZ6, 87CMFVGP6, METHODNOTE    No results found for: URICACID    No components found for: TSPOTTB    Rheum Labs:   SUBHASH negative   Rheumatoid factor negative.   Samira Mcmahon negative.   Scleroderma antibody negative.   RNP antibody negative.   Double-stranded DNA negative   SSA antibody negative.  SSB antibody negative.   APS profile negative 12/2016     Infectious Labs:   Hepatitis profile nonreactive   HIV NR 2016     Imaging:  All imaging reviewed and independently interpreted by me.    MRI C-spine December 2015  Mild posterior disc osteophyte complex at C5-C6 level resulting in no cord compression or central stenosis. No other significant abnormality of the cervical spine.    MRI lumbar and thoracic spine December 2015  No abnormality is detected    X-ray lumbar spine August 2018  in nature.  Vertebral body heights are well maintained.  No spondylolisthesis demonstrated.  No change in spinal alignment with flexion or extension to suggest instability.  Intervertebral disk spaces are well preserved.  No pars defects visualized.  Posterior elements appear grossly intact. No acute fractures or subluxations are demonstrated.  The remaining visualized osseous and soft tissue structures demonstrate no appreciable abnormality.    X-ray foot July  2019  Postsurgical changes calcaneal osteotomy again noted with stable positioning and alignment fracture fragments.  Callus formation noted.  Multi articular degenerative changes and mild pes planus again noted.  No acute fracture or dislocation.  Stable appearance mild soft tissue swelling.    MRI ankle January 2020  Postoperative changes of the calcaneus consistent with prior osteotomy.  Mild primary arthrosis calcaneal cuboid joint.    Postoperative changes of the tibialis posterior tendon without evidence of avulsion or full-thickness tear. Nonspecific thickening of the proximal and mid Achilles tendon segments.    EMG/NCS 2017   Normal NCV and EMG of the lower extremities, no radiculopathy or peripheral nerve entrapment syndrome     ASSESSMENT / PLAN:     Monica Johnson is a 45 y.o. Black or  male with:    1. Chronic low back pain, unspecified back pain laterality, unspecified whether sciatica present  - features of trochanteric bursa syndrome and back pain with mixed pattern. Distant family history of PsO.  - Discussed and recommended exercise (soraya non wt-bearing/swimming)  - resting affected joint for brief periods (<12 h), range of motion and flexibility exercises  - would benefit from decreasing at least 10% of body weight.  - Acetaminophen prn -> standing -> NSAIDs (if persistent pain)  - Topicals therapy: Capsaicin / NSAIDs   - following with Pain Management, received local injections for DDD  - workup as below, consider MRI sacroiliac joints if unrevealing  - C-Reactive Protein; Standing  - Sedimentation rate; Standing  - HLA B27 Antigen; Future  - X-Ray Sacroiliac Joints 3 Views; Future    2. Counseling on health promotion and disease prevention  - over 10 minutes spent regarding below topics:  - Weight loss counseling done.  - Nutrition and exercise counseling.  - Limitation of alcohol consumption.  - Regular exercise:  Aerobic and resistance.  - Medication counseling  provided.    Follow up in about 2 months (around 2/3/2021).    Method of contact with patient concerns: [facility]    Disclaimer:  This note is prepared using voice recognition software and as such is likely to have errors and has not been proof read. Please contact me for questions.     Time spent: 60 minutes in face to face discussion concerning diagnosis, prognosis, review of lab and test results, benefits of treatment as well as management of disease, counseling of patient and coordination of care between various health care providers.  Greater than half the time spent was used for coordination of care and counseling of patient.    Ismael Ramirez M.D.  Rheumatology Department   [facility] [facility]     n/a

## 2023-11-16 ENCOUNTER — LAB VISIT (OUTPATIENT)
Dept: LAB | Facility: HOSPITAL | Age: 48
End: 2023-11-16
Attending: STUDENT IN AN ORGANIZED HEALTH CARE EDUCATION/TRAINING PROGRAM
Payer: COMMERCIAL

## 2023-11-16 DIAGNOSIS — E78.1 HIGH TRIGLYCERIDES: ICD-10-CM

## 2023-11-16 LAB
CHOLEST SERPL-MCNC: 124 MG/DL (ref 120–199)
CHOLEST/HDLC SERPL: 3.3 {RATIO} (ref 2–5)
HDLC SERPL-MCNC: 38 MG/DL (ref 40–75)
HDLC SERPL: 30.6 % (ref 20–50)
LDLC SERPL CALC-MCNC: 72.6 MG/DL (ref 63–159)
NONHDLC SERPL-MCNC: 86 MG/DL
TRIGL SERPL-MCNC: 67 MG/DL (ref 30–150)

## 2023-11-16 PROCEDURE — 36415 COLL VENOUS BLD VENIPUNCTURE: CPT | Performed by: STUDENT IN AN ORGANIZED HEALTH CARE EDUCATION/TRAINING PROGRAM

## 2023-11-16 PROCEDURE — 80061 LIPID PANEL: CPT | Performed by: STUDENT IN AN ORGANIZED HEALTH CARE EDUCATION/TRAINING PROGRAM

## 2023-11-28 DIAGNOSIS — I10 ESSENTIAL HYPERTENSION: Primary | ICD-10-CM

## 2023-12-22 ENCOUNTER — TELEPHONE (OUTPATIENT)
Dept: PAIN MEDICINE | Facility: CLINIC | Age: 48
End: 2023-12-22
Payer: COMMERCIAL
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
## 2023-12-26 ENCOUNTER — TELEPHONE (OUTPATIENT)
Dept: PAIN MEDICINE | Facility: CLINIC | Age: 48
End: 2023-12-26

## 2023-12-26 ENCOUNTER — OFFICE VISIT (OUTPATIENT)
Dept: PAIN MEDICINE | Facility: CLINIC | Age: 48
End: 2023-12-26
Payer: COMMERCIAL

## 2023-12-26 DIAGNOSIS — M47.816 LUMBAR FACET ARTHROPATHY: ICD-10-CM

## 2023-12-26 DIAGNOSIS — M16.11 PRIMARY OSTEOARTHRITIS OF RIGHT HIP: ICD-10-CM

## 2023-12-26 DIAGNOSIS — M47.816 LUMBAR SPONDYLOSIS: ICD-10-CM

## 2023-12-26 DIAGNOSIS — M46.1 SACROILIITIS: Primary | ICD-10-CM

## 2023-12-26 PROCEDURE — 99214 PR OFFICE/OUTPT VISIT, EST, LEVL IV, 30-39 MIN: ICD-10-PCS | Mod: 95,,, | Performed by: ANESTHESIOLOGY

## 2023-12-26 PROCEDURE — 99214 OFFICE O/P EST MOD 30 MIN: CPT | Mod: 95,,, | Performed by: ANESTHESIOLOGY

## 2023-12-26 RX ORDER — TIZANIDINE 4 MG/1
4 TABLET ORAL 3 TIMES DAILY PRN
Qty: 270 TABLET | Refills: 0 | Status: SHIPPED | OUTPATIENT
Start: 2023-12-26 | End: 2024-03-25

## 2023-12-26 RX ORDER — GABAPENTIN 300 MG/1
CAPSULE ORAL
Qty: 180 CAPSULE | Refills: 5 | Status: SHIPPED | OUTPATIENT
Start: 2023-12-26

## 2023-12-26 NOTE — TELEPHONE ENCOUNTER
Called pt to set up their procedure. Pt answered and procedure has been made. Inform pt on the procedure instruction.Pt understood and all question answered.

## 2024-01-23 ENCOUNTER — TELEPHONE (OUTPATIENT)
Dept: PAIN MEDICINE | Facility: CLINIC | Age: 49
End: 2024-01-23
Payer: COMMERCIAL

## 2024-01-24 ENCOUNTER — OFFICE VISIT (OUTPATIENT)
Dept: HEPATOLOGY | Facility: CLINIC | Age: 49
End: 2024-01-24
Payer: COMMERCIAL

## 2024-01-24 VITALS — BODY MASS INDEX: 30.75 KG/M2 | HEIGHT: 72 IN | WEIGHT: 227 LBS

## 2024-01-24 DIAGNOSIS — K76.0 FATTY LIVER: Primary | ICD-10-CM

## 2024-01-24 PROCEDURE — 1159F MED LIST DOCD IN RCRD: CPT | Mod: CPTII,95,, | Performed by: NURSE PRACTITIONER

## 2024-01-24 PROCEDURE — 3008F BODY MASS INDEX DOCD: CPT | Mod: CPTII,95,, | Performed by: NURSE PRACTITIONER

## 2024-01-24 PROCEDURE — 99213 OFFICE O/P EST LOW 20 MIN: CPT | Mod: 95,,, | Performed by: NURSE PRACTITIONER

## 2024-01-24 PROCEDURE — 4010F ACE/ARB THERAPY RXD/TAKEN: CPT | Mod: CPTII,95,, | Performed by: NURSE PRACTITIONER

## 2024-01-24 NOTE — PROGRESS NOTES
Clinic Follow Up:  [facility] Follow Up Note    Reason for Follow Up:  The encounter diagnosis was Fatty liver.    PCP: Kenya Escoto     The patient location is: [place]  The chief complaint leading to consultation is: above    Visit type: audiovisual    Face to Face time with patient: 15 minutes  20 minutes of total time spent on the encounter, which includes face to face time and non-face to face time preparing to see the patient (eg, review of tests), Obtaining and/or reviewing separately obtained history, Documenting clinical information in the electronic or other health record, Independently interpreting results (not separately reported) and communicating results to the patient/family/caregiver, or Care coordination (not separately reported).         Each patient to whom he or she provides medical services by telemedicine is:  (1) informed of the relationship between the physician and patient and the respective role of any other health care provider with respect to management of the patient; and (2) notified that he or she may decline to receive medical services by telemedicine and may withdraw from such care at any time.    Notes:       HPI:  This is a 48 y.o. male here for follow up of the above  Pt states that he has been feeling overall stable  Has been working on improved nutrition and has been successful at weight los of about 10-15 pounds.   Denies any abdominal pain.  No nausea or vomiting.  No change in bowel pattern.  No melena or hematochezia. No weight loss.  No upper GI bleeding.  No ascites or BLE.  No overt confusion.  Previous Fibroscan without significant fibrosis     Review of Systems   Constitutional:  Negative for chills, fever, malaise/fatigue and weight loss.   Respiratory:  Negative for cough.    Cardiovascular:  Negative for chest pain.   Gastrointestinal:         Per HPI   Musculoskeletal:  Negative for myalgias.   Skin:  Negative for itching and rash.    Neurological:  Negative for headaches.   Psychiatric/Behavioral:  The patient is not nervous/anxious.        Medical History:  Past Medical History:   Diagnosis Date    ADHD (attention deficit hyperactivity disorder)     Allergy     Anxiety 1/25/2016    Arthritis 2014    Osteoarthritis    CHF (congestive heart failure)     Degenerative disc disease at L5-S1 level     Depression 1/25/2016    Hypertension     Sciatica of left side 1/31/2017       Surgical History:   Past Surgical History:   Procedure Laterality Date    CALCANEAL OSTEOTOMY Left 04/16/2019    Procedure: OSTEOTOMY, CALCANEUS;  Surgeon: Rober Colon DPM;  Location: [facility] OR;  Service: Podiatry;  Laterality: Left;    CIRCUMCISION      FOOT SURGERY      INJECTION OF ANESTHETIC AGENT AROUND MEDIAL BRANCH NERVES INNERVATING CERVICAL FACET JOINT Bilateral 11/15/2021    Procedure: Block-nerve-medial branch-cervical bilateral C5, 6,7 RN IV sedation;  Surgeon: Ivan Davey MD;  Location: [facility] PAIN MGT;  Service: Pain Management;  Laterality: Bilateral;    INJECTION OF ANESTHETIC AGENT AROUND MEDIAL BRANCH NERVES INNERVATING LUMBAR FACET JOINT Bilateral 03/07/2022    Procedure: bilateral L3-L5 MBB;  Surgeon: Ivan Davey MD;  Location: [facility] PAIN MGT;  Service: Pain Management;  Laterality: Bilateral;    INJECTION OF ANESTHETIC AGENT AROUND MEDIAL BRANCH NERVES INNERVATING LUMBAR FACET JOINT Bilateral 3/22/2023    Procedure: Bilateral L3-5 MBB RN IV Sedation;  Surgeon: Lyudmila Leung MD;  Location: [facility] PAIN MGT;  Service: Pain Management;  Laterality: Bilateral;    INJECTION OF ANESTHETIC AGENT INTO SACROILIAC JOINT Right 11/24/2020    Procedure: Right BLOCK, SACROILIAC JOINT and Right Knee Injection with RN IV sedation;  Surgeon: Ivan Davey MD;  Location: [facility] PAIN MGT;  Service: Pain Management;  Laterality: Right;    INJECTION OF ANESTHETIC AGENT INTO SACROILIAC JOINT Right 04/16/2021    Procedure: Right BLOCK, SACROILIAC JOINT RIght Hip Right GTB RN IV  sedation;  Surgeon: Ivan Davey MD;  Location: [facility] PAIN MGT;  Service: Pain Management;  Laterality: Right;    INJECTION OF ANESTHETIC AGENT INTO SACROILIAC JOINT Right 5/10/2023    Procedure: Bilateral SIJ + right IA hip Injection RN IV Sedation;  Surgeon: Lyudmila Leung MD;  Location: [facility] PAIN MGT;  Service: Pain Management;  Laterality: Right;    INJECTION OF JOINT Right 3/8/2023    Procedure: Right Knee injection with steroid RN IV Sedation;  Surgeon: Lyudmila Leung MD;  Location: [facility] PAIN MGT;  Service: Pain Management;  Laterality: Right;    INJECTION OF JOINT Right 5/10/2023    Procedure: Bilateral SIJ + right IA hip Injection;  Surgeon: Lyudmila Leung MD;  Location: [facility] PAIN MGT;  Service: Pain Management;  Laterality: Right;    LENGTHENING OF ACHILLES TENDON Left 04/16/2019    Procedure: LENGTHENING, TENDON, ACHILLES;  Surgeon: Rober Colon DPM;  Location: [facility] OR;  Service: Podiatry;  Laterality: Left;    PLACEMENT OF ACELLULAR HUMAN DERMAL ALLOGRAFT Left 02/17/2021    Procedure: APPLICATION, ACELLULAR HUMAN DERMAL ALLOGRAFT;  Surgeon: Rober Colon DPM;  Location: [facility] OR;  Service: Podiatry;  Laterality: Left;    REPAIR OF POSTERIOR TIBIALIS TENDON Left 04/16/2019    Procedure: REPAIR, TENDON, TIBIALIS POSTERIOR;  Surgeon: Rober Colon DPM;  Location: [facility] OR;  Service: Podiatry;  Laterality: Left;    REPAIR OF TENDON OF LOWER EXTREMITY Left 02/17/2021    Procedure: REPAIR, TENDON, LOWER EXTREMITY;  Surgeon: Rober Colon DPM;  Location: [facility] OR;  Service: Podiatry;  Laterality: Left;    TENDON TRANSFER Left 04/16/2019    Procedure: TRANSFER, TENDON;  Surgeon: Rober Colon DPM;  Location: [facility] OR;  Service: Podiatry;  Laterality: Left;       Family History:   Family History   Problem Relation Age of Onset    Cancer Mother         breast    Hypertension Mother     Cataracts Mother     Arthritis Mother     Depression Mother     Diabetes Mother     Hearing loss Mother      Hyperlipidemia Mother     Miscarriages / Stillbirths Mother         stillbirth    Hypertension Maternal Grandmother     Cancer Maternal Grandmother         ovarian    Mental illness Maternal Grandmother     Stroke Maternal Grandmother     Glaucoma Father     Cancer Father         prostrate    Vision loss Father         glaucoma    Asthma Maternal Grandfather     Cancer Maternal Aunt         breast    Heart disease Maternal Aunt         tripple bypass    Stroke Maternal Aunt     Diabetes Maternal Aunt     Hyperlipidemia Maternal Aunt     Hypertension Maternal Aunt     Stroke Maternal Aunt     Diabetes Maternal Aunt     Hyperlipidemia Maternal Aunt     Hypertension Maternal Aunt        Social History:   Social History     Tobacco Use    Smoking status: Every Day     Current packs/day: 0.50     Average packs/day: 0.5 packs/day for 34.1 years (17.0 ttl pk-yrs)     Types: Cigarettes     Start date: 1990     Passive exposure: Never    Smokeless tobacco: Never    Tobacco comments:     HOLD MIDNIGHT TO SURGERY   Substance Use Topics    Alcohol use: Yes     Alcohol/week: 1.0 standard drink of alcohol     Types: 1 Drinks containing 0.5 oz of alcohol per week     Comment: socially: HOLD 72HRS PRIOR TO SURGERY    Drug use: Yes     Types: Marijuana     Comment: HOLD TODAY PRIOR TO SURGERY       Allergies: Reviewed    Home Medications:  Current Outpatient Medications on File Prior to Visit   Medication Sig Dispense Refill    aspirin (ECOTRIN) 81 MG EC tablet Take 81 mg by mouth once daily.      atorvastatin (LIPITOR) 20 MG tablet Take 1 tablet (20 mg total) by mouth every evening. 90 tablet 3    b complex vitamins capsule Take 1 capsule by mouth once daily.      busPIRone (BUSPAR) 30 MG Tab Take 1 tablet by mouth twice daily 60 tablet 1    cetirizine (ZYRTEC) 10 MG tablet Take 10 mg by mouth once daily.      cholecalciferol, vitamin D3, 125 mcg (5,000 unit) Tab Take 5,000 Units by mouth once daily.      gabapentin (NEURONTIN) 300  MG capsule Take 1 capsule (300 mg total) by mouth 2 (two) times daily AND 4 capsules (1,200 mg total) every evening. TAKE ONE CAPSULE BY MOUTH TWICE DAILY AND 4 CAPSULES EVERY EVENING. 180 capsule 5    meloxicam (MOBIC) 15 MG tablet Take 1 tablet (15 mg total) by mouth once daily. 30 tablet 3    metoprolol succinate (TOPROL-XL) 25 MG 24 hr tablet Take 1 tablet (25 mg total) by mouth once daily. 90 tablet 1    multivitamin capsule Take 1 capsule by mouth once daily.      omega-3 fatty acids/fish oil (FISH OIL-OMEGA-3 FATTY ACIDS) 300-1,000 mg capsule Take by mouth once daily.      pantoprazole (PROTONIX) 40 MG tablet Take 1 tablet (40 mg total) by mouth once daily. 90 tablet 3    tamsulosin (FLOMAX) 0.4 mg Cap Take 1 capsule (0.4 mg total) by mouth once daily. 90 capsule 3    telmisartan (MICARDIS) 40 MG Tab Take 1 tablet (40 mg total) by mouth once daily. 30 tablet 11    tiZANidine (ZANAFLEX) 4 MG tablet Take 1 tablet (4 mg total) by mouth 3 (three) times daily as needed. AS NEEDED FOR DAY TIME PAIN 270 tablet 0    traZODone (DESYREL) 100 MG tablet Take 1/2 to 1 tablet at bedtime as needed for sleep. 30 tablet 3    TUMERIC-GING-OLIVE-OREG-CAPRYL ORAL Take by mouth.       No current facility-administered medications on file prior to visit.       Physical Exam:  Vital Signs:  Ht 6' (1.829 m)   Wt 103 kg (227 lb)   BMI 30.79 kg/m²   Body mass index is 30.79 kg/m².  Physical Exam  Vitals reviewed.   Constitutional:       Appearance: He is well-developed.   HENT:      Head: Normocephalic.   Eyes:      General: No scleral icterus.  Cardiovascular:      Rate and Rhythm: Normal rate.   Pulmonary:      Effort: Pulmonary effort is normal.   Abdominal:      General: There is no distension.      Tenderness: There is no abdominal tenderness.   Musculoskeletal:         General: Normal range of motion.      Cervical back: Normal range of motion.   Skin:     General: Skin is warm and dry.   Neurological:      Mental Status: He is  alert and oriented to person, place, and time.         Labs: Pertinent labs reviewed.      Assessment:  1. Fatty liver        Recommendations:  Stable without new complaints.   Will get updated labs  If labs are stable, will plan for yearly labs, US and visit with repeat Fibroscan every 2-3 years  Continue with weight loss with improved nutrition      Return to Clinic:  Every 6-12 months depending on above

## 2024-02-01 ENCOUNTER — TELEPHONE (OUTPATIENT)
Dept: PAIN MEDICINE | Facility: CLINIC | Age: 49
End: 2024-02-01
Payer: COMMERCIAL

## 2024-02-01 NOTE — TELEPHONE ENCOUNTER
CASAM to inform pt, that we are canceling his upcoming appointment with Dr. Leung, because we are not in network with his insurance.

## 2024-03-15 RX ORDER — TELMISARTAN 40 MG/1
40 TABLET ORAL
Qty: 30 TABLET | Refills: 0 | Status: SHIPPED | OUTPATIENT
Start: 2024-03-15 | End: 2024-05-03 | Stop reason: SDUPTHER

## 2024-03-27 PROCEDURE — 99284 EMERGENCY DEPT VISIT MOD MDM: CPT

## 2024-03-28 ENCOUNTER — HOSPITAL ENCOUNTER (EMERGENCY)
Facility: HOSPITAL | Age: 49
Discharge: HOME | End: 2024-03-28
Attending: STUDENT IN AN ORGANIZED HEALTH CARE EDUCATION/TRAINING PROGRAM
Payer: COMMERCIAL

## 2024-03-28 VITALS
TEMPERATURE: 100 F | BODY MASS INDEX: 27.02 KG/M2 | OXYGEN SATURATION: 97 % | SYSTOLIC BLOOD PRESSURE: 130 MMHG | HEART RATE: 82 BPM | RESPIRATION RATE: 18 BRPM | HEIGHT: 72 IN | WEIGHT: 199.5 LBS | DIASTOLIC BLOOD PRESSURE: 73 MMHG

## 2024-03-28 DIAGNOSIS — R05.9 COUGH: ICD-10-CM

## 2024-03-28 DIAGNOSIS — J98.9 RESPIRATORY ILLNESS: Primary | ICD-10-CM

## 2024-03-28 LAB
GROUP A STREP, MOLECULAR: NEGATIVE
INFLUENZA A, MOLECULAR: NEGATIVE
INFLUENZA B, MOLECULAR: NEGATIVE
OHS QRS DURATION: 102 MS
OHS QTC CALCULATION: 412 MS
SARS-COV-2 RDRP RESP QL NAA+PROBE: NEGATIVE
SPECIMEN SOURCE: NORMAL

## 2024-03-28 PROCEDURE — 25000003 PHARM REV CODE 250: Performed by: STUDENT IN AN ORGANIZED HEALTH CARE EDUCATION/TRAINING PROGRAM

## 2024-03-28 PROCEDURE — 93005 ELECTROCARDIOGRAM TRACING: CPT

## 2024-03-28 PROCEDURE — U0002 COVID-19 LAB TEST NON-CDC: HCPCS | Performed by: STUDENT IN AN ORGANIZED HEALTH CARE EDUCATION/TRAINING PROGRAM

## 2024-03-28 PROCEDURE — 87502 INFLUENZA DNA AMP PROBE: CPT | Performed by: STUDENT IN AN ORGANIZED HEALTH CARE EDUCATION/TRAINING PROGRAM

## 2024-03-28 PROCEDURE — 87651 STREP A DNA AMP PROBE: CPT | Performed by: STUDENT IN AN ORGANIZED HEALTH CARE EDUCATION/TRAINING PROGRAM

## 2024-03-28 PROCEDURE — 93010 ELECTROCARDIOGRAM REPORT: CPT | Mod: ,,, | Performed by: GENERAL PRACTICE

## 2024-03-28 RX ORDER — AMOXICILLIN AND CLAVULANATE POTASSIUM 875; 125 MG/1; MG/1
1 TABLET, FILM COATED ORAL 2 TIMES DAILY
Qty: 10 TABLET | Refills: 0 | Status: SHIPPED | OUTPATIENT
Start: 2024-03-28 | End: 2024-04-02

## 2024-03-28 RX ORDER — DOXYCYCLINE HYCLATE 100 MG
100 TABLET ORAL
Status: COMPLETED | OUTPATIENT
Start: 2024-03-28 | End: 2024-03-28

## 2024-03-28 RX ORDER — DOXYCYCLINE 100 MG/1
100 CAPSULE ORAL 2 TIMES DAILY
Qty: 10 CAPSULE | Refills: 0 | Status: SHIPPED | OUTPATIENT
Start: 2024-03-28 | End: 2024-04-02

## 2024-03-28 RX ORDER — AMOXICILLIN AND CLAVULANATE POTASSIUM 875; 125 MG/1; MG/1
1 TABLET, FILM COATED ORAL
Status: COMPLETED | OUTPATIENT
Start: 2024-03-28 | End: 2024-03-28

## 2024-03-28 RX ORDER — ACETAMINOPHEN 500 MG
1000 TABLET ORAL
Status: COMPLETED | OUTPATIENT
Start: 2024-03-28 | End: 2024-03-28

## 2024-03-28 RX ADMIN — ACETAMINOPHEN 1000 MG: 500 TABLET, COATED ORAL at 02:03

## 2024-03-28 RX ADMIN — AMOXICILLIN AND CLAVULANATE POTASSIUM 1 TABLET: 875; 125 TABLET, FILM COATED ORAL at 02:03

## 2024-03-28 RX ADMIN — DOXYCYCLINE HYCLATE 100 MG: 100 TABLET, COATED ORAL at 02:03

## 2024-03-28 NOTE — Clinical Note
"Monica "Monica" Alex was seen and treated in our emergency department on 3/27/2024.  He may return to work on 04/02/2024.       If you have any questions or concerns, please don't hesitate to call.      Beckie DURAN    "

## 2024-03-28 NOTE — DISCHARGE INSTRUCTIONS
Follow up primary care physician 1 week for repeat evaluation and return to ED for any worsening symptoms

## 2024-03-28 NOTE — ED PROVIDER NOTES
Encounter Date: 3/27/2024       History     Chief Complaint   Patient presents with    Fever    Chills    Generalized Body Aches    Chest Pain    Cough    Sore Throat     48-year-old male presents with multiple symptoms.  Symptoms include cough congestion, body aches and chills.  Ongoing for the last 2 days roughly.  No known sick exposure and multiple comorbidities.  No active chest pain or shortness a breath.  Significant other bedside also provides history    The history is provided by the patient and a significant other.     Review of patient's allergies indicates:  No Known Allergies  Past Medical History:   Diagnosis Date    ADHD (attention deficit hyperactivity disorder)     Allergy     Anxiety 1/25/2016    Arthritis 2014    Osteoarthritis    CHF (congestive heart failure)     Degenerative disc disease at L5-S1 level     Depression 1/25/2016    Hypertension     Sciatica of left side 1/31/2017     Past Surgical History:   Procedure Laterality Date    CALCANEAL OSTEOTOMY Left 04/16/2019    Procedure: OSTEOTOMY, CALCANEUS;  Surgeon: Rober Colon DPM;  Location: [facility];  Service: Podiatry;  Laterality: Left;    CIRCUMCISION      FOOT SURGERY      INJECTION OF ANESTHETIC AGENT AROUND MEDIAL BRANCH NERVES INNERVATING CERVICAL FACET JOINT Bilateral 11/15/2021    Procedure: Block-nerve-medial branch-cervical bilateral C5, 6,7 RN IV sedation;  Surgeon: Ivan Davey MD;  Location: [facility];  Service: Pain Management;  Laterality: Bilateral;    INJECTION OF ANESTHETIC AGENT AROUND MEDIAL BRANCH NERVES INNERVATING LUMBAR FACET JOINT Bilateral 03/07/2022    Procedure: bilateral L3-L5 MBB;  Surgeon: Ivan Davey MD;  Location: [facility] PAIN MGT;  Service: Pain Management;  Laterality: Bilateral;    INJECTION OF ANESTHETIC AGENT AROUND MEDIAL BRANCH NERVES INNERVATING LUMBAR FACET JOINT Bilateral 3/22/2023    Procedure: Bilateral L3-5 MBB RN IV Sedation;  Surgeon: Lyudmila Leung MD;  Location: [facility] PAIN T;  Service:  Pain Management;  Laterality: Bilateral;    INJECTION OF ANESTHETIC AGENT INTO SACROILIAC JOINT Right 11/24/2020    Procedure: Right BLOCK, SACROILIAC JOINT and Right Knee Injection with RN IV sedation;  Surgeon: Ivan Davey MD;  Location: [facility] PAIN MGT;  Service: Pain Management;  Laterality: Right;    INJECTION OF ANESTHETIC AGENT INTO SACROILIAC JOINT Right 04/16/2021    Procedure: Right BLOCK, SACROILIAC JOINT RIght Hip Right GTB RN IV sedation;  Surgeon: Ivan Davey MD;  Location: [facility] PAIN MGT;  Service: Pain Management;  Laterality: Right;    INJECTION OF ANESTHETIC AGENT INTO SACROILIAC JOINT Right 5/10/2023    Procedure: Bilateral SIJ + right IA hip Injection RN IV Sedation;  Surgeon: Lyudmila Leung MD;  Location: [facility] PAIN MGT;  Service: Pain Management;  Laterality: Right;    INJECTION OF JOINT Right 3/8/2023    Procedure: Right Knee injection with steroid RN IV Sedation;  Surgeon: Lyudmila Leung MD;  Location: [facility] PAIN MGT;  Service: Pain Management;  Laterality: Right;    INJECTION OF JOINT Right 5/10/2023    Procedure: Bilateral SIJ + right IA hip Injection;  Surgeon: Lyudmila Leung MD;  Location: [facility] PAIN MGT;  Service: Pain Management;  Laterality: Right;    LENGTHENING OF ACHILLES TENDON Left 04/16/2019    Procedure: LENGTHENING, TENDON, ACHILLES;  Surgeon: Rober Colon DPM;  Location: [facility] OR;  Service: Podiatry;  Laterality: Left;    PLACEMENT OF ACELLULAR HUMAN DERMAL ALLOGRAFT Left 02/17/2021    Procedure: APPLICATION, ACELLULAR HUMAN DERMAL ALLOGRAFT;  Surgeon: Rober Colon DPM;  Location: [facility] OR;  Service: Podiatry;  Laterality: Left;    REPAIR OF POSTERIOR TIBIALIS TENDON Left 04/16/2019    Procedure: REPAIR, TENDON, TIBIALIS POSTERIOR;  Surgeon: Rober Colon DPM;  Location: [facility] OR;  Service: Podiatry;  Laterality: Left;    REPAIR OF TENDON OF LOWER EXTREMITY Left 02/17/2021    Procedure: REPAIR, TENDON, LOWER EXTREMITY;  Surgeon: Rober Colon DPM;  Location: [facility] OR;   Service: Podiatry;  Laterality: Left;    TENDON TRANSFER Left 04/16/2019    Procedure: TRANSFER, TENDON;  Surgeon: Rober Colon DPM;  Location: [facility];  Service: Podiatry;  Laterality: Left;     Family History   Problem Relation Age of Onset    Cancer Mother         breast    Hypertension Mother     Cataracts Mother     Arthritis Mother     Depression Mother     Diabetes Mother     Hearing loss Mother     Hyperlipidemia Mother     Miscarriages / Stillbirths Mother         stillbirth    Hypertension Maternal Grandmother     Cancer Maternal Grandmother         ovarian    Mental illness Maternal Grandmother     Stroke Maternal Grandmother     Glaucoma Father     Cancer Father         prostrate    Vision loss Father         glaucoma    Asthma Maternal Grandfather     Cancer Maternal Aunt         breast    Heart disease Maternal Aunt         tripple bypass    Stroke Maternal Aunt     Diabetes Maternal Aunt     Hyperlipidemia Maternal Aunt     Hypertension Maternal Aunt     Stroke Maternal Aunt     Diabetes Maternal Aunt     Hyperlipidemia Maternal Aunt     Hypertension Maternal Aunt      Social History     Tobacco Use    Smoking status: Every Day     Current packs/day: 0.50     Average packs/day: 0.5 packs/day for 34.2 years (17.1 ttl pk-yrs)     Types: Cigarettes     Start date: 1990     Passive exposure: Never    Smokeless tobacco: Never    Tobacco comments:     HOLD MIDNIGHT TO SURGERY   Substance Use Topics    Alcohol use: Yes     Alcohol/week: 1.0 standard drink of alcohol     Types: 1 Drinks containing 0.5 oz of alcohol per week     Comment: socially: HOLD 72HRS PRIOR TO SURGERY    Drug use: Yes     Types: Marijuana     Comment: HOLD TODAY PRIOR TO SURGERY     Review of Systems   All other systems reviewed and are negative.      Physical Exam     Initial Vitals [03/27/24 2352]   BP Pulse Resp Temp SpO2   130/73 82 18 99.6 °F (37.6 °C) 97 %      MAP       --         Physical Exam    Nursing note and vitals  reviewed.  Constitutional: Vital signs are normal.  Non-toxic appearance. No distress.   HENT:   Head: Normocephalic.   Eyes: No scleral icterus.   Cardiovascular:  Regular rhythm.           Pulmonary/Chest: No stridor. No respiratory distress.   Intermittent coughing, oxygen 97% on room air, rhonchi bilaterally Bilateral chest rise   Abdominal: There is no guarding.   Musculoskeletal:         General: No tenderness.      Cervical back: No rigidity.      Comments: No lower extremity limb swelling     Neurological: He is alert.   No associated focal motor or sensory deficit   Skin: Skin is warm and dry. No rash noted.   Psychiatric: His speech is normal. He is not actively hallucinating.   Not anxious  or agitated         ED Course   Procedures  Labs Reviewed   INFLUENZA A & B BY MOLECULAR   GROUP A STREP, MOLECULAR   SARS-COV-2 RNA AMPLIFICATION, QUAL          Imaging Results              X-Ray Chest PA And Lateral (In process)                      Medications   acetaminophen tablet 1,000 mg (1,000 mg Oral Given 3/28/24 0215)   amoxicillin-clavulanate 875-125mg per tablet 1 tablet (1 tablet Oral Given 3/28/24 0245)   doxycycline tablet 100 mg (100 mg Oral Given 3/28/24 0245)     Medical Decision Making  48-year-old male presents with cough, sore throat, congestion body aches and chills.  Within differential diagnosis includes viral syndrome, viral tested negative.  Strep test negative.  Chest x-ray obtained with perihilar infiltrates per my read.  Multiple comorbidities, do not suspect any exacerbation of chronic comorbidities requiring hospitalization or further workup at this time.  We will treat with oral antibiotics Augmentin and doxycycline, known allergies.  Patient administered Tylenol for supportive care for body aches.    Amount and/or Complexity of Data Reviewed  Labs:  Decision-making details documented in ED Course.  Radiology: ordered and independent interpretation performed.     Details: Perihilar  infiltrate per my read  ECG/medicine tests: ordered and independent interpretation performed.     Details: Rate 68, , , normal sinus rhythm, no STEMI    Risk  OTC drugs.  Prescription drug management.               ED Course as of 03/28/24 0311   Thu Mar 28, 2024   0205 Group A Strep, Molecular: Negative [KB]   0205 Influenza A, Molecular: Negative [KB]   0205 Influenza B, Molecular: Negative [KB]   0205 SARS-CoV-2 RNA, Amplification, Qual: Negative [KB]   0243 Patient requested dose of oral antibiotics here in ED as he is traveling out of town to get his antibiotics filled [KB]      ED Course User Index  [KB] Austen Morgan Jr.,                            Clinical Impression:  Final diagnoses:  [R05.9] Cough  [J98.9] Respiratory illness (Primary)          ED Disposition Condition    Discharge Stable          ED Prescriptions       Medication Sig Dispense Start Date End Date Auth. Provider    amoxicillin-clavulanate 875-125mg (AUGMENTIN) 875-125 mg per tablet Take 1 tablet by mouth 2 (two) times daily. for 5 days 10 tablet 3/28/2024 4/2/2024 Austen Morgan Jr., DO    doxycycline (VIBRAMYCIN) 100 MG Cap Take 1 capsule (100 mg total) by mouth 2 (two) times daily. for 5 days 10 capsule 3/28/2024 4/2/2024 Austen Morgan Jr., DO          Follow-up Information    None          Austen Morgan Jr.,   03/28/24 0244       Austen Morgan Jr., DO  03/28/24 0311

## 2024-03-30 NOTE — TELEPHONE ENCOUNTER
Care Due:                  Date            Visit Type   Department     Provider  --------------------------------------------------------------------------------                                EP -                              PRIMARY      [facility]  Last Visit: 02-      CARE ([facility])   MEDICINE       Kenya Escoto  Next Visit: None Scheduled  None         None Found                                                            Last  Test          Frequency    Reason                     Performed    Due Date  --------------------------------------------------------------------------------    Office Visit  15 months..  metoprolol...............  02- 05-    [facility] Embedded Care Due Messages. Reference number: [id number].   3/30/2024 5:37:24 AM CDT

## 2024-04-01 RX ORDER — METOPROLOL SUCCINATE 25 MG/1
25 TABLET, EXTENDED RELEASE ORAL
Qty: 90 TABLET | Refills: 0 | Status: SHIPPED | OUTPATIENT
Start: 2024-04-01 | End: 2024-05-03 | Stop reason: SDUPTHER

## 2024-05-03 ENCOUNTER — OFFICE VISIT (OUTPATIENT)
Dept: INTERNAL MEDICINE | Facility: CLINIC | Age: 49
End: 2024-05-03
Payer: COMMERCIAL

## 2024-05-03 ENCOUNTER — LAB VISIT (OUTPATIENT)
Dept: LAB | Facility: HOSPITAL | Age: 49
End: 2024-05-03
Payer: COMMERCIAL

## 2024-05-03 VITALS
HEART RATE: 90 BPM | BODY MASS INDEX: 28.25 KG/M2 | OXYGEN SATURATION: 98 % | WEIGHT: 208.56 LBS | TEMPERATURE: 99 F | DIASTOLIC BLOOD PRESSURE: 88 MMHG | HEIGHT: 72 IN | SYSTOLIC BLOOD PRESSURE: 150 MMHG

## 2024-05-03 DIAGNOSIS — I10 ESSENTIAL HYPERTENSION: ICD-10-CM

## 2024-05-03 DIAGNOSIS — I25.118 ATHEROSCLEROTIC HEART DISEASE OF NATIVE CORONARY ARTERY WITH OTHER FORMS OF ANGINA PECTORIS: ICD-10-CM

## 2024-05-03 DIAGNOSIS — K21.9 GASTROESOPHAGEAL REFLUX DISEASE, UNSPECIFIED WHETHER ESOPHAGITIS PRESENT: ICD-10-CM

## 2024-05-03 DIAGNOSIS — F43.9 STRESS: ICD-10-CM

## 2024-05-03 DIAGNOSIS — K76.0 FATTY LIVER: ICD-10-CM

## 2024-05-03 DIAGNOSIS — N40.0 BENIGN PROSTATIC HYPERPLASIA, UNSPECIFIED WHETHER LOWER URINARY TRACT SYMPTOMS PRESENT: ICD-10-CM

## 2024-05-03 DIAGNOSIS — Z00.00 ANNUAL PHYSICAL EXAM: ICD-10-CM

## 2024-05-03 DIAGNOSIS — I50.9 CONGESTIVE HEART FAILURE, UNSPECIFIED HF CHRONICITY, UNSPECIFIED HEART FAILURE TYPE: ICD-10-CM

## 2024-05-03 DIAGNOSIS — M47.816 LUMBAR FACET ARTHROPATHY: ICD-10-CM

## 2024-05-03 DIAGNOSIS — E78.1 HIGH TRIGLYCERIDES: ICD-10-CM

## 2024-05-03 DIAGNOSIS — I42.8 NON-ISCHEMIC CARDIOMYOPATHY: ICD-10-CM

## 2024-05-03 DIAGNOSIS — Z00.00 ANNUAL PHYSICAL EXAM: Primary | ICD-10-CM

## 2024-05-03 LAB
ALBUMIN SERPL BCP-MCNC: 4.4 G/DL (ref 3.5–5.2)
ALP SERPL-CCNC: 61 U/L (ref 55–135)
ALT SERPL W/O P-5'-P-CCNC: 54 U/L (ref 10–44)
ANION GAP SERPL CALC-SCNC: 9 MMOL/L (ref 8–16)
AST SERPL-CCNC: 42 U/L (ref 10–40)
BASOPHILS # BLD AUTO: 0.11 K/UL (ref 0–0.2)
BASOPHILS NFR BLD: 1 % (ref 0–1.9)
BILIRUB SERPL-MCNC: 0.4 MG/DL (ref 0.1–1)
BNP SERPL-MCNC: <10 PG/ML (ref 0–99)
BUN SERPL-MCNC: 11 MG/DL (ref 6–20)
CALCIUM SERPL-MCNC: 10.2 MG/DL (ref 8.7–10.5)
CHLORIDE SERPL-SCNC: 104 MMOL/L (ref 95–110)
CHOLEST SERPL-MCNC: 140 MG/DL (ref 120–199)
CHOLEST/HDLC SERPL: 3.3 {RATIO} (ref 2–5)
CO2 SERPL-SCNC: 29 MMOL/L (ref 23–29)
COMPLEXED PSA SERPL-MCNC: 2.3 NG/ML (ref 0–4)
CREAT SERPL-MCNC: 1.2 MG/DL (ref 0.5–1.4)
DIFFERENTIAL METHOD BLD: ABNORMAL
EOSINOPHIL # BLD AUTO: 0.3 K/UL (ref 0–0.5)
EOSINOPHIL NFR BLD: 2.7 % (ref 0–8)
ERYTHROCYTE [DISTWIDTH] IN BLOOD BY AUTOMATED COUNT: 15.4 % (ref 11.5–14.5)
EST. GFR  (NO RACE VARIABLE): >60 ML/MIN/1.73 M^2
ESTIMATED AVG GLUCOSE: 111 MG/DL (ref 68–131)
GLUCOSE SERPL-MCNC: 90 MG/DL (ref 70–110)
HBA1C MFR BLD: 5.5 % (ref 4–5.6)
HCT VFR BLD AUTO: 45.5 % (ref 40–54)
HDLC SERPL-MCNC: 43 MG/DL (ref 40–75)
HDLC SERPL: 30.7 % (ref 20–50)
HGB BLD-MCNC: 14.8 G/DL (ref 14–18)
IMM GRANULOCYTES # BLD AUTO: 0.03 K/UL (ref 0–0.04)
IMM GRANULOCYTES NFR BLD AUTO: 0.3 % (ref 0–0.5)
INR PPP: 1 (ref 0.8–1.2)
LDLC SERPL CALC-MCNC: 78.8 MG/DL (ref 63–159)
LYMPHOCYTES # BLD AUTO: 3.3 K/UL (ref 1–4.8)
LYMPHOCYTES NFR BLD: 30.7 % (ref 18–48)
MCH RBC QN AUTO: 28.5 PG (ref 27–31)
MCHC RBC AUTO-ENTMCNC: 32.5 G/DL (ref 32–36)
MCV RBC AUTO: 88 FL (ref 82–98)
MONOCYTES # BLD AUTO: 0.6 K/UL (ref 0.3–1)
MONOCYTES NFR BLD: 5.6 % (ref 4–15)
NEUTROPHILS # BLD AUTO: 6.5 K/UL (ref 1.8–7.7)
NEUTROPHILS NFR BLD: 59.7 % (ref 38–73)
NONHDLC SERPL-MCNC: 97 MG/DL
NRBC BLD-RTO: 0 /100 WBC
PLATELET # BLD AUTO: 286 K/UL (ref 150–450)
PMV BLD AUTO: 11.5 FL (ref 9.2–12.9)
POTASSIUM SERPL-SCNC: 4.1 MMOL/L (ref 3.5–5.1)
PROT SERPL-MCNC: 7.9 G/DL (ref 6–8.4)
PROTHROMBIN TIME: 10.5 SEC (ref 9–12.5)
RBC # BLD AUTO: 5.19 M/UL (ref 4.6–6.2)
SODIUM SERPL-SCNC: 142 MMOL/L (ref 136–145)
TRIGL SERPL-MCNC: 91 MG/DL (ref 30–150)
WBC # BLD AUTO: 10.82 K/UL (ref 3.9–12.7)

## 2024-05-03 PROCEDURE — 83036 HEMOGLOBIN GLYCOSYLATED A1C: CPT

## 2024-05-03 PROCEDURE — 3077F SYST BP >= 140 MM HG: CPT | Mod: CPTII,S$GLB,,

## 2024-05-03 PROCEDURE — 80053 COMPREHEN METABOLIC PANEL: CPT

## 2024-05-03 PROCEDURE — 83880 ASSAY OF NATRIURETIC PEPTIDE: CPT

## 2024-05-03 PROCEDURE — 80061 LIPID PANEL: CPT

## 2024-05-03 PROCEDURE — 85025 COMPLETE CBC W/AUTO DIFF WBC: CPT

## 2024-05-03 PROCEDURE — 36415 COLL VENOUS BLD VENIPUNCTURE: CPT

## 2024-05-03 PROCEDURE — 84153 ASSAY OF PSA TOTAL: CPT

## 2024-05-03 PROCEDURE — 4010F ACE/ARB THERAPY RXD/TAKEN: CPT | Mod: CPTII,S$GLB,,

## 2024-05-03 PROCEDURE — 3079F DIAST BP 80-89 MM HG: CPT | Mod: CPTII,S$GLB,,

## 2024-05-03 PROCEDURE — 3008F BODY MASS INDEX DOCD: CPT | Mod: CPTII,S$GLB,,

## 2024-05-03 PROCEDURE — 99396 PREV VISIT EST AGE 40-64: CPT | Mod: S$GLB,,,

## 2024-05-03 PROCEDURE — 1159F MED LIST DOCD IN RCRD: CPT | Mod: CPTII,S$GLB,,

## 2024-05-03 PROCEDURE — 85610 PROTHROMBIN TIME: CPT

## 2024-05-03 PROCEDURE — 1160F RVW MEDS BY RX/DR IN RCRD: CPT | Mod: CPTII,S$GLB,,

## 2024-05-03 PROCEDURE — 99999 PR PBB SHADOW E&M-EST. PATIENT-LVL IV: CPT | Mod: PBBFAC,,,

## 2024-05-03 RX ORDER — ATORVASTATIN CALCIUM 20 MG/1
20 TABLET, FILM COATED ORAL NIGHTLY
Qty: 90 TABLET | Refills: 3 | Status: SHIPPED | OUTPATIENT
Start: 2024-05-03

## 2024-05-03 RX ORDER — TAMSULOSIN HYDROCHLORIDE 0.4 MG/1
0.4 CAPSULE ORAL DAILY
Qty: 90 CAPSULE | Refills: 3 | Status: SHIPPED | OUTPATIENT
Start: 2024-05-03

## 2024-05-03 RX ORDER — BUSPIRONE HYDROCHLORIDE 30 MG/1
30 TABLET ORAL 2 TIMES DAILY
Qty: 60 TABLET | Refills: 1 | Status: SHIPPED | OUTPATIENT
Start: 2024-05-03

## 2024-05-03 RX ORDER — TRAZODONE HYDROCHLORIDE 100 MG/1
TABLET ORAL
Qty: 90 TABLET | Refills: 3 | Status: SHIPPED | OUTPATIENT
Start: 2024-05-03

## 2024-05-03 RX ORDER — PANTOPRAZOLE SODIUM 40 MG/1
40 TABLET, DELAYED RELEASE ORAL DAILY
Qty: 90 TABLET | Refills: 3 | Status: SHIPPED | OUTPATIENT
Start: 2024-05-03 | End: 2025-05-03

## 2024-05-03 RX ORDER — TELMISARTAN 40 MG/1
40 TABLET ORAL DAILY
Qty: 90 TABLET | Refills: 3 | Status: SHIPPED | OUTPATIENT
Start: 2024-05-03

## 2024-05-03 RX ORDER — MELOXICAM 15 MG/1
15 TABLET ORAL DAILY
Qty: 90 TABLET | Refills: 3 | Status: SHIPPED | OUTPATIENT
Start: 2024-05-03

## 2024-05-03 RX ORDER — GABAPENTIN 300 MG/1
CAPSULE ORAL
Qty: 180 CAPSULE | Refills: 5 | Status: SHIPPED | OUTPATIENT
Start: 2024-05-03 | End: 2024-05-24 | Stop reason: SDUPTHER

## 2024-05-03 RX ORDER — METOPROLOL SUCCINATE 25 MG/1
25 TABLET, EXTENDED RELEASE ORAL DAILY
Qty: 90 TABLET | Refills: 3 | Status: SHIPPED | OUTPATIENT
Start: 2024-05-03

## 2024-05-03 NOTE — PROGRESS NOTES
Monica Johnson Alex  05/03/2024  [id number]    Kenya Escoto MD  Patient Care Team:  Kenya Escoto MD as PCP - General (Family Medicine)  Barbara Moore LPN as Care Coordinator (Internal Medicine)  Kenya Escoto MD (Family Medicine)  Elisa Chao RD (Inactive) as Dietitian (Nutrition)  Jian Foster as Digital Medicine Health   Bazin, Tkeyah, PharmD as Hypertension Digital Medicine Clinician  Kenya Escoto MD as Hypertension Digital Medicine Responsible Provider (Family Medicine)          Visit Type:a scheduled routine follow-up visit    Chief Complaint:  Chief Complaint   Patient presents with    Annual Exam       History of Present Illness:    Changed insurance recently  Unable to take some of his medicine     CAD  Last appt with cards May 2023 with dr. Coronado      Fatty liver  Last appt Jan 2024  Followed by hepatology  Working on diet  Stable without new complaints.   Will get updated labs  If labs are stable, will plan for yearly labs, US and visit with repeat Fibroscan every 2-3 years  Continue with weight loss with improved nutrition    History:  Past Medical History:   Diagnosis Date    ADHD (attention deficit hyperactivity disorder)     Allergy     Anxiety 1/25/2016    Arthritis 2014    Osteoarthritis    CHF (congestive heart failure)     Degenerative disc disease at L5-S1 level     Depression 1/25/2016    Hypertension     Sciatica of left side 1/31/2017     Past Surgical History:   Procedure Laterality Date    CALCANEAL OSTEOTOMY Left 04/16/2019    Procedure: OSTEOTOMY, CALCANEUS;  Surgeon: Rober Colon DPM;  Location: [facility] OR;  Service: Podiatry;  Laterality: Left;    CIRCUMCISION      FOOT SURGERY      INJECTION OF ANESTHETIC AGENT AROUND MEDIAL BRANCH NERVES INNERVATING CERVICAL FACET JOINT Bilateral 11/15/2021    Procedure: Block-nerve-medial branch-cervical bilateral C5, 6,7 RN IV sedation;  Surgeon: Ivan Davey MD;  Location: [facility] PAIN MGT;  Service: Pain  Management;  Laterality: Bilateral;    INJECTION OF ANESTHETIC AGENT AROUND MEDIAL BRANCH NERVES INNERVATING LUMBAR FACET JOINT Bilateral 03/07/2022    Procedure: bilateral L3-L5 MBB;  Surgeon: Ivan Davey MD;  Location: [facility] PAIN [facility]GT;  Service: Pain Management;  Laterality: Bilateral;    INJECTION OF ANESTHETIC AGENT AROUND MEDIAL BRANCH NERVES INNERVATING LUMBAR FACET JOINT Bilateral 3/22/2023    Procedure: Bilateral L3-5 MBB RN IV Sedation;  Surgeon: Lyudmila Leung MD;  Location: [facility] PAIN [facility]GT;  Service: Pain Management;  Laterality: Bilateral;    INJECTION OF ANESTHETIC AGENT INTO SACROILIAC JOINT Right 11/24/2020    Procedure: Right BLOCK, SACROILIAC JOINT and Right Knee Injection with RN IV sedation;  Surgeon: Ivan Davey MD;  [facility];  Service: Pain Management;  Laterality: Right;    INJECTION OF ANESTHETIC AGENT INTO SACROILIAC JOINT Right 04/16/2021    Procedure: Right BLOCK, SACROILIAC JOINT RIght Hip Right GTB RN IV sedation;  Surgeon: Ivan Davey MD;  Location: [facility]IN [facility]GT;  Service: Pain Management;  Laterality: Right;    INJECTION OF ANESTHETIC AGENT INTO SACROILIAC JOINT Right 5/10/2023    Procedure: Bilateral SIJ + right IA hip Injection RN IV Sedation;  Surgeon: Lyudmila Leung MD;  Location: [facility] PAIN MGT;  Service: Pain Management;  Laterality: Right;    INJECTION OF JOINT Right 3/8/2023    Procedure: Right Knee injection with steroid RN IV Sedation;  Surgeon: Lyudmila Leung MD;  [facility];  Service: Pain Management;  Laterality: Right;    INJECTION OF JOINT Right 5/10/2023    Procedure: Bilateral SIJ + right IA hip Injection;  Surgeon: Lyudmila Leung MD;  Location: [facility]IN [facility]GT;  Service: Pain Management;  Laterality: Right;    LENGTHENING OF ACHILLES TENDON Left 04/16/2019    Procedure: LENGTHENING, TENDON, ACHILLES;  Surgeon: Rober Colon DPM;  Location: [facility] OR;  Service: Podiatry;  Laterality: Left;    PLACEMENT OF ACELLULAR HUMAN DERMAL ALLOGRAFT Left  02/17/2021    Procedure: APPLICATION, ACELLULAR HUMAN DERMAL ALLOGRAFT;  Surgeon: Rober Colon DPM;  Location: [facility] OR;  Service: Podiatry;  Laterality: Left;    REPAIR OF POSTERIOR TIBIALIS TENDON Left 04/16/2019    Procedure: REPAIR, TENDON, TIBIALIS POSTERIOR;  Surgeon: Rober Colon DPM;  Location: [facility] OR;  Service: Podiatry;  Laterality: Left;    REPAIR OF TENDON OF LOWER EXTREMITY Left 02/17/2021    Procedure: REPAIR, TENDON, LOWER EXTREMITY;  Surgeon: Rober Colon DPM;  Location: [facility] OR;  Service: Podiatry;  Laterality: Left;    TENDON TRANSFER Left 04/16/2019    Procedure: TRANSFER, TENDON;  Surgeon: Rober Colon DPM;  Location: [facility] OR;  Service: Podiatry;  Laterality: Left;     Family History   Problem Relation Name Age of Onset    Cancer Mother Yaquelin Alex         breast    Hypertension Mother Yaquelincarolina Johnson     Cataracts Mother Yaquelin Alex     Arthritis Mother Yaquelin Alex     Depression Mother Yaquelin Alex     Diabetes Mother Yaquelin Alex     Hearing loss Mother Yaquelin Alex     Hyperlipidemia Mother Yaquelincarolina Johnson     Miscarriages / Stillbirths Mother Yaquelinnakia Johnson         stillbirth    Hypertension Maternal Grandmother Sherice Alex     Cancer Maternal Grandmother Sherice Alex         ovarian    Mental illness Maternal Grandmother Sherice Alex     Stroke Maternal Grandmother Sherice Alex     Glaucoma Father Wil Mcmahon Sr.     Cancer Father Wil Mcmahon Sr.         prostrate    Vision loss Father Wil Mcmahon Sr.         glaucoma    Asthma Maternal Grandfather Mandeep Johnson Sr.     Cancer Maternal Aunt Andra Mcgowanlion         breast    Heart disease Maternal Aunt Andra Cifuentes         tripple bypass    Stroke Maternal Aunt Andra Cifuentes     Diabetes Maternal Aunt Carrol Javon     Hyperlipidemia Maternal Aunt Carrol Javon     Hypertension Maternal Aunt Carrol Javon     Stroke Maternal Aunt Carrol Javon     Diabetes Maternal Aunt Jessica Alex     Hyperlipidemia Maternal Aunt  Jessica Johnson     Hypertension Maternal Aunt Kenyatta Li      Social History     Socioeconomic History    Marital status: Legally    Tobacco Use    Smoking status: Every Day     Current packs/day: 0.50     Average packs/day: 0.5 packs/day for 34.3 years (17.2 ttl pk-yrs)     Types: Cigarettes     Start date: 1990     Passive exposure: Never    Smokeless tobacco: Never    Tobacco comments:     HOLD MIDNIGHT TO SURGERY   Substance and Sexual Activity    Alcohol use: Yes     Alcohol/week: 1.0 standard drink of alcohol     Types: 1 Drinks containing 0.5 oz of alcohol per week     Comment: socially: HOLD 72HRS PRIOR TO SURGERY    Drug use: Yes     Types: Marijuana     Comment: HOLD TODAY PRIOR TO SURGERY    Sexual activity: Yes     Partners: Female   Social History Narrative    ** Merged History Encounter **          Social Determinants of Health     Financial Resource Strain: High Risk (8/19/2023)    Overall Financial Resource Strain (CARDIA)     Difficulty of Paying Living Expenses: Hard   Food Insecurity: Food Insecurity Present (8/19/2023)    Hunger Vital Sign     Worried About Running Out of Food in the Last Year: Sometimes true     Ran Out of Food in the Last Year: Sometimes true   Transportation Needs: Unmet Transportation Needs (8/19/2023)    PRAPARE - Transportation     Lack of Transportation (Medical): Yes     Lack of Transportation (Non-Medical): Yes   Physical Activity: Inactive (8/19/2023)    Exercise Vital Sign     Days of Exercise per Week: 2 days     Minutes of Exercise per Session: 0 min   Stress: Stress Concern Present (8/19/2023)    [employer] [place] [employer] - Occupational Stress Questionnaire     Feeling of Stress : Rather much   Housing Stability: High Risk (8/19/2023)    Housing Stability Vital Sign     Unable to Pay for Housing in the Last Year: Yes     Number of Places Lived in the Last Year: 2     Unstable Housing in the Last Year: No     Patient Active Problem List    Diagnosis    Depression    Intermittent explosive disorder    Sciatica of left side    Osteoarthritis    Essential hypertension    Elevated liver function tests    Chondromalacia of right knee    Fatty liver    Tobacco use disorder    Non-ischemic cardiomyopathy    Coronary artery disease involving native coronary artery of native heart without angina pectoris    Sacroiliac joint dysfunction    Atherosclerotic heart disease of native coronary artery with other forms of angina pectoris    Family history of prostate cancer in father    Chronic pain of right knee    Osteoarthritis of right knee    High triglycerides    Lumbar spondylosis     Review of patient's allergies indicates:  No Known Allergies    The following were reviewed at this visit: active problem list, medication list, allergies, family history, social history, and health maintenance.    Medications:  Current Outpatient Medications on File Prior to Visit   Medication Sig Dispense Refill    aspirin (ECOTRIN) 81 MG EC tablet Take 81 mg by mouth once daily.      atorvastatin (LIPITOR) 20 MG tablet Take 1 tablet (20 mg total) by mouth every evening. 90 tablet 3    b complex vitamins capsule Take 1 capsule by mouth once daily.      cetirizine (ZYRTEC) 10 MG tablet Take 10 mg by mouth once daily.      cholecalciferol, vitamin D3, 125 mcg (5,000 unit) Tab Take 5,000 Units by mouth once daily.      gabapentin (NEURONTIN) 300 MG capsule Take 1 capsule (300 mg total) by mouth 2 (two) times daily AND 4 capsules (1,200 mg total) every evening. TAKE ONE CAPSULE BY MOUTH TWICE DAILY AND 4 CAPSULES EVERY EVENING. 180 capsule 5    meloxicam (MOBIC) 15 MG tablet Take 1 tablet (15 mg total) by mouth once daily. 30 tablet 3    metoprolol succinate (TOPROL-XL) 25 MG 24 hr tablet Take 1 tablet by mouth once daily 90 tablet 0    multivitamin capsule Take 1 capsule by mouth once daily.      omega-3 fatty acids/fish oil (FISH OIL-OMEGA-3 FATTY ACIDS) 300-1,000 mg capsule Take  by mouth once daily.      pantoprazole (PROTONIX) 40 MG tablet Take 1 tablet (40 mg total) by mouth once daily. 90 tablet 3    tamsulosin (FLOMAX) 0.4 mg Cap Take 1 capsule (0.4 mg total) by mouth once daily. 90 capsule 3    telmisartan (MICARDIS) 40 MG Tab Take 1 tablet by mouth once daily 30 tablet 0    traZODone (DESYREL) 100 MG tablet Take 1/2 to 1 tablet at bedtime as needed for sleep. 30 tablet 3    TUMERIC-GING-OLIVE-OREG-CAPRYL ORAL Take by mouth.      busPIRone (BUSPAR) 30 MG Tab Take 1 tablet by mouth twice daily 60 tablet 1     No current facility-administered medications on file prior to visit.       Medications have been reviewed and reconciled with patient at this visit.  Barriers to medications reviewed with patient.    Adverse reactions to current medications reviewed with patient..    Over the counter medications reviewed and reconciled with patient.    Exam:  Wt Readings from Last 3 Encounters:   05/03/24 94.6 kg (208 lb 8.9 oz)   03/27/24 90.5 kg (199 lb 8.3 oz)   01/24/24 103 kg (227 lb)     Temp Readings from Last 3 Encounters:   05/03/24 98.7 °F (37.1 °C) (Tympanic)   03/28/24 99.6 °F (37.6 °C)   08/22/23 98.2 °F (36.8 °C) (Tympanic)     BP Readings from Last 3 Encounters:   05/03/24 (!) 150/88   03/27/24 130/73   08/28/23 113/77     Pulse Readings from Last 3 Encounters:   05/03/24 90   03/27/24 82   08/28/23 77     Body mass index is 28.29 kg/m².      Review of Systems   Respiratory:  Negative for shortness of breath.    Cardiovascular:  Negative for chest pain and palpitations.   Musculoskeletal:  Positive for back pain.   Psychiatric/Behavioral:  The patient is nervous/anxious.      Physical Exam  Vitals and nursing note reviewed.   Constitutional:       General: He is not in acute distress.     Appearance: He is well-developed. He is not diaphoretic.   HENT:      Head: Normocephalic and atraumatic.      Right Ear: External ear normal.      Left Ear: External ear normal.   Eyes:       General:         Right eye: No discharge.         Left eye: No discharge.      Conjunctiva/sclera: Conjunctivae normal.      Pupils: Pupils are equal, round, and reactive to light.   Cardiovascular:      Rate and Rhythm: Normal rate and regular rhythm.      Heart sounds: Normal heart sounds. No murmur heard.  Pulmonary:      Effort: Pulmonary effort is normal. No respiratory distress.      Breath sounds: Normal breath sounds. No wheezing.   Abdominal:      General: Bowel sounds are normal.   Musculoskeletal:         General: Tenderness present.   Neurological:      Mental Status: He is alert and oriented to person, place, and time.   Psychiatric:         Behavior: Behavior normal.         Thought Content: Thought content normal.         Judgment: Judgment normal.         Laboratory Reviewed ({Yes)  Lab Results   Component Value Date    WBC 10.64 03/15/2023    HGB 14.6 03/15/2023    HCT 45.7 03/15/2023     03/15/2023    CHOL 124 11/16/2023    TRIG 67 11/16/2023    HDL 38 (L) 11/16/2023    ALT 73 (H) 08/22/2023    AST 52 (H) 08/22/2023     08/22/2023    K 4.5 08/22/2023     08/22/2023    CREATININE 1.0 08/22/2023    BUN 7 08/22/2023    CO2 24 08/22/2023    TSH 0.492 11/29/2021    PSA 1.6 08/22/2023    INR 0.9 03/15/2023    HGBA1C 5.5 12/03/2020       Monica was seen today for annual exam.    Diagnoses and all orders for this visit:    Annual physical exam  -     CBC Auto Differential; Future  -     Lipid Panel; Future  -     Comprehensive Metabolic Panel; Future  -     PSA, SCREENING; Future  -     HEMOGLOBIN A1C; Future    Essential hypertension    Atherosclerotic heart disease of native coronary artery with other forms of angina pectoris  -     Lipid Panel; Future  -     atorvastatin (LIPITOR) 20 MG tablet; Take 1 tablet (20 mg total) by mouth every evening.    Fatty liver  -     Comprehensive Metabolic Panel; Future  -     PROTIME-INR; Future    Congestive heart failure, unspecified HF chronicity,  unspecified heart failure type  -     CBC Auto Differential; Future  -     Lipid Panel; Future  -     Comprehensive Metabolic Panel; Future  -     B-TYPE NATRIURETIC PEPTIDE; Future    Stress    Benign prostatic hyperplasia, unspecified whether lower urinary tract symptoms present  -     PSA, SCREENING; Future  -     tamsulosin (FLOMAX) 0.4 mg Cap; Take 1 capsule (0.4 mg total) by mouth once daily.    Gastroesophageal reflux disease, unspecified whether esophagitis present  -     pantoprazole (PROTONIX) 40 MG tablet; Take 1 tablet (40 mg total) by mouth once daily.    High triglycerides  -     atorvastatin (LIPITOR) 20 MG tablet; Take 1 tablet (20 mg total) by mouth every evening.    Non-ischemic cardiomyopathy  -     atorvastatin (LIPITOR) 20 MG tablet; Take 1 tablet (20 mg total) by mouth every evening.    Lumbar facet arthropathy  -     gabapentin (NEURONTIN) 300 MG capsule; Take 1 capsule (300 mg total) by mouth 2 (two) times daily AND 4 capsules (1,200 mg total) every evening. TAKE ONE CAPSULE BY MOUTH TWICE DAILY AND 4 CAPSULES EVERY EVENING.    Other orders  -     telmisartan (MICARDIS) 40 MG Tab; Take 1 tablet (40 mg total) by mouth once daily.  -     metoprolol succinate (TOPROL-XL) 25 MG 24 hr tablet; Take 1 tablet (25 mg total) by mouth once daily.  -     traZODone (DESYREL) 100 MG tablet; Take 1/2 to 1 tablet at bedtime as needed for sleep.  -     busPIRone (BUSPAR) 30 MG Tab; Take 1 tablet (30 mg total) by mouth 2 (two) times daily.  -     meloxicam (MOBIC) 15 MG tablet; Take 1 tablet (15 mg total) by mouth once daily.      Labs today    Blood pressure was not at goal, will schedule Blood pressure follow up in 2-4 weeks with me     Schedule follow up appt with cards and pain management    Will schedule a follow up exam with Dr. Escoto in 6 months         Care Plan/Goals: Reviewed    Goals         Blood Pressure < 130/80 (pt-stated)       Exercise at least 150 minutes per week.       Take at least one BP  reading per week at various times of the day             Follow up: No follow-ups on file.    After visit summary was printed and given to patient upon discharge today.  Patient goals and care plan are included in After Visit Summary.
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
## 2024-05-20 ENCOUNTER — OFFICE VISIT (OUTPATIENT)
Dept: CARDIOLOGY | Facility: CLINIC | Age: 49
End: 2024-05-20
Payer: COMMERCIAL

## 2024-05-20 VITALS
SYSTOLIC BLOOD PRESSURE: 140 MMHG | HEIGHT: 72 IN | OXYGEN SATURATION: 97 % | WEIGHT: 216.5 LBS | HEART RATE: 67 BPM | BODY MASS INDEX: 29.32 KG/M2 | DIASTOLIC BLOOD PRESSURE: 90 MMHG

## 2024-05-20 DIAGNOSIS — M19.90 OSTEOARTHRITIS, UNSPECIFIED OSTEOARTHRITIS TYPE, UNSPECIFIED SITE: ICD-10-CM

## 2024-05-20 DIAGNOSIS — F17.200 TOBACCO USE DISORDER: ICD-10-CM

## 2024-05-20 DIAGNOSIS — I25.10 CORONARY ARTERY DISEASE INVOLVING NATIVE CORONARY ARTERY OF NATIVE HEART WITHOUT ANGINA PECTORIS: ICD-10-CM

## 2024-05-20 DIAGNOSIS — I10 ESSENTIAL HYPERTENSION: Primary | ICD-10-CM

## 2024-05-20 DIAGNOSIS — E78.1 HIGH TRIGLYCERIDES: ICD-10-CM

## 2024-05-20 DIAGNOSIS — I25.118 ATHEROSCLEROTIC HEART DISEASE OF NATIVE CORONARY ARTERY WITH OTHER FORMS OF ANGINA PECTORIS: ICD-10-CM

## 2024-05-20 DIAGNOSIS — I42.8 NON-ISCHEMIC CARDIOMYOPATHY: ICD-10-CM

## 2024-05-20 PROCEDURE — 3080F DIAST BP >= 90 MM HG: CPT | Mod: CPTII,S$GLB,, | Performed by: STUDENT IN AN ORGANIZED HEALTH CARE EDUCATION/TRAINING PROGRAM

## 2024-05-20 PROCEDURE — 1159F MED LIST DOCD IN RCRD: CPT | Mod: CPTII,S$GLB,, | Performed by: STUDENT IN AN ORGANIZED HEALTH CARE EDUCATION/TRAINING PROGRAM

## 2024-05-20 PROCEDURE — 3008F BODY MASS INDEX DOCD: CPT | Mod: CPTII,S$GLB,, | Performed by: STUDENT IN AN ORGANIZED HEALTH CARE EDUCATION/TRAINING PROGRAM

## 2024-05-20 PROCEDURE — 4010F ACE/ARB THERAPY RXD/TAKEN: CPT | Mod: CPTII,S$GLB,, | Performed by: STUDENT IN AN ORGANIZED HEALTH CARE EDUCATION/TRAINING PROGRAM

## 2024-05-20 PROCEDURE — 99999 PR PBB SHADOW E&M-EST. PATIENT-LVL IV: CPT | Mod: PBBFAC,,, | Performed by: STUDENT IN AN ORGANIZED HEALTH CARE EDUCATION/TRAINING PROGRAM

## 2024-05-20 PROCEDURE — 99214 OFFICE O/P EST MOD 30 MIN: CPT | Mod: S$GLB,,, | Performed by: STUDENT IN AN ORGANIZED HEALTH CARE EDUCATION/TRAINING PROGRAM

## 2024-05-20 PROCEDURE — 3077F SYST BP >= 140 MM HG: CPT | Mod: CPTII,S$GLB,, | Performed by: STUDENT IN AN ORGANIZED HEALTH CARE EDUCATION/TRAINING PROGRAM

## 2024-05-20 PROCEDURE — 3044F HG A1C LEVEL LT 7.0%: CPT | Mod: CPTII,S$GLB,, | Performed by: STUDENT IN AN ORGANIZED HEALTH CARE EDUCATION/TRAINING PROGRAM

## 2024-05-20 RX ORDER — AMLODIPINE BESYLATE 2.5 MG/1
2.5 TABLET ORAL DAILY
Qty: 90 TABLET | Refills: 1 | Status: SHIPPED | OUTPATIENT
Start: 2024-05-20 | End: 2025-05-20
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
## 2024-05-24 ENCOUNTER — OFFICE VISIT (OUTPATIENT)
Dept: PAIN MEDICINE | Facility: CLINIC | Age: 49
End: 2024-05-24
Payer: COMMERCIAL

## 2024-05-24 VITALS
HEART RATE: 73 BPM | DIASTOLIC BLOOD PRESSURE: 80 MMHG | SYSTOLIC BLOOD PRESSURE: 122 MMHG | WEIGHT: 208.88 LBS | HEIGHT: 72 IN | BODY MASS INDEX: 28.29 KG/M2

## 2024-05-24 DIAGNOSIS — M47.812 CERVICAL SPONDYLOSIS: Primary | ICD-10-CM

## 2024-05-24 DIAGNOSIS — M46.1 SACROILIITIS: ICD-10-CM

## 2024-05-24 DIAGNOSIS — M47.816 LUMBAR FACET ARTHROPATHY: ICD-10-CM

## 2024-05-24 DIAGNOSIS — M16.11 PRIMARY OSTEOARTHRITIS OF RIGHT HIP: ICD-10-CM

## 2024-05-24 PROCEDURE — 99214 OFFICE O/P EST MOD 30 MIN: CPT | Mod: S$GLB,,, | Performed by: NURSE PRACTITIONER

## 2024-05-24 PROCEDURE — 3008F BODY MASS INDEX DOCD: CPT | Mod: CPTII,S$GLB,, | Performed by: NURSE PRACTITIONER

## 2024-05-24 PROCEDURE — 3074F SYST BP LT 130 MM HG: CPT | Mod: CPTII,S$GLB,, | Performed by: NURSE PRACTITIONER

## 2024-05-24 PROCEDURE — 4010F ACE/ARB THERAPY RXD/TAKEN: CPT | Mod: CPTII,S$GLB,, | Performed by: NURSE PRACTITIONER

## 2024-05-24 PROCEDURE — 3079F DIAST BP 80-89 MM HG: CPT | Mod: CPTII,S$GLB,, | Performed by: NURSE PRACTITIONER

## 2024-05-24 PROCEDURE — 3044F HG A1C LEVEL LT 7.0%: CPT | Mod: CPTII,S$GLB,, | Performed by: NURSE PRACTITIONER

## 2024-05-24 PROCEDURE — 99999 PR PBB SHADOW E&M-EST. PATIENT-LVL IV: CPT | Mod: PBBFAC,,, | Performed by: NURSE PRACTITIONER

## 2024-05-24 PROCEDURE — 1159F MED LIST DOCD IN RCRD: CPT | Mod: CPTII,S$GLB,, | Performed by: NURSE PRACTITIONER

## 2024-05-24 RX ORDER — GABAPENTIN 300 MG/1
CAPSULE ORAL
Qty: 180 CAPSULE | Refills: 3 | Status: SHIPPED | OUTPATIENT
Start: 2024-05-24

## 2024-05-24 RX ORDER — TIZANIDINE 4 MG/1
4 TABLET ORAL 3 TIMES DAILY PRN
Qty: 90 TABLET | Refills: 1 | Status: SHIPPED | OUTPATIENT
Start: 2024-05-24 | End: 2024-07-23

## 2024-06-03 ENCOUNTER — OFFICE VISIT (OUTPATIENT)
Dept: INTERNAL MEDICINE | Facility: CLINIC | Age: 49
End: 2024-06-03
Payer: COMMERCIAL

## 2024-06-03 VITALS
HEART RATE: 74 BPM | OXYGEN SATURATION: 98 % | TEMPERATURE: 98 F | WEIGHT: 215.81 LBS | SYSTOLIC BLOOD PRESSURE: 122 MMHG | BODY MASS INDEX: 29.23 KG/M2 | RESPIRATION RATE: 18 BRPM | DIASTOLIC BLOOD PRESSURE: 80 MMHG | HEIGHT: 72 IN

## 2024-06-03 DIAGNOSIS — M47.816 LUMBAR FACET ARTHROPATHY: ICD-10-CM

## 2024-06-03 DIAGNOSIS — I25.118 ATHEROSCLEROTIC HEART DISEASE OF NATIVE CORONARY ARTERY WITH OTHER FORMS OF ANGINA PECTORIS: ICD-10-CM

## 2024-06-03 DIAGNOSIS — M47.812 CERVICAL SPONDYLOSIS: ICD-10-CM

## 2024-06-03 DIAGNOSIS — I10 ESSENTIAL HYPERTENSION: Primary | ICD-10-CM

## 2024-06-03 DIAGNOSIS — M16.11 PRIMARY OSTEOARTHRITIS OF RIGHT HIP: ICD-10-CM

## 2024-06-03 DIAGNOSIS — Z98.890 HISTORY OF ANKLE SURGERY: ICD-10-CM

## 2024-06-03 PROCEDURE — 4010F ACE/ARB THERAPY RXD/TAKEN: CPT | Mod: CPTII,S$GLB,,

## 2024-06-03 PROCEDURE — 3074F SYST BP LT 130 MM HG: CPT | Mod: CPTII,S$GLB,,

## 2024-06-03 PROCEDURE — 1159F MED LIST DOCD IN RCRD: CPT | Mod: CPTII,S$GLB,,

## 2024-06-03 PROCEDURE — 99214 OFFICE O/P EST MOD 30 MIN: CPT | Mod: S$GLB,,,

## 2024-06-03 PROCEDURE — 99999 PR PBB SHADOW E&M-EST. PATIENT-LVL V: CPT | Mod: PBBFAC,,,

## 2024-06-03 PROCEDURE — 3008F BODY MASS INDEX DOCD: CPT | Mod: CPTII,S$GLB,,

## 2024-06-03 PROCEDURE — G2211 COMPLEX E/M VISIT ADD ON: HCPCS | Mod: S$GLB,,,

## 2024-06-03 PROCEDURE — 3079F DIAST BP 80-89 MM HG: CPT | Mod: CPTII,S$GLB,,

## 2024-06-03 PROCEDURE — 3044F HG A1C LEVEL LT 7.0%: CPT | Mod: CPTII,S$GLB,,

## 2024-06-03 PROCEDURE — 1160F RVW MEDS BY RX/DR IN RCRD: CPT | Mod: CPTII,S$GLB,,

## 2024-06-03 NOTE — PROGRESS NOTES
Tyyuliana Johnson  06/03/2024  [id number]    Kenya Escoto MD  Patient Care Team:  Kenya Escoto MD as PCP - General (Family Medicine)  Barbara Moore LPN as Care Coordinator (Internal Medicine)  Kenya Escoto MD (Family Medicine)  Elisa Chao RD (Inactive) as Dietitian (Nutrition)  Jian Foster as Digital Medicine Health   Bazin, Tkeyah, PharmD as Hypertension Digital Medicine Clinician  Kenya Escoto MD as Hypertension Digital Medicine Responsible Provider (Family Medicine)          Visit Type:a scheduled routine follow-up visit    Chief Complaint:  Chief Complaint   Patient presents with    HTN F/U         History of Present Illness:  Presents today for HTN follow up     HTN  Micardis 40 mg  Toprol XL 25 mg     Saw Dr. Coronado last month   She restarted on Norvasc 2.5 mg     ?CHF   Could not get records from prior cardiologist   BNP <10    CAD  Denies blockages in his history  Has had 2 LHC in the past per patient   Continue aspirin for now     Saw pain management last month  Schedule bilateral C5-7 MBB for primarily axial neck pain     2 weeks later     Schedule for right-sided sacroiliac joint and right-sided intra-articular hip injection to help with sacroiliitis and osteoarthritis of the hip.    History:  Past Medical History:   Diagnosis Date    ADHD (attention deficit hyperactivity disorder)     Allergy     Anxiety 1/25/2016    Arthritis 2014    Osteoarthritis    CHF (congestive heart failure)     Degenerative disc disease at L5-S1 level     Depression 1/25/2016    Hypertension     Sciatica of left side 1/31/2017     Past Surgical History:   Procedure Laterality Date    CALCANEAL OSTEOTOMY Left 04/16/2019    Procedure: OSTEOTOMY, CALCANEUS;  Surgeon: Rober Colon DPM;  Location: [facility];  Service: Podiatry;  Laterality: Left;    CIRCUMCISION      FOOT SURGERY      INJECTION OF ANESTHETIC AGENT AROUND MEDIAL BRANCH NERVES INNERVATING CERVICAL FACET JOINT Bilateral  11/15/2021    Procedure: Block-nerve-medial branch-cervical bilateral C5, 6,7 RN IV sedation;  Surgeon: Ivan Davey MD;  Location: [facility];  Service: Pain Management;  Laterality: Bilateral;    INJECTION OF ANESTHETIC AGENT AROUND MEDIAL BRANCH NERVES INNERVATING LUMBAR FACET JOINT Bilateral 03/07/2022    Procedure: bilateral L3-L5 MBB;  Surgeon: Ivan Davey MD;  [facility];  Service: Pain Management;  Laterality: Bilateral;    INJECTION OF ANESTHETIC AGENT AROUND MEDIAL BRANCH NERVES INNERVATING LUMBAR FACET JOINT Bilateral 3/22/2023    Procedure: Bilateral L3-5 MBB RN IV Sedation;  Surgeon: Lyudmila Leung MD;  Location: [facility];  Service: Pain Management;  Laterality: Bilateral;    INJECTION OF ANESTHETIC AGENT INTO SACROILIAC JOINT Right 11/24/2020    Procedure: Right BLOCK, SACROILIAC JOINT and Right Knee Injection with RN IV sedation;  Surgeon: Ivan Davey MD;  Location: [facility];  Service: Pain Management;  Laterality: Right;    INJECTION OF ANESTHETIC AGENT INTO SACROILIAC JOINT Right 04/16/2021    Procedure: Right BLOCK, SACROILIAC JOINT RIght Hip Right GTB RN IV sedation;  Surgeon: Ivan Davey MD;  Location: [facility];  Service: Pain Management;  Laterality: Right;    INJECTION OF ANESTHETIC AGENT INTO SACROILIAC JOINT Right 5/10/2023    Procedure: Bilateral SIJ + right IA hip Injection RN IV Sedation;  Surgeon: Lyudmila Leung MD;  Location: [facility];  Service: Pain Management;  Laterality: Right;    INJECTION OF JOINT Right 3/8/2023    Procedure: Right Knee injection with steroid RN IV Sedation;  Surgeon: Lyudmila Leung MD;  [facility];  Service: Pain Management;  Laterality: Right;    INJECTION OF JOINT Right 5/10/2023    Procedure: Bilateral SIJ + right IA hip Injection;  Surgeon: Lyudmila Leung MD;  Location: [facility];  Service: Pain Management;  Laterality: Right;    LENGTHENING OF ACHILLES TENDON Left 04/16/2019    Procedure: LENGTHENING,  TENDON, ACHILLES;  Surgeon: Rober Colon DPM;  Location: [facility];  Service: Podiatry;  Laterality: Left;    PLACEMENT OF ACELLULAR HUMAN DERMAL ALLOGRAFT Left 02/17/2021    Procedure: APPLICATION, ACELLULAR HUMAN DERMAL ALLOGRAFT;  Surgeon: Rober Colon DPM;  Location: [facility];  Service: Podiatry;  Laterality: Left;    REPAIR OF POSTERIOR TIBIALIS TENDON Left 04/16/2019    Procedure: REPAIR, TENDON, TIBIALIS POSTERIOR;  Surgeon: Rober Colon DPM;  Location: [facility];  Service: Podiatry;  Laterality: Left;    REPAIR OF TENDON OF LOWER EXTREMITY Left 02/17/2021    Procedure: REPAIR, TENDON, LOWER EXTREMITY;  Surgeon: Rober Colon DPM;  Location: [facility];  Service: Podiatry;  Laterality: Left;    TENDON TRANSFER Left 04/16/2019    Procedure: TRANSFER, TENDON;  Surgeon: Rober Colon DPM;  Location: [facility];  Service: Podiatry;  Laterality: Left;     Family History   Problem Relation Name Age of Onset    Cancer Mother Yaquelin Alex         breast    Hypertension Mother Yaquelin Alex     Cataracts Mother Yaquelin Alex     Arthritis Mother Yaquelin Alex     Depression Mother Yaquelin Alex     Diabetes Mother Yaquelin Alex     Hearing loss Mother Yaquelin Alex     Hyperlipidemia Mother Yaquelin Alex     Miscarriages / Stillbirths Mother Yaquelin Alex         stillbirth    Hypertension Maternal Grandmother Sherice Johnson     Cancer Maternal Grandmother Sherice Alex         ovarian    Mental illness Maternal Grandmother Sherice Alex     Stroke Maternal Grandmother Sherice Alex     Glaucoma Father Wilmile Mcmahon Sr.     Cancer Father Wil Mcmahon Sr.         prostrate    Vision loss Father Wil Mcmahon Sr.         glaucoma    Asthma Maternal Grandfather Mandeep Jhonson Sr.     Cancer Maternal Aunt Andra Cifuentes         breast    Heart disease Maternal Aunt Andra Cifuentes         tripple bypass    Stroke Maternal Aunt Andra Cifuentes     Diabetes Maternal Aunt Carrol Alejandro     Hyperlipidemia Maternal Aunt Carrol Alejandro      Hypertension Maternal Aunt Carrol Alejandro     Stroke Maternal Aunt Carrol Alejandro     Diabetes Maternal Aunt Jessica Johnson     Hyperlipidemia Maternal Aunt Jessica Johnson     Hypertension Maternal Aunt Kenyatta Li      Social History     Socioeconomic History    Marital status: Legally    Tobacco Use    Smoking status: Every Day     Current packs/day: 0.50     Average packs/day: 0.5 packs/day for 34.4 years (17.2 ttl pk-yrs)     Types: Cigarettes     Start date: 1990     Passive exposure: Never    Smokeless tobacco: Never    Tobacco comments:     HOLD MIDNIGHT TO SURGERY   Substance and Sexual Activity    Alcohol use: Yes     Alcohol/week: 1.0 standard drink of alcohol     Types: 1 Drinks containing 0.5 oz of alcohol per week     Comment: socially: HOLD 72HRS PRIOR TO SURGERY    Drug use: Yes     Types: Marijuana     Comment: HOLD TODAY PRIOR TO SURGERY    Sexual activity: Yes     Partners: Female   Social History Narrative    ** Merged History Encounter **          Social Determinants of Health     Financial Resource Strain: High Risk (8/19/2023)    Overall Financial Resource Strain (CARDIA)     Difficulty of Paying Living Expenses: Hard   Food Insecurity: Food Insecurity Present (8/19/2023)    Hunger Vital Sign     Worried About Running Out of Food in the Last Year: Sometimes true     Ran Out of Food in the Last Year: Sometimes true   Transportation Needs: Unmet Transportation Needs (8/19/2023)    PRAPARE - Transportation     Lack of Transportation (Medical): Yes     Lack of Transportation (Non-Medical): Yes   Physical Activity: Inactive (8/19/2023)    Exercise Vital Sign     Days of Exercise per Week: 2 days     Minutes of Exercise per Session: 0 min   Stress: Stress Concern Present (8/19/2023)    [employer] - Occupational Stress Questionnaire     Feeling of Stress : Rather much   Housing Stability: High Risk (8/19/2023)    Housing Stability Vital Sign     Unable to Pay  for Housing in the Last Year: Yes     Number of Places Lived in the Last Year: 2     Unstable Housing in the Last Year: No     Patient Active Problem List   Diagnosis    Depression    Intermittent explosive disorder    Sciatica of left side    Osteoarthritis    Essential hypertension    Elevated liver function tests    Chondromalacia of right knee    Fatty liver    Tobacco use disorder    Non-ischemic cardiomyopathy    Coronary artery disease involving native coronary artery of native heart without angina pectoris    Sacroiliac joint dysfunction    Atherosclerotic heart disease of native coronary artery with other forms of angina pectoris    Family history of prostate cancer in father    Chronic pain of right knee    Osteoarthritis of right knee    High triglycerides    Lumbar spondylosis     Review of patient's allergies indicates:  No Known Allergies    The following were reviewed at this visit: active problem list, medication list, allergies, family history, social history, and health maintenance.    Medications:  Current Outpatient Medications on File Prior to Visit   Medication Sig Dispense Refill    amLODIPine (NORVASC) 2.5 MG tablet Take 1 tablet (2.5 mg total) by mouth once daily. 90 tablet 1    aspirin (ECOTRIN) 81 MG EC tablet Take 81 mg by mouth once daily.      atorvastatin (LIPITOR) 20 MG tablet Take 1 tablet (20 mg total) by mouth every evening. 90 tablet 3    b complex vitamins capsule Take 1 capsule by mouth once daily.      busPIRone (BUSPAR) 30 MG Tab Take 1 tablet (30 mg total) by mouth 2 (two) times daily. 60 tablet 1    cetirizine (ZYRTEC) 10 MG tablet Take 10 mg by mouth once daily.      cholecalciferol, vitamin D3, 125 mcg (5,000 unit) Tab Take 5,000 Units by mouth once daily.      gabapentin (NEURONTIN) 300 MG capsule Take 1 capsule (300 mg total) by mouth 2 (two) times daily AND 4 capsules (1,200 mg total) every evening. TAKE ONE CAPSULE BY MOUTH TWICE DAILY AND 4 CAPSULES EVERY EVENING. 180  capsule 3    meloxicam (MOBIC) 15 MG tablet Take 1 tablet (15 mg total) by mouth once daily. 90 tablet 3    metoprolol succinate (TOPROL-XL) 25 MG 24 hr tablet Take 1 tablet (25 mg total) by mouth once daily. 90 tablet 3    multivitamin capsule Take 1 capsule by mouth once daily.      omega-3 fatty acids/fish oil (FISH OIL-OMEGA-3 FATTY ACIDS) 300-1,000 mg capsule Take by mouth once daily.      pantoprazole (PROTONIX) 40 MG tablet Take 1 tablet (40 mg total) by mouth once daily. 90 tablet 3    tamsulosin (FLOMAX) 0.4 mg Cap Take 1 capsule (0.4 mg total) by mouth once daily. 90 capsule 3    telmisartan (MICARDIS) 40 MG Tab Take 1 tablet (40 mg total) by mouth once daily. 90 tablet 3    tiZANidine (ZANAFLEX) 4 MG tablet Take 1 tablet (4 mg total) by mouth 3 (three) times daily as needed (muscle pain). 90 tablet 1    traZODone (DESYREL) 100 MG tablet Take 1/2 to 1 tablet at bedtime as needed for sleep. 90 tablet 3    TUMERIC-GING-OLIVE-OREG-CAPRYL ORAL Take by mouth.       No current facility-administered medications on file prior to visit.       Medications have been reviewed and reconciled with patient at this visit.  Barriers to medications reviewed with patient.    Adverse reactions to current medications reviewed with patient..    Over the counter medications reviewed and reconciled with patient.    Exam:  Wt Readings from Last 3 Encounters:   05/24/24 94.7 kg (208 lb 14.2 oz)   05/20/24 98.2 kg (216 lb 7.9 oz)   05/03/24 94.6 kg (208 lb 8.9 oz)     Temp Readings from Last 3 Encounters:   05/03/24 98.7 °F (37.1 °C) (Tympanic)   03/28/24 99.6 °F (37.6 °C)   08/22/23 98.2 °F (36.8 °C) (Tympanic)     BP Readings from Last 3 Encounters:   05/24/24 122/80   05/20/24 (!) 140/90   05/03/24 (!) 150/88     Pulse Readings from Last 3 Encounters:   05/24/24 73   05/20/24 67   05/03/24 90     There is no height or weight on file to calculate BMI.      Review of Systems   Constitutional:  Negative for malaise/fatigue.   Eyes:   Negative for blurred vision.   Respiratory:  Negative for shortness of breath.    Cardiovascular:  Negative for chest pain and palpitations.   Musculoskeletal:  Positive for back pain and joint pain.   Neurological:  Negative for headaches.     Physical Exam  Nursing note reviewed.   HENT:      Head: Normocephalic and atraumatic.   Cardiovascular:      Rate and Rhythm: Normal rate and regular rhythm.      Heart sounds: Normal heart sounds.   Pulmonary:      Effort: Pulmonary effort is normal. No respiratory distress.      Breath sounds: Normal breath sounds.   Musculoskeletal:         General: Tenderness present.      Cervical back: Tenderness present.      Right hip: Tenderness present. Decreased range of motion.      Left ankle: Tenderness present.   Skin:     General: Skin is dry.   Neurological:      Mental Status: He is alert and oriented to person, place, and time.   Psychiatric:         Mood and Affect: Mood normal.         Behavior: Behavior normal.         Thought Content: Thought content normal.         Judgment: Judgment normal.         Laboratory Reviewed ({Yes)  Lab Results   Component Value Date    WBC 10.82 05/03/2024    HGB 14.8 05/03/2024    HCT 45.5 05/03/2024     05/03/2024    CHOL 140 05/03/2024    TRIG 91 05/03/2024    HDL 43 05/03/2024    ALT 54 (H) 05/03/2024    AST 42 (H) 05/03/2024     05/03/2024    K 4.1 05/03/2024     05/03/2024    CREATININE 1.2 05/03/2024    BUN 11 05/03/2024    CO2 29 05/03/2024    TSH 0.492 11/29/2021    PSA 2.3 05/03/2024    INR 1.0 05/03/2024    HGBA1C 5.5 05/03/2024     Monica was seen today for htn f/u .    Diagnoses and all orders for this visit:    Essential hypertension  At visit, Blood pressure is at goal. Continue current medications      Atherosclerotic heart disease of native coronary artery with other forms of angina pectoris  CAD  On ASA and Lipitor    Cervical spondylosis    Lumbar facet arthropathy    Primary osteoarthritis of right  hip    History of ankle surgery  -     Ambulatory referral/consult to Podiatry; Future  Previously saw Dr. Colon  Having some pain in his ankle  Would like to follow up with Dr. Colon  Ref placed    Cervical spondylosis/lumbar arthropathy and OA right hip   Schedule bilateral C5-7 MBB for primarily axial neck pain     2 weeks later     Schedule for right-sided sacroiliac joint and right-sided intra-articular hip injection to help with sacroiliitis and osteoarthritis of the hip.        Health Maintenance Reviewed and updated. At this time will continue the patient on current dose of medication for chronic medical conditions      Visit today included increased complexity associated with the care of the episodic problem HTN , which was addressed while instituting co-management of the longitudinal care of the patient due to the serious and/or complex managed problem(s) .    I have evaluated and discussed management associated with medical care services that serve as the continuing focal point for all needed health care services and/or with medical care services that are part of ongoing care related to my patient's single, serious condition or a complex condition(s).    I am providing ongoing care and I am the primary care provider for this patient, and they are being managed, monitored, and/or observed for their chronic conditions over time.     I have addressed their ongoing health maintenance requirements and needs for all health care services and reviewed co-management plans provided by specialty providers when available.    Health Maintenance Due   Topic Date Due    TETANUS VACCINE  Never done    Pneumococcal Vaccines (Age 0-64) (2 of 2 - PCV) 05/25/1996    COVID-19 Vaccine (3 - 2023-24 season) 09/01/2023              Care Plan/Goals: Reviewed    Goals         Blood Pressure < 130/80 (pt-stated)       Exercise at least 150 minutes per week.       Take at least one BP reading per week at various times of the day              Follow up: No follow-ups on file.    After visit summary was printed and given to patient upon discharge today.  Patient goals and care plan are included in After Visit Summary.

## 2024-06-04 ENCOUNTER — PATIENT MESSAGE (OUTPATIENT)
Dept: PAIN MEDICINE | Facility: HOSPITAL | Age: 49
End: 2024-06-04
Payer: COMMERCIAL

## 2024-06-04 NOTE — PRE-PROCEDURE INSTRUCTIONS
Unable to reach patient regarding procedure scheduled on 6.7. PAT instructions and arrival time sent via [employer]     Arrival time 1200     Has patient been sick with fever or on antibiotics within the last 7 days? No     Does the patient have any open wounds, sores or rashes? No     Does the patient have any recent fractures? no     Has patient received a vaccination within the last 7 days? No     Received the COVID vaccination? yes     Has the patient stopped all medications as directed? continue asa      Does patient have a pacemaker, defibrillator, or implantable stimulator? No     Does the patient have a ride to and from procedure and someone reliable to remain with patient?       Is the patient diabetic? no     Does the patient have sleep apnea? Or use O2 at home? no     Is the patient receiving sedation? yes     Is the patient instructed to remain NPO beginning at midnight the night before their procedure? yes     Procedure location confirmed with patient? Yes     Covid- Denies signs/symptoms. Instructed to notify PAT/MD if any changes.

## 2024-06-07 ENCOUNTER — HOSPITAL ENCOUNTER (OUTPATIENT)
Facility: HOSPITAL | Age: 49
Discharge: HOME | End: 2024-06-07
Attending: ANESTHESIOLOGY | Admitting: ANESTHESIOLOGY
Payer: COMMERCIAL

## 2024-06-07 VITALS
HEART RATE: 55 BPM | DIASTOLIC BLOOD PRESSURE: 80 MMHG | RESPIRATION RATE: 16 BRPM | SYSTOLIC BLOOD PRESSURE: 137 MMHG | OXYGEN SATURATION: 98 %

## 2024-06-07 DIAGNOSIS — M47.812 CERVICAL SPONDYLOSIS: ICD-10-CM

## 2024-06-07 PROCEDURE — 64490 INJ PARAVERT F JNT C/T 1 LEV: CPT | Mod: 50,,, | Performed by: ANESTHESIOLOGY

## 2024-06-07 PROCEDURE — 25000003 PHARM REV CODE 250: Performed by: ANESTHESIOLOGY

## 2024-06-07 PROCEDURE — 64491 INJ PARAVERT F JNT C/T 2 LEV: CPT | Mod: 50,,, | Performed by: ANESTHESIOLOGY

## 2024-06-07 PROCEDURE — 63600175 PHARM REV CODE 636 W HCPCS: Performed by: ANESTHESIOLOGY

## 2024-06-07 PROCEDURE — 64490 INJ PARAVERT F JNT C/T 1 LEV: CPT | Mod: 50 | Performed by: ANESTHESIOLOGY

## 2024-06-07 PROCEDURE — 64491 INJ PARAVERT F JNT C/T 2 LEV: CPT | Mod: 50 | Performed by: ANESTHESIOLOGY

## 2024-06-07 RX ORDER — BUPIVACAINE HYDROCHLORIDE 2.5 MG/ML
INJECTION, SOLUTION EPIDURAL; INFILTRATION; INTRACAUDAL
Status: DISCONTINUED | OUTPATIENT
Start: 2024-06-07 | End: 2024-06-07 | Stop reason: HOSPADM

## 2024-06-07 RX ORDER — INDOMETHACIN 25 MG/1
CAPSULE ORAL
Status: DISCONTINUED | OUTPATIENT
Start: 2024-06-07 | End: 2024-06-07 | Stop reason: HOSPADM

## 2024-06-07 RX ORDER — MIDAZOLAM HYDROCHLORIDE 1 MG/ML
INJECTION, SOLUTION INTRAMUSCULAR; INTRAVENOUS
Status: DISCONTINUED | OUTPATIENT
Start: 2024-06-07 | End: 2024-06-07 | Stop reason: HOSPADM

## 2024-06-07 RX ORDER — FENTANYL CITRATE 50 UG/ML
INJECTION, SOLUTION INTRAMUSCULAR; INTRAVENOUS
Status: DISCONTINUED | OUTPATIENT
Start: 2024-06-07 | End: 2024-06-07 | Stop reason: HOSPADM

## 2024-06-07 RX ORDER — DEXAMETHASONE SODIUM PHOSPHATE 10 MG/ML
INJECTION INTRAMUSCULAR; INTRAVENOUS
Status: DISCONTINUED | OUTPATIENT
Start: 2024-06-07 | End: 2024-06-07 | Stop reason: HOSPADM

## 2024-06-07 NOTE — DISCHARGE INSTRUCTIONS

## 2024-06-07 NOTE — PLAN OF CARE
Pt in recovery, tolerating PO fluids. Sinus jada on monitor.  No complaints of pain at this time.

## 2024-06-07 NOTE — DISCHARGE SUMMARY
Discharge Note  Short Stay      SUMMARY     Admit Date: 6/7/2024    Attending Physician: Lyudmila Leung MD        Discharge Physician: Lyudmila Leung MD        Discharge Date: 6/7/2024 9:48 AM    Procedure(s) (LRB):  Bilateral C4-6 MBB with RN IV sedation (Bilateral)    Final Diagnosis: Cervical spondylosis [M47.812]    Disposition: Home or self care    Patient Instructions:   Current Discharge Medication List        CONTINUE these medications which have NOT CHANGED    Details   amLODIPine (NORVASC) 2.5 MG tablet Take 1 tablet (2.5 mg total) by mouth once daily.  Qty: 90 tablet, Refills: 1    Comments: .      aspirin (ECOTRIN) 81 MG EC tablet Take 81 mg by mouth once daily.      atorvastatin (LIPITOR) 20 MG tablet Take 1 tablet (20 mg total) by mouth every evening.  Qty: 90 tablet, Refills: 3    Associated Diagnoses: Atherosclerotic heart disease of native coronary artery with other forms of angina pectoris; High triglycerides; Non-ischemic cardiomyopathy      b complex vitamins capsule Take 1 capsule by mouth once daily.      busPIRone (BUSPAR) 30 MG Tab Take 1 tablet (30 mg total) by mouth 2 (two) times daily.  Qty: 60 tablet, Refills: 1      cholecalciferol, vitamin D3, 125 mcg (5,000 unit) Tab Take 5,000 Units by mouth once daily.      gabapentin (NEURONTIN) 300 MG capsule Take 1 capsule (300 mg total) by mouth 2 (two) times daily AND 4 capsules (1,200 mg total) every evening. TAKE ONE CAPSULE BY MOUTH TWICE DAILY AND 4 CAPSULES EVERY EVENING.  Qty: 180 capsule, Refills: 3    Associated Diagnoses: Lumbar facet arthropathy      meloxicam (MOBIC) 15 MG tablet Take 1 tablet (15 mg total) by mouth once daily.  Qty: 90 tablet, Refills: 3      metoprolol succinate (TOPROL-XL) 25 MG 24 hr tablet Take 1 tablet (25 mg total) by mouth once daily.  Qty: 90 tablet, Refills: 3    Comments: .      multivitamin capsule Take 1 capsule by mouth once daily.      omega-3 fatty acids/fish oil (FISH OIL-OMEGA-3 FATTY ACIDS) 300-1,000  mg capsule Take by mouth once daily.      pantoprazole (PROTONIX) 40 MG tablet Take 1 tablet (40 mg total) by mouth once daily.  Qty: 90 tablet, Refills: 3    Associated Diagnoses: Gastroesophageal reflux disease, unspecified whether esophagitis present      tamsulosin (FLOMAX) 0.4 mg Cap Take 1 capsule (0.4 mg total) by mouth once daily.  Qty: 90 capsule, Refills: 3    Associated Diagnoses: Benign prostatic hyperplasia, unspecified whether lower urinary tract symptoms present      telmisartan (MICARDIS) 40 MG Tab Take 1 tablet (40 mg total) by mouth once daily.  Qty: 90 tablet, Refills: 3    Comments: .      tiZANidine (ZANAFLEX) 4 MG tablet Take 1 tablet (4 mg total) by mouth 3 (three) times daily as needed (muscle pain).  Qty: 90 tablet, Refills: 1      traZODone (DESYREL) 100 MG tablet Take 1/2 to 1 tablet at bedtime as needed for sleep.  Qty: 90 tablet, Refills: 3      TUMERIC-GING-OLIVE-OREG-CAPRYL ORAL Take by mouth.      cetirizine (ZYRTEC) 10 MG tablet Take 10 mg by mouth once daily.                 Discharge Diagnosis: Cervical spondylosis [M47.812]  Condition on Discharge: Stable with no complications to procedure   Diet on Discharge: Same as before.  Activity: as per instruction sheet.  Discharge to: Home with a responsible adult.  Follow up: 2-4 weeks       Please call the office at [phone] if you experience any weakness or loss of sensation, fever > 101.5, pain uncontrolled with oral medications, persistent nausea/vomiting/or diarrhea, redness or drainage from the incisions, or any other worrisome concerns. If physician on call was not reached or could not communicate with our office for any reason please go to the nearest emergency department

## 2024-06-07 NOTE — OP NOTE
CERVICAL Medial Branch Block Under Fluoroscopy  Time-out taken to identify patient and procedure side prior to starting the procedure.        Date of Procedure: 06/07/2024                                                           PROCEDURE:  bilateral medial branch block at the transverse processes of C4, C5, C6    Pre Procedure Diagnosis:  Cervical spondylosis [M47.812]  Post Procedure Diagnosis: Same    PHYSICIAN: Lyudmila Leung MD    ASSISTANTS: None    Anesthesia:   Conscious sedation provided by M.D    The patient was monitored with continuous pulse oximetry, EKG, and intermittent blood pressure monitors.  The patient was hemodynamically stable throughout the entire process was responsive to voice, and breathing spontaneously.  Supplemental O2 was provided at 2L/min via nasal cannula.  Patient was comfortable for the duration of the procedure. (See nurse documentation and case log for sedation time)    There was a total of 2mg IV Midazolam and 25mcg Fentanyl titrated for the procedure      MEDICATIONS INJECTED:  0.25% Bupivicaine total 1.5mL  LOCAL ANESTHETIC USED:   Xylocaine 1% 1mL / site    SEDATION MEDICATIONS: None    ESTIMATED BLOOD LOSS:  None.    COMPLICATIONS:  None.    TECHNIQUE:   Laying in a bilateral lateral decubitus  position, the patient was prepped and draped in the usual sterile fashion using ChloraPrep and fenestrated drape.  The level was determined under fluoroscopic guidance.  Local anesthetic was given by going down to the hub of the 27-gauge 1.25in needle and raising a wheel.  A 22-gauge 3.5inch needle was introduced to the anatomic local of the medial branch at each of the stated above levels using fluoroscopy. Then after negative aspiration, a total of 1.5 cc of .25% bupivacaine and 10 mg dexamethasone was injected in divided doses at the three levels. The patient tolerated the procedure well.     The patient was monitored after the procedure.  Patient was given post procedure and  discharge instructions to follow at home.  We will see the patient back in two weeks or the patient may call to inform of status. The patient was discharged in a stable condition.    Lyudmila Leung

## 2024-06-10 ENCOUNTER — HOSPITAL ENCOUNTER (OUTPATIENT)
Dept: RADIOLOGY | Facility: HOSPITAL | Age: 49
Discharge: HOME | End: 2024-06-10
Attending: PODIATRIST
Payer: COMMERCIAL

## 2024-06-10 ENCOUNTER — TELEPHONE (OUTPATIENT)
Dept: PAIN MEDICINE | Facility: CLINIC | Age: 49
End: 2024-06-10
Payer: COMMERCIAL

## 2024-06-10 ENCOUNTER — OFFICE VISIT (OUTPATIENT)
Dept: PODIATRY | Facility: CLINIC | Age: 49
End: 2024-06-10
Payer: COMMERCIAL

## 2024-06-10 VITALS — BODY MASS INDEX: 29.23 KG/M2 | WEIGHT: 215.81 LBS | HEIGHT: 72 IN

## 2024-06-10 DIAGNOSIS — Z98.890 HISTORY OF FOOT SURGERY: ICD-10-CM

## 2024-06-10 DIAGNOSIS — M25.572 LEFT ANKLE PAIN, UNSPECIFIED CHRONICITY: Primary | ICD-10-CM

## 2024-06-10 DIAGNOSIS — Z98.890 HISTORY OF ANKLE SURGERY: ICD-10-CM

## 2024-06-10 DIAGNOSIS — M21.42 ACQUIRED PES PLANUS, LEFT: ICD-10-CM

## 2024-06-10 DIAGNOSIS — M25.572 PAIN IN LEFT ANKLE AND JOINTS OF LEFT FOOT: Primary | ICD-10-CM

## 2024-06-10 PROCEDURE — 99213 OFFICE O/P EST LOW 20 MIN: CPT | Mod: PBBFAC,25 | Performed by: PODIATRIST

## 2024-06-10 PROCEDURE — 73630 X-RAY EXAM OF FOOT: CPT | Mod: TC,LT

## 2024-06-10 PROCEDURE — 99999 PR PBB SHADOW E&M-EST. PATIENT-LVL III: CPT | Mod: PBBFAC,,, | Performed by: PODIATRIST

## 2024-06-10 PROCEDURE — 99214 OFFICE O/P EST MOD 30 MIN: CPT | Mod: S$GLB,,, | Performed by: PODIATRIST

## 2024-06-10 PROCEDURE — 73630 X-RAY EXAM OF FOOT: CPT | Mod: 26,LT,, | Performed by: RADIOLOGY

## 2024-06-10 NOTE — TELEPHONE ENCOUNTER
Reach out to the patient to received their % relief from the MBB procedure. Pt reported 75-80 %. Inform patient that I will inform the provider and   we will see them at their follow up appointment. Pt understood.

## 2024-06-10 NOTE — PROGRESS NOTES
Subjective:       Patient ID: Monica Johnson is a 49 y.o. male.    Chief Complaint: Foot Pain (Foot pain,  rates pain 6, nondiabetic )      HPI:  Monica Johnson presents to the office today, s/p 2/17/2021 left flatfoot reconstruction.  Patient states with the past 3 or so weeks, he has had moderate pains about the left foot/ankle.  Patient was last seen here in the office by myself on 04/30/2021.  Does state wearing ASO brace as needed for symptomatic relief.  States Tylenol and/or NSAIDs as needed.  States mildly antalgic gait pattern.  Works for several hours daily, standing on his feet.  Denies recent trauma.  Did complete outpatient physical therapy status post.  Does also follow-up Pain Management, for lumbar spine pathology.      Hemoglobin A1C   Date Value Ref Range Status   05/03/2024 5.5 4.0 - 5.6 % Final     Comment:     ADA Screening Guidelines:  5.7-6.4%  Consistent with prediabetes  >or=6.5%  Consistent with diabetes    High levels of fetal hemoglobin interfere with the HbA1C  assay. Heterozygous hemoglobin variants (HbS, HgC, etc)do  not significantly interfere with this assay.   However, presence of multiple variants may affect accuracy.     12/03/2020 5.5 4.0 - 5.6 % Final     Comment:     ADA Screening Guidelines:  5.7-6.4%  Consistent with prediabetes  >or=6.5%  Consistent with diabetes  High levels of fetal hemoglobin interfere with the HbA1C  assay. Heterozygous hemoglobin variants (HbS, HgC, etc)do  not significantly interfere with this assay.   However, presence of multiple variants may affect accuracy.     01/09/2020 5.6 4.0 - 5.6 % Final     Comment:     ADA Screening Guidelines:  5.7-6.4%  Consistent with prediabetes  >or=6.5%  Consistent with diabetes  High levels of fetal hemoglobin interfere with the HbA1C  assay. Heterozygous hemoglobin variants (HbS, HgC, etc)do  not significantly interfere with this assay.   However, presence of multiple variants may affect accuracy.          Review of patient's allergies indicates:  No Known Allergies    Past Medical History:   Diagnosis Date    ADHD (attention deficit hyperactivity disorder)     Allergy     Anxiety 1/25/2016    Arthritis 2014    Osteoarthritis    CHF (congestive heart failure)     Degenerative disc disease at L5-S1 level     Depression 1/25/2016    Hypertension     Sciatica of left side 1/31/2017       Family History   Problem Relation Name Age of Onset    Cancer Mother Yaquelinnakia Johnson         breast    Hypertension Mother Yaquelinnakia Johnson     Cataracts Mother Yaquelincarolina Johnson     Arthritis Mother Yaquelincarolina Johnson     Depression Mother Yaquelincarolina Johnson     Diabetes Mother Yaquelincarolina Johnson     Hearing loss Mother Yaquelincarolina Johnson     Hyperlipidemia Mother Yaquelinnakia Johnson     Miscarriages / Stillbirths Mother Yaquelin Johnson         stillbirth    Hypertension Maternal Grandmother Sherice Alex     Cancer Maternal Grandmother Sherice Alex         ovarian    Mental illness Maternal Grandmother Sherice Alex     Stroke Maternal Grandmother Sherice Alex     Glaucoma Father Wil Mcmahon Sr.     Cancer Father Wil Mcmahon Sr.         prostrate    Vision loss Father Wil Mcmahon Sr.         glaucoma    Asthma Maternal Grandfather Mandeep Alex Sr.     Cancer Maternal Aunt Andra Cifuentes         breast    Heart disease Maternal Aunt Andra Cifuentes         tripple bypass    Stroke Maternal Aunt Andra Cifuentes     Diabetes Maternal Aunt Carrol Alejandro     Hyperlipidemia Maternal Aunt Carrol Alejandro     Hypertension Maternal Aunt Carrolall Alejandro     Stroke Maternal Aunt Carrol Alejandro     Diabetes Maternal Aunt Jessica Alex     Hyperlipidemia Maternal Aunt Jessica Alex     Hypertension Maternal Aunt Kenyatta Li        Social History     Socioeconomic History    Marital status: Legally    Tobacco Use    Smoking status: Every Day     Current packs/day: 0.50     Average packs/day: 0.5 packs/day for 34.4 years (17.2 ttl pk-yrs)      Types: Cigarettes     Start date: 1990     Passive exposure: Never    Smokeless tobacco: Never    Tobacco comments:     HOLD MIDNIGHT TO SURGERY   Substance and Sexual Activity    Alcohol use: Yes     Alcohol/week: 1.0 standard drink of alcohol     Types: 1 Drinks containing 0.5 oz of alcohol per week     Comment: socially: HOLD 72HRS PRIOR TO SURGERY    Drug use: Yes     Types: Marijuana     Comment: HOLD TODAY PRIOR TO SURGERY    Sexual activity: Yes     Partners: Female   Social History Narrative    ** Merged History Encounter **          Social Determinants of Health     Financial Resource Strain: High Risk (8/19/2023)    Overall Financial Resource Strain (CARDIA)     Difficulty of Paying Living Expenses: Hard   Food Insecurity: Food Insecurity Present (8/19/2023)    Hunger Vital Sign     Worried About Running Out of Food in the Last Year: Sometimes true     Ran Out of Food in the Last Year: Sometimes true   Transportation Needs: Unmet Transportation Needs (8/19/2023)    PRAPARE - Transportation     Lack of Transportation (Medical): Yes     Lack of Transportation (Non-Medical): Yes   Physical Activity: Inactive (8/19/2023)    Exercise Vital Sign     Days of Exercise per Week: 2 days     Minutes of Exercise per Session: 0 min   Stress: Stress Concern Present (8/19/2023)    [place] [employer] - Occupational Stress Questionnaire     Feeling of Stress : Rather much   Housing Stability: High Risk (8/19/2023)    Housing Stability Vital Sign     Unable to Pay for Housing in the Last Year: Yes     Number of Places Lived in the Last Year: 2     Unstable Housing in the Last Year: No       Past Surgical History:   Procedure Laterality Date    CALCANEAL OSTEOTOMY Left 04/16/2019    Procedure: OSTEOTOMY, CALCANEUS;  Surgeon: Rober Colon DPM;  Location: [facility];  Service: Podiatry;  Laterality: Left;    CIRCUMCISION      FOOT SURGERY      INJECTION OF ANESTHETIC AGENT AROUND  MEDIAL BRANCH NERVES INNERVATING CERVICAL FACET JOINT Bilateral 11/15/2021    Procedure: Block-nerve-medial branch-cervical bilateral C5, 6,7 RN IV sedation;  Surgeon: Ivan Davey MD;  Location: [facility];  Service: Pain Management;  Laterality: Bilateral;    INJECTION OF ANESTHETIC AGENT AROUND MEDIAL BRANCH NERVES INNERVATING CERVICAL FACET JOINT Bilateral 6/7/2024    Procedure: Bilateral C4-6 MBB with RN IV sedation;  Surgeon: Lyudmila Leung MD;  Location: [facility];  Service: Pain Management;  Laterality: Bilateral;    INJECTION OF ANESTHETIC AGENT AROUND MEDIAL BRANCH NERVES INNERVATING LUMBAR FACET JOINT Bilateral 03/07/2022    Procedure: bilateral L3-L5 MBB;  Surgeon: Ivan Davey MD;  Location: [facility];  Service: Pain Management;  Laterality: Bilateral;    INJECTION OF ANESTHETIC AGENT AROUND MEDIAL BRANCH NERVES INNERVATING LUMBAR FACET JOINT Bilateral 3/22/2023    Procedure: Bilateral L3-5 MBB RN IV Sedation;  Surgeon: Lyudmila Leung MD;  Location: [facility];  Service: Pain Management;  Laterality: Bilateral;    INJECTION OF ANESTHETIC AGENT INTO SACROILIAC JOINT Right 11/24/2020    Procedure: Right BLOCK, SACROILIAC JOINT and Right Knee Injection with RN IV sedation;  Surgeon: Ivan Davey MD;  Location: [facility];  Service: Pain Management;  Laterality: Right;    INJECTION OF ANESTHETIC AGENT INTO SACROILIAC JOINT Right 04/16/2021    Procedure: Right BLOCK, SACROILIAC JOINT RIght Hip Right GTB RN IV sedation;  Surgeon: Ivan Davey MD;  Location: [facility];  Service: Pain Management;  Laterality: Right;    INJECTION OF ANESTHETIC AGENT INTO SACROILIAC JOINT Right 5/10/2023    Procedure: Bilateral SIJ + right IA hip Injection RN IV Sedation;  Surgeon: Lyudmila Leung MD;  Location: [facility];  Service: Pain Management;  Laterality: Right;    INJECTION OF JOINT Right 3/8/2023    Procedure: Right Knee injection with steroid RN IV Sedation;  Surgeon: Lyudmila Leung MD;   Location: [facility];  Service: Pain Management;  Laterality: Right;    INJECTION OF JOINT Right 5/10/2023    Procedure: Bilateral SIJ + right IA hip Injection;  Surgeon: Lyudmila Leung MD;  Location: [facility];  Service: Pain Management;  Laterality: Right;    LENGTHENING OF ACHILLES TENDON Left 04/16/2019    Procedure: LENGTHENING, TENDON, ACHILLES;  Surgeon: Rober Colon DPM;  Location: [facility];  Service: Podiatry;  Laterality: Left;    PLACEMENT OF ACELLULAR HUMAN DERMAL ALLOGRAFT Left 02/17/2021    Procedure: APPLICATION, ACELLULAR HUMAN DERMAL ALLOGRAFT;  Surgeon: Rober Colon DPM;  Location: [facility];  Service: Podiatry;  Laterality: Left;    REPAIR OF POSTERIOR TIBIALIS TENDON Left 04/16/2019    Procedure: REPAIR, TENDON, TIBIALIS POSTERIOR;  Surgeon: Rober Colon DPM;  Location: [facility];  Service: Podiatry;  Laterality: Left;    REPAIR OF TENDON OF LOWER EXTREMITY Left 02/17/2021    Procedure: REPAIR, TENDON, LOWER EXTREMITY;  Surgeon: Rober Colon DPM;  Location: [facility];  Service: Podiatry;  Laterality: Left;    TENDON TRANSFER Left 04/16/2019    Procedure: TRANSFER, TENDON;  Surgeon: Rober Colon DPM;  Location: [facility];  Service: Podiatry;  Laterality: Left;       Review of Systems   Constitutional:  Negative for chills, fatigue and fever.   HENT:  Negative for hearing loss.    Eyes:  Negative for photophobia and visual disturbance.   Respiratory:  Negative for cough, chest tightness, shortness of breath and wheezing.    Cardiovascular:  Negative for chest pain and palpitations.   Gastrointestinal:  Negative for constipation, diarrhea, nausea and vomiting.   Endocrine: Negative for cold intolerance and heat intolerance.   Genitourinary:  Negative for flank pain.   Musculoskeletal:  Positive for back pain and gait problem. Negative for neck pain and neck stiffness.   Skin:  Negative for wound.   Neurological:  Negative for light-headedness and headaches.    Psychiatric/Behavioral:  Negative for sleep disturbance.           Objective:   Ht 6' (1.829 m)   Wt 97.9 kg (215 lb 13.3 oz)   BMI 29.27 kg/m²          Physical Exam  LOWER EXTREMITY PHYSICAL EXAMINATION  ORTHOPEDIC:  Pain to palpation, proximal aspect of the posterior tibial tendon.  Slight pes planus foot type is noted, with mild gastroc equinus.  Minimal to no pain to palpation along the distal aspect of the PT tendon at the navicular tuberosity.  No sinus tarsi pain to palpation.  Hindfoot range of motion is decreased.  Palpable spurring, dorsal midfoot and hindfoot.      Assessment:     1. Pain in left ankle and joints of left foot    2. History of ankle surgery    3. History of foot surgery    4. Acquired pes planus, left          Plan:     Pain in left ankle and joints of left foot  -     MRI Ankle Without Contrast Left; Future; Expected date: 06/10/2024    History of ankle surgery  -     Ambulatory referral/consult to Podiatry  -     MRI Ankle Without Contrast Left; Future; Expected date: 06/10/2024    History of foot surgery  -     X-Ray Foot Complete Left; Future; Expected date: 06/10/2024  -     MRI Ankle Without Contrast Left; Future; Expected date: 06/10/2024    Acquired pes planus, left      Thorough discussion is had with the patient today, concerning the diagnosis, its etiology, and the treatment algorithm at present.    XRAYS are reviewed in detail with the patient. All questions and concerns regarding findings and its/their implications are outlined and discussed.    X-ray shows all osseous structures are healed.    X-ray shows DJD of the hindfoot.    No discernible pathology is noted along the posterior tibial tendon, but I cannot speak to this with clarity without MRI.    Did discuss proper and supportive shoe gear in detail and at length with the patient.  These are shoes with firm and robust arch support; medial counter.  Shoes which only bend at the metatarsophalangeal joint and which are  rigid in the midfoot and hindfoot. Patient urged to purchase running type or cross training type shoes gear which are designed for pronation control.    Did complete ample physical therapy status post surgery.    States he was symptom free up until about 4-6 weeks ago.               Future Appointments   Date Time Provider Department Center   6/13/2024  7:30 PM [facility]   8/6/2024 12:30 PM Lyudmila Leung MD [facility] INT [facility]   8/21/2024  3:20 PM Julio Coronado MD ON [facility]   11/6/2024 11:40 AM Kenya Escoto MD ON [facility]

## 2024-06-13 ENCOUNTER — HOSPITAL ENCOUNTER (OUTPATIENT)
Dept: RADIOLOGY | Facility: HOSPITAL | Age: 49
Discharge: HOME | End: 2024-06-13
Attending: PODIATRIST
Payer: COMMERCIAL

## 2024-06-13 DIAGNOSIS — Z98.890 HISTORY OF FOOT SURGERY: ICD-10-CM

## 2024-06-13 DIAGNOSIS — M25.572 PAIN IN LEFT ANKLE AND JOINTS OF LEFT FOOT: ICD-10-CM

## 2024-06-13 DIAGNOSIS — Z98.890 HISTORY OF ANKLE SURGERY: ICD-10-CM

## 2024-06-13 PROCEDURE — 73721 MRI JNT OF LWR EXTRE W/O DYE: CPT | Mod: 26,LT,, | Performed by: RADIOLOGY

## 2024-06-13 PROCEDURE — 73721 MRI JNT OF LWR EXTRE W/O DYE: CPT | Mod: TC,LT

## 2024-06-18 ENCOUNTER — OFFICE VISIT (OUTPATIENT)
Dept: PODIATRY | Facility: CLINIC | Age: 49
End: 2024-06-18
Payer: COMMERCIAL

## 2024-06-18 VITALS — WEIGHT: 215.81 LBS | BODY MASS INDEX: 29.23 KG/M2 | HEIGHT: 72 IN

## 2024-06-18 DIAGNOSIS — Z98.890 HISTORY OF ANKLE SURGERY: ICD-10-CM

## 2024-06-18 DIAGNOSIS — Z78.9 UNDER CARE OF PAIN MANAGEMENT SPECIALIST: ICD-10-CM

## 2024-06-18 DIAGNOSIS — M19.072 OSTEOARTHRITIS OF LEFT ANKLE OR FOOT: Primary | ICD-10-CM

## 2024-06-18 DIAGNOSIS — M21.42 ACQUIRED PES PLANUS, LEFT: ICD-10-CM

## 2024-06-18 DIAGNOSIS — M54.17 LUMBOSACRAL RADICULOPATHY: ICD-10-CM

## 2024-06-18 DIAGNOSIS — M25.572 PAIN IN LEFT ANKLE AND JOINTS OF LEFT FOOT: ICD-10-CM

## 2024-06-18 DIAGNOSIS — Z98.890 HISTORY OF FOOT SURGERY: ICD-10-CM

## 2024-06-18 DIAGNOSIS — M47.816 LUMBAR SPONDYLOSIS: ICD-10-CM

## 2024-06-18 PROCEDURE — 3008F BODY MASS INDEX DOCD: CPT | Mod: CPTII,S$GLB,, | Performed by: PODIATRIST

## 2024-06-18 PROCEDURE — 3044F HG A1C LEVEL LT 7.0%: CPT | Mod: CPTII,S$GLB,, | Performed by: PODIATRIST

## 2024-06-18 PROCEDURE — 99214 OFFICE O/P EST MOD 30 MIN: CPT | Mod: S$GLB,,, | Performed by: PODIATRIST

## 2024-06-18 PROCEDURE — 1159F MED LIST DOCD IN RCRD: CPT | Mod: CPTII,S$GLB,, | Performed by: PODIATRIST

## 2024-06-18 PROCEDURE — 4010F ACE/ARB THERAPY RXD/TAKEN: CPT | Mod: CPTII,S$GLB,, | Performed by: PODIATRIST

## 2024-06-18 PROCEDURE — 1160F RVW MEDS BY RX/DR IN RCRD: CPT | Mod: CPTII,S$GLB,, | Performed by: PODIATRIST

## 2024-06-18 PROCEDURE — 99999 PR PBB SHADOW E&M-EST. PATIENT-LVL III: CPT | Mod: PBBFAC,,, | Performed by: PODIATRIST

## 2024-06-18 RX ORDER — OXYCODONE AND ACETAMINOPHEN 10; 325 MG/1; MG/1
1 TABLET ORAL EVERY 6 HOURS PRN
Qty: 28 TABLET | Refills: 0 | Status: SHIPPED | OUTPATIENT
Start: 2024-06-18 | End: 2024-06-25

## 2024-06-18 NOTE — PROGRESS NOTES
Subjective:       Patient ID: Monica Johnson is a 49 y.o. male.    Chief Complaint: Follow-up (Follow up, rate pain 7/10, nondiabetic, pt is wearing boots )      HPI:  Monica Johnson presents to the office today, s/p 2/17/2021 left flatfoot reconstruction. Patient presents for follow up of the foot/ankle pains. He did have MRI evaluation a few days ago. States persistent 7/10 pains. States neuritis as well. States NSAIDs prn. Ambulates with ASO Brace. Does not have [place] Brace. Does have a history of cervical and lumbar spine pathology. Does follow with Pain Management.  Does state prior EMG NCV study. Does smoke 1/2 PPD of cigarettes.     Hemoglobin A1C   Date Value Ref Range Status   05/03/2024 5.5 4.0 - 5.6 % Final     Comment:     ADA Screening Guidelines:  5.7-6.4%  Consistent with prediabetes  >or=6.5%  Consistent with diabetes    High levels of fetal hemoglobin interfere with the HbA1C  assay. Heterozygous hemoglobin variants (HbS, HgC, etc)do  not significantly interfere with this assay.   However, presence of multiple variants may affect accuracy.     12/03/2020 5.5 4.0 - 5.6 % Final     Comment:     ADA Screening Guidelines:  5.7-6.4%  Consistent with prediabetes  >or=6.5%  Consistent with diabetes  High levels of fetal hemoglobin interfere with the HbA1C  assay. Heterozygous hemoglobin variants (HbS, HgC, etc)do  not significantly interfere with this assay.   However, presence of multiple variants may affect accuracy.     01/09/2020 5.6 4.0 - 5.6 % Final     Comment:     ADA Screening Guidelines:  5.7-6.4%  Consistent with prediabetes  >or=6.5%  Consistent with diabetes  High levels of fetal hemoglobin interfere with the HbA1C  assay. Heterozygous hemoglobin variants (HbS, HgC, etc)do  not significantly interfere with this assay.   However, presence of multiple variants may affect accuracy.         Review of patient's allergies indicates:  No Known Allergies    Past Medical History:   Diagnosis  Date    ADHD (attention deficit hyperactivity disorder)     Allergy     Anxiety 1/25/2016    Arthritis 2014    Osteoarthritis    CHF (congestive heart failure)     Degenerative disc disease at L5-S1 level     Depression 1/25/2016    Hypertension     Sciatica of left side 1/31/2017       Family History   Problem Relation Name Age of Onset    Cancer Mother Yaquelin Johnson         breast    Hypertension Mother Yaquelin Johnson     Cataracts Mother Yaquelin Johnson     Arthritis Mother Yaquelin Johnson     Depression Mother Yaquelin Johnson     Diabetes Mother Yaquelin Johnson     Hearing loss Mother Yaquelin Johnson     Hyperlipidemia Mother Yaquelin Johnson     Miscarriages / Stillbirths Mother Yaquelin Johnson         stillbirth    Hypertension Maternal Grandmother Sherice Johnson     Cancer Maternal Grandmother Sherice Alex         ovarian    Mental illness Maternal Grandmother Sherice Alex     Stroke Maternal Grandmother Sherice Alex     Glaucoma Father Wil Mcmahon Sr.     Cancer Father Wil Mcmahon Sr.         prostrate    Vision loss Father Wil Mcmahon Sr.         glaucoma    Asthma Maternal Grandfather Mandeep Johnson Sr.     Cancer Maternal Aunt Andra Cifuentes         breast    Heart disease Maternal Aunt Andra Cifuentes         tripple bypass    Stroke Maternal Aunt Andra Cifuentes     Diabetes Maternal Aunt Carrol Alejandro     Hyperlipidemia Maternal Aunt Carrol Alejandro     Hypertension Maternal Aunt Carrol Alejandro     Stroke Maternal Aunt Carrol Alejandro     Diabetes Maternal Aunt Jessica Johnson     Hyperlipidemia Maternal Aunt Jessica Johnson     Hypertension Maternal Aunt Kenyatta Guillermo        Social History     Socioeconomic History    Marital status: Legally    Tobacco Use    Smoking status: Every Day     Current packs/day: 0.50     Average packs/day: 0.5 packs/day for 34.5 years (17.2 ttl pk-yrs)     Types: Cigarettes     Start date: 1990     Passive exposure: Never    Smokeless tobacco: Never    Tobacco comments:     HOLD MIDNIGHT TO  SURGERY   Substance and Sexual Activity    Alcohol use: Yes     Alcohol/week: 1.0 standard drink of alcohol     Types: 1 Drinks containing 0.5 oz of alcohol per week     Comment: socially: HOLD 72HRS PRIOR TO SURGERY    Drug use: Yes     Types: Marijuana     Comment: HOLD TODAY PRIOR TO SURGERY    Sexual activity: Yes     Partners: Female   Social History Narrative    ** Merged History Encounter **          Social Determinants of Health     Financial Resource Strain: High Risk (8/19/2023)    Overall Financial Resource Strain (CARDIA)     Difficulty of Paying Living Expenses: Hard   Food Insecurity: Food Insecurity Present (8/19/2023)    Hunger Vital Sign     Worried About Running Out of Food in the Last Year: Sometimes true     Ran Out of Food in the Last Year: Sometimes true   Transportation Needs: Unmet Transportation Needs (8/19/2023)    PRAPARE - Transportation     Lack of Transportation (Medical): Yes     Lack of Transportation (Non-Medical): Yes   Physical Activity: Inactive (8/19/2023)    Exercise Vital Sign     Days of Exercise per Week: 2 days     Minutes of Exercise per Session: 0 min   Stress: Stress Concern Present (8/19/2023)    [employer] - Occupational Stress Questionnaire     Feeling of Stress : Rather much   Housing Stability: High Risk (8/19/2023)    Housing Stability Vital Sign     Unable to Pay for Housing in the Last Year: Yes     Number of Places Lived in the Last Year: 2     Unstable Housing in the Last Year: No       Past Surgical History:   Procedure Laterality Date    CALCANEAL OSTEOTOMY Left 04/16/2019    Procedure: OSTEOTOMY, CALCANEUS;  Surgeon: Rober Colon DPM;  Location: [facility];  Service: Podiatry;  Laterality: Left;    CIRCUMCISION      FOOT SURGERY      INJECTION OF ANESTHETIC AGENT AROUND MEDIAL BRANCH NERVES INNERVATING CERVICAL FACET JOINT Bilateral 11/15/2021    Procedure: Block-nerve-medial branch-cervical bilateral C5, 6,7 RN IV sedation;   Surgeon: Ivan Davey MD;  Location: [facility];  Service: Pain Management;  Laterality: Bilateral;    INJECTION OF ANESTHETIC AGENT AROUND MEDIAL BRANCH NERVES INNERVATING CERVICAL FACET JOINT Bilateral 6/7/2024    Procedure: Bilateral C4-6 MBB with RN IV sedation;  Surgeon: Lyudmila Leung MD;  Location: [facility];  Service: Pain Management;  Laterality: Bilateral;    INJECTION OF ANESTHETIC AGENT AROUND MEDIAL BRANCH NERVES INNERVATING LUMBAR FACET JOINT Bilateral 03/07/2022    Procedure: bilateral L3-L5 MBB;  Surgeon: Ivan Davey MD;  Location: [facility];  Service: Pain Management;  Laterality: Bilateral;    INJECTION OF ANESTHETIC AGENT AROUND MEDIAL BRANCH NERVES INNERVATING LUMBAR FACET JOINT Bilateral 3/22/2023    Procedure: Bilateral L3-5 MBB RN IV Sedation;  Surgeon: Lyudmila Leung MD;  Location: [facility];  Service: Pain Management;  Laterality: Bilateral;    INJECTION OF ANESTHETIC AGENT INTO SACROILIAC JOINT Right 11/24/2020    Procedure: Right BLOCK, SACROILIAC JOINT and Right Knee Injection with RN IV sedation;  Surgeon: Ivan Davey MD;  Location: [facility];  Service: Pain Management;  Laterality: Right;    INJECTION OF ANESTHETIC AGENT INTO SACROILIAC JOINT Right 04/16/2021    Procedure: Right BLOCK, SACROILIAC JOINT RIght Hip Right GTB RN IV sedation;  Surgeon: Ivan Davey MD;  Location: [facility];  Service: Pain Management;  Laterality: Right;    INJECTION OF ANESTHETIC AGENT INTO SACROILIAC JOINT Right 5/10/2023    Procedure: Bilateral SIJ + right IA hip Injection RN IV Sedation;  Surgeon: Lyudmila Leung MD;  Location: [facility];  Service: Pain Management;  Laterality: Right;    INJECTION OF JOINT Right 3/8/2023    Procedure: Right Knee injection with steroid RN IV Sedation;  Surgeon: Lyudmila Leung MD;  Location: [facility];  Service: Pain Management;  Laterality: Right;    INJECTION OF JOINT Right 5/10/2023    Procedure: Bilateral SIJ + right IA hip Injection;   Surgeon: Lyudmila Leung MD;  Location: [facility];  Service: Pain Management;  Laterality: Right;    LENGTHENING OF ACHILLES TENDON Left 04/16/2019    Procedure: LENGTHENING, TENDON, ACHILLES;  Surgeon: Rober Colon DPM;  Location: [facility] OR;  Service: Podiatry;  Laterality: Left;    PLACEMENT OF ACELLULAR HUMAN DERMAL ALLOGRAFT Left 02/17/2021    Procedure: APPLICATION, ACELLULAR HUMAN DERMAL ALLOGRAFT;  Surgeon: Rober Colon DPM;  Location: [facility] OR;  Service: Podiatry;  Laterality: Left;    REPAIR OF POSTERIOR TIBIALIS TENDON Left 04/16/2019    Procedure: REPAIR, TENDON, TIBIALIS POSTERIOR;  Surgeon: Rober Colon DPM;  Location: [facility] OR;  Service: Podiatry;  Laterality: Left;    REPAIR OF TENDON OF LOWER EXTREMITY Left 02/17/2021    Procedure: REPAIR, TENDON, LOWER EXTREMITY;  Surgeon: Rober Colon DPM;  Location: [facility] OR;  Service: Podiatry;  Laterality: Left;    TENDON TRANSFER Left 04/16/2019    Procedure: TRANSFER, TENDON;  Surgeon: Rober Colon DPM;  Location: [facility] OR;  Service: Podiatry;  Laterality: Left;       Review of Systems   Constitutional:  Negative for chills, fatigue and fever.   HENT:  Negative for hearing loss.    Eyes:  Negative for photophobia and visual disturbance.   Respiratory:  Negative for cough, chest tightness, shortness of breath and wheezing.    Cardiovascular:  Negative for chest pain and palpitations.   Gastrointestinal:  Negative for constipation, diarrhea, nausea and vomiting.   Endocrine: Negative for cold intolerance and heat intolerance.   Genitourinary:  Negative for flank pain.   Musculoskeletal:  Positive for back pain and gait problem. Negative for neck pain and neck stiffness.   Skin:  Negative for wound.   Neurological:  Negative for light-headedness and headaches.   Psychiatric/Behavioral:  Negative for sleep disturbance.           Objective:   Ht 6' (1.829 m)   Wt 97.9 kg (215 lb 13.3 oz)   BMI 29.27 kg/m²        Physical Exam  LOWER EXTREMITY  PHYSICAL EXAMINATION  ORTHOPEDIC:  Pain to palpation, proximal aspect of the posterior tibial tendon. Slight pes planus foot type is noted, with mild gastroc equinus. Minimal to no pain to palpation along the distal aspect of the PT tendon at the navicular tuberosity. Minimal sinus tarsi pain to palpation.  Hind hallucis foot range of motion is decreased. Spurring, dorsal midfoot and hindfoot.      VASCULAR: On the left foot, the dorsalis pedis pulse is 2/4 and the posterior tibial pulse is 1/4. Capillary refill time is less than 3 seconds. Hair growth is present on the dorsum of the foot and at the digits. No rubor is present. Proximal to distal temperature is warm to warm.    NEUROLOGY: Sensation to light touch is intact. Proprioception is intact. Sensation to pin prick is intact.     Assessment:     1. Osteoarthritis of left ankle or foot    2. Pain in left ankle and joints of left foot    3. History of ankle surgery    4. History of foot surgery    5. Acquired pes planus, left    6. Lumbar spondylosis    7. Lumbosacral radiculopathy    8. Under care of pain management specialist          Plan:     Osteoarthritis of left ankle or foot  -     Nerve conduction test; Future; Expected date: 07/18/2024    Pain in left ankle and joints of left foot  -     oxyCODONE-acetaminophen (PERCOCET)  mg per tablet; Take 1 tablet by mouth every 6 (six) hours as needed for Pain.  Dispense: 28 tablet; Refill: 0  -     Nerve conduction test; Future; Expected date: 07/18/2024    History of ankle surgery  -     Nerve conduction test; Future; Expected date: 07/18/2024    History of foot surgery  -     Nerve conduction test; Future; Expected date: 07/18/2024    Acquired pes planus, left    Lumbar spondylosis  -     Nerve conduction test; Future; Expected date: 07/18/2024    Lumbosacral radiculopathy  -     Nerve conduction test; Future; Expected date: 07/18/2024    Under care of pain management specialist      Thorough discussion  is had with the patient today, concerning the diagnosis, its etiology, and the treatment algorithm at present.    XRAYS are reviewed in detail with the patient. All questions and concerns regarding findings and its/their implications are outlined and discussed.    MRI is reviewed in detail with the patient. All questions and concerns regarding findings and its/their implications are outlined and discussed.    Did discuss proper and supportive shoe gear in detail and at length with the patient.  These are shoes with firm and robust arch support; medial counter.  Shoes which only bend at the metatarsophalangeal joint and which are rigid in the midfoot and hindfoot. Patient urged to purchase running type or cross training type shoes gear which are designed for pronation control.    Did complete ample physical therapy status post surgery.    The procedure of (LLE triple arthrodesis) was thoroughly explained to the patient. Its necessity and/or purpose and the implications therein were outlined, including any pertinent advantages and/or disadvantages, and possible complications, if any. Possible complications include recurrence of pathology and/or deformity, infection (cellulitis, drainage, purulence, malodor, etc...), pain, numbness, neuritis, edema, burning, loss of function, need for further surgery, possible need for removal of any implanted hardware, soft tissue contracture and/or scarring, etc... No guarantees were given and/or implied. Post-operative expectations and weightbearing protocol is thoroughly explained to the patient, who acknowledges understanding.    Consider [place] Bracig.    Continue follow-up and treatment with Pain Management.    Obtain EMG NCV.        Future Appointments   Date Time Provider Department Center   8/6/2024 12:30 PM Lyudmila Leung MD [facility]   8/21/2024  3:20 PM Julio Coronado MD [facility] C[facility] Medical C   11/6/2024 11:40 AM Kenya Escoto MD [facility] IM BR  Medical C

## 2024-06-21 ENCOUNTER — HOSPITAL ENCOUNTER (EMERGENCY)
Facility: HOSPITAL | Age: 49
Discharge: HOME | End: 2024-06-21
Attending: EMERGENCY MEDICINE
Payer: COMMERCIAL

## 2024-06-21 VITALS
RESPIRATION RATE: 16 BRPM | SYSTOLIC BLOOD PRESSURE: 134 MMHG | OXYGEN SATURATION: 99 % | BODY MASS INDEX: 29.03 KG/M2 | HEART RATE: 90 BPM | TEMPERATURE: 98 F | HEIGHT: 72 IN | DIASTOLIC BLOOD PRESSURE: 89 MMHG | WEIGHT: 214.31 LBS

## 2024-06-21 DIAGNOSIS — M25.511 ACUTE PAIN OF RIGHT SHOULDER: Primary | ICD-10-CM

## 2024-06-21 PROCEDURE — 63600175 PHARM REV CODE 636 W HCPCS: Performed by: NURSE PRACTITIONER

## 2024-06-21 PROCEDURE — 25000003 PHARM REV CODE 250: Performed by: NURSE PRACTITIONER

## 2024-06-21 PROCEDURE — 99284 EMERGENCY DEPT VISIT MOD MDM: CPT | Mod: 25

## 2024-06-21 PROCEDURE — 96372 THER/PROPH/DIAG INJ SC/IM: CPT | Performed by: NURSE PRACTITIONER

## 2024-06-21 RX ORDER — KETOROLAC TROMETHAMINE 30 MG/ML
30 INJECTION, SOLUTION INTRAMUSCULAR; INTRAVENOUS
Status: COMPLETED | OUTPATIENT
Start: 2024-06-21 | End: 2024-06-21

## 2024-06-21 RX ORDER — MORPHINE SULFATE 4 MG/ML
4 INJECTION, SOLUTION INTRAMUSCULAR; INTRAVENOUS
Status: COMPLETED | OUTPATIENT
Start: 2024-06-21 | End: 2024-06-21

## 2024-06-21 RX ORDER — ONDANSETRON 4 MG/1
4 TABLET, ORALLY DISINTEGRATING ORAL
Status: COMPLETED | OUTPATIENT
Start: 2024-06-21 | End: 2024-06-21

## 2024-06-21 RX ADMIN — KETOROLAC TROMETHAMINE 30 MG: 30 INJECTION, SOLUTION INTRAMUSCULAR at 03:06

## 2024-06-21 RX ADMIN — MORPHINE SULFATE 4 MG: 4 INJECTION INTRAVENOUS at 05:06

## 2024-06-21 RX ADMIN — ONDANSETRON 4 MG: 4 TABLET, ORALLY DISINTEGRATING ORAL at 05:06

## 2024-06-21 NOTE — Clinical Note
"Monica "Monica" Alex was seen and treated in our emergency department on 6/21/2024.  He may return to work on 06/23/2024.       If you have any questions or concerns, please don't hesitate to call.      Pillo Hawkins, JOHANA"

## 2024-06-24 ENCOUNTER — PATIENT MESSAGE (OUTPATIENT)
Dept: PAIN MEDICINE | Facility: HOSPITAL | Age: 49
End: 2024-06-24
Payer: COMMERCIAL

## 2024-06-24 NOTE — PRE-PROCEDURE INSTRUCTIONS
Unable to reach patient regarding procedure scheduled on 6.25. PAT instructions and arrival time sent via portal     Arrival time 0930     Has patient been sick with fever or on antibiotics within the last 7 days? No     Does the patient have any open wounds, sores or rashes? No     Does the patient have any recent fractures? no     Has patient received a vaccination within the last 7 days? No     Received the COVID vaccination? yes     Has the patient stopped all medications as directed? na     Does patient have a pacemaker, defibrillator, or implantable stimulator? No     Does the patient have a ride to and from procedure and someone reliable to remain with patient?       Is the patient diabetic? no     Does the patient have sleep apnea? Or use O2 at home? no     Is the patient receiving sedation? yes     Is the patient instructed to remain NPO beginning at midnight the night before their procedure? yes     Procedure location confirmed with patient? Yes     Covid- Denies signs/symptoms. Instructed to notify PAT/MD if any changes.   Alar Island Pedicle Flap Text: The defect edges were debeveled with a #15 scalpel blade.  Given the location of the defect, shape of the defect and the proximity to the alar rim an island pedicle advancement flap was deemed most appropriate.  Using a sterile surgical marker, an appropriate advancement flap was drawn incorporating the defect, outlining the appropriate donor tissue and placing the expected incisions within the nasal ala running parallel to the alar rim. The area thus outlined was incised with a #15 scalpel blade.  The skin margins were undermined minimally to an appropriate distance in all directions around the primary defect and laterally outward around the island pedicle utilizing iris scissors.  There was minimal undermining beneath the pedicle flap.

## 2024-06-25 ENCOUNTER — HOSPITAL ENCOUNTER (OUTPATIENT)
Facility: HOSPITAL | Age: 49
Discharge: HOME | End: 2024-06-25
Attending: ANESTHESIOLOGY | Admitting: ANESTHESIOLOGY
Payer: COMMERCIAL

## 2024-06-25 VITALS
BODY MASS INDEX: 28.89 KG/M2 | WEIGHT: 213.31 LBS | SYSTOLIC BLOOD PRESSURE: 143 MMHG | RESPIRATION RATE: 17 BRPM | HEART RATE: 59 BPM | OXYGEN SATURATION: 100 % | TEMPERATURE: 98 F | DIASTOLIC BLOOD PRESSURE: 90 MMHG | HEIGHT: 72 IN

## 2024-06-25 DIAGNOSIS — M16.11 PRIMARY OSTEOARTHRITIS OF RIGHT HIP: ICD-10-CM

## 2024-06-25 DIAGNOSIS — M46.1 SACROILIITIS: ICD-10-CM

## 2024-06-25 PROCEDURE — 63600175 PHARM REV CODE 636 W HCPCS: Performed by: ANESTHESIOLOGY

## 2024-06-25 PROCEDURE — 20610 DRAIN/INJ JOINT/BURSA W/O US: CPT | Mod: 59,RT | Performed by: ANESTHESIOLOGY

## 2024-06-25 PROCEDURE — 20610 DRAIN/INJ JOINT/BURSA W/O US: CPT | Mod: 59,RT,, | Performed by: ANESTHESIOLOGY

## 2024-06-25 PROCEDURE — 27096 INJECT SACROILIAC JOINT: CPT | Mod: 50,,, | Performed by: ANESTHESIOLOGY

## 2024-06-25 PROCEDURE — 27096 INJECT SACROILIAC JOINT: CPT | Mod: 50 | Performed by: ANESTHESIOLOGY

## 2024-06-25 PROCEDURE — 25000003 PHARM REV CODE 250: Performed by: ANESTHESIOLOGY

## 2024-06-25 PROCEDURE — 25500020 PHARM REV CODE 255: Performed by: ANESTHESIOLOGY

## 2024-06-25 RX ORDER — TRIAMCINOLONE ACETONIDE 40 MG/ML
INJECTION, SUSPENSION INTRA-ARTICULAR; INTRAMUSCULAR
Status: DISCONTINUED | OUTPATIENT
Start: 2024-06-25 | End: 2024-06-25 | Stop reason: HOSPADM

## 2024-06-25 RX ORDER — MIDAZOLAM HYDROCHLORIDE 1 MG/ML
INJECTION, SOLUTION INTRAMUSCULAR; INTRAVENOUS
Status: DISCONTINUED | OUTPATIENT
Start: 2024-06-25 | End: 2024-06-25 | Stop reason: HOSPADM

## 2024-06-25 RX ORDER — FENTANYL CITRATE 50 UG/ML
INJECTION, SOLUTION INTRAMUSCULAR; INTRAVENOUS
Status: DISCONTINUED | OUTPATIENT
Start: 2024-06-25 | End: 2024-06-25 | Stop reason: HOSPADM

## 2024-06-25 RX ORDER — INDOMETHACIN 25 MG/1
CAPSULE ORAL
Status: DISCONTINUED | OUTPATIENT
Start: 2024-06-25 | End: 2024-06-25 | Stop reason: HOSPADM

## 2024-06-25 RX ORDER — BUPIVACAINE HYDROCHLORIDE 2.5 MG/ML
INJECTION, SOLUTION EPIDURAL; INFILTRATION; INTRACAUDAL
Status: DISCONTINUED | OUTPATIENT
Start: 2024-06-25 | End: 2024-06-25 | Stop reason: HOSPADM

## 2024-06-25 NOTE — H&P
HPI  Patient presenting for Procedure(s) (LRB):  Bilateral SIJ + right IA hip Injection (Right)  Bilateral SIJ + right IA hip Injection (Right)     Patient on Anti-coagulation No    No health changes since previous encounter    Past Medical History:   Diagnosis Date    ADHD (attention deficit hyperactivity disorder)     Allergy     Anxiety 1/25/2016    Arthritis 2014    Osteoarthritis    CHF (congestive heart failure)     Degenerative disc disease at L5-S1 level     Depression 1/25/2016    Hypertension     Sciatica of left side 1/31/2017     Past Surgical History:   Procedure Laterality Date    CALCANEAL OSTEOTOMY Left 04/16/2019    Procedure: OSTEOTOMY, CALCANEUS;  Surgeon: Rober Colon DPM;  Location: [facility] OR;  Service: Podiatry;  Laterality: Left;    CIRCUMCISION      FOOT SURGERY      INJECTION OF ANESTHETIC AGENT AROUND MEDIAL BRANCH NERVES INNERVATING CERVICAL FACET JOINT Bilateral 11/15/2021    Procedure: Block-nerve-medial branch-cervical bilateral C5, 6,7 RN IV sedation;  Surgeon: Ivan Davey MD;  Location: [facility];  Service: Pain Management;  Laterality: Bilateral;    INJECTION OF ANESTHETIC AGENT AROUND MEDIAL BRANCH NERVES INNERVATING CERVICAL FACET JOINT Bilateral 6/7/2024    Procedure: Bilateral C4-6 MBB with RN IV sedation;  Surgeon: Lyudmila Leung MD;  Location: [facility];  Service: Pain Management;  Laterality: Bilateral;    INJECTION OF ANESTHETIC AGENT AROUND MEDIAL BRANCH NERVES INNERVATING LUMBAR FACET JOINT Bilateral 03/07/2022    Procedure: bilateral L3-L5 MBB;  Surgeon: Ivan Davey MD;  Location: [facility];  Service: Pain Management;  Laterality: Bilateral;    INJECTION OF ANESTHETIC AGENT AROUND MEDIAL BRANCH NERVES INNERVATING LUMBAR FACET JOINT Bilateral 3/22/2023    Procedure: Bilateral L3-5 MBB RN IV Sedation;  Surgeon: Lyudmila Leung MD;  Location: [facility];  Service: Pain Management;  Laterality: Bilateral;    INJECTION OF ANESTHETIC AGENT INTO SACROILIAC  JOINT Right 11/24/2020    Procedure: Right BLOCK, SACROILIAC JOINT and Right Knee Injection with RN IV sedation;  Surgeon: Ivan Davey MD;  Location: [facility];  Service: Pain Management;  Laterality: Right;    INJECTION OF ANESTHETIC AGENT INTO SACROILIAC JOINT Right 04/16/2021    Procedure: Right BLOCK, SACROILIAC JOINT RIght Hip Right GTB RN IV sedation;  Surgeon: Ivan Davey MD;  Location: [facility];  Service: Pain Management;  Laterality: Right;    INJECTION OF ANESTHETIC AGENT INTO SACROILIAC JOINT Right 5/10/2023    Procedure: Bilateral SIJ + right IA hip Injection RN IV Sedation;  Surgeon: Lyudmila Leung MD;  [facility];  Service: Pain Management;  Laterality: Right;    INJECTION OF JOINT Right 3/8/2023    Procedure: Right Knee injection with steroid RN IV Sedation;  Surgeon: Lyudmila Leung MD;  Location: [facility];  Service: Pain Management;  Laterality: Right;    INJECTION OF JOINT Right 5/10/2023    Procedure: Bilateral SIJ + right IA hip Injection;  Surgeon: Lyudmila Leung MD;  [facility];  Service: Pain Management;  Laterality: Right;    LENGTHENING OF ACHILLES TENDON Left 04/16/2019    Procedure: LENGTHENING, TENDON, ACHILLES;  Surgeon: Rober Colon DPM;  Location: [facility] OR;  Service: Podiatry;  Laterality: Left;    PLACEMENT OF ACELLULAR HUMAN DERMAL ALLOGRAFT Left 02/17/2021    Procedure: APPLICATION, ACELLULAR HUMAN DERMAL ALLOGRAFT;  Surgeon: Rober Colon DPM;  Location: [facility] OR;  Service: Podiatry;  Laterality: Left;    REPAIR OF POSTERIOR TIBIALIS TENDON Left 04/16/2019    Procedure: REPAIR, TENDON, TIBIALIS POSTERIOR;  Surgeon: Rober Colon DPM;  Location: [facility] OR;  Service: Podiatry;  Laterality: Left;    REPAIR OF TENDON OF LOWER EXTREMITY Left 02/17/2021    Procedure: REPAIR, TENDON, LOWER EXTREMITY;  Surgeon: Rober Colon DPM;  Location: [facility] OR;  Service: Podiatry;  Laterality: Left;    TENDON TRANSFER Left 04/16/2019    Procedure:  TRANSFER, TENDON;  Surgeon: Rober Colon DPM;  Location: [facility];  Service: Podiatry;  Laterality: Left;     Review of patient's allergies indicates:  No Known Allergies     No current facility-administered medications on file prior to encounter.     Current Outpatient Medications on File Prior to Encounter   Medication Sig Dispense Refill    amLODIPine (NORVASC) 2.5 MG tablet Take 1 tablet (2.5 mg total) by mouth once daily. 90 tablet 1    metoprolol succinate (TOPROL-XL) 25 MG 24 hr tablet Take 1 tablet (25 mg total) by mouth once daily. 90 tablet 3    aspirin (ECOTRIN) 81 MG EC tablet Take 81 mg by mouth once daily.      atorvastatin (LIPITOR) 20 MG tablet Take 1 tablet (20 mg total) by mouth every evening. 90 tablet 3    b complex vitamins capsule Take 1 capsule by mouth once daily.      busPIRone (BUSPAR) 30 MG Tab Take 1 tablet (30 mg total) by mouth 2 (two) times daily. 60 tablet 1    cetirizine (ZYRTEC) 10 MG tablet Take 10 mg by mouth once daily.      cholecalciferol, vitamin D3, 125 mcg (5,000 unit) Tab Take 5,000 Units by mouth once daily.      gabapentin (NEURONTIN) 300 MG capsule Take 1 capsule (300 mg total) by mouth 2 (two) times daily AND 4 capsules (1,200 mg total) every evening. TAKE ONE CAPSULE BY MOUTH TWICE DAILY AND 4 CAPSULES EVERY EVENING. 180 capsule 3    meloxicam (MOBIC) 15 MG tablet Take 1 tablet (15 mg total) by mouth once daily. 90 tablet 3    multivitamin capsule Take 1 capsule by mouth once daily.      omega-3 fatty acids/fish oil (FISH OIL-OMEGA-3 FATTY ACIDS) 300-1,000 mg capsule Take by mouth once daily.      pantoprazole (PROTONIX) 40 MG tablet Take 1 tablet (40 mg total) by mouth once daily. 90 tablet 3    tamsulosin (FLOMAX) 0.4 mg Cap Take 1 capsule (0.4 mg total) by mouth once daily. 90 capsule 3    telmisartan (MICARDIS) 40 MG Tab Take 1 tablet (40 mg total) by mouth once daily. 90 tablet 3    tiZANidine (ZANAFLEX) 4 MG tablet Take 1 tablet (4 mg total) by mouth 3 (three)  times daily as needed (muscle pain). 90 tablet 1    traZODone (DESYREL) 100 MG tablet Take 1/2 to 1 tablet at bedtime as needed for sleep. 90 tablet 3    TUMERIC-GING-OLIVE-OREG-CAPRYL ORAL Take by mouth.          PMHx, PSHx, Allergies, Medications reviewed in epic    ROS negative except pain complaints in HPI    OBJECTIVE:    /85 (BP Location: Right arm, Patient Position: Sitting)   Pulse 69   Temp 97.8 °F (36.6 °C) (Temporal)   Resp 18   Ht 6' (1.829 m)   Wt 96.7 kg (213 lb 4.7 oz)   SpO2 98%   BMI 28.93 kg/m²     PHYSICAL EXAMINATION:    GENERAL: Well appearing, in no acute distress, alert and oriented x3.  PSYCH:  Mood and affect appropriate.  SKIN: Skin color, texture, turgor normal, no rashes or lesions which will impact the procedure.  CV: RRR with palpation of the radial artery.  PULM: No evidence of respiratory difficulty, symmetric chest rise. Clear to auscultation.  NEURO: Cranial nerves grossly intact.    Plan:    Proceed with procedure as planned Procedure(s) (LRB):  Bilateral SIJ + right IA hip Injection (Right)  Bilateral SIJ + right IA hip Injection (Right)    Lyudmila Leung MD  06/25/2024

## 2024-06-25 NOTE — DISCHARGE INSTRUCTIONS

## 2024-06-25 NOTE — ED PROVIDER NOTES
"Encounter Date: 6/21/2024       History     Chief Complaint   Patient presents with    Shoulder Pain     Right shoulder pain was hauling approx 40 pounds of meat and right shoulder and arm "gave out" now with pain, numbness and tingling radiating down to fingers. Can only raise to approx shoulder level. Denies injury      Patient complains of right shoulder pain after lifting a heavy meet while performing his job duties.  Denies loss of bowel or bladder or saddle anesthesia        Review of patient's allergies indicates:  No Known Allergies  Past Medical History:   Diagnosis Date    ADHD (attention deficit hyperactivity disorder)     Allergy     Anxiety 1/25/2016    Arthritis 2014    Osteoarthritis    CHF (congestive heart failure)     Degenerative disc disease at L5-S1 level     Depression 1/25/2016    Hypertension     Sciatica of left side 1/31/2017     Past Surgical History:   Procedure Laterality Date    CALCANEAL OSTEOTOMY Left 04/16/2019    Procedure: OSTEOTOMY, CALCANEUS;  Surgeon: Rober Colon DPM;  Location: [facility];  Service: Podiatry;  Laterality: Left;    CIRCUMCISION      FOOT SURGERY      INJECTION OF ANESTHETIC AGENT AROUND MEDIAL BRANCH NERVES INNERVATING CERVICAL FACET JOINT Bilateral 11/15/2021    Procedure: Block-nerve-medial branch-cervical bilateral C5, 6,7 RN IV sedation;  Surgeon: Ivan Davey MD;  Location: [facility];  Service: Pain Management;  Laterality: Bilateral;    INJECTION OF ANESTHETIC AGENT AROUND MEDIAL BRANCH NERVES INNERVATING CERVICAL FACET JOINT Bilateral 6/7/2024    Procedure: Bilateral C4-6 MBB with RN IV sedation;  Surgeon: Lyudmila Leung MD;  Location: [facility];  Service: Pain Management;  Laterality: Bilateral;    INJECTION OF ANESTHETIC AGENT AROUND MEDIAL BRANCH NERVES INNERVATING LUMBAR FACET JOINT Bilateral 03/07/2022    Procedure: bilateral L3-L5 MBB;  Surgeon: Ivan Davey MD;  Location: [facility];  Service: Pain Management;  Laterality: Bilateral; "    INJECTION OF ANESTHETIC AGENT AROUND MEDIAL BRANCH NERVES INNERVATING LUMBAR FACET JOINT Bilateral 3/22/2023    Procedure: Bilateral L3-5 MBB RN IV Sedation;  Surgeon: Lyudmila Leung MD;  Location: [facility];  Service: Pain Management;  Laterality: Bilateral;    INJECTION OF ANESTHETIC AGENT INTO SACROILIAC JOINT Right 11/24/2020    Procedure: Right BLOCK, SACROILIAC JOINT and Right Knee Injection with RN IV sedation;  Surgeon: Ivan Davey MD;  Location: [facility];  Service: Pain Management;  Laterality: Right;    INJECTION OF ANESTHETIC AGENT INTO SACROILIAC JOINT Right 04/16/2021    Procedure: Right BLOCK, SACROILIAC JOINT RIght Hip Right GTB RN IV sedation;  Surgeon: Ivan Davey MD;  Location: [facility];  Service: Pain Management;  Laterality: Right;    INJECTION OF ANESTHETIC AGENT INTO SACROILIAC JOINT Right 5/10/2023    Procedure: Bilateral SIJ + right IA hip Injection RN IV Sedation;  Surgeon: Lyudmila Leung MD;  [facility];  Service: Pain Management;  Laterality: Right;    INJECTION OF JOINT Right 3/8/2023    Procedure: Right Knee injection with steroid RN IV Sedation;  Surgeon: Lyudmila Leung MD;  Location: [facility];  Service: Pain Management;  Laterality: Right;    INJECTION OF JOINT Right 5/10/2023    Procedure: Bilateral SIJ + right IA hip Injection;  Surgeon: Lyudmila Leung MD;  Location: [facility];  Service: Pain Management;  Laterality: Right;    LENGTHENING OF ACHILLES TENDON Left 04/16/2019    Procedure: LENGTHENING, TENDON, ACHILLES;  Surgeon: Rober Colon DPM;  Location: [facility] OR;  Service: Podiatry;  Laterality: Left;    PLACEMENT OF ACELLULAR HUMAN DERMAL ALLOGRAFT Left 02/17/2021    Procedure: APPLICATION, ACELLULAR HUMAN DERMAL ALLOGRAFT;  Surgeon: Rober Colon DPM;  Location: [facility] OR;  Service: Podiatry;  Laterality: Left;    REPAIR OF POSTERIOR TIBIALIS TENDON Left 04/16/2019    Procedure: REPAIR, TENDON, TIBIALIS POSTERIOR;  Surgeon: Rober CLEVELAND  RAQUEL Colon;  Location: [facility] OR;  Service: Podiatry;  Laterality: Left;    REPAIR OF TENDON OF LOWER EXTREMITY Left 02/17/2021    Procedure: REPAIR, TENDON, LOWER EXTREMITY;  Surgeon: Rober Colno DPM;  Location: [facility] OR;  Service: Podiatry;  Laterality: Left;    TENDON TRANSFER Left 04/16/2019    Procedure: TRANSFER, TENDON;  Surgeon: Rober Colon DPM;  Location: [facility] OR;  Service: Podiatry;  Laterality: Left;     Family History   Problem Relation Name Age of Onset    Cancer Mother Yaquelin Alex         breast    Hypertension Mother Yaquelin Alex     Cataracts Mother Yaquelin Alex     Arthritis Mother Yaquelin Alex     Depression Mother Yaquelin Alex     Diabetes Mother Yaquelin Alex     Hearing loss Mother Yaquelin Alex     Hyperlipidemia Mother Yaquelin Alex     Miscarriages / Stillbirths Mother Yaquelin Alex         stillbirth    Hypertension Maternal Grandmother Sherice Johnson     Cancer Maternal Grandmother Sherice Alex         ovarian    Mental illness Maternal Grandmother Sherice Alex     Stroke Maternal Grandmother Sherice Alex     Glaucoma Father Wil Mcmahon Sr.     Cancer Father Wil Mcmahon Sr.         prostrate    Vision loss Father Wil Mcmahon Sr.         glaucoma    Asthma Maternal Grandfather Mandeep Johnson Sr.     Cancer Maternal Aunt Andra Cifuentes         breast    Heart disease Maternal Aunt Andra Cifuentes         tripple bypass    Stroke Maternal Aunt Andra Chewon     Diabetes Maternal Aunt Carrol Javon     Hyperlipidemia Maternal Aunt Carrol Javon     Hypertension Maternal Aunt Carrol Javon     Stroke Maternal Aunt Carrol Javon     Diabetes Maternal Aunt Jessica Alex     Hyperlipidemia Maternal Aunt Jessica Johnson     Hypertension Maternal Aunt Kenyatta Guillermo      Social History     Tobacco Use    Smoking status: Every Day     Current packs/day: 0.50     Average packs/day: 0.5 packs/day for 34.5 years (17.2 ttl pk-yrs)     Types: Cigarettes     Start date: 1990     Passive exposure:  Never    Smokeless tobacco: Never    Tobacco comments:     HOLD MIDNIGHT TO SURGERY   Substance Use Topics    Alcohol use: Yes     Alcohol/week: 1.0 standard drink of alcohol     Types: 1 Drinks containing 0.5 oz of alcohol per week     Comment: socially: HOLD 72HRS PRIOR TO SURGERY    Drug use: Yes     Types: Marijuana     Comment: HOLD TODAY PRIOR TO SURGERY     Review of Systems   Constitutional:  Negative for fever.   HENT:  Negative for sore throat.    Respiratory:  Negative for shortness of breath.    Cardiovascular:  Negative for chest pain.   Gastrointestinal:  Negative for nausea.   Genitourinary:  Negative for dysuria.   Musculoskeletal:  Negative for back pain.   Skin:  Negative for rash.   Neurological:  Negative for weakness.   Hematological:  Does not bruise/bleed easily.       Physical Exam     Initial Vitals [06/21/24 1436]   BP Pulse Resp Temp SpO2   134/89 90 18 98 °F (36.7 °C) 99 %      MAP       --         Physical Exam    Nursing note and vitals reviewed.  Constitutional: He appears well-developed and well-nourished.   HENT:   Head: Normocephalic and atraumatic.   Eyes: Conjunctivae are normal. Pupils are equal, round, and reactive to light.   Neck: Neck supple.   Normal range of motion.  Cardiovascular:  Normal rate, regular rhythm, normal heart sounds and intact distal pulses.           Pulmonary/Chest: Breath sounds normal.   Abdominal: Abdomen is soft. There is no rebound and no guarding.   Musculoskeletal:         General: Normal range of motion.      Cervical back: Normal range of motion and neck supple.      Comments: Right rotator cuff tenderness with range of motion     Neurological: He is alert.   Skin: Skin is warm and dry.   Psychiatric: He has a normal mood and affect. His behavior is normal. Thought content normal.         ED Course   Procedures  Labs Reviewed - No data to display       Imaging Results              X-ray Shoulder 2 or More Views Right (Final result)  Result time  06/21/24 15:58:02      Final result by Jimmy Ellis MD (06/21/24 15:58:02)                   Impression:      No acute fracture or dislocation.      Electronically signed by: Jimmy Ellis MD  Date:    06/21/2024  Time:    15:58               Narrative:    EXAMINATION:  XR SHOULDER COMPLETE 2 OR MORE VIEWS RIGHT    CLINICAL HISTORY:  XR SHOULDER COMPLETE 2 OR MORE VIEWS RIGHT    COMPARISON:  None    FINDINGS:  Three views of the right shoulder were obtained.    No evidence of acute fracture or dislocation.  Bony mineralization is normal.  Soft tissues are unremarkable.                                       Medications   ketorolac injection 30 mg (30 mg Intramuscular Given 6/21/24 1529)   morphine injection 4 mg (4 mg Intramuscular Given 6/21/24 1717)   ondansetron disintegrating tablet 4 mg (4 mg Oral Given 6/21/24 1718)     Medical Decision Making  Differential diagnosis considered but not limited to; right shoulder strain, rotator cuff tear    Amount and/or Complexity of Data Reviewed  Radiology: ordered.    Risk  Prescription drug management.                                      Clinical Impression:  Final diagnoses:  [M25.511] Acute pain of right shoulder (Primary)          ED Disposition Condition    Discharge Stable          ED Prescriptions    None       Follow-up Information       Follow up With Specialties Details Why Contact Info    Kenya Escoto MD Family Medicine Schedule an appointment as soon as possible for a visit today As needed [address] [facility] [address] [phone]               Pillo Hawkins NP  06/25/24 [id number]

## 2024-06-25 NOTE — PLAN OF CARE
Pt discharged home, awake, alert, oriented x's 4,  pain decreasing, no apparent distress noted. All questions and concerns addressed and answered, pt verbalizes understanding of discharge process, pt meets discharge criteria and is being discharged to car via wheelchair.

## 2024-06-25 NOTE — DISCHARGE SUMMARY
Discharge Note  Short Stay      SUMMARY     Admit Date: 6/25/2024    Attending Physician: Lyudmila Leung MD        Discharge Physician: Lyudmila Leung MD        Discharge Date: 6/25/2024 10:26 AM    Procedure(s) (LRB):  Bilateral SIJ + right IA hip Injection (Right)  Bilateral SIJ + right IA hip Injection (Right)    Final Diagnosis: Sacroiliitis [M46.1]  Primary osteoarthritis of right hip [M16.11]    Disposition: Home or self care    Patient Instructions:   Discharge Medication List as of 6/25/2024  8:53 AM        CONTINUE these medications which have NOT CHANGED    Details   amLODIPine (NORVASC) 2.5 MG tablet Take 1 tablet (2.5 mg total) by mouth once daily., Starting Mon 5/20/2024, Until Tue 5/20/2025, Normal      aspirin (ECOTRIN) 81 MG EC tablet Take 81 mg by mouth once daily., Starting Fri 7/30/2021, Historical Med      atorvastatin (LIPITOR) 20 MG tablet Take 1 tablet (20 mg total) by mouth every evening., Starting Fri 5/3/2024, Normal      b complex vitamins capsule Take 1 capsule by mouth once daily., Historical Med      busPIRone (BUSPAR) 30 MG Tab Take 1 tablet (30 mg total) by mouth 2 (two) times daily., Starting Fri 5/3/2024, Normal      cetirizine (ZYRTEC) 10 MG tablet Take 10 mg by mouth once daily., Historical Med      cholecalciferol, vitamin D3, 125 mcg (5,000 unit) Tab Take 5,000 Units by mouth once daily., Historical Med      gabapentin (NEURONTIN) 300 MG capsule Multiple Dosages:Starting Fri 5/24/2024Take 1 capsule (300 mg total) by mouth 2 (two) times daily AND 4 capsules (1,200 mg total) every evening. TAKE ONE CAPSULE BY MOUTH TWICE DAILY AND 4 CAPSULES EVERY EVENING., Normal      meloxicam (MOBIC) 15 MG tablet Take 1 tablet (15 mg total) by mouth once daily., Starting Fri 5/3/2024, Normal      metoprolol succinate (TOPROL-XL) 25 MG 24 hr tablet Take 1 tablet (25 mg total) by mouth once daily., Starting Fri 5/3/2024, Normal      multivitamin capsule Take 1 capsule by mouth once daily.,  Historical Med      omega-3 fatty acids/fish oil (FISH OIL-OMEGA-3 FATTY ACIDS) 300-1,000 mg capsule Take by mouth once daily., Historical Med      oxyCODONE-acetaminophen (PERCOCET)  mg per tablet Take 1 tablet by mouth every 6 (six) hours as needed for Pain., Starting Tue 6/18/2024, Until Tue 6/25/2024 at 2359, Normal      pantoprazole (PROTONIX) 40 MG tablet Take 1 tablet (40 mg total) by mouth once daily., Starting Fri 5/3/2024, Until Sat 5/3/2025, Normal      tamsulosin (FLOMAX) 0.4 mg Cap Take 1 capsule (0.4 mg total) by mouth once daily., Starting Fri 5/3/2024, Normal      telmisartan (MICARDIS) 40 MG Tab Take 1 tablet (40 mg total) by mouth once daily., Starting Fri 5/3/2024, Normal      tiZANidine (ZANAFLEX) 4 MG tablet Take 1 tablet (4 mg total) by mouth 3 (three) times daily as needed (muscle pain)., Starting Fri 5/24/2024, Until Tue 7/23/2024 at 2359, Normal      traZODone (DESYREL) 100 MG tablet Take 1/2 to 1 tablet at bedtime as needed for sleep., Normal      TUMERIC-GING-OLIVE-OREG-CAPRYL ORAL Take by mouth., Historical Med                 Discharge Diagnosis: Sacroiliitis [M46.1]  Primary osteoarthritis of right hip [M16.11]  Condition on Discharge: Stable with no complications to procedure   Diet on Discharge: Same as before.  Activity: as per instruction sheet.  Discharge to: Home with a responsible adult.  Follow up: 2-4 weeks       Please call the office at [phone] if you experience any weakness or loss of sensation, fever > 101.5, pain uncontrolled with oral medications, persistent nausea/vomiting/or diarrhea, redness or drainage from the incisions, or any other worrisome concerns. If physician on call was not reached or could not communicate with our office for any reason please go to the nearest emergency department

## 2024-06-25 NOTE — OP NOTE
Vitals:    02/23/22 0805   BP: (!) 168/93   Pulse: (!) 111   Resp: (!) 22   Temp:        Procedure Date:06/25/2024      INFORMED CONSENT: The procedure, risks, benefits and options were discussed with patient. There are no contraindications to the procedure. The patient expressed understanding and agreed to proceed. The personnel performing the procedure was discussed. I verify that I personally obtained consent prior to the start of the procedure and the signed consent can be found on the patient's chart.       Anesthesia:   Conscious sedation provided by M.D    The patient was monitored with continuous pulse oximetry, EKG, and intermittent blood pressure monitors.  The patient was hemodynamically stable throughout the entire process was responsive to voice, and breathing spontaneously.  Supplemental O2 was provided at 2L/min via nasal cannula.  Patient was comfortable for the duration of the procedure. (See nurse documentation and case log for sedation time)    There was a total of 1mg IV Midazolam and 25mcg Fentanyl titrated for the procedure    Pre Procedure diagnosis: Sacroiliitis [M46.1]  Post-Procedure diagnosis: SAME      PROCEDURE:  Bilateral sacroiliac joint injection                          Right Hip (acetabulofemoral) joint injection under fluoroscopic guidance    REASON FOR PROCEDURE:   Sacroiliitis [M46.1]  osteoarthritis of the hip (of the above noted laterality)    MEDICATIONS INJECTED: 1mL 40mg/ml Kenalog and 4mL Bupivacaine 0.25% into each site    LOCAL ANESTHETIC USED: Xylocaine 1% 6ml     ESTIMATED BLOOD LOSS: None.   COMPLICATIONS: None.     TECHNIQUE:   Sacroiliac joint injection:   Laying in the prone position, the patient was prepped and draped in the usual sterile fashion using ChloraPrep and fenestrated drape.  The area was determined under fluoroscopy.  Local Xylocaine was injected by raising a wheel and going down to the periosteum using a 27-gauge hypodermic needle.  The 3.5 inch  22-gauge spinal needle was introduce into the Left sacroiliac joint.  Negative pressure applied to confirm no intravascular placement.  Omnipaque was injected to confirm placement and to confirm that there was no vascular runoff.  The medication was then injected slowly.  The patient tolerated the procedure well.                       Hip Intraarticular Injection:   The patient was then repositioned on the table in supine orientation.. The area over the above noted joint/s was widely prepped with ChloraPrep and drapped in usual sterile fashion.    The above noted joint/s was identified on fluoroscopy. A 27-gauge 1.5 inch needle was used to localize the site of entry with 3 mL of 1% PF Lidocaine. A 22 gauge 3.5 inch spinal needle was then introduced and advanced towards the neck of the femur. The target site was approximately 0.5 to 1.0 cm distal to the lateral border of the femoral head and 0.5 to 1.0 cm below the superior aspect of the femoral neck. The needle was advanced until osseus interface was met. Following negative aspiration, a solution containing 4 mL of 0.25% Bupivacaine and 1 mL of Triamcinolone (40 mg/mL) was then injected. There was minimal resistance encountered throughout injection. No paresthesias were noted on injection. The needle stylet was replaced and the needle was removed intact. The patient tolerated the procedure well. A bandage was applied to the site of injection.    The patient was monitored for approximately 30 minutes after the procedure. Patient was given post procedure and discharge instructions to follow at home. We will see the patient back in two weeks or the patient may call to inform of status. The patient was discharged in a stable condition

## 2024-06-26 DIAGNOSIS — M79.672 PAIN IN LEFT FOOT: Primary | ICD-10-CM

## 2024-07-02 ENCOUNTER — OFFICE VISIT (OUTPATIENT)
Dept: INTERNAL MEDICINE | Facility: CLINIC | Age: 49
End: 2024-07-02
Payer: COMMERCIAL

## 2024-07-02 VITALS
SYSTOLIC BLOOD PRESSURE: 124 MMHG | OXYGEN SATURATION: 97 % | TEMPERATURE: 97 F | RESPIRATION RATE: 15 BRPM | HEIGHT: 72 IN | HEART RATE: 80 BPM | DIASTOLIC BLOOD PRESSURE: 82 MMHG | BODY MASS INDEX: 28.6 KG/M2 | WEIGHT: 211.19 LBS

## 2024-07-02 DIAGNOSIS — M25.572 PAIN IN LEFT ANKLE AND JOINTS OF LEFT FOOT: ICD-10-CM

## 2024-07-02 DIAGNOSIS — G89.29 CHRONIC RIGHT SHOULDER PAIN: Primary | ICD-10-CM

## 2024-07-02 DIAGNOSIS — M25.611 DECREASED ROM OF RIGHT SHOULDER: ICD-10-CM

## 2024-07-02 DIAGNOSIS — I10 ESSENTIAL HYPERTENSION: ICD-10-CM

## 2024-07-02 DIAGNOSIS — M25.511 CHRONIC RIGHT SHOULDER PAIN: Primary | ICD-10-CM

## 2024-07-02 PROCEDURE — 4010F ACE/ARB THERAPY RXD/TAKEN: CPT | Mod: CPTII,S$GLB,,

## 2024-07-02 PROCEDURE — 1160F RVW MEDS BY RX/DR IN RCRD: CPT | Mod: CPTII,S$GLB,,

## 2024-07-02 PROCEDURE — G2211 COMPLEX E/M VISIT ADD ON: HCPCS | Mod: S$GLB,,,

## 2024-07-02 PROCEDURE — 3079F DIAST BP 80-89 MM HG: CPT | Mod: CPTII,S$GLB,,

## 2024-07-02 PROCEDURE — 3044F HG A1C LEVEL LT 7.0%: CPT | Mod: CPTII,S$GLB,,

## 2024-07-02 PROCEDURE — 1159F MED LIST DOCD IN RCRD: CPT | Mod: CPTII,S$GLB,,

## 2024-07-02 PROCEDURE — 3074F SYST BP LT 130 MM HG: CPT | Mod: CPTII,S$GLB,,

## 2024-07-02 PROCEDURE — 3008F BODY MASS INDEX DOCD: CPT | Mod: CPTII,S$GLB,,

## 2024-07-02 PROCEDURE — 99999 PR PBB SHADOW E&M-EST. PATIENT-LVL V: CPT | Mod: PBBFAC,,,

## 2024-07-02 PROCEDURE — 99214 OFFICE O/P EST MOD 30 MIN: CPT | Mod: S$GLB,,,

## 2024-07-02 RX ORDER — OXYCODONE AND ACETAMINOPHEN 10; 325 MG/1; MG/1
1 TABLET ORAL EVERY 6 HOURS PRN
Qty: 28 TABLET | Refills: 0 | Status: SHIPPED | OUTPATIENT
Start: 2024-07-02 | End: 2024-07-09
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
## 2024-07-05 RX ORDER — BUSPIRONE HYDROCHLORIDE 30 MG/1
30 TABLET ORAL 2 TIMES DAILY
Qty: 60 TABLET | Refills: 0 | Status: SHIPPED | OUTPATIENT
Start: 2024-07-05

## 2024-07-13 ENCOUNTER — HOSPITAL ENCOUNTER (OUTPATIENT)
Dept: RADIOLOGY | Facility: HOSPITAL | Age: 49
Discharge: HOME | End: 2024-07-13
Payer: COMMERCIAL

## 2024-07-13 DIAGNOSIS — M25.611 DECREASED ROM OF RIGHT SHOULDER: ICD-10-CM

## 2024-07-13 DIAGNOSIS — G89.29 CHRONIC RIGHT SHOULDER PAIN: ICD-10-CM

## 2024-07-13 DIAGNOSIS — M25.511 CHRONIC RIGHT SHOULDER PAIN: ICD-10-CM

## 2024-07-13 PROCEDURE — 73221 MRI JOINT UPR EXTREM W/O DYE: CPT | Mod: 26,RT,, | Performed by: RADIOLOGY

## 2024-07-13 PROCEDURE — 73221 MRI JOINT UPR EXTREM W/O DYE: CPT | Mod: TC,RT

## 2024-07-24 ENCOUNTER — OFFICE VISIT (OUTPATIENT)
Dept: ORTHOPEDICS | Facility: CLINIC | Age: 49
End: 2024-07-24
Payer: COMMERCIAL

## 2024-07-24 VITALS
HEIGHT: 72 IN | WEIGHT: 211.19 LBS | SYSTOLIC BLOOD PRESSURE: 119 MMHG | DIASTOLIC BLOOD PRESSURE: 71 MMHG | TEMPERATURE: 98 F | BODY MASS INDEX: 28.6 KG/M2 | HEART RATE: 75 BPM

## 2024-07-24 DIAGNOSIS — M25.611 DECREASED ROM OF RIGHT SHOULDER: ICD-10-CM

## 2024-07-24 DIAGNOSIS — G89.29 CHRONIC RIGHT SHOULDER PAIN: ICD-10-CM

## 2024-07-24 DIAGNOSIS — M75.01 ADHESIVE CAPSULITIS OF RIGHT SHOULDER: ICD-10-CM

## 2024-07-24 DIAGNOSIS — M25.511 CHRONIC RIGHT SHOULDER PAIN: ICD-10-CM

## 2024-07-24 DIAGNOSIS — M54.12 CERVICAL RADICULOPATHY: Primary | ICD-10-CM

## 2024-07-24 PROCEDURE — 4010F ACE/ARB THERAPY RXD/TAKEN: CPT | Mod: CPTII,S$GLB,, | Performed by: ORTHOPAEDIC SURGERY

## 2024-07-24 PROCEDURE — 99204 OFFICE O/P NEW MOD 45 MIN: CPT | Mod: S$GLB,,, | Performed by: ORTHOPAEDIC SURGERY

## 2024-07-24 PROCEDURE — 3074F SYST BP LT 130 MM HG: CPT | Mod: CPTII,S$GLB,, | Performed by: ORTHOPAEDIC SURGERY

## 2024-07-24 PROCEDURE — 3044F HG A1C LEVEL LT 7.0%: CPT | Mod: CPTII,S$GLB,, | Performed by: ORTHOPAEDIC SURGERY

## 2024-07-24 PROCEDURE — 3078F DIAST BP <80 MM HG: CPT | Mod: CPTII,S$GLB,, | Performed by: ORTHOPAEDIC SURGERY

## 2024-07-24 PROCEDURE — 1159F MED LIST DOCD IN RCRD: CPT | Mod: CPTII,S$GLB,, | Performed by: ORTHOPAEDIC SURGERY

## 2024-07-24 PROCEDURE — 1160F RVW MEDS BY RX/DR IN RCRD: CPT | Mod: CPTII,S$GLB,, | Performed by: ORTHOPAEDIC SURGERY

## 2024-07-24 PROCEDURE — 3008F BODY MASS INDEX DOCD: CPT | Mod: CPTII,S$GLB,, | Performed by: ORTHOPAEDIC SURGERY

## 2024-07-24 PROCEDURE — 99999 PR PBB SHADOW E&M-EST. PATIENT-LVL V: CPT | Mod: PBBFAC,,, | Performed by: ORTHOPAEDIC SURGERY

## 2024-07-24 RX ORDER — METHYLPREDNISOLONE 4 MG/1
TABLET ORAL
Qty: 21 EACH | Refills: 0 | Status: SHIPPED | OUTPATIENT
Start: 2024-07-24 | End: 2024-08-14

## 2024-07-24 RX ORDER — MELOXICAM 15 MG/1
15 TABLET ORAL DAILY
Qty: 30 TABLET | Refills: 1 | Status: SHIPPED | OUTPATIENT
Start: 2024-07-24 | End: 2024-09-22

## 2024-08-06 ENCOUNTER — TELEPHONE (OUTPATIENT)
Dept: PAIN MEDICINE | Facility: CLINIC | Age: 49
End: 2024-08-06

## 2024-08-06 ENCOUNTER — OFFICE VISIT (OUTPATIENT)
Dept: PAIN MEDICINE | Facility: CLINIC | Age: 49
End: 2024-08-06
Payer: COMMERCIAL

## 2024-08-06 DIAGNOSIS — M47.812 CERVICAL SPONDYLOSIS: ICD-10-CM

## 2024-08-06 DIAGNOSIS — M54.12 RADICULOPATHY, CERVICAL REGION: ICD-10-CM

## 2024-08-06 DIAGNOSIS — M16.11 PRIMARY OSTEOARTHRITIS OF RIGHT HIP: ICD-10-CM

## 2024-08-06 DIAGNOSIS — M46.1 SACROILIITIS: Primary | ICD-10-CM

## 2024-08-06 DIAGNOSIS — M47.816 LUMBAR FACET ARTHROPATHY: ICD-10-CM

## 2024-08-06 PROCEDURE — 3044F HG A1C LEVEL LT 7.0%: CPT | Mod: CPTII,95,, | Performed by: ANESTHESIOLOGY

## 2024-08-06 PROCEDURE — 1160F RVW MEDS BY RX/DR IN RCRD: CPT | Mod: CPTII,95,, | Performed by: ANESTHESIOLOGY

## 2024-08-06 PROCEDURE — 99214 OFFICE O/P EST MOD 30 MIN: CPT | Mod: 95,,, | Performed by: ANESTHESIOLOGY

## 2024-08-06 PROCEDURE — 1159F MED LIST DOCD IN RCRD: CPT | Mod: CPTII,95,, | Performed by: ANESTHESIOLOGY

## 2024-08-06 PROCEDURE — 4010F ACE/ARB THERAPY RXD/TAKEN: CPT | Mod: CPTII,95,, | Performed by: ANESTHESIOLOGY

## 2024-08-06 RX ORDER — GABAPENTIN 300 MG/1
CAPSULE ORAL
Qty: 540 CAPSULE | Refills: 0 | Status: SHIPPED | OUTPATIENT
Start: 2024-08-06 | End: 2024-11-04

## 2024-08-06 RX ORDER — TIZANIDINE 4 MG/1
4 TABLET ORAL 3 TIMES DAILY PRN
Qty: 90 TABLET | Refills: 1 | Status: SHIPPED | OUTPATIENT
Start: 2024-08-06 | End: 2024-11-04

## 2024-08-13 ENCOUNTER — HOSPITAL ENCOUNTER (OUTPATIENT)
Dept: RADIOLOGY | Facility: HOSPITAL | Age: 49
Discharge: HOME | End: 2024-08-13
Attending: ANESTHESIOLOGY
Payer: COMMERCIAL

## 2024-08-13 DIAGNOSIS — M54.12 RADICULOPATHY, CERVICAL REGION: ICD-10-CM

## 2024-08-13 PROCEDURE — 72141 MRI NECK SPINE W/O DYE: CPT | Mod: TC

## 2024-08-13 PROCEDURE — 72141 MRI NECK SPINE W/O DYE: CPT | Mod: 26,,, | Performed by: RADIOLOGY
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
## 2024-08-17 NOTE — TELEPHONE ENCOUNTER
No care due was identified.  [facility] Embedded Care Due Messages. Reference number: [id number].   8/17/2024 2:01:00 PM CDT

## 2024-08-20 RX ORDER — BUSPIRONE HYDROCHLORIDE 30 MG/1
30 TABLET ORAL 2 TIMES DAILY
Qty: 60 TABLET | Refills: 0 | Status: SHIPPED | OUTPATIENT
Start: 2024-08-20

## 2024-08-21 ENCOUNTER — OFFICE VISIT (OUTPATIENT)
Dept: PAIN MEDICINE | Facility: CLINIC | Age: 49
End: 2024-08-21
Payer: COMMERCIAL

## 2024-08-21 ENCOUNTER — OFFICE VISIT (OUTPATIENT)
Dept: CARDIOLOGY | Facility: CLINIC | Age: 49
End: 2024-08-21
Payer: COMMERCIAL

## 2024-08-21 VITALS
OXYGEN SATURATION: 99 % | DIASTOLIC BLOOD PRESSURE: 74 MMHG | HEART RATE: 80 BPM | SYSTOLIC BLOOD PRESSURE: 122 MMHG | BODY MASS INDEX: 28.95 KG/M2 | HEIGHT: 72 IN | WEIGHT: 213.75 LBS

## 2024-08-21 DIAGNOSIS — I25.10 CORONARY ARTERY DISEASE INVOLVING NATIVE CORONARY ARTERY OF NATIVE HEART WITHOUT ANGINA PECTORIS: ICD-10-CM

## 2024-08-21 DIAGNOSIS — M47.816 LUMBAR SPONDYLOSIS: ICD-10-CM

## 2024-08-21 DIAGNOSIS — I10 ESSENTIAL HYPERTENSION: Primary | ICD-10-CM

## 2024-08-21 DIAGNOSIS — M54.12 CERVICAL RADICULOPATHY: Primary | ICD-10-CM

## 2024-08-21 DIAGNOSIS — I25.118 ATHEROSCLEROTIC HEART DISEASE OF NATIVE CORONARY ARTERY WITH OTHER FORMS OF ANGINA PECTORIS: ICD-10-CM

## 2024-08-21 DIAGNOSIS — I42.8 NON-ISCHEMIC CARDIOMYOPATHY: ICD-10-CM

## 2024-08-21 DIAGNOSIS — F17.200 TOBACCO USE DISORDER: ICD-10-CM

## 2024-08-21 DIAGNOSIS — M50.30 BULGING OF CERVICAL INTERVERTEBRAL DISC: ICD-10-CM

## 2024-08-21 DIAGNOSIS — M19.90 OSTEOARTHRITIS, UNSPECIFIED OSTEOARTHRITIS TYPE, UNSPECIFIED SITE: ICD-10-CM

## 2024-08-21 DIAGNOSIS — E78.1 HIGH TRIGLYCERIDES: ICD-10-CM

## 2024-08-21 PROCEDURE — 4010F ACE/ARB THERAPY RXD/TAKEN: CPT | Mod: CPTII,S$GLB,, | Performed by: STUDENT IN AN ORGANIZED HEALTH CARE EDUCATION/TRAINING PROGRAM

## 2024-08-21 PROCEDURE — 1159F MED LIST DOCD IN RCRD: CPT | Mod: CPTII,S$GLB,, | Performed by: STUDENT IN AN ORGANIZED HEALTH CARE EDUCATION/TRAINING PROGRAM

## 2024-08-21 PROCEDURE — 3074F SYST BP LT 130 MM HG: CPT | Mod: CPTII,S$GLB,, | Performed by: STUDENT IN AN ORGANIZED HEALTH CARE EDUCATION/TRAINING PROGRAM

## 2024-08-21 PROCEDURE — 99999 PR PBB SHADOW E&M-EST. PATIENT-LVL IV: CPT | Mod: PBBFAC,,, | Performed by: STUDENT IN AN ORGANIZED HEALTH CARE EDUCATION/TRAINING PROGRAM

## 2024-08-21 PROCEDURE — 3078F DIAST BP <80 MM HG: CPT | Mod: CPTII,S$GLB,, | Performed by: STUDENT IN AN ORGANIZED HEALTH CARE EDUCATION/TRAINING PROGRAM

## 2024-08-21 PROCEDURE — 3008F BODY MASS INDEX DOCD: CPT | Mod: CPTII,S$GLB,, | Performed by: STUDENT IN AN ORGANIZED HEALTH CARE EDUCATION/TRAINING PROGRAM

## 2024-08-21 PROCEDURE — 99214 OFFICE O/P EST MOD 30 MIN: CPT | Mod: S$GLB,,, | Performed by: STUDENT IN AN ORGANIZED HEALTH CARE EDUCATION/TRAINING PROGRAM

## 2024-08-21 PROCEDURE — 3044F HG A1C LEVEL LT 7.0%: CPT | Mod: CPTII,S$GLB,, | Performed by: STUDENT IN AN ORGANIZED HEALTH CARE EDUCATION/TRAINING PROGRAM
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
## 2024-08-27 ENCOUNTER — PATIENT MESSAGE (OUTPATIENT)
Dept: CARDIOLOGY | Facility: HOSPITAL | Age: 49
End: 2024-08-27
Payer: COMMERCIAL

## 2024-08-29 ENCOUNTER — TELEPHONE (OUTPATIENT)
Dept: PAIN MEDICINE | Facility: CLINIC | Age: 49
End: 2024-08-29
Payer: COMMERCIAL

## 2024-08-29 DIAGNOSIS — Z79.01 ANTICOAGULATED: Primary | ICD-10-CM

## 2024-08-29 NOTE — TELEPHONE ENCOUNTER
----- Message from Kaylin Brooks NP sent at 8/21/2024 12:12 PM CDT -----  Pain Provider: Madhu  Case:C6/7 [place] MARYURI  Level:C6/7  Laterality:[place]  Anticoagulation:aspirin  Length to hold:3 days  Rx Provider: mike gonzales    4 week follow up

## 2024-09-18 ENCOUNTER — OFFICE VISIT (OUTPATIENT)
Dept: ORTHOPEDICS | Facility: CLINIC | Age: 49
End: 2024-09-18
Payer: COMMERCIAL

## 2024-09-18 VITALS
HEIGHT: 72 IN | HEART RATE: 83 BPM | DIASTOLIC BLOOD PRESSURE: 77 MMHG | SYSTOLIC BLOOD PRESSURE: 129 MMHG | WEIGHT: 213.88 LBS | BODY MASS INDEX: 28.97 KG/M2

## 2024-09-18 DIAGNOSIS — M75.01 ADHESIVE CAPSULITIS OF RIGHT SHOULDER: Primary | ICD-10-CM

## 2024-09-18 DIAGNOSIS — M54.12 CERVICAL RADICULOPATHY: ICD-10-CM

## 2024-09-18 PROCEDURE — 3078F DIAST BP <80 MM HG: CPT | Mod: CPTII,S$GLB,, | Performed by: ORTHOPAEDIC SURGERY

## 2024-09-18 PROCEDURE — 3008F BODY MASS INDEX DOCD: CPT | Mod: CPTII,S$GLB,, | Performed by: ORTHOPAEDIC SURGERY

## 2024-09-18 PROCEDURE — 3044F HG A1C LEVEL LT 7.0%: CPT | Mod: CPTII,S$GLB,, | Performed by: ORTHOPAEDIC SURGERY

## 2024-09-18 PROCEDURE — 99999 PR PBB SHADOW E&M-EST. PATIENT-LVL IV: CPT | Mod: PBBFAC,,, | Performed by: ORTHOPAEDIC SURGERY

## 2024-09-18 PROCEDURE — 1159F MED LIST DOCD IN RCRD: CPT | Mod: CPTII,S$GLB,, | Performed by: ORTHOPAEDIC SURGERY

## 2024-09-18 PROCEDURE — 99213 OFFICE O/P EST LOW 20 MIN: CPT | Mod: S$GLB,,, | Performed by: ORTHOPAEDIC SURGERY

## 2024-09-18 PROCEDURE — 4010F ACE/ARB THERAPY RXD/TAKEN: CPT | Mod: CPTII,S$GLB,, | Performed by: ORTHOPAEDIC SURGERY

## 2024-09-18 PROCEDURE — 1160F RVW MEDS BY RX/DR IN RCRD: CPT | Mod: CPTII,S$GLB,, | Performed by: ORTHOPAEDIC SURGERY

## 2024-09-18 PROCEDURE — 3074F SYST BP LT 130 MM HG: CPT | Mod: CPTII,S$GLB,, | Performed by: ORTHOPAEDIC SURGERY

## 2024-09-18 RX ORDER — OXYCODONE HYDROCHLORIDE 5 MG/1
5 TABLET ORAL EVERY 12 HOURS PRN
Qty: 14 TABLET | Refills: 0 | Status: SHIPPED | OUTPATIENT
Start: 2024-09-18 | End: 2024-09-25

## 2024-09-18 RX ORDER — METHYLPREDNISOLONE 4 MG/1
TABLET ORAL
Qty: 21 EACH | Refills: 0 | Status: SHIPPED | OUTPATIENT
Start: 2024-09-18 | End: 2024-10-09

## 2024-09-18 NOTE — PROGRESS NOTES
CC Right shoulder pain    HPI Monica Johnson is a 49 y.o. year old right hand dominant male here for follow-up of his shoulder pain.    He reports the pain was relieved by the steroid pack and the Mobic.  He states the pain was relieved for about 1.5 weeks.  He is still taking the meloxicam. He is using oxycodone to help with the pain.  He states while taking the steroid dose pack he could move his neck more as well as his arm.     The numbness and tingling has nearly resolved.     He states he was doing better until a few days ago.  He was laying in bed and moved his head.  He states he than had a shooting nerve pain from his neck into his arm.      He reports his shoulder motion is still better than when he first came to see me.      Turning his head is better than it was after the other night but it is still not normal.  He is still getting arm pain when he moves his neck.     He had not started his physical therapy.     Past Medical History:   Diagnosis Date    ADHD (attention deficit hyperactivity disorder)     Allergy     Anxiety 1/25/2016    Arthritis 2014    Osteoarthritis    CHF (congestive heart failure)     Degenerative disc disease at L5-S1 level     Depression 1/25/2016    Hypertension     Sciatica of left side 1/31/2017         Past Surgical History:   Procedure Laterality Date    CALCANEAL OSTEOTOMY Left 04/16/2019    Procedure: OSTEOTOMY, CALCANEUS;  Surgeon: Rober Colon DPM;  Location: [facility] OR;  Service: Podiatry;  Laterality: Left;    CIRCUMCISION      FOOT SURGERY      INJECTION OF ANESTHETIC AGENT AROUND MEDIAL BRANCH NERVES INNERVATING CERVICAL FACET JOINT Bilateral 11/15/2021    Procedure: Block-nerve-medial branch-cervical bilateral C5, 6,7 RN IV sedation;  Surgeon: Ivan Davey MD;  Location: [facility];  Service: Pain Management;  Laterality: Bilateral;    INJECTION OF ANESTHETIC AGENT AROUND MEDIAL BRANCH NERVES INNERVATING CERVICAL FACET JOINT Bilateral 6/7/2024    Procedure:  Bilateral C4-6 MBB with RN IV sedation;  Surgeon: Lyudmila Leung MD;  Location: [facility];  Service: Pain Management;  Laterality: Bilateral;    INJECTION OF ANESTHETIC AGENT AROUND MEDIAL BRANCH NERVES INNERVATING LUMBAR FACET JOINT Bilateral 03/07/2022    Procedure: bilateral L3-L5 MBB;  Surgeon: Ivan Davey MD;  Location: [facility];  Service: Pain Management;  Laterality: Bilateral;    INJECTION OF ANESTHETIC AGENT AROUND MEDIAL BRANCH NERVES INNERVATING LUMBAR FACET JOINT Bilateral 3/22/2023    Procedure: Bilateral L3-5 MBB RN IV Sedation;  Surgeon: Lyudmila Leung MD;  Location: [facility];  Service: Pain Management;  Laterality: Bilateral;    INJECTION OF ANESTHETIC AGENT INTO SACROILIAC JOINT Right 11/24/2020    Procedure: Right BLOCK, SACROILIAC JOINT and Right Knee Injection with RN IV sedation;  Surgeon: Ivan Davey MD;  Location: [facility];  Service: Pain Management;  Laterality: Right;    INJECTION OF ANESTHETIC AGENT INTO SACROILIAC JOINT Right 04/16/2021    Procedure: Right BLOCK, SACROILIAC JOINT RIght Hip Right GTB RN IV sedation;  Surgeon: Ivan Davey MD;  Location: [facility];  Service: Pain Management;  Laterality: Right;    INJECTION OF ANESTHETIC AGENT INTO SACROILIAC JOINT Right 5/10/2023    Procedure: Bilateral SIJ + right IA hip Injection RN IV Sedation;  Surgeon: Lyudmila Leung MD;  Location: [facility];  Service: Pain Management;  Laterality: Right;    INJECTION OF ANESTHETIC AGENT INTO SACROILIAC JOINT Right 6/25/2024    Procedure: Bilateral SIJ + right IA hip Injection;  Surgeon: Lyumdila Leung MD;  Location: [facility];  Service: Pain Management;  Laterality: Right;    INJECTION OF JOINT Right 3/8/2023    Procedure: Right Knee injection with steroid RN IV Sedation;  Surgeon: Lyudmila Leung MD;  Location: [facility];  Service: Pain Management;  Laterality: Right;    INJECTION OF JOINT Right 5/10/2023    Procedure: Bilateral SIJ + right IA hip Injection;   Surgeon: Lyudmila Leung MD;  Location: [facility]IN [facility]GT;  Service: Pain Management;  Laterality: Right;    INJECTION OF JOINT Right 6/25/2024    Procedure: Bilateral SIJ + right IA hip Injection;  Surgeon: Lyudmila Leung MD;  Location: [facility];  Service: Pain Management;  Laterality: Right;    LENGTHENING OF ACHILLES TENDON Left 04/16/2019    Procedure: LENGTHENING, TENDON, ACHILLES;  Surgeon: Rober Colon DPM;  Location: [facility] OR;  Service: Podiatry;  Laterality: Left;    PLACEMENT OF ACELLULAR HUMAN DERMAL ALLOGRAFT Left 02/17/2021    Procedure: APPLICATION, ACELLULAR HUMAN DERMAL ALLOGRAFT;  Surgeon: Rober Colon DPM;  Location: [facility] OR;  Service: Podiatry;  Laterality: Left;    REPAIR OF POSTERIOR TIBIALIS TENDON Left 04/16/2019    Procedure: REPAIR, TENDON, TIBIALIS POSTERIOR;  Surgeon: Rober Colon DPM;  Location: [facility] OR;  Service: Podiatry;  Laterality: Left;    REPAIR OF TENDON OF LOWER EXTREMITY Left 02/17/2021    Procedure: REPAIR, TENDON, LOWER EXTREMITY;  Surgeon: Rober Colon DPM;  Location: [facility] OR;  Service: Podiatry;  Laterality: Left;    TENDON TRANSFER Left 04/16/2019    Procedure: TRANSFER, TENDON;  Surgeon: Rober Colon DPM;  Location: [facility] OR;  Service: Podiatry;  Laterality: Left;       ALLERGY  Review of patient's allergies indicates:  No Known Allergies      MEDICATIONS   Current Outpatient Medications   Medication Instructions    amLODIPine (NORVASC) 2.5 mg, Oral, Daily    aspirin (ECOTRIN) 81 mg, Oral, Daily    atorvastatin (LIPITOR) 20 mg, Oral, Nightly    b complex vitamins capsule 1 capsule, Oral, Daily    busPIRone (BUSPAR) 30 mg, Oral, 2 times daily    cetirizine (ZYRTEC) 10 mg, Oral, Daily    cholecalciferol (vitamin D3) 5,000 Units, Oral, Daily    gabapentin (NEURONTIN) 300 MG capsule Take 1 capsule (300 mg total) by mouth 2 (two) times daily AND 4 capsules (1,200 mg total) every evening. TAKE ONE CAPSULE BY MOUTH TWICE DAILY AND 4 CAPSULES EVERY EVENING.     meloxicam (MOBIC) 15 mg, Oral, Daily    meloxicam (MOBIC) 15 mg, Oral, Daily, Do not start this medicine until you complete the dose pack    metoprolol succinate (TOPROL-XL) 25 mg, Oral, Daily    multivitamin capsule 1 capsule, Oral, Daily    omega-3 fatty acids/fish oil (FISH OIL-OMEGA-3 FATTY ACIDS) 300-1,000 mg capsule Oral, Daily    pantoprazole (PROTONIX) 40 mg, Oral, Daily    tamsulosin (FLOMAX) 0.4 mg, Oral, Daily    telmisartan (MICARDIS) 40 mg, Oral, Daily    tiZANidine (ZANAFLEX) 4 mg, Oral, 3 times daily PRN    traZODone (DESYREL) 100 MG tablet Take 1/2 to 1 tablet at bedtime as needed for sleep.    TUMERIC-GING-OLIVE-OREG-CAPRYL ORAL Oral         PHYSICAL EXAM  Vitals:    09/18/24 0820   BP: 129/77   Pulse: 83       GEN  NAD, well appearing    PSYCH A&Ox3, pleasant, cooperative      Right Shoulder      Skin intact.  No erythema/ecchymosis or edema.  No deformity noted.  Non-tender over AC joint, biceps tendon, or lateral to the acromion.  Active forward flexion to 90 with pain; passive to 170 with pain.   Pain around the shoulder with resisted forward flexion in neutral, supination or pronation. Positive [place]'s Test.  Negative Donald's Test.   Positive Cruz. Strength 5/5  Active Abduction to 30 with pain; passive to 150 with pain.  Strength  5/5   Pain at the shoulder with resisted ABDuction in neutral, supination or pronation. ER strength 5/5 . IR strength 5/5 . Passive ER to 90 degrees.  Internal rotation to SI joint (left to T10).  Positive Yergason's. Cap refill <2s.        Cervical Spine  Full flexion/extension with only neck pian  Rotation 40 degrees either way with pain to the right  SB 30 degrees, to right caused neck pain, to the left caused RIGHT arm pain.     Positioning for Spurling's to the left caused severe right arm pain  Positioning for Spurling's to the right caused moderate right arm pain          DIAGNOSTIC IMAGING  Cspine MRI reviewed personally and with patient.     DIAGNOSIS:     ICD-10-CM ICD-9-CM    1. Adhesive capsulitis of right shoulder  M75.01 726.0       2. Cervical radiculopathy  M54.12 723.4            DISCUSSION  Diagnostic imaging, clinical findings, and patient's symptoms reviewed and correlated.  Discussed non-operative treatments including NSAIDs,PT, injections.  Discussed at length the cervical radiculopathy and how it is affecting his arm pain/weakness.     Patient given an opportunity to ask questions.  When his questions were answered, he agreed with the plan noted below.    PLAN  Another medrol dose pack given  A one time prescription for oxycodone written  Recommend referral to neurosurgery  Follow-up in 6-8 weeks without XR

## 2024-09-18 NOTE — PATIENT INSTRUCTIONS
"Stop the meloxicam (mobic) when you start the steroid dose pack- you can restart the meloxicam (mobic) when you've finished the steroid pack.      Keep track of which pains (arm/leg/neck/back) feel much better with the steroid pack.    The shoulder movement is better than it was but it is still not normal.  This is from the inflammation from the frozen shoulder.    The arm pain and "numbiness"  is from the pinched nerve in your neck.  This is called radicular pain and a cervical radiculopathy.    The steroids should help all of it.       Keep a pain/symptom journal of motion/pain/numbness and things that make the pain worse.  "

## 2024-09-19 DIAGNOSIS — M47.816 LUMBAR FACET ARTHROPATHY: ICD-10-CM

## 2024-09-19 RX ORDER — GABAPENTIN 300 MG/1
CAPSULE ORAL
Qty: 540 CAPSULE | Refills: 0 | Status: CANCELLED | OUTPATIENT
Start: 2024-09-19 | End: 2024-12-18

## 2024-09-19 RX ORDER — TIZANIDINE 4 MG/1
4 TABLET ORAL 3 TIMES DAILY PRN
Qty: 90 TABLET | Refills: 1 | Status: CANCELLED | OUTPATIENT
Start: 2024-09-19 | End: 2024-12-18

## 2024-09-20 RX ORDER — BUSPIRONE HYDROCHLORIDE 30 MG/1
30 TABLET ORAL 2 TIMES DAILY
Qty: 60 TABLET | Refills: 0 | Status: SHIPPED | OUTPATIENT
Start: 2024-09-20

## 2024-09-20 RX ORDER — AMLODIPINE BESYLATE 2.5 MG/1
2.5 TABLET ORAL DAILY
Qty: 90 TABLET | Refills: 1 | Status: SHIPPED | OUTPATIENT
Start: 2024-09-20 | End: 2025-09-20

## 2024-09-20 RX ORDER — MELOXICAM 15 MG/1
15 TABLET ORAL DAILY
Qty: 30 TABLET | Refills: 1 | Status: SHIPPED | OUTPATIENT
Start: 2024-09-20 | End: 2024-11-19

## 2024-09-20 NOTE — TELEPHONE ENCOUNTER
No care due was identified.  [facility] Embedded Care Due Messages. Reference number: [id number].   9/19/2024 10:19:39 PM CDT

## 2024-09-20 NOTE — TELEPHONE ENCOUNTER
Refill Routing Note   Medication(s) are not appropriate for processing by [facility] for the following reason(s):        Outside of protocol    [facility] action(s):  Route             Appointments  past 12m or future 3m with PCP    Date Provider   Last Visit   Visit date not found Kenya Escoto MD   Next Visit   11/6/2024 Kenya Escoto MD   ED visits in past 90 days: 0        Note composed:8:40 AM 09/20/2024

## 2024-09-20 NOTE — TELEPHONE ENCOUNTER
Pt is requesting for a refill of: gabapentin (NEURONTIN) 300 MG capsule and tiZANidine (ZANAFLEX) 4 MG tablet   Last filed:  Last encounter: 8/21/24  Up coming apt:   4 weeks after procedure (waiting on clearance)   Pharmacy:  Is this something you can do?

## 2024-09-26 ENCOUNTER — PROCEDURE VISIT (OUTPATIENT)
Dept: NEUROLOGY | Facility: CLINIC | Age: 49
End: 2024-09-26
Payer: COMMERCIAL

## 2024-09-26 ENCOUNTER — PATIENT MESSAGE (OUTPATIENT)
Dept: PODIATRY | Facility: CLINIC | Age: 49
End: 2024-09-26
Payer: COMMERCIAL

## 2024-09-26 DIAGNOSIS — M79.606 PAIN OF LOWER EXTREMITY, UNSPECIFIED LATERALITY: ICD-10-CM

## 2024-09-26 DIAGNOSIS — M54.9 BACK PAIN, UNSPECIFIED BACK LOCATION, UNSPECIFIED BACK PAIN LATERALITY, UNSPECIFIED CHRONICITY: Primary | ICD-10-CM

## 2024-09-26 DIAGNOSIS — M79.672 PAIN IN LEFT FOOT: ICD-10-CM

## 2024-09-26 PROCEDURE — 95910 NRV CNDJ TEST 7-8 STUDIES: CPT | Mod: S$GLB,,, | Performed by: PSYCHIATRY & NEUROLOGY

## 2024-09-26 PROCEDURE — 95886 MUSC TEST DONE W/N TEST COMP: CPT | Mod: S$GLB,,, | Performed by: PSYCHIATRY & NEUROLOGY

## 2024-09-26 NOTE — PROCEDURES
[facility]   Juanjose Wood  Department of Neurology  [address] [place] [address] [place]  [id number]  Phone [phone]     Fax  [phone]        Full Name: Monica Johnson Gender: Male  Patient ID: [id number] YOB: 1975      Visit Date: 9/26/2024 8:12 AM  Age: 49 Years  Examining Physician: Gregory Lockwood M.D.  Referring Physician: Rober Colon DPM  Technician: DANILO Green  History: EMG/NCS/BLE/Lumbar radiculopathy/Neuritis.  C/O: Persistent pain in left ankle and joints of left foot, back pain, neuritis of left foot, acquired pes planus (left).  PMHX: Left flatfoot reconstruction, ADHD, OA, CHF, L5-S1 DDD, HTN, sciatica of left side, LS radiculopathy.  EMG/NCS/LLE 1/13/17: Normal.       SUMMARY     Nerve conduction studies were performed in the right and left lower extremity. The right peroneal motor study recording the extensor digitorum brevis showed a normal amplitude, normal distal latency and normal conduction velocity. No conduction block or focal slowing was present across the fibular neck. The right tibial motor study recording the abductor hallucis brevis showed borderline-normal amplitude, normal distal latency and normal conduction velocity.     Right sural sensory response showed a normal amplitude and conduction velocity. Right superficial peroneal sensory response showed a normal amplitude and conduction velocity.     The left peroneal motor study recording the extensor digitorum brevis showed a normal amplitude, normal distal latency and normal conduction velocity. No conduction block or focal slowing was present across the fibular neck. The left tibial motor study recording the abductor hallucis brevis showed a borderline-normal amplitude, normal distal latency and normal conduction velocity.     Left sural sensory response showed a normal amplitude and conduction velocity. Left superficial peroneal sensory response showed a normal amplitude and conduction  velocity.     Needle EMG of the right lower extremity and lumbar paraspinal muscles was performed. No denervation was present in any muscle. All motor unit potentials morphology, activation and recruitment patterns were normal.     Needle EMG of the left lower extremity and lumbar paraspinal muscles was performed. No denervation was present in any muscle. All motor unit potentials morphology, activation and recruitment patterns were normal.               IMPRESSION    This is a normal study.     There is no electrophysiologic evidence of lumbosacral radiculopathy, plexopathy, or peripheral neuropathy of either the right or left lower extremity.      ----------------------------------------           Gregory Lockwood M.D., F.A.A.N.      Diplomate, American Board of Psychiatry and Neurology  Diplomate, American Board of Clinical Neurophysiology  Fellow, American Academy of Neurology             SENSORY NCS      Nerve / Sites Rec. Site Peak NP Amp PP Amp Dist Pablo d Lat.2     ms µV µV cm m/s ms   L Superficial peroneal      Lat Leg Ankle 3.02 10.9 6.3 10 48.0 3.02      Ref.  4.50  5.0  40.0    L Sural - Lat Mall      Calf Lat Mall 3.12 4.3 3.8 14 56.5 3.12      Ref.  4.50  5.0  40.0    R Superficial peroneal      Lat Leg Ankle 3.15 9.3 14.1 10 39.0 3.15      Ref.  4.50  5.0  40.0    R Sural - Lat Mall      Calf Lat Mall 2.90 4.8 5.9 14 70.7 2.90      Ref.  4.50  5.0  40.0       Calf Lat Mall 2.94 4.6 5.5   2.94       MOTOR NCS      Nerve / Sites Rec. Site Lat Amp Dist Pablo     ms mV cm m/s   L Peroneal - EDB      Ankle EDB 3.60 4.5 8       Ref.  5.50 3.0        FibHead EDB 12.19 3.7 38.5 44.9      Ref.     40.0      Knee EDB 14.25 3.5 10 48.5   L Tibial - AH      Ankle AH 5.54 5.2 8       Ref.  6.00 8.0        Knee AH 15.81 4.9 45 43.8      Ref.     40.0   R Peroneal - EDB      Ankle EDB 4.06 3.8 8       Ref.  5.50 3.0        FibHead EDB 13.10 3.1 39.5 43.7      Ref.     40.0      Knee EDB 14.79 2.9 10 59.3   R Tibial - AH       Ankle AH 5.94 6.9 8       Ref.  6.00 8.0        Knee AH 14.19 6.0 45 54.5      Ref.     40.0       EMG Summary Table BILATERAL     Spontaneous MUAP Recruitment   Muscle Nerve Roots IA Fib PSW Fasc Other Amp Dur. PPP Pattern   Gastrocnemius (Medial head) Tibial S1-S2 N None None None - N N N N   Tibialis anterior Deep peroneal (Fibular) L4-L5 N None None None - N N N N   Gluteus medius Superior gluteal L4-S1 N None None None - N N N N   Biceps femoris (short head) Sciatic (peroneal division) L5-S2 N None None None - N N N N   Vastus lateralis Femoral L2-L4 N None None None - N N N N   Flexor hallucis longus Tibial L5-S2 N None None None - N N N N   Extensor hallucis longus Deep peroneal (Fibular) L5-S1 N None None None - N N N N                             ----------------------------------------           Gregory Lockwood M.D., F.A.A.N.      Diplomate, American Board of Psychiatry and Neurology  Diplomate, American Board of Clinical Neurophysiology  Fellow, American Academy of Neurology

## 2025-02-06 DIAGNOSIS — Z00.00 ROUTINE ADULT HEALTH MAINTENANCE: Primary | ICD-10-CM

## 2025-02-06 DIAGNOSIS — I10 ESSENTIAL HYPERTENSION: ICD-10-CM

## 2025-04-10 RX ORDER — BUSPIRONE HYDROCHLORIDE 30 MG/1
30 TABLET ORAL 2 TIMES DAILY
Qty: 60 TABLET | Refills: 0 | OUTPATIENT
Start: 2025-04-10

## 2025-04-10 NOTE — TELEPHONE ENCOUNTER
No care due was identified.  [facility] Embedded Care Due Messages. Reference number: [id number].   4/10/2025 2:33:51 PM CDT

## 2025-04-10 NOTE — TELEPHONE ENCOUNTER
Refill Routing Note   Medication(s) are not appropriate for processing by [facility] for the following reason(s):        Outside of protocol    [facility] action(s):  Route        Medication Therapy Plan: FOV in 1 week      Appointments  past 12m or future 3m with PCP    Date Provider   Last Visit   2/22/2023 Kenya Escoto MD   Next Visit   4/17/2025 Kenya Escoto MD   ED visits in past 90 days: 0        Note composed:2:34 PM 04/10/2025

## 2025-04-17 ENCOUNTER — OFFICE VISIT (OUTPATIENT)
Dept: INTERNAL MEDICINE | Facility: CLINIC | Age: 50
End: 2025-04-17

## 2025-04-17 VITALS
DIASTOLIC BLOOD PRESSURE: 82 MMHG | OXYGEN SATURATION: 98 % | HEIGHT: 73 IN | BODY MASS INDEX: 27.96 KG/M2 | WEIGHT: 211 LBS | HEART RATE: 81 BPM | SYSTOLIC BLOOD PRESSURE: 124 MMHG
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
## 2025-04-17 DIAGNOSIS — M19.90 OSTEOARTHRITIS, UNSPECIFIED OSTEOARTHRITIS TYPE, UNSPECIFIED SITE: ICD-10-CM

## 2025-04-17 DIAGNOSIS — M47.816 LUMBAR SPONDYLOSIS: ICD-10-CM

## 2025-04-17 DIAGNOSIS — M54.9 BACK PAIN, UNSPECIFIED BACK LOCATION, UNSPECIFIED BACK PAIN LATERALITY, UNSPECIFIED CHRONICITY: ICD-10-CM

## 2025-04-17 DIAGNOSIS — I42.8 NON-ISCHEMIC CARDIOMYOPATHY: ICD-10-CM

## 2025-04-17 DIAGNOSIS — Z80.42 FAMILY HISTORY OF PROSTATE CANCER IN FATHER: ICD-10-CM

## 2025-04-17 DIAGNOSIS — M47.812 CERVICAL SPONDYLOSIS: ICD-10-CM

## 2025-04-17 DIAGNOSIS — R79.89 ELEVATED LIVER FUNCTION TESTS: ICD-10-CM

## 2025-04-17 DIAGNOSIS — I50.9 CONGESTIVE HEART FAILURE, UNSPECIFIED HF CHRONICITY, UNSPECIFIED HEART FAILURE TYPE: ICD-10-CM

## 2025-04-17 DIAGNOSIS — M47.816 LUMBAR FACET ARTHROPATHY: ICD-10-CM

## 2025-04-17 DIAGNOSIS — F17.200 TOBACCO USE DISORDER: ICD-10-CM

## 2025-04-17 DIAGNOSIS — I25.118 ATHEROSCLEROTIC HEART DISEASE OF NATIVE CORONARY ARTERY WITH OTHER FORMS OF ANGINA PECTORIS: ICD-10-CM

## 2025-04-17 DIAGNOSIS — I10 ESSENTIAL HYPERTENSION: ICD-10-CM

## 2025-04-17 DIAGNOSIS — Z12.11 SCREEN FOR COLON CANCER: ICD-10-CM

## 2025-04-17 PROCEDURE — 99214 OFFICE O/P EST MOD 30 MIN: CPT | Mod: PBBFAC | Performed by: FAMILY MEDICINE

## 2025-04-17 PROCEDURE — 99999 PR PBB SHADOW E&M-EST. PATIENT-LVL IV: CPT | Mod: PBBFAC,,, | Performed by: FAMILY MEDICINE

## 2025-04-17 RX ORDER — GABAPENTIN 300 MG/1
CAPSULE ORAL
Qty: 540 CAPSULE | Refills: 0 | Status: SHIPPED | OUTPATIENT
Start: 2025-04-17 | End: 2025-07-16

## 2025-04-17 RX ORDER — CYCLOBENZAPRINE HCL 10 MG
10 TABLET ORAL 3 TIMES DAILY PRN
Qty: 90 TABLET | Refills: 0 | Status: SHIPPED | OUTPATIENT
Start: 2025-04-17 | End: 2025-07-16

## 2025-04-17 RX ORDER — BUSPIRONE HYDROCHLORIDE 30 MG/1
30 TABLET ORAL 2 TIMES DAILY
Qty: 60 TABLET | Refills: 0 | Status: SHIPPED | OUTPATIENT
Start: 2025-04-17

## 2025-04-17 RX ORDER — TIZANIDINE 4 MG/1
4 TABLET ORAL
COMMUNITY
Start: 2025-03-31 | End: 2025-04-17

## 2025-04-17 RX ORDER — METHYLPREDNISOLONE 4 MG/1
TABLET ORAL
Qty: 1 EACH | Refills: 0 | Status: SHIPPED | OUTPATIENT
Start: 2025-04-17 | End: 2025-05-08

## 2025-04-17 NOTE — PROGRESS NOTES
Monica Johnson  04/17/2025  [id number]    Kenya Escoto MD  Patient Care Team:  Kenya Escoto MD as PCP - General (Family Medicine)  Barbara Moore LPN as Care Coordinator (Internal Medicine)  Kenya Escoto MD (Family Medicine)  Elisa Chao RD (Inactive) as Dietitian (Nutrition)  Bazin, Tkeyah, PharmD as Hypertension Digital Medicine Clinician  Kenya Escoto MD as Hypertension Digital Medicine Responsible Provider (Family Medicine)          Visit Type: Follow up ER    Chief Complaint:  Chief Complaint   Patient presents with    Hospital Follow Up    Fall       History of Present Illness:    History of Present Illness    CHIEF COMPLAINT:  Mr. Johnson presents today with back pain and difficulty walking after a fall.    HISTORY OF PRESENT ILLNESS:  He woke up early in the morning with right-sided back swelling. After attempting to rest, he was unable to stand up straight when trying to go to work. He reports weakness and difficulty walking, which led to an ER visit at [facility]. CT at the ER showed nerve-related issues. He received morphine and an icy hot patch for treatment.    CURRENT SYMPTOMS:  He experiences pain and issues running down his entire spine from neck downwards. He reports neck crunching sounds described as repetitive cracking with neck movement. He also reports pressure and pain during urination and bowel movements. He denies urinary or fecal incontinence.    FUNCTIONAL STATUS:  He is unable to perform his job duties as a  due to inability to stand, lift, and enter/exit truck. He has difficulty applying pressure to the gas pedal with his right leg, limiting driving speed to 40 miles per hour. He has been out of work since previous Wednesday.    IMAGING:  MRI in 2021 showed mild degenerative changes in the lumbar spine at L1, L2, L4, and L5 levels without high-grade spinal canal stenosis. Cervical spine MRI revealed minor disc bulging.    CURRENT  MEDICATIONS:  He takes Gabapentin 1600mg total daily (1 tablet morning, 1 tablet afternoon, 4 tablets at night) and reports Tizanidine as ineffective.   ER follow up   No records  Last time I saw him was in 23.   Last week lower back swelling- legs hurt.  Had CT scan. He had CT scan, bulging disc. Lumbar spine  He has pain in his neck and back. He did injections.  He had visits with Dr. Ortiz external to [facility] for PCP.  He is a . He said he is having pressure when he urinates and has BM, but he does not have incontience.   He said he has been out of work since last Wednesday.    Cards was Dr. Coronado, left practice  Chronic history of HTN, Anxiety, chronic pain.  Was seeing Dr. Zaragoza at [facility].  REports [facility], with no intervention  Strong family history of CAD.  She ordered ECHO stress not done with [facility] unfortunabtely    He is in digital medicine now for BP control  Not in goal range  Micardis 40  Toprol 25  Last labs in May of 24 with [facility], and liver fucntion with Dr. Ortiz [place].    Last US of liver in 23.  Saw Hepatology with no follow up  No elastrography completed, ordered but he didn't do this.             The following were reviewed at this visit: active problem list, medication list, allergies, family history, social history, and health maintenance.  History:  Past Medical History:   Diagnosis Date    ADHD (attention deficit hyperactivity disorder)     Allergy     Anxiety 1/25/2016    Arthritis 2014    Osteoarthritis    CHF (congestive heart failure)     Degenerative disc disease at L5-S1 level     Depression 1/25/2016    Hypertension     Sciatica of left side 1/31/2017     Past Surgical History:   Procedure Laterality Date    CALCANEAL OSTEOTOMY Left 04/16/2019    Procedure: OSTEOTOMY, CALCANEUS;  Surgeon: Rober Colon DPM;  Location: [facility] OR;  Service: Podiatry;  Laterality: Left;    CIRCUMCISION      FOOT SURGERY      INJECTION OF ANESTHETIC AGENT AROUND MEDIAL BRANCH  NERVES INNERVATING CERVICAL FACET JOINT Bilateral 11/15/2021    Procedure: Block-nerve-medial branch-cervical bilateral C5, 6,7 RN IV sedation;  Surgeon: Ivan Davey MD;  Location: [facility] PAIN MGT;  Service: Pain Management;  Laterality: Bilateral;    INJECTION OF ANESTHETIC AGENT AROUND MEDIAL BRANCH NERVES INNERVATING CERVICAL FACET JOINT Bilateral 6/7/2024    Procedure: Bilateral C4-6 MBB with RN IV sedation;  Surgeon: Lyudmila Leung MD;  [facility];  Service: Pain Management;  Laterality: Bilateral;    INJECTION OF ANESTHETIC AGENT AROUND MEDIAL BRANCH NERVES INNERVATING LUMBAR FACET JOINT Bilateral 03/07/2022    Procedure: bilateral L3-L5 MBB;  Surgeon: Ivan Davey MD;  Location: [facility] PAIN MGT;  Service: Pain Management;  Laterality: Bilateral;    INJECTION OF ANESTHETIC AGENT AROUND MEDIAL BRANCH NERVES INNERVATING LUMBAR FACET JOINT Bilateral 3/22/2023    Procedure: Bilateral L3-5 MBB RN IV Sedation;  Surgeon: Lyudmila Leung MD;  Location: [facility] PAIN [facility]GT;  Service: Pain Management;  Laterality: Bilateral;    INJECTION OF ANESTHETIC AGENT INTO SACROILIAC JOINT Right 11/24/2020    Procedure: Right BLOCK, SACROILIAC JOINT and Right Knee Injection with RN IV sedation;  Surgeon: Ivan Davey MD;  Location: [facility] PAIN [facility]GT;  Service: Pain Management;  Laterality: Right;    INJECTION OF ANESTHETIC AGENT INTO SACROILIAC JOINT Right 04/16/2021    Procedure: Right BLOCK, SACROILIAC JOINT RIght Hip Right GTB RN IV sedation;  Surgeon: Ivan Davey MD;  Location: [facility] PAIN [facility]GT;  Service: Pain Management;  Laterality: Right;    INJECTION OF ANESTHETIC AGENT INTO SACROILIAC JOINT Right 5/10/2023    Procedure: Bilateral SIJ + right IA hip Injection RN IV Sedation;  Surgeon: Lyudmila Leung MD;  [facility]: [facility];  Service: Pain Management;  Laterality: Right;    INJECTION OF ANESTHETIC AGENT INTO SACROILIAC JOINT Right 6/25/2024    Procedure: Bilateral SIJ + right IA hip Injection;  Surgeon: Lyudmila Leung,  MD;  Location: [facility] PAIN MGT;  Service: Pain Management;  Laterality: Right;    INJECTION OF JOINT Right 3/8/2023    Procedure: Right Knee injection with steroid RN IV Sedation;  Surgeon: Lyudmila Leung MD;  Location: [facility] PAIN MGT;  Service: Pain Management;  Laterality: Right;    INJECTION OF JOINT Right 5/10/2023    Procedure: Bilateral SIJ + right IA hip Injection;  Surgeon: Lyudmila Leung MD;  Location: [facility] PAIN MGT;  Service: Pain Management;  Laterality: Right;    INJECTION OF JOINT Right 6/25/2024    Procedure: Bilateral SIJ + right IA hip Injection;  Surgeon: Lyudmila Leung MD;  Location: [facility] PAIN [facility]GT;  Service: Pain Management;  Laterality: Right;    LENGTHENING OF ACHILLES TENDON Left 04/16/2019    Procedure: LENGTHENING, TENDON, ACHILLES;  Surgeon: Rober Colon DPM;  Location: [facility] OR;  Service: Podiatry;  Laterality: Left;    PLACEMENT OF ACELLULAR HUMAN DERMAL ALLOGRAFT Left 02/17/2021    Procedure: APPLICATION, ACELLULAR HUMAN DERMAL ALLOGRAFT;  Surgeon: Rober Colon DPM;  Location: [facility] OR;  Service: Podiatry;  Laterality: Left;    REPAIR OF POSTERIOR TIBIALIS TENDON Left 04/16/2019    Procedure: REPAIR, TENDON, TIBIALIS POSTERIOR;  Surgeon: Rober Colon DPM;  Location: [facility] OR;  Service: Podiatry;  Laterality: Left;    REPAIR OF TENDON OF LOWER EXTREMITY Left 02/17/2021    Procedure: REPAIR, TENDON, LOWER EXTREMITY;  Surgeon: Rober Colon DPM;  Location: [facility] OR;  Service: Podiatry;  Laterality: Left;    TENDON TRANSFER Left 04/16/2019    Procedure: TRANSFER, TENDON;  Surgeon: Rober Colon DPM;  Location: [facility] OR;  Service: Podiatry;  Laterality: Left;     Problem List[1]    Medications:  Medications Ordered Prior to Encounter[2]    Medications have been reviewed and reconciled with patient at this visit.    Exam:  Wt Readings from Last 3 Encounters:   04/17/25 95.7 kg (210 lb 15.7 oz)   09/18/24 97 kg (213 lb 13.5 oz)   08/21/24 97 kg (213 lb 11.8 oz)     Temp Readings from  Last 3 Encounters:   07/24/24 98.2 °F (36.8 °C) (Oral)   07/02/24 96.5 °F (35.8 °C) (Tympanic)   06/25/24 97.8 °F (36.6 °C) (Temporal)     BP Readings from Last 3 Encounters:   04/17/25 124/82   09/18/24 129/77   08/21/24 122/74     Pulse Readings from Last 3 Encounters:   04/17/25 81   09/18/24 83   08/21/24 80     Body mass index is 27.84 kg/m².      Review of Systems   Constitutional: Negative.  Negative for chills and fever.   HENT: Negative.  Negative for congestion, sinus pain and sore throat.    Eyes:  Negative for blurred vision and double vision.   Respiratory:  Negative for cough, sputum production, shortness of breath and wheezing.    Cardiovascular:  Negative for chest pain, palpitations and leg swelling.   Gastrointestinal:  Negative for abdominal pain, constipation, diarrhea, heartburn, nausea and vomiting.   Genitourinary: Negative.    Musculoskeletal:  Positive for back pain, joint pain and neck pain.   Skin: Negative.  Negative for rash.   Neurological: Negative.    Endo/Heme/Allergies: Negative.  Negative for polydipsia. Does not bruise/bleed easily.   Psychiatric/Behavioral:  Negative for depression and substance abuse.      Physical Exam  Nursing note reviewed.   Pulmonary:      Effort: Pulmonary effort is normal. No respiratory distress.   Musculoskeletal:         General: Tenderness present.      Right lower leg: No edema.      Left lower leg: No edema.   Neurological:      Mental Status: He is alert and oriented to person, place, and time.   Psychiatric:         Mood and Affect: Mood normal.         Behavior: Behavior normal.         Thought Content: Thought content normal.         Judgment: Judgment normal.       Physical Exam             Laboratory Reviewed ({Yes)    Lab Results   Component Value Date    WBC 10.82 05/03/2024    HGB 14.8 05/03/2024    HCT 45.5 05/03/2024     05/03/2024    CHOL 140 05/03/2024    TRIG 91 05/03/2024    HDL 43 05/03/2024    ALT 54 (H) 05/03/2024    AST 42  (H) 05/03/2024     05/03/2024    K 4.1 05/03/2024     05/03/2024    CREATININE 1.2 05/03/2024    BUN 11 05/03/2024    CO2 29 05/03/2024    TSH 0.492 11/29/2021    PSA 2.3 05/03/2024    INR 1.0 05/03/2024    HGBA1C 5.5 05/03/2024       Assessment & Plan    I50.9 Congestive heart failure, unspecified HF chronicity, unspecified heart failure type  M50.320 Other cervical disc degeneration, mid-cervical region, unspecified level  M51.26 Other intervertebral disc displacement, lumbar region  R26.2 Difficulty in walking, not elsewhere classified  R39.89 Other symptoms and signs involving the genitourinary system  R53.1 Weakness    Reviewed history of degenerative disc disease, noting previous MRI findings of mild degenerative changes without high-grade spinal canal stenosis in L1, L2, L4, L5.  Unable to review CT images directly.  Evaluated symptoms, including pressure and pain with urination and defecation, but no incontinence.  Initiated steroids due to lack of previous steroid treatment from ER visit.      - Acknowledged the patient's concerns and recommended further evaluation by specialists.  - Advised the patient to consult with their insurance provider to find in-network specialists for comprehensive evaluation and treatment.    FOR SYMPTOM MANAGEMENT, PRESCRIBED:  - - Gabapentin 1600mg daily (1 in the morning, 1 in the afternoon, 4 at night) - Cyclobenzaprine - Corticosteroids    CERVICAL DISC DEGENERATION:  - Reviewed previous MRI showing disc bulging and minor degenerative changes.  - Explained that cervical degenerative disc disease typically does not require surgical intervention unless there is cord compression or nerve impingement, which would require an MRI to confirm.  - Described potential surgical interventions for significant cases, including discectomy with fusion and nerve decompression.  - Noted the patient's ongoing symptoms, including crunching sounds and discomfort when moving the  neck.  - Discussed that bulging discs may resorb or decrease without surgical intervention.    LUMBAR DISC DISPLACEMENT:  - Reviewed previous MRI showing mild degenerative changes without high-grade spinal canal stenosis in L1, L2, L4, L5.  - Evaluated recent CT revealing nerve compression.  - Explained that bulging discs can resorb or decrease without surgical intervention, and surgery is only considered if there's nerve encroachment or impingement.    DIFFICULTY IN WALKING:  - Observed the patient's inability to walk properly and stand up straight.  - Suggested further evaluation if symptoms persist.    GENITOURINARY SYMPTOMS:  - Confirmed that the patient is not incontinent of urine or stool but experiences pressure and pain during elimination.    WEAKNESS:  - Observed the patient's weakness through inability to walk properly and falling incidents.    PAIN MANAGEMENT AND MEDICATIONS:  - Continued Flexeril (cyclobenzaprine).  - Started oral steroids.    REFERRALS AND CONSULTATIONS:  - Recommend consultation with spinal specialists at [facility] or [facility], depending on insurance coverage. He will need to go back ER with any change in Neuro sx as I have no network MD to refer to at [facility], and he is unable to do cash pay at this time.      DISABILITY AND LEGAL ADVICE:  - Explained the process of applying for disability and the potential timeline of 2-3 years.  - Advised against applying for disability while still employed full-time.  - Recommend consulting with a disability  if pursuing disability benefits.    FOLLOW-UP AND EMERGENCY CARE:  - Scheduled follow-up after the patient contacts insurance to determine in-network providers for neurosurgery and orthopedics.  - Advised patient to avoid going to [facility] emergency room due to out-of-network status.  - Instructed to return to ER for further imaging if pain becomes significant.       Monica was seen today for hospital follow up and fall.    Diagnoses and  all orders for this visit:    Fall, subsequent encounter    Cervical spondylosis    Back pain, unspecified back location, unspecified back pain laterality, unspecified chronicity    Lumbar spondylosis    Congestive heart failure, unspecified HF chronicity, unspecified heart failure type  -     CBC Auto Differential; Future  -     Comprehensive Metabolic Panel; Future    Osteoarthritis, unspecified osteoarthritis type, unspecified site    Atherosclerotic heart disease of native coronary artery with other forms of angina pectoris  -     CBC Auto Differential; Future  -     Comprehensive Metabolic Panel; Future  -     Lipid Panel; Future    Elevated liver function tests  -     Comprehensive Metabolic Panel; Future    Essential hypertension  -     Comprehensive Metabolic Panel; Future    Family history of prostate cancer in father  -     PSA, Screening; Future    Non-ischemic cardiomyopathy  -     Comprehensive Metabolic Panel; Future  -     Lipid Panel; Future    Tobacco use disorder    Screen for colon cancer  -     Cologuard Screening (Multitarget Stool DNA); Future  -     Cologuard Screening (Multitarget Stool DNA)    Lumbar facet arthropathy  -     gabapentin (NEURONTIN) 300 MG capsule; Take 1 capsule (300 mg total) by mouth 2 (two) times daily AND 4 capsules (1,200 mg total) every evening. TAKE ONE CAPSULE BY MOUTH TWICE DAILY AND 4 CAPSULES EVERY EVENING.  -     cyclobenzaprine (FLEXERIL) 10 MG tablet; Take 1 tablet (10 mg total) by mouth 3 (three) times daily as needed for Muscle spasms.  -     methylPREDNISolone (MEDROL DOSEPACK) 4 mg tablet; use as directed    Other orders  -     busPIRone (BUSPAR) 30 MG Tab; Take 1 tablet (30 mg total) by mouth 2 (two) times daily.       He was given Morphine in ER                Care Plan/Goals: Reviewed     Follow up: No follow-ups on file.    After visit summary was printed and given to patient upon discharge today.  Patient goals and care plan are included in After Visit  Summary.    This note was generated with the assistance of ambient listening technology. Verbal consent was obtained by the patient and accompanying visitor(s) for the recording of patient appointment to facilitate this note. I attest to having reviewed and edited the generated note for accuracy, though some syntax or spelling errors may persist. Please contact the author of this note for any clarification.            [1]   Patient Active Problem List  Diagnosis    Depression    Intermittent explosive disorder    Sciatica of left side    Osteoarthritis    Essential hypertension    Elevated liver function tests    Chondromalacia of right knee    Fatty liver    Tobacco use disorder    Non-ischemic cardiomyopathy    Coronary artery disease involving native coronary artery of native heart without angina pectoris    Sacroiliac joint dysfunction    Atherosclerotic heart disease of native coronary artery with other forms of angina pectoris    Family history of prostate cancer in father    Chronic pain of right knee    Osteoarthritis of right knee    High triglycerides    Lumbar spondylosis    Cervical spondylosis    Primary osteoarthritis of right hip    Pain of lower extremity    Back pain    Congestive heart failure, unspecified HF chronicity, unspecified heart failure type   [2]   Current Outpatient Medications on File Prior to Visit   Medication Sig Dispense Refill    amLODIPine (NORVASC) 2.5 MG tablet Take 1 tablet (2.5 mg total) by mouth once daily. 90 tablet 1    aspirin (ECOTRIN) 81 MG EC tablet Take 81 mg by mouth once daily.      atorvastatin (LIPITOR) 20 MG tablet Take 1 tablet (20 mg total) by mouth every evening. 90 tablet 3    b complex vitamins capsule Take 1 capsule by mouth once daily.      cetirizine (ZYRTEC) 10 MG tablet Take 10 mg by mouth once daily.      cholecalciferol, vitamin D3, 125 mcg (5,000 unit) Tab Take 5,000 Units by mouth once daily.      meloxicam (MOBIC) 15 MG tablet Take 1 tablet (15 mg  total) by mouth once daily. 90 tablet 3    metoprolol succinate (TOPROL-XL) 25 MG 24 hr tablet Take 1 tablet (25 mg total) by mouth once daily. 90 tablet 3    multivitamin capsule Take 1 capsule by mouth once daily.      omega-3 fatty acids/fish oil (FISH OIL-OMEGA-3 FATTY ACIDS) 300-1,000 mg capsule Take by mouth once daily.      pantoprazole (PROTONIX) 40 MG tablet Take 1 tablet (40 mg total) by mouth once daily. 90 tablet 3    tamsulosin (FLOMAX) 0.4 mg Cap Take 1 capsule (0.4 mg total) by mouth once daily. 90 capsule 3    telmisartan (MICARDIS) 40 MG Tab Take 1 tablet (40 mg total) by mouth once daily. 90 tablet 3    traZODone (DESYREL) 100 MG tablet Take 1/2 to 1 tablet at bedtime as needed for sleep. 90 tablet 3    TUMERIC-GING-OLIVE-OREG-CAPRYL ORAL Take by mouth.      [DISCONTINUED] busPIRone (BUSPAR) 30 MG Tab Take 1 tablet (30 mg total) by mouth 2 (two) times daily. 60 tablet 0    [DISCONTINUED] gabapentin (NEURONTIN) 300 MG capsule Take 1 capsule (300 mg total) by mouth 2 (two) times daily AND 4 capsules (1,200 mg total) every evening. TAKE ONE CAPSULE BY MOUTH TWICE DAILY AND 4 CAPSULES EVERY EVENING. 540 capsule 0    [DISCONTINUED] tiZANidine (ZANAFLEX) 4 MG tablet Take 4 mg by mouth.       No current facility-administered medications on file prior to visit.

## 2025-05-19 ENCOUNTER — RESULTS FOLLOW-UP (OUTPATIENT)
Dept: INTERNAL MEDICINE | Facility: CLINIC | Age: 50
End: 2025-05-19

## (undated) DEVICE — SUT MONOCRYL 4.0 PS2 CP496G

## (undated) DEVICE — Device

## (undated) DEVICE — ELECTRODE REM PLYHSV RETURN 9

## (undated) DEVICE — STOCKINET TUBULAR 1 PLY 6X60IN

## (undated) DEVICE — SEE MEDLINE ITEM 157131

## (undated) DEVICE — NDL SAFETY 25G X 1.5 ECLIPSE

## (undated) DEVICE — APPLICATOR CHLORAPREP ORN 26ML

## (undated) DEVICE — SPONGE DERMACEA GAUZE 4X4

## (undated) DEVICE — GUIDEWIRE ORTHO 1.6X150MM
Type: IMPLANTABLE DEVICE | Site: FOOT | Status: NON-FUNCTIONAL
Removed: 2019-04-16

## (undated) DEVICE — TOURNIQUET SB QC DP 24X4IN

## (undated) DEVICE — SEE MEDLINE ITEM 152523

## (undated) DEVICE — PAD CAST SPECIALIST STRL 4

## (undated) DEVICE — DRAPE PLASTIC U 60X72

## (undated) DEVICE — PAD ABD 8X10 STERILE

## (undated) DEVICE — SUT VICRYL PLUS 4-0 P3 18IN

## (undated) DEVICE — GLOVE BIOGEL SENSOR SZ 6.5

## (undated) DEVICE — BLADE SURG #15 CARBON STEEL

## (undated) DEVICE — SEE MEDLINE ITEM 146298

## (undated) DEVICE — SEE MEDLINE ITEM 146308

## (undated) DEVICE — SEE MEDLINE ITEM 152529

## (undated) DEVICE — SEE MEDLINE ITEM 157027

## (undated) DEVICE — BLADE LONG 31.0MM X 9.0MM

## (undated) DEVICE — GAUZE SPONGE 4X4 12PLY

## (undated) DEVICE — SUT VICRYL PLUS 3-0 PS2 18

## (undated) DEVICE — COVER OVERHEAD SURG LT BLUE

## (undated) DEVICE — SEE MEDLINE ITEM 157117

## (undated) DEVICE — SUT ETHIBOND EXCEL 2 V37 30

## (undated) DEVICE — GLOVE 7.0 PROTEXIS PI BLUE

## (undated) DEVICE — SYR 10CC LUER LOCK

## (undated) DEVICE — DRAPE C-ARMOR EQUIPMENT COVER

## (undated) DEVICE — MANIFOLD 4 PORT

## (undated) DEVICE — SUT VICRYL 2-0 SH VCP317H

## (undated) DEVICE — SUT VICRYL 2-0 27 CT-1

## (undated) DEVICE — SEE MEDLINE ITEM 152622

## (undated) DEVICE — SUT ETHILON 3/0 18IN PS-1

## (undated) DEVICE — DRESSING N ADH OIL EMUL 3X3

## (undated) DEVICE — DRAPE MOBILE C-ARM

## (undated) DEVICE — GAUZE BANDAGE CONFORM 2X75IN

## (undated) DEVICE — GLOVE SURG PLYSPHRN ORTH SZ7.5

## (undated) DEVICE — SUT VICRYL PLUS 0 CT1 36IN

## (undated) DEVICE — UNDERGLOVES BIOGEL PI SZ 7 LF

## (undated) DEVICE — SEE MEDLINE ITEM 146231

## (undated) DEVICE — SUT VICRYL 4-0 ANTIBACT

## (undated) DEVICE — SUT ETHILON 3-0 PS2 18 BLK

## (undated) DEVICE — PIN STEINMANN SMOOTH 2.5X100MM
Type: IMPLANTABLE DEVICE | Site: FOOT | Status: NON-FUNCTIONAL
Removed: 2019-04-16

## (undated) DEVICE — DRESSING XEROFORM FOIL PK 1X8